# Patient Record
Sex: MALE | Race: WHITE | Employment: OTHER | URBAN - METROPOLITAN AREA
[De-identification: names, ages, dates, MRNs, and addresses within clinical notes are randomized per-mention and may not be internally consistent; named-entity substitution may affect disease eponyms.]

---

## 2019-03-25 ENCOUNTER — APPOINTMENT (OUTPATIENT)
Dept: ULTRASOUND IMAGING | Age: 54
End: 2019-03-25
Payer: MEDICARE

## 2019-03-25 ENCOUNTER — HOSPITAL ENCOUNTER (EMERGENCY)
Age: 54
Discharge: HOME | End: 2019-03-25
Payer: MEDICARE

## 2019-03-25 ENCOUNTER — APPOINTMENT (OUTPATIENT)
Dept: GENERAL RADIOLOGY | Age: 54
End: 2019-03-25
Payer: MEDICARE

## 2019-03-25 VITALS
HEIGHT: 72 IN | TEMPERATURE: 98.1 F | HEART RATE: 86 BPM | DIASTOLIC BLOOD PRESSURE: 94 MMHG | OXYGEN SATURATION: 98 % | BODY MASS INDEX: 33.18 KG/M2 | WEIGHT: 245 LBS | RESPIRATION RATE: 14 BRPM | SYSTOLIC BLOOD PRESSURE: 137 MMHG

## 2019-03-25 DIAGNOSIS — M79.604 BILATERAL LEG PAIN: Primary | ICD-10-CM

## 2019-03-25 DIAGNOSIS — M79.605 BILATERAL LEG PAIN: Primary | ICD-10-CM

## 2019-03-25 DIAGNOSIS — M54.50 ACUTE LOW BACK PAIN WITHOUT SCIATICA, UNSPECIFIED BACK PAIN LATERALITY: ICD-10-CM

## 2019-03-25 LAB
ALBUMIN SERPL-MCNC: 4.2 G/DL (ref 3.5–5.2)
ALP BLD-CCNC: 57 U/L (ref 40–129)
ALT SERPL-CCNC: 15 U/L (ref 0–40)
ANION GAP SERPL CALCULATED.3IONS-SCNC: 10 MMOL/L (ref 7–16)
AST SERPL-CCNC: 13 U/L (ref 0–39)
BILIRUB SERPL-MCNC: 0.3 MG/DL (ref 0–1.2)
BUN BLDV-MCNC: 19 MG/DL (ref 6–20)
CALCIUM SERPL-MCNC: 9.8 MG/DL (ref 8.6–10.2)
CHLORIDE BLD-SCNC: 99 MMOL/L (ref 98–107)
CO2: 29 MMOL/L (ref 22–29)
CREAT SERPL-MCNC: 1.1 MG/DL (ref 0.7–1.2)
GFR AFRICAN AMERICAN: >60
GFR NON-AFRICAN AMERICAN: >60 ML/MIN/1.73
GLUCOSE BLD-MCNC: 105 MG/DL (ref 74–99)
HCT VFR BLD CALC: 43.7 % (ref 37–54)
HEMOGLOBIN: 14.4 G/DL (ref 12.5–16.5)
MCH RBC QN AUTO: 31.6 PG (ref 26–35)
MCHC RBC AUTO-ENTMCNC: 33 % (ref 32–34.5)
MCV RBC AUTO: 96 FL (ref 80–99.9)
PDW BLD-RTO: 12.9 FL (ref 11.5–15)
PLATELET # BLD: 352 E9/L (ref 130–450)
PMV BLD AUTO: 9.7 FL (ref 7–12)
POTASSIUM SERPL-SCNC: 4.9 MMOL/L (ref 3.5–5)
RBC # BLD: 4.55 E12/L (ref 3.8–5.8)
SODIUM BLD-SCNC: 138 MMOL/L (ref 132–146)
TOTAL PROTEIN: 6.9 G/DL (ref 6.4–8.3)
WBC # BLD: 14.3 E9/L (ref 4.5–11.5)

## 2019-03-25 PROCEDURE — 72110 X-RAY EXAM L-2 SPINE 4/>VWS: CPT

## 2019-03-25 PROCEDURE — 99284 EMERGENCY DEPT VISIT MOD MDM: CPT

## 2019-03-25 PROCEDURE — 80053 COMPREHEN METABOLIC PANEL: CPT

## 2019-03-25 PROCEDURE — 36415 COLL VENOUS BLD VENIPUNCTURE: CPT

## 2019-03-25 PROCEDURE — 6370000000 HC RX 637 (ALT 250 FOR IP): Performed by: NURSE PRACTITIONER

## 2019-03-25 PROCEDURE — 85027 COMPLETE CBC AUTOMATED: CPT

## 2019-03-25 PROCEDURE — 73562 X-RAY EXAM OF KNEE 3: CPT

## 2019-03-25 PROCEDURE — 73564 X-RAY EXAM KNEE 4 OR MORE: CPT

## 2019-03-25 PROCEDURE — 93970 EXTREMITY STUDY: CPT

## 2019-03-25 RX ORDER — HYDROCODONE BITARTRATE AND ACETAMINOPHEN 5; 325 MG/1; MG/1
1 TABLET ORAL ONCE
Status: DISCONTINUED | OUTPATIENT
Start: 2019-03-25 | End: 2019-03-25

## 2019-03-25 RX ORDER — NAPROXEN 500 MG/1
500 TABLET ORAL 2 TIMES DAILY PRN
Qty: 28 TABLET | Refills: 0 | Status: SHIPPED | OUTPATIENT
Start: 2019-03-25 | End: 2019-04-16 | Stop reason: ALTCHOICE

## 2019-03-25 RX ORDER — HYDROCODONE BITARTRATE AND ACETAMINOPHEN 5; 325 MG/1; MG/1
1 TABLET ORAL ONCE
Status: COMPLETED | OUTPATIENT
Start: 2019-03-25 | End: 2019-03-25

## 2019-03-25 RX ADMIN — HYDROCODONE BITARTRATE AND ACETAMINOPHEN 1 TABLET: 5; 325 TABLET ORAL at 10:32

## 2019-03-25 ASSESSMENT — PAIN DESCRIPTION - ORIENTATION: ORIENTATION: LEFT;RIGHT

## 2019-03-25 ASSESSMENT — PAIN SCALES - GENERAL
PAINLEVEL_OUTOF10: 10
PAINLEVEL_OUTOF10: 10

## 2019-03-25 ASSESSMENT — PAIN DESCRIPTION - FREQUENCY: FREQUENCY: CONTINUOUS

## 2019-03-25 ASSESSMENT — PAIN DESCRIPTION - PROGRESSION: CLINICAL_PROGRESSION: GRADUALLY WORSENING

## 2019-03-25 ASSESSMENT — PAIN DESCRIPTION - DESCRIPTORS: DESCRIPTORS: ACHING

## 2019-03-25 ASSESSMENT — PAIN DESCRIPTION - PAIN TYPE: TYPE: ACUTE PAIN

## 2019-03-25 ASSESSMENT — PAIN DESCRIPTION - LOCATION: LOCATION: LEG

## 2019-03-28 ENCOUNTER — OFFICE VISIT (OUTPATIENT)
Dept: INTERNAL MEDICINE | Age: 54
End: 2019-03-28
Payer: MEDICARE

## 2019-03-28 ENCOUNTER — TELEPHONE (OUTPATIENT)
Dept: INTERNAL MEDICINE | Age: 54
End: 2019-03-28

## 2019-03-28 VITALS
RESPIRATION RATE: 18 BRPM | WEIGHT: 250 LBS | HEART RATE: 87 BPM | DIASTOLIC BLOOD PRESSURE: 78 MMHG | HEIGHT: 72 IN | BODY MASS INDEX: 33.86 KG/M2 | TEMPERATURE: 97.4 F | SYSTOLIC BLOOD PRESSURE: 130 MMHG

## 2019-03-28 DIAGNOSIS — J42 CHRONIC BRONCHITIS, UNSPECIFIED CHRONIC BRONCHITIS TYPE (HCC): ICD-10-CM

## 2019-03-28 DIAGNOSIS — M25.461 KNEE EFFUSION, RIGHT: Primary | ICD-10-CM

## 2019-03-28 PROCEDURE — G8417 CALC BMI ABV UP PARAM F/U: HCPCS | Performed by: INTERNAL MEDICINE

## 2019-03-28 PROCEDURE — G8427 DOCREV CUR MEDS BY ELIG CLIN: HCPCS | Performed by: INTERNAL MEDICINE

## 2019-03-28 PROCEDURE — 99213 OFFICE O/P EST LOW 20 MIN: CPT | Performed by: INTERNAL MEDICINE

## 2019-03-28 PROCEDURE — 99203 OFFICE O/P NEW LOW 30 MIN: CPT | Performed by: INTERNAL MEDICINE

## 2019-03-28 PROCEDURE — 3017F COLORECTAL CA SCREEN DOC REV: CPT | Performed by: INTERNAL MEDICINE

## 2019-03-28 PROCEDURE — G8484 FLU IMMUNIZE NO ADMIN: HCPCS | Performed by: INTERNAL MEDICINE

## 2019-03-28 PROCEDURE — G8926 SPIRO NO PERF OR DOC: HCPCS | Performed by: INTERNAL MEDICINE

## 2019-03-28 PROCEDURE — 4004F PT TOBACCO SCREEN RCVD TLK: CPT | Performed by: INTERNAL MEDICINE

## 2019-03-28 PROCEDURE — 3023F SPIROM DOC REV: CPT | Performed by: INTERNAL MEDICINE

## 2019-03-28 RX ORDER — COLCHICINE 0.6 MG/1
0.6 TABLET ORAL 2 TIMES DAILY
Qty: 28 TABLET | Refills: 0 | Status: SHIPPED | OUTPATIENT
Start: 2019-03-28 | End: 2019-04-25 | Stop reason: CLARIF

## 2019-03-28 RX ORDER — TRAZODONE HYDROCHLORIDE 100 MG/1
100 TABLET ORAL NIGHTLY
COMMUNITY
End: 2019-08-07 | Stop reason: SDUPTHER

## 2019-03-28 RX ORDER — ALBUTEROL SULFATE 90 UG/1
1-2 AEROSOL, METERED RESPIRATORY (INHALATION) 2 TIMES DAILY
Status: ON HOLD | COMMUNITY
Start: 2014-10-14 | End: 2020-03-11 | Stop reason: SDUPTHER

## 2019-03-28 RX ORDER — DULOXETIN HYDROCHLORIDE 60 MG/1
60 CAPSULE, DELAYED RELEASE ORAL DAILY
COMMUNITY
End: 2019-08-07 | Stop reason: SDUPTHER

## 2019-03-28 RX ORDER — GABAPENTIN 400 MG/1
400 CAPSULE ORAL 3 TIMES DAILY
COMMUNITY
End: 2019-05-13 | Stop reason: DRUGHIGH

## 2019-03-28 ASSESSMENT — PATIENT HEALTH QUESTIONNAIRE - PHQ9
SUM OF ALL RESPONSES TO PHQ9 QUESTIONS 1 & 2: 0
SUM OF ALL RESPONSES TO PHQ QUESTIONS 1-9: 0
2. FEELING DOWN, DEPRESSED OR HOPELESS: 0
SUM OF ALL RESPONSES TO PHQ QUESTIONS 1-9: 0
1. LITTLE INTEREST OR PLEASURE IN DOING THINGS: 0

## 2019-04-02 ENCOUNTER — OFFICE VISIT (OUTPATIENT)
Dept: ORTHOPEDIC SURGERY | Age: 54
End: 2019-04-02
Payer: MEDICARE

## 2019-04-02 VITALS
SYSTOLIC BLOOD PRESSURE: 124 MMHG | HEIGHT: 72 IN | TEMPERATURE: 97.1 F | BODY MASS INDEX: 33.18 KG/M2 | WEIGHT: 245 LBS | HEART RATE: 82 BPM | DIASTOLIC BLOOD PRESSURE: 88 MMHG

## 2019-04-02 DIAGNOSIS — M25.562 CHRONIC PAIN OF BOTH KNEES: Primary | ICD-10-CM

## 2019-04-02 DIAGNOSIS — M25.561 CHRONIC PAIN OF BOTH KNEES: Primary | ICD-10-CM

## 2019-04-02 DIAGNOSIS — M48.062 SPINAL STENOSIS OF LUMBAR REGION WITH NEUROGENIC CLAUDICATION: ICD-10-CM

## 2019-04-02 DIAGNOSIS — G89.29 CHRONIC PAIN OF BOTH KNEES: Primary | ICD-10-CM

## 2019-04-02 PROCEDURE — 4004F PT TOBACCO SCREEN RCVD TLK: CPT | Performed by: ORTHOPAEDIC SURGERY

## 2019-04-02 PROCEDURE — G8427 DOCREV CUR MEDS BY ELIG CLIN: HCPCS | Performed by: ORTHOPAEDIC SURGERY

## 2019-04-02 PROCEDURE — 3017F COLORECTAL CA SCREEN DOC REV: CPT | Performed by: ORTHOPAEDIC SURGERY

## 2019-04-02 PROCEDURE — 99203 OFFICE O/P NEW LOW 30 MIN: CPT | Performed by: ORTHOPAEDIC SURGERY

## 2019-04-02 PROCEDURE — G8417 CALC BMI ABV UP PARAM F/U: HCPCS | Performed by: ORTHOPAEDIC SURGERY

## 2019-04-02 NOTE — PROGRESS NOTES
Chief Complaint:   Chief Complaint   Patient presents with    Knee Pain     Bilateral knee pain, right knee is swollen and painful, pt. states its worse than left knee. Pt. states this pain started 3 wks ago. Xrays 3/25/19 [facility].  Leg Pain     Bilateral lower leg pain, sometimes travels into thighs x 3 wks. No xrays. HPI     Veronica Dai is a 48 y.o. male, who presents with a few week history of nonspecific bilateral knee pain predominantly anterior compartment associated with some crepitus on the right, no history of injury. He also complains of radicular type symptoms from the low back down the lateral and posterior aspect of the upper leg into the posterior aspect of the lower leg. The symptoms are aggravated especially by standing and walking. Progressive knee pain interfering with his abilities especially on stairs and transfers. Patient does have history of some type of chronic back problems with \"a ruptured disc\". He recently moved to this area from [place] he is a former  on custodial disability due to history of kidney and lung cancer, has undergone nephrectomy and partial pneumonectomy for this in the past. He does continue to smoke. Activity levels are sedentary. No fever chills sweats or other constitutional symptoms of weight loss malaise or anorexia. Allergies; medications; past medical, surgical, family, and social history; and problem list have been reviewed today and updated as indicated in this encounter - see below following Ortho specifics. Musculoskeletal: Upper extremities are grossly intact, leg lengths are equal and hip motion is painless bilaterally. Bilateral knees show no deformity, there is anterior compartment synovitis and maybe a trace benign effusion in the right knee with minimal retropatellar crepitus, no mediolateral or AP laxity, negative Rosa Maria's minimal joint line pain or tenderness.  Straight leg raising is grossly positive bilaterally for sciatic type symptoms down the back of both legs into the feet. No objective motor sensory deficits, quadriceps reflexes are brisk bilaterally, Achilles reflexes absent bilaterally. Radiologic Studies: Weightbearing x-rays of the knees were obtained today, joint spaces are preserved there is no deformity sclerosis osteophyte formation, these are interpreted as normal.    ASSESSMENT/PLAN:    Yocasta was seen today for knee pain and leg pain. Diagnoses and all orders for this visit:    Chronic pain of both knees  -     XR Knee Bilateral Standing    Spinal stenosis of lumbar region with neurogenic claudication  -     Ambulatory referral to [address]     Treatment alternatives were reviewed including medical and physical therapies, injections, and surgical options, expected risks benefits and likely outcome of each were discussed in detail. My clinical impression is probably spinal stenosis with sciatic symptoms, I find no structural indication for problems at the knees. I think his knee pain is most likely functional nature related to his deconditioning and probable spinal stenosis. He was referred for evaluation in this regard by physiatry questions were asked and answered he will follow-up here if needed. Return if symptoms worsen or fail to improve. Chris Benitez MD    4/2/2019  4:06 PM      There is no problem list on file for this patient.       Past Medical History:   Diagnosis Date    Difficulty sleeping        Past Surgical History:   Procedure Laterality Date    LUNG SURGERY Right     part of the right     TOTAL NEPHRECTOMY Right        Current Outpatient Medications   Medication Sig Dispense Refill    albuterol sulfate HFA (PROVENTIL HFA) 108 (90 Base) MCG/ACT inhaler Inhale 1-2 puffs into the lungs      fluticasone-salmeterol (ADVAIR DISKUS) 250-50 MCG/DOSE AEPB Inhale into the lungs      gabapentin (NEURONTIN) 400 MG capsule Take 400 mg by mouth 3 times daily.      DULoxetine (CYMBALTA) 60 MG extended release capsule Take 60 mg by mouth daily      traZODone (DESYREL) 100 MG tablet Take 100 mg by mouth nightly      colchicine (COLCRYS) 0.6 MG tablet Take 1 tablet by mouth 2 times daily for 14 days 28 tablet 0    naproxen (NAPROSYN) 500 MG tablet Take 1 tablet by mouth 2 times daily as needed for Pain 28 tablet 0     No current facility-administered medications for this visit. No Known Allergies    Social History     Socioeconomic History    Marital status: Single     Spouse name: None    Number of children: None    Years of education: None    Highest education level: None   Occupational History    None   Social Needs    Financial resource strain: None    Food insecurity:     Worry: None     Inability: None    Transportation needs:     Medical: None     Non-medical: None   Tobacco Use    Smoking status: Current Every Day Smoker     Packs/day: 1.00     Types: Cigarettes    Smokeless tobacco: Never Used   Substance and Sexual Activity    Alcohol use: Not Currently    Drug use: Never    Sexual activity: None   Lifestyle    Physical activity:     Days per week: None     Minutes per session: None    Stress: None   Relationships    Social connections:     Talks on phone: None     Gets together: None     Attends Shinto service: None     Active member of club or organization: None     Attends meetings of clubs or organizations: None     Relationship status: None    Intimate partner violence:     Fear of current or ex partner: None     Emotionally abused: None     Physically abused: None     Forced sexual activity: None   Other Topics Concern    None   Social History Narrative    None       No family history on file. Review of Systems  As follows except as previously noted in HPI:  Constitutional: Negative for chills, diaphoresis, fatigue, fever and unexpected weight change.    Respiratory: Negative for cough, shortness of breath and wheezing. Cardiovascular: Negative for chest pain and palpitations. Neurological: Negative for dizziness, syncope, cephalgia. GI / : negative  Musculoskeletal: see HPI       Objective:   Physical Exam   Constitutional: Oriented to person, place, and time. and appears well-developed and well-nourished. :   Head: Normocephalic and atraumatic. Eyes: EOM are normal.   Neck: Neck supple. Cardiovascular: Normal rate and regular rhythm. Pulmonary/Chest: Effort normal. No stridor. No respiratory distress, no wheezes. Abdominal:  No abnormal distension. Neurological: Alert and oriented to person, place, and time. Skin: Skin is warm and dry. Psychiatric: Normal mood and affect.  Behavior is normal. Thought content normal.

## 2019-04-16 ENCOUNTER — OFFICE VISIT (OUTPATIENT)
Dept: INTERNAL MEDICINE | Age: 54
End: 2019-04-16
Payer: MEDICARE

## 2019-04-16 VITALS
HEIGHT: 72 IN | BODY MASS INDEX: 33.18 KG/M2 | DIASTOLIC BLOOD PRESSURE: 76 MMHG | TEMPERATURE: 98 F | SYSTOLIC BLOOD PRESSURE: 120 MMHG | RESPIRATION RATE: 16 BRPM | HEART RATE: 90 BPM | WEIGHT: 245 LBS

## 2019-04-16 DIAGNOSIS — M25.562 BILATERAL CHRONIC KNEE PAIN: Primary | ICD-10-CM

## 2019-04-16 DIAGNOSIS — M48.062 SPINAL STENOSIS OF LUMBAR REGION WITH NEUROGENIC CLAUDICATION: ICD-10-CM

## 2019-04-16 DIAGNOSIS — G89.29 BILATERAL CHRONIC KNEE PAIN: Primary | ICD-10-CM

## 2019-04-16 DIAGNOSIS — M25.561 BILATERAL CHRONIC KNEE PAIN: Primary | ICD-10-CM

## 2019-04-16 PROCEDURE — 99213 OFFICE O/P EST LOW 20 MIN: CPT | Performed by: INTERNAL MEDICINE

## 2019-04-16 PROCEDURE — G8417 CALC BMI ABV UP PARAM F/U: HCPCS | Performed by: INTERNAL MEDICINE

## 2019-04-16 PROCEDURE — 99212 OFFICE O/P EST SF 10 MIN: CPT | Performed by: INTERNAL MEDICINE

## 2019-04-16 PROCEDURE — G8427 DOCREV CUR MEDS BY ELIG CLIN: HCPCS | Performed by: INTERNAL MEDICINE

## 2019-04-16 PROCEDURE — 3017F COLORECTAL CA SCREEN DOC REV: CPT | Performed by: INTERNAL MEDICINE

## 2019-04-16 PROCEDURE — 4004F PT TOBACCO SCREEN RCVD TLK: CPT | Performed by: INTERNAL MEDICINE

## 2019-04-16 RX ORDER — HYDROCORTISONE 0.5 %
CREAM (GRAM) TOPICAL 3 TIMES DAILY
Qty: 2 BOTTLE | Refills: 2 | Status: SHIPPED | OUTPATIENT
Start: 2019-04-16 | End: 2019-10-16

## 2019-04-16 RX ORDER — IBUPROFEN 600 MG/1
600 TABLET ORAL 4 TIMES DAILY PRN
Qty: 120 TABLET | Refills: 2 | Status: SHIPPED | OUTPATIENT
Start: 2019-04-16 | End: 2019-08-07 | Stop reason: SDUPTHER

## 2019-04-16 RX ORDER — OMEPRAZOLE 40 MG/1
CAPSULE, DELAYED RELEASE ORAL
Refills: 0 | COMMUNITY
Start: 2019-04-01 | End: 2019-08-07 | Stop reason: SDUPTHER

## 2019-04-16 ASSESSMENT — ENCOUNTER SYMPTOMS
WHEEZING: 0
VOMITING: 0
EYE DISCHARGE: 0
STRIDOR: 0
APNEA: 0
ANAL BLEEDING: 0
ABDOMINAL PAIN: 0
EYE ITCHING: 0
CHEST TIGHTNESS: 0
NAUSEA: 0
ABDOMINAL DISTENTION: 0
SINUS PRESSURE: 0
SINUS PAIN: 0
EYE PAIN: 0
RHINORRHEA: 0
BACK PAIN: 1
COUGH: 0
SORE THROAT: 0
CONSTIPATION: 0
SHORTNESS OF BREATH: 0
CHOKING: 0
DIARRHEA: 0
EYE REDNESS: 0

## 2019-04-16 NOTE — PROGRESS NOTES
Medical record release signed for [facility] in [facility] [place] [facility]  Discharge instructions given. Patient verbalizes understanding.

## 2019-04-16 NOTE — PROGRESS NOTES
[facility]  InternalMedicine Residency Program  ACC Note      SUBJECTIVE:  CC: had concerns including Knee Pain (bilateral); Leg Swelling; Difficulty Walking; and Nicotine Dependence. HPI:Yocasta Ardon presented to the [facility] for a 2 week follow up visit. He was here 2 weeks ago as an ER follow up. He was seen in the ER for bilateral leg pain of a few weeks duration. Today, he is still complaining of b/l knee pain, occasional swelling, worse on the R. Pain is 10.5/10; aching, constant. He at times feels the pain originates at the back. Associated difficulty with ambulation, with occasional knee knocking. States Appt with PMR is in July. Naproxen does not help. Per wife, the family did some moving and heavy lifting about 6 weeks ago Prior to pain onset      Bilateral knee pain right >> left  - He had a negative lower extremity US, XR negative for any acute pathology. His LS spine XR reveals age-indeterminate anterior wedging/compression deformities at T11, T12 and L1 with loss of anterior vertebral body height ranging from 5-10%. - Remote history of Gout  - Orthopedic referral -  My clinical impression is probably spinal stenosis with sciatic symptoms, I find no structural indication for problems at the knees. I think his knee pain is most likely functional nature related to his deconditioning and probable spinal stenosis. Return if symptoms worsen or fail to improve. He was referred to PM&R, however, appointment is in July. - Per patient, the pain is unbearable  - patient already on Gabapentin    COPD  - He has a history of COPD- no PFTS on file  - had R partial pneumonectomy for lung cancer about 5 years ago,  -  He is a current smoker.  He has about 80PY smoking history and still smokes a pack per day currently (cut down from 2 packs per day)  - Will obtain medical records  - Referral to pulmonology - appt in August  -On Albuterol and Advair    Renal Cancer  - also possible hx of renal cell cancer with right nephrectomy in 2005. - Will obtain medical records    T11-T12 wedge compression fracture  - Need DEXA scan in future appt   - Claims he had MRI in the past      Review of Systems   Constitutional: Negative for activity change, appetite change, chills, diaphoresis, fatigue and fever. HENT: Negative for congestion, nosebleeds, postnasal drip, rhinorrhea, sinus pressure, sinus pain, sneezing, sore throat and tinnitus. Eyes: Negative for pain, discharge, redness and itching. Respiratory: Negative for apnea, cough, choking, chest tightness, shortness of breath, wheezing and stridor. Cardiovascular: Negative for chest pain, palpitations and leg swelling. Gastrointestinal: Negative for abdominal distention, abdominal pain, anal bleeding, constipation, diarrhea, nausea and vomiting. Endocrine: Negative for cold intolerance and heat intolerance. Genitourinary: Negative for difficulty urinating, dysuria, enuresis, flank pain, frequency and genital sores. Musculoskeletal: Positive for arthralgias, back pain and joint swelling. Negative for gait problem, myalgias and neck pain. Difficulty with ambulation   Skin: Negative for rash. Allergic/Immunologic: Negative for environmental allergies. Neurological: Negative for headaches. Hematological: Does not bruise/bleed easily. Psychiatric/Behavioral: Negative for suicidal ideas.        Outpatient Medications Marked as Taking for the 4/16/19 encounter (Office Visit) with Wally Martinez MD   Medication Sig Dispense Refill    ibuprofen (ADVIL;MOTRIN) 600 MG tablet Take 1 tablet by mouth 4 times daily as needed for Pain Please take with meals 120 tablet 2    methyl salicylate-menthol (RODNEY GARCIA GREASELESS) 10-15 % CREA Apply topically three times daily 2 Bottle 2    albuterol sulfate HFA (PROVENTIL HFA) 108 (90 Base) MCG/ACT inhaler Inhale 1-2 puffs into the lungs      fluticasone-salmeterol (ADVAIR DISKUS) 250-50 affect. His behavior is normal. Judgment and thought content normal.     PLAN:  1. Spinal stenosis of lumbar region with neurogenic claudication  - ibuprofen (ADVIL;MOTRIN) 600 MG tablet; Take 1 tablet by mouth 4 times daily as needed for Pain Please take with meals  Dispense: 120 tablet; Refill: 2  [facility], [address] [place] [address] records - patient may need repeat MRI  - PM&R follow up in July    2. Bilateral chronic knee pain  - ibuprofen (ADVIL;MOTRIN) 600 MG tablet; Take 1 tablet by mouth 4 times daily as needed for Pain Please take with meals  Dispense: 120 tablet; Refill: 2  - Barry Lazo MD, Pain Medicine, [facility] - Physical Therapy, [address]   - methyl salicylate-menthol (RODNEY GARCIA GREASELESS) 10-15 % CREA; Apply topically three times daily  Dispense: 2 Bottle;  Refill: 2  - Switch antinflammatory - Naproxen changed to Ibuprofen  - Stop colchicine    RTC in one month  I have reviewed my findings and recommendations with Louie Talbot and Dr Michael Robertson MD PGY-1   4/16/2019 9:00 AM

## 2019-04-16 NOTE — PATIENT INSTRUCTIONS
Thank you for coming to your follow up appointment   Please take your medications as directed and keep your follow up appointment  Call our office if you have any questions or concerns at [phone]    Referrals have been made to  Physical Therapy and pain medicine. If you do not hear from the office in 1 week, please call the number listed.     Maria Guadalupe Chowdhury MD

## 2019-04-17 ENCOUNTER — TELEPHONE (OUTPATIENT)
Dept: INTERNAL MEDICINE | Age: 54
End: 2019-04-17

## 2019-04-19 ENCOUNTER — HOSPITAL ENCOUNTER (EMERGENCY)
Age: 54
Discharge: HOME | End: 2019-04-19
Payer: MEDICARE

## 2019-04-19 VITALS
BODY MASS INDEX: 33.59 KG/M2 | HEART RATE: 98 BPM | WEIGHT: 248 LBS | DIASTOLIC BLOOD PRESSURE: 84 MMHG | RESPIRATION RATE: 16 BRPM | SYSTOLIC BLOOD PRESSURE: 136 MMHG | HEIGHT: 72 IN | TEMPERATURE: 97.6 F | OXYGEN SATURATION: 100 %

## 2019-04-19 DIAGNOSIS — G89.29 ACUTE EXACERBATION OF CHRONIC LOW BACK PAIN: Primary | ICD-10-CM

## 2019-04-19 DIAGNOSIS — M54.50 ACUTE EXACERBATION OF CHRONIC LOW BACK PAIN: Primary | ICD-10-CM

## 2019-04-19 PROCEDURE — 6370000000 HC RX 637 (ALT 250 FOR IP): Performed by: NURSE PRACTITIONER

## 2019-04-19 PROCEDURE — 96372 THER/PROPH/DIAG INJ SC/IM: CPT

## 2019-04-19 PROCEDURE — 99282 EMERGENCY DEPT VISIT SF MDM: CPT

## 2019-04-19 PROCEDURE — 6360000002 HC RX W HCPCS: Performed by: NURSE PRACTITIONER

## 2019-04-19 RX ORDER — DEXAMETHASONE SODIUM PHOSPHATE 10 MG/ML
8 INJECTION INTRAMUSCULAR; INTRAVENOUS ONCE
Status: COMPLETED | OUTPATIENT
Start: 2019-04-19 | End: 2019-04-19

## 2019-04-19 RX ORDER — KETOROLAC TROMETHAMINE 30 MG/ML
30 INJECTION, SOLUTION INTRAMUSCULAR; INTRAVENOUS ONCE
Status: COMPLETED | OUTPATIENT
Start: 2019-04-19 | End: 2019-04-19

## 2019-04-19 RX ORDER — CYCLOBENZAPRINE HCL 10 MG
10 TABLET ORAL ONCE
Status: COMPLETED | OUTPATIENT
Start: 2019-04-19 | End: 2019-04-19

## 2019-04-19 RX ORDER — HYDROCODONE BITARTRATE AND ACETAMINOPHEN 5; 325 MG/1; MG/1
1 TABLET ORAL EVERY 6 HOURS PRN
Qty: 12 TABLET | Refills: 0 | Status: SHIPPED | OUTPATIENT
Start: 2019-04-19 | End: 2019-04-22

## 2019-04-19 RX ORDER — METHYLPREDNISOLONE 4 MG/1
TABLET ORAL
Qty: 1 KIT | Refills: 0 | Status: SHIPPED | OUTPATIENT
Start: 2019-04-19 | End: 2019-04-25 | Stop reason: CLARIF

## 2019-04-19 RX ORDER — CYCLOBENZAPRINE HCL 5 MG
5 TABLET ORAL 3 TIMES DAILY PRN
Qty: 12 TABLET | Refills: 0 | Status: SHIPPED | OUTPATIENT
Start: 2019-04-19 | End: 2019-04-25 | Stop reason: SDUPTHER

## 2019-04-19 RX ADMIN — CYCLOBENZAPRINE HYDROCHLORIDE 10 MG: 10 TABLET, FILM COATED ORAL at 10:12

## 2019-04-19 RX ADMIN — KETOROLAC TROMETHAMINE 30 MG: 30 INJECTION, SOLUTION INTRAMUSCULAR; INTRAVENOUS at 10:12

## 2019-04-19 RX ADMIN — DEXAMETHASONE SODIUM PHOSPHATE 8 MG: 10 INJECTION INTRAMUSCULAR; INTRAVENOUS at 10:11

## 2019-04-19 ASSESSMENT — PAIN SCALES - GENERAL
PAINLEVEL_OUTOF10: 10
PAINLEVEL_OUTOF10: 10

## 2019-04-19 ASSESSMENT — PAIN DESCRIPTION - PAIN TYPE: TYPE: ACUTE PAIN

## 2019-04-19 ASSESSMENT — PAIN DESCRIPTION - ORIENTATION: ORIENTATION: RIGHT

## 2019-04-19 ASSESSMENT — PAIN DESCRIPTION - LOCATION: LOCATION: LEG;HIP

## 2019-04-19 ASSESSMENT — PAIN DESCRIPTION - FREQUENCY: FREQUENCY: CONTINUOUS

## 2019-04-19 ASSESSMENT — PAIN DESCRIPTION - DESCRIPTORS: DESCRIPTORS: ACHING

## 2019-04-20 NOTE — ED PROVIDER NOTES
[facility]     Department of Emergency Medicine   ED  Provider Note  Admit Date/RoomTime: 4/19/2019  9:31 AM  ED Room: /[facility]-2  Chief Complaint   Leg Swelling (from hips down, reports pain and swelling ongoing for weeks, getting worse, denies injury)    History of Present Illness   Source of history provided by:  patient. History/Exam Limitations: none. Catalino Simms is a 48 y.o. old male with a prior history of recurrent self limited episodes of low back pain in the past, previous osteoarthritis of lumbar spine and previous herniated disc, presents to the emergency department by private vehicle, for acute-on-chronic, aching bilateral lower lumbar spine pain with radiation, to both thighs, for several month(s) prior to arrival.  The original pain was caused by twisting. There has been no history of recent injury. Since onset the symptoms have been intermittent and mild in severity. He denies any of the following: bladder incontinence, bladder urgency, bowel incontinence, bowel urgency or sacral numbness. Associated Signs & Symptoms: none. The pain is aggraveated by movement and relieved by nothing. There has been no abdominal pain, cramping, vomiting, diarrhea, fever, chills, sweats, headache or arthralgias. He is not enrolled in pain management program.since states that he is seeing his primary care physician multiple times for his back pain. He states that he is scheduled for pain management in one week and also to follow-up with physical therapy. Patient states that he would like something for pain while he waits to follow up. He denies any new injury or trauma he denies any chest pain shortness of breath or loss of bowel or bladder function. .  ROS    Pertinent positives and negatives are stated within HPI, all other systems reviewed and are negative. Past Surgical History:  has a past surgical history that includes Lung surgery (Right) and total nephrectomy (Right).   Social History:  reports that he has been smoking cigarettes. He has a 40.00 pack-year smoking history. He has never used smokeless tobacco. He reports that he drank alcohol. He reports that he does not use drugs. Family History: family history is not on file. Allergies: Patient has no known allergies. Physical Exam           ED Triage Vitals   BP Temp Temp Source Pulse Resp SpO2 Height Weight   04/19/19 0927 04/19/19 0920 04/19/19 0920 04/19/19 0920 04/19/19 0927 04/19/19 0920 04/19/19 0927 04/19/19 0927   (!) 125/106 97.6 °F (36.4 °C) Temporal 98 16 100 % 6' (1.829 m) 248 lb (112.5 kg)      Oxygen Saturation Interpretation: Normal.    Constitutional:  Alert, development consistent with age. HEENT:  NC/NT. Airway patent. Neck:  Normal ROM. Supple. Respiratory:  Clear to auscultation and breath sounds equal.  CV:  Regular rate and rhythm, normal heart sounds, without pathological murmurs, ectopy, gallops, or rubs. GI:  Abdomen Soft, nontender, good bowel sounds. No firm or pulsatile mass. Back: lower lumbar spine bilateral.             Tenderness: Mild. Swelling: no.              Range of Motion: full range with pain. CVA Tenderness: No.            Straight leg raising:  Bilateral negative. Skin:  no erythema, rash or swelling noted. Distal Function:              Motor deficit: none. Sensory deficit: none. Pulse deficit: none. Calf Tenderness:  No Bilateral.               Edema:  none Both lower extremity(s). Reflexes: Bilateral knee,ankle,biceps normal.  Gait:  normal.  Integument:  Normal turgor. Warm, dry, without visible rash. Lymphatics: No lymphangitis or adenopathy noted. Neurological:  Oriented. Motor functions intact. Lab / Imaging Results   (All laboratory and radiology results have been personally reviewed by myself)  Labs:  No results found for this visit on 04/19/19. Imaging:   All Radiology results interpreted by Radiologist unless otherwise noted. No orders to display       ED Course / Medical Decision Making     Medications   dexamethasone (DECADRON) injection 8 mg (8 mg Intramuscular Given 4/19/19 1011)   cyclobenzaprine (FLEXERIL) tablet 10 mg (10 mg Oral Given 4/19/19 1012)   ketorolac (TORADOL) injection 30 mg (30 mg Intramuscular Given 4/19/19 1012)            Consult(s):   None    Procedure(s):   none    Medical Decision Making/Counseling:    *At this time the patient is without objective evidence of an acute process requiring inpatient management. The emergency provider has spoken with the patient and discussed todays emergency visit, in addition to providing specific details for the plan of care and counseling regarding the diagnosis and prognosis. He was counseled on the role of the emergency department regarding prescribing medications for chronic conditions including Narcotic and other controlled substances. Based on the presenting complaint and nature of illness, the requested medications will  be provided today in prescription form and he is instructed to contact their provider for supplemental medications as soon as possible. Questions are answered at this time and they are agreeable with the plan. Assessment      1. Acute exacerbation of chronic low back pain      Plan   Discharge to home  Patient condition is good    New Medications     Discharge Medication List as of 4/19/2019 10:35 AM      START taking these medications    Details   cyclobenzaprine (FLEXERIL) 5 MG tablet Take 1 tablet by mouth 3 times daily as needed for Muscle spasms, Disp-12 tablet, R-0Print      methylPREDNISolone (MEDROL, SHANI,) 4 MG tablet Take by mouth., Disp-1 kit, R-0Print      HYDROcodone-acetaminophen (NORCO) 5-325 MG per tablet Take 1 tablet by mouth every 6 hours as needed for Pain for up to 3 days. , Disp-12 tablet, R-0Print           Electronically signed by STEFANIA Zepeda CNP   DD: 4/20/19  **This report was transcribed using voice recognition software. Every effort was made to ensure accuracy; however, inadvertent computerized transcription errors may be present.   END OF ED PROVIDER NOTE      STEFANIA Multani - CNP  04/20/19 1645

## 2019-04-23 ENCOUNTER — TELEPHONE (OUTPATIENT)
Dept: INTERNAL MEDICINE | Age: 54
End: 2019-04-23

## 2019-04-23 NOTE — TELEPHONE ENCOUNTER
Romayne Brazen from [facility] [phone] # [phone] called in at 3:15. She got a referral from us but the patient already goes to PMR. Do you feel it necessary for patient to do both? Romayne Brazen thinks that is repetitive? She can be reached at the above number.

## 2019-04-24 ENCOUNTER — TELEPHONE (OUTPATIENT)
Dept: INTERNAL MEDICINE | Age: 54
End: 2019-04-24

## 2019-04-24 NOTE — TELEPHONE ENCOUNTER
Called carlos at pain management,  last note states patient to be scheduled for PT,Pain Management and that PMR appointment pending. Socorro Plan states will schedule appointment for pain management.

## 2019-04-24 NOTE — TELEPHONE ENCOUNTER
4/17/19 called PMR  ,was able to schedule patient with Dr.Masternick angulo on 4/25/19 at 1pm  Patient notified, office number and address given to patient.

## 2019-04-25 ENCOUNTER — OFFICE VISIT (OUTPATIENT)
Dept: PHYSICAL MEDICINE AND REHAB | Age: 54
End: 2019-04-25
Payer: MEDICARE

## 2019-04-25 VITALS
HEART RATE: 80 BPM | SYSTOLIC BLOOD PRESSURE: 128 MMHG | WEIGHT: 245 LBS | RESPIRATION RATE: 16 BRPM | BODY MASS INDEX: 33.18 KG/M2 | DIASTOLIC BLOOD PRESSURE: 80 MMHG | HEIGHT: 72 IN

## 2019-04-25 DIAGNOSIS — M54.41 ACUTE BILATERAL LOW BACK PAIN WITH BILATERAL SCIATICA: Primary | ICD-10-CM

## 2019-04-25 DIAGNOSIS — M54.42 ACUTE BILATERAL LOW BACK PAIN WITH BILATERAL SCIATICA: Primary | ICD-10-CM

## 2019-04-25 PROCEDURE — 3017F COLORECTAL CA SCREEN DOC REV: CPT | Performed by: PHYSICAL MEDICINE & REHABILITATION

## 2019-04-25 PROCEDURE — 4004F PT TOBACCO SCREEN RCVD TLK: CPT | Performed by: PHYSICAL MEDICINE & REHABILITATION

## 2019-04-25 PROCEDURE — G8427 DOCREV CUR MEDS BY ELIG CLIN: HCPCS | Performed by: PHYSICAL MEDICINE & REHABILITATION

## 2019-04-25 PROCEDURE — G8417 CALC BMI ABV UP PARAM F/U: HCPCS | Performed by: PHYSICAL MEDICINE & REHABILITATION

## 2019-04-25 PROCEDURE — 99204 OFFICE O/P NEW MOD 45 MIN: CPT | Performed by: PHYSICAL MEDICINE & REHABILITATION

## 2019-04-25 RX ORDER — CYCLOBENZAPRINE HCL 5 MG
5-10 TABLET ORAL 3 TIMES DAILY PRN
Qty: 90 TABLET | Refills: 1 | Status: SHIPPED | OUTPATIENT
Start: 2019-04-25 | End: 2019-08-07 | Stop reason: ALTCHOICE

## 2019-04-25 RX ORDER — HYDROCODONE BITARTRATE AND ACETAMINOPHEN 5; 325 MG/1; MG/1
1 TABLET ORAL EVERY 8 HOURS PRN
Qty: 21 TABLET | Refills: 0 | Status: SHIPPED | OUTPATIENT
Start: 2019-04-25 | End: 2019-05-02

## 2019-04-25 NOTE — PROGRESS NOTES
Titifreddysilvio Gooden, [address] [facility] Physical Medicine [facility] Rehabilitation  [address] [facility] [place]  Phone: [phone]  Fax: [phone]    PCP: Yanci Dash MD  Date of visit: 4/25/19    Chief Complaint   Patient presents with    New Patient     Referred by Dr. Howie Eller for spinal stenosis    Back Pain     Started 7 weeks ago with no known etiology. Radiates down both legs and knees swell       Dear Dr. Howie Eller,     Thank you for referring your patient to be seen. As you know,  Charlann Dance is a 48 y.o. male with past medical history as below who presents with low back and leg pain for 7 week(s). There was a sudden onset of pain after no known injury. Now, the pain is constant and occurs daily. The pain is rated Pain Score:  10 - Worst pain ever, is described as aching, and is located in the low back with radiation to the legs and thighs are very achy. The symptoms have been worse since onset. The pain is better with rest.  The pain is worse with activity. There is associated numbness/tingling in legs. There is weakness. There is no bowel/bladder changes. The prior workup has included: Xray    The prior treatment has included:  PT: scheduled to start tomorrow   Chiropractic: none    Modalities: none   OTC Tylenol: yes with relief   NSAIDS: only has one kidney. ibupfrofen 600mg  Medrol dose lawrence with relief   Opioids: norco with relief   Membrane stabilizers: neurontin 400mg TID  cymbalta currently   Muscle relaxers: flexeril with relief   Previous injections: none   Previous surgery at this site: none       No Known Allergies    Current Outpatient Medications   Medication Sig Dispense Refill    cyclobenzaprine (FLEXERIL) 5 MG tablet Take 1-2 tablets by mouth 3 times daily as needed for Muscle spasms 90 tablet 1    HYDROcodone-acetaminophen (NORCO) 5-325 MG per tablet Take 1 tablet by mouth every 8 hours as needed for Pain for up to 7 days.  Intended supply: 7 days. Take lowest dose possible to manage pain 21 tablet 0    omeprazole (PRILOSEC) 40 MG delayed release capsule   0    ibuprofen (ADVIL;MOTRIN) 600 MG tablet Take 1 tablet by mouth 4 times daily as needed for Pain Please take with meals 120 tablet 2    methyl salicylate-menthol (RODNEY GARCIA GREASELESS) 10-15 % CREA Apply topically three times daily 2 Bottle 2    albuterol sulfate HFA (PROVENTIL HFA) 108 (90 Base) MCG/ACT inhaler Inhale 1-2 puffs into the lungs      fluticasone-salmeterol (ADVAIR DISKUS) 250-50 MCG/DOSE AEPB Inhale into the lungs      gabapentin (NEURONTIN) 400 MG capsule Take 400 mg by mouth 3 times daily.  DULoxetine (CYMBALTA) 60 MG extended release capsule Take 60 mg by mouth daily      traZODone (DESYREL) 100 MG tablet Take 100 mg by mouth nightly       No current facility-administered medications for this visit. Past Medical History:   Diagnosis Date    Difficulty sleeping        Past Surgical History:   Procedure Laterality Date    LUNG SURGERY Right     part of the right     TOTAL NEPHRECTOMY Right        No family history on file. Social History     Tobacco Use    Smoking status: Current Every Day Smoker     Packs/day: 1.00     Years: 40.00     Pack years: 40.00     Types: Cigarettes    Smokeless tobacco: Never Used   Substance Use Topics    Alcohol use: Not Currently    Drug use: Never      Functional Status: The patient is able to ambulate and perform activities of daily living without the use of an assistive device. Occupation: The patient is currently disability for cancer. ROS: For more complete ROS answered by the patient, please see .     Constitutional: Denies fevers, chills, night sweats, unintentional weight loss     Skin: Denies rash or skin changes     Eyes: Denies vision changes    Ears/Nose/Throat: Denies nasal congestion or sore throat     Respiratory: Denies SOB or cough     Cardiovascular: Denies CP, palpitations, anterior wedging at T11, T12 and L1 with loss of anterior vertebral   body height ranging from 5-10%. No comparison studies are available. No pars interarticularis fracture defect.           Impression   1. Age-indeterminate anterior wedging/compression deformities at T11,   T12 and L1 with loss of anterior vertebral body height ranging from   5-10%. Clinical correlation is recommended         Impression:   Evans Conway is a 48 y.o. male     1. Acute bilateral low back pain with bilateral sciatica        Plan:   · Will obtain MRI L spine, acute onset pain with radiating pain, age-indeterminate compression deformities on x-ray. · Refilled flexeril 5mg TID PRN   · Cautious with NSAIDs- one kidney   · Provided 7 days of norco 5/325mg. He understands I will not provide any refills. Controlled Substances Monitoring:     RX Monitoring 4/25/2019   Attestation The Prescription Monitoring Report for this patient was reviewed today. Chronic Pain Routine Monitoring No signs of potential drug abuse or diversion identified: otherwise, see note documentation   ·      The patient was educated about the diagnosis, prognosis, indications, risks and benefits of treatment. An opportunity to ask questions was given to the patient and questions were answered. The patient agreed to proceed with the recommended treatment as described above.  Follow up after MRI      Thank you for the consultation and for allowing me to participate in the care of this patient.         Sincerely,     [employer] froylan, [facility]   Board Certified Physical Medicine and Rehabilitation

## 2019-05-03 ENCOUNTER — TELEPHONE (OUTPATIENT)
Dept: PHYSICAL MEDICINE AND REHAB | Age: 54
End: 2019-05-03

## 2019-05-03 NOTE — TELEPHONE ENCOUNTER
I called the patient back and informed him that Dr. González Siddiqui will not provide refills, and to either contact his PCP's or Dr. Vinod Marquis office. He voiced understanding.

## 2019-05-04 ENCOUNTER — HOSPITAL ENCOUNTER (OUTPATIENT)
Dept: MRI IMAGING | Age: 54
Discharge: HOME | End: 2019-05-06
Payer: MEDICARE

## 2019-05-04 DIAGNOSIS — M54.42 ACUTE BILATERAL LOW BACK PAIN WITH BILATERAL SCIATICA: ICD-10-CM

## 2019-05-04 DIAGNOSIS — M54.41 ACUTE BILATERAL LOW BACK PAIN WITH BILATERAL SCIATICA: ICD-10-CM

## 2019-05-04 PROCEDURE — 72148 MRI LUMBAR SPINE W/O DYE: CPT

## 2019-05-07 ENCOUNTER — TELEPHONE (OUTPATIENT)
Dept: PHYSICAL MEDICINE AND REHAB | Age: 54
End: 2019-05-07

## 2019-05-07 NOTE — TELEPHONE ENCOUNTER
Patient called regarding results of MRI that he had on 5-4-19. Would you like to see him or have us give him his results over the phone? Please advise.

## 2019-05-08 ENCOUNTER — OFFICE VISIT (OUTPATIENT)
Dept: INTERNAL MEDICINE | Age: 54
End: 2019-05-08
Payer: MEDICARE

## 2019-05-08 ENCOUNTER — HOSPITAL ENCOUNTER (OUTPATIENT)
Age: 54
Discharge: HOME | End: 2019-05-08
Payer: MEDICARE

## 2019-05-08 VITALS — WEIGHT: 244.3 LBS | HEIGHT: 72 IN | RESPIRATION RATE: 16 BRPM | BODY MASS INDEX: 33.09 KG/M2

## 2019-05-08 DIAGNOSIS — E08.8 DIABETES MELLITUS DUE TO UNDERLYING CONDITION WITH COMPLICATION, WITHOUT LONG-TERM CURRENT USE OF INSULIN (HCC): ICD-10-CM

## 2019-05-08 DIAGNOSIS — N52.9 VASCULOGENIC ERECTILE DYSFUNCTION, UNSPECIFIED VASCULOGENIC ERECTILE DYSFUNCTION TYPE: ICD-10-CM

## 2019-05-08 DIAGNOSIS — Z12.2 ENCOUNTER FOR SCREENING FOR LUNG CANCER: Primary | ICD-10-CM

## 2019-05-08 DIAGNOSIS — Z85.118 H/O: LUNG CANCER: ICD-10-CM

## 2019-05-08 DIAGNOSIS — Z11.4 SCREENING FOR HIV (HUMAN IMMUNODEFICIENCY VIRUS): ICD-10-CM

## 2019-05-08 DIAGNOSIS — Z13.220 SCREENING FOR HYPERLIPIDEMIA: ICD-10-CM

## 2019-05-08 DIAGNOSIS — Z13.1 SCREENING FOR DIABETES MELLITUS: ICD-10-CM

## 2019-05-08 DIAGNOSIS — F41.3 OTHER MIXED ANXIETY DISORDERS: ICD-10-CM

## 2019-05-08 LAB
CHOLESTEROL, TOTAL: 221 MG/DL (ref 0–199)
HBA1C MFR BLD: 5.1 % (ref 4–5.6)
HDLC SERPL-MCNC: 51 MG/DL
LDL CHOLESTEROL CALCULATED: 116 MG/DL (ref 0–99)
TRIGL SERPL-MCNC: 270 MG/DL (ref 0–149)
TSH SERPL DL<=0.05 MIU/L-ACNC: 2.08 UIU/ML (ref 0.27–4.2)
VLDLC SERPL CALC-MCNC: 54 MG/DL

## 2019-05-08 PROCEDURE — 86703 HIV-1/HIV-2 1 RESULT ANTBDY: CPT

## 2019-05-08 PROCEDURE — 80061 LIPID PANEL: CPT

## 2019-05-08 PROCEDURE — 3017F COLORECTAL CA SCREEN DOC REV: CPT | Performed by: INTERNAL MEDICINE

## 2019-05-08 PROCEDURE — 4004F PT TOBACCO SCREEN RCVD TLK: CPT | Performed by: INTERNAL MEDICINE

## 2019-05-08 PROCEDURE — 83036 HEMOGLOBIN GLYCOSYLATED A1C: CPT

## 2019-05-08 PROCEDURE — 99213 OFFICE O/P EST LOW 20 MIN: CPT | Performed by: INTERNAL MEDICINE

## 2019-05-08 PROCEDURE — 36415 COLL VENOUS BLD VENIPUNCTURE: CPT

## 2019-05-08 PROCEDURE — G8427 DOCREV CUR MEDS BY ELIG CLIN: HCPCS | Performed by: INTERNAL MEDICINE

## 2019-05-08 PROCEDURE — 99214 OFFICE O/P EST MOD 30 MIN: CPT | Performed by: INTERNAL MEDICINE

## 2019-05-08 PROCEDURE — 84443 ASSAY THYROID STIM HORMONE: CPT

## 2019-05-08 PROCEDURE — G8417 CALC BMI ABV UP PARAM F/U: HCPCS | Performed by: INTERNAL MEDICINE

## 2019-05-08 RX ORDER — SILDENAFIL CITRATE 20 MG/1
20 TABLET ORAL PRN
Qty: 30 TABLET | Refills: 0 | Status: SHIPPED | OUTPATIENT
Start: 2019-05-08 | End: 2019-07-15 | Stop reason: SDUPTHER

## 2019-05-08 NOTE — PROGRESS NOTES
Dr. Jorge Alvarez reviewed discharge instructions with patient. Pt given AVS and lab scripts per Dr. Jad Lafleur notified we would mail out appt card with the date and time of next appointment.

## 2019-05-08 NOTE — PATIENT INSTRUCTIONS
- return in 3 months  - Cont follow up with PMR, pain clinic, lung doctor   - Lab work today  - Chapito Weiss MD  Internal Medicine

## 2019-05-08 NOTE — PROGRESS NOTES
[facility]  Internal Medicine Clinic    Attending Physician Statement:  Anup Conley M.D., F.A.C.P. I have discussed the case, including pertinent history and exam findings with the resident. I have seen and examined the patient and the key elements of the encounter have been performed by me. I agree with the assessment, plan and orders as documented by the resident. - ED dysfuction-- discussed options at length    Doubt acute compression fx T12- I personally reviewed xray/MRI  PMR fu for MRI results  Mechanical back pain  Patient is seen for fu visit today. Last office notes reviewed, relative labs and imaging. Also lung CA hx  Sp resection- fu CT lung order/pulm    Remainder of medical problems as per resident note.

## 2019-05-09 LAB — HIV-1 AND HIV-2 ANTIBODIES: NORMAL

## 2019-05-10 ENCOUNTER — OFFICE VISIT (OUTPATIENT)
Dept: PAIN MANAGEMENT | Age: 54
End: 2019-05-10
Payer: MEDICARE

## 2019-05-10 ENCOUNTER — TELEPHONE (OUTPATIENT)
Dept: PHYSICAL MEDICINE AND REHAB | Age: 54
End: 2019-05-10

## 2019-05-10 VITALS
DIASTOLIC BLOOD PRESSURE: 72 MMHG | RESPIRATION RATE: 16 BRPM | OXYGEN SATURATION: 97 % | SYSTOLIC BLOOD PRESSURE: 134 MMHG | BODY MASS INDEX: 33.18 KG/M2 | HEART RATE: 102 BPM | WEIGHT: 245 LBS | TEMPERATURE: 98 F | HEIGHT: 72 IN

## 2019-05-10 DIAGNOSIS — M54.41 CHRONIC BILATERAL LOW BACK PAIN WITH BILATERAL SCIATICA: ICD-10-CM

## 2019-05-10 DIAGNOSIS — M48.061 SPINAL STENOSIS OF LUMBAR REGION WITHOUT NEUROGENIC CLAUDICATION: ICD-10-CM

## 2019-05-10 DIAGNOSIS — M54.42 CHRONIC BILATERAL LOW BACK PAIN WITH BILATERAL SCIATICA: ICD-10-CM

## 2019-05-10 DIAGNOSIS — G89.4 CHRONIC PAIN SYNDROME: Primary | ICD-10-CM

## 2019-05-10 DIAGNOSIS — G89.29 CHRONIC BILATERAL LOW BACK PAIN WITH BILATERAL SCIATICA: ICD-10-CM

## 2019-05-10 DIAGNOSIS — M54.16 LUMBAR RADICULOPATHY: ICD-10-CM

## 2019-05-10 PROCEDURE — 3017F COLORECTAL CA SCREEN DOC REV: CPT | Performed by: PAIN MEDICINE

## 2019-05-10 PROCEDURE — G8427 DOCREV CUR MEDS BY ELIG CLIN: HCPCS | Performed by: PAIN MEDICINE

## 2019-05-10 PROCEDURE — 99204 OFFICE O/P NEW MOD 45 MIN: CPT | Performed by: PAIN MEDICINE

## 2019-05-10 PROCEDURE — 4004F PT TOBACCO SCREEN RCVD TLK: CPT | Performed by: PAIN MEDICINE

## 2019-05-10 PROCEDURE — G8417 CALC BMI ABV UP PARAM F/U: HCPCS | Performed by: PAIN MEDICINE

## 2019-05-10 NOTE — PROGRESS NOTES
Veronica Dai presents to the [facility] on 5/10/2019. Yocasta is complaining of pain mid-lower back and bilateral legs . The pain is constant. The pain is described as aching, throbbing and stabbing. Pain is rated on his best day at a 8, on his worst day at a 10, and on average at a 8 on the VAS scale. He took his last dose of ibu. Any procedures since your last visit    Pacemaker or defibrilator: No managed by none    He has been on anticoagulation medications to include NSAIDS and is managed by PCP.       /72   Pulse 102   Temp 98 °F (36.7 °C) (Oral)   Resp 16   Ht 6' (1.829 m)   Wt 245 lb (111.1 kg)   SpO2 97%   BMI 33.23 kg/m²

## 2019-05-10 NOTE — TELEPHONE ENCOUNTER
Patient states that he went to the pain clinic this morning and was told that they could not help him or see him any more because he already goes to you and that you \"basically do the same thing\". He states that he is in severe chronic pain and feels he is getting bounced around with no relief. He would like to know if you have any advise or suggestions. Please advise.

## 2019-05-10 NOTE — PROGRESS NOTES
[facility]        [address], [facility] [place] [facility]  Dept: [phone]          Consult Note      Patient:  FREDI Monae 1965    Date of Service:  05/10/19     Requesting Physician:  Madalyn Byers MD    Reason for Consult:      Patient presents with complaints of bilateral, lower back pain that started 2 months ago    HISTORY OF PRESENT ILLNESS:      Pain is constant and is described as aching, stabbing and throbbing. Pain does radiate to both lower extremities. He  has numbness that is intermittent of the both lower extremities and does not have bladder or bowel dysfunction. Alleviating factors include: \"laying across the bed\". Aggravating factors include:  Stairs, sitting or walking too long, increased activity. He has been on anticoagulation medications to include NSAIDS and has not been on herbal supplements. He is not diabetic. Imaging:   Lumbar MRI 2019 -   FINDINGS:     Vertebral body height and alignment are normal. Bone marrow is   unremarkable. There is mild loss of signal from L2-3 and L4-5 disc   compatible with degenerative change but without disc space narrowing       At L1-2, there is slight anterior posterior bridging hard disc and   spur. There is no significant stenosis. Similar findings are present   at L2-3.       At L3-4 there is mild posterior disc bulge. There is mild narrowing of   the interpedicular distance and mild stenosis.       At L4-5 there is mild central and left-sided disc bulge. There is   facet arthrosis and narrowed interpedicular distance. There is   moderate stenosis.       At L5-S1 there is some facet arthrosis but without stenosis. Xray lumbar 3/2019 -   Findings: Surgical clips scattered in the right abdomen. 5   nonrib-bearing vertebral bodies. . No pars interarticularis fracture   defect. Normal sagittal alignment lumbar spine.  Age-indeterminate   anterior wedging at T11, T12 and L1 with loss of x3  Build:Overweight  Function:Rises from a seated position with difficulty, mild    HEENT:    Head:normocephalic, atraumatic  Pupils:regular, round, equal  Sclera: icterus absent    Lungs:    Breathing:normal breathing pattern    Abdomen:    Shape:non-distended  Tenderness:none  Guarding:none    Lumbar spine:    Spine inspection:normal   CVA tenderness:No   Palpation:tenderness paravertebral muscles, left, right and positive. Range of motion:abnormal mildly in lateral bending, flexion, extension rotation bilateral and is painful. Musculoskeletal:    Trigger points in Paraveteral:absent bilaterally  SI joint tenderness:positive right, positive left              KYLIE test:negative right, negative             left  Piriformis tenderness:positive right, positive left  Trochanteric bursa tenderness:positive right, positive left  SLR:positive right, positive left, sitting     Extremities:    Tremors:None bilaterally upper and lower  Range of motion:pain with internal rotation of hips negative.   Intact:Yes  Varicose veins:absent   Pulses:present Lt radial  Cyanosis:none  Edema:none x all 4 extremities    Neurological:    Sensory:normal to light touch BLE    Motor:       Left Deltoid5/5                  Right Quadriceps5/5          Left Quadriceps5/5           Right Gastrocnemius5/5    Left Gastrocnemius5/5  Right Ant Tibialis5/5  Left Ant Tibialis5/5    Reflexes:      Right Quadriceps reflex2+  Left Quadriceps reflex2+  Right Achilles reflex2+  Left Achilles reflex2+  Gait:normal    Dermatology:    Skin:no rashes or lesions noted    Assessment/Plan:  LBP and BLE pain  Lumbar MRI shows moderate stenosis at L4-5  Hx renal cell carcinoma tx with right nephrectomy in 2005, had right lung surgery in 2013 due to recurrence (no chemo or radiation)    Discussed the overall treatment of his condition with membrane stabilizers (currently prescribed), PT (currently ordered), and injections (likely pending with  Stacie) and if those conservative measures fail he could consider surgery  Pt is in the midst of a work-up and treatment plan with Dr. Servando Lindo that would not differ from my treatment plan therefore I encouraged him to continue to continue to f/u with Dr. Servando Lindo as he is scheduled in 3 days where she will likely be discussing injection therapy with him    Referring document and imaging reviewed  OARRS report reviewed  Pt is not a candidate for NSAIDS due to prior nephrectomy  Continue gabapentin from outside provider (300 mg TID), encouraged him to seek an increase  PT scheduled but pt was a no show as he was \"afraid\" to participate for fear of making it worse, discussed he should move forward with PT  Patient encouraged to stay active and to lose weight  Treatment plan discussed with the patient including medication and procedure side effects     Gailal was seen today for new patient. Diagnoses and all orders for this visit:    Chronic pain syndrome    Lumbar radiculopathy    Spinal stenosis of lumbar region without neurogenic claudication    Chronic bilateral low back pain with bilateral sciatica      CC:  Referring physician    GRANT Mercedes.

## 2019-05-13 ENCOUNTER — OFFICE VISIT (OUTPATIENT)
Dept: PHYSICAL MEDICINE AND REHAB | Age: 54
End: 2019-05-13
Payer: MEDICARE

## 2019-05-13 ENCOUNTER — PREP FOR PROCEDURE (OUTPATIENT)
Dept: PHYSICAL MEDICINE AND REHAB | Age: 54
End: 2019-05-13

## 2019-05-13 VITALS
WEIGHT: 247 LBS | HEART RATE: 92 BPM | DIASTOLIC BLOOD PRESSURE: 84 MMHG | HEIGHT: 72 IN | BODY MASS INDEX: 33.46 KG/M2 | SYSTOLIC BLOOD PRESSURE: 126 MMHG

## 2019-05-13 DIAGNOSIS — M54.17 LUMBOSACRAL RADICULITIS: Primary | ICD-10-CM

## 2019-05-13 DIAGNOSIS — M51.36 DDD (DEGENERATIVE DISC DISEASE), LUMBAR: ICD-10-CM

## 2019-05-13 PROCEDURE — 4004F PT TOBACCO SCREEN RCVD TLK: CPT | Performed by: PHYSICAL MEDICINE & REHABILITATION

## 2019-05-13 PROCEDURE — G8427 DOCREV CUR MEDS BY ELIG CLIN: HCPCS | Performed by: PHYSICAL MEDICINE & REHABILITATION

## 2019-05-13 PROCEDURE — 99214 OFFICE O/P EST MOD 30 MIN: CPT | Performed by: PHYSICAL MEDICINE & REHABILITATION

## 2019-05-13 PROCEDURE — G8417 CALC BMI ABV UP PARAM F/U: HCPCS | Performed by: PHYSICAL MEDICINE & REHABILITATION

## 2019-05-13 PROCEDURE — 3017F COLORECTAL CA SCREEN DOC REV: CPT | Performed by: PHYSICAL MEDICINE & REHABILITATION

## 2019-05-13 RX ORDER — GABAPENTIN 600 MG/1
600 TABLET ORAL 3 TIMES DAILY
Qty: 90 TABLET | Refills: 2 | Status: SHIPPED | OUTPATIENT
Start: 2019-05-13 | End: 2019-08-07 | Stop reason: SDUPTHER

## 2019-05-13 NOTE — H&P
daily 2 Bottle 2    albuterol sulfate HFA (PROVENTIL HFA) 108 (90 Base) MCG/ACT inhaler Inhale 1-2 puffs into the lungs      fluticasone-salmeterol (ADVAIR DISKUS) 250-50 MCG/DOSE AEPB Inhale into the lungs      DULoxetine (CYMBALTA) 60 MG extended release capsule Take 60 mg by mouth daily      traZODone (DESYREL) 100 MG tablet Take 100 mg by mouth nightly       No current facility-administered medications for this visit. Past Medical History:   Diagnosis Date    Difficulty sleeping        Past Surgical History:   Procedure Laterality Date    LUNG SURGERY Right     part of the right     TOTAL NEPHRECTOMY Right        History reviewed. No pertinent family history. Social History     Tobacco Use    Smoking status: Current Every Day Smoker     Packs/day: 1.00     Years: 40.00     Pack years: 40.00     Types: Cigarettes    Smokeless tobacco: Never Used   Substance Use Topics    Alcohol use: Not Currently    Drug use: Never      Functional Status: The patient is able to ambulate and perform activities of daily living without the use of an assistive device. Occupation: The patient is currently disability for cancer. ROS:    Constitutional: Denies fevers, chills, night sweats, unintentional weight loss     Skin: Denies rash or skin changes     Eyes: Denies vision changes    Ears/Nose/Throat: Denies nasal congestion or sore throat     Respiratory: Denies SOB or cough     Cardiovascular: Denies CP, palpitations, edema      Gastrointestinal: Denies abdominal pain,  N/V, constipation, or diarrhea    Genitourinary: Denies urinary symptoms    Neurologic: See HPI.     MSK: See HPI. Psychiatric: Denies sleep disturbance, anxiety, depression    Hematologic/Lymphatic/Immunologic: Denies bruising       Physical Exam:   Blood pressure 126/84, pulse 92, height 6' (1.829 m), weight 247 lb (112 kg). General: well developed and well nourished in no acute distress.  Body habitus is obese  HEENT: No rhinorrhea, sneezing, yawning, or lacrimation. No scleral icterus or conjunctival injection. Resp: symmetrical chest expansion, unlabored breathing, respirations unlabored. CV: Heart rate is regular. Peripheral pulses are palpable  Lymph: No visible regional lymphadenopathy. Skin: No rashes or ecchymosis. Normal turgor. Psych: Mood is calm. Affect is normal.   Ext: No edema noted     MSK:   Back/Hip Exam:   Inspection: Pelvis was symmetric. Lumbar lordotic curvature was decreased. There was no scoliosis. No ecchymoses or erythema. Palpation: Palpatory exam revealed tenderness along lumbosacral paraspinals, ttp midline spine L1-L4, no ttp greater trochanters and TFL on both side. There was paraspinal spasms. There were no trigger points. ROM: ROM decreased  Special/provocative testing:   Supine SLR positive bilaterally   negative FABERS. Significantly tight hamstrings     Neurological Exam:  Strength:   R  L  Hip Flex  5  5  Knee Ext  5  5  Ankle dorsi  5  5  EHL   5 5  Ankle Plantar  5  5    Sensory:  Decreased sensation LT left L3 dermatomes     Reflexes:   R  L  Patellar  (2+) (2+)  Ankle Jerk  (2+) (2+)      Gait is antalgic. Imaging: (personally reviewed by me 05/13/19)  MRI L Spine   FINDINGS:     Vertebral body height and alignment are normal. Bone marrow is   unremarkable. There is mild loss of signal from L2-3 and L4-5 disc   compatible with degenerative change but without disc space narrowing       At L1-2, there is slight anterior posterior bridging hard disc and   spur. There is no significant stenosis. Similar findings are present   at L2-3.       At L3-4 there is mild posterior disc bulge. There is mild narrowing of   the interpedicular distance and mild stenosis.       At L4-5 there is mild central and left-sided disc bulge. There is   facet arthrosis and narrowed interpedicular distance.  There is   moderate stenosis.       At L5-S1 there is some facet arthrosis but without stenosis.         Impression   Multilevel mild posterior bridging hard disc and spur. This is   somewhat greater centrally and on the left at L4-5. In combination   with facet arthrosis and narrowed interpedicular distance, there is   moderate stenosis at this level         Impression:   Yo Villeda is a 48 y.o. male     1. Lumbosacral radiculitis    2. DDD (degenerative disc disease), lumbar        Plan:   · MRI reviewed in detail with the patient. · Schedule PT  · Will increase neurontin to 600mg TID   · Patient would benefit from bilateral L4-5 Transforaminal Epidural Steroid Injection. Procedure risks, benefits and alternatives were discussed. Patient would like to proceed.  The patient was educated about the diagnosis, prognosis, indications, risks and benefits of treatment. An opportunity to ask questions was given to the patient and questions were answered. The patient agreed to proceed with the recommended treatment as described above.       Follow up after injection         Andrea Guerrero DO, [facility]   Board Certified Physical Medicine and Rehabilitation

## 2019-05-13 NOTE — PROGRESS NOTES
Levi Rees, [address] [facility] Physical Medicine and Rehabilitation  [address] [facility] [place]  Phone: [phone]  Fax: [phone]    PCP: Melissa Martínez MD  Date of visit: 5/13/19    Chief Complaint   Patient presents with    Back Pain     Follow up, discuss MRI results     Interval:   Patient presents today for follow up visit and to review MRI. MRI reveals central and NF stenosis at L4-5. Has continued pain. The pain is rated Pain Score:  10 - Worst pain ever, is described as aching, and is located in the low back with radiation to the legs and thighs are very achy. The pain is better with rest.  The pain is worse with activity. There is associated numbness/tingling in legs. There is weakness. There is no bowel/bladder changes. The prior workup has included: Xray, MRI    The prior treatment has included:  PT: none   Chiropractic: none    Modalities: none   OTC Tylenol: yes with relief   NSAIDS: only has one kidney. ibupfrofen 600mg  Medrol dose lawrence with relief   Opioids: norco with relief   Membrane stabilizers: neurontin 400mg TID  cymbalta currently   Muscle relaxers: flexeril with relief   Previous injections: none   Previous surgery at this site: none       No Known Allergies    Current Outpatient Medications   Medication Sig Dispense Refill    gabapentin (NEURONTIN) 600 MG tablet Take 1 tablet by mouth 3 times daily for 30 days.  90 tablet 2    sildenafil (REVATIO) 20 MG tablet Take 1 tablet by mouth as needed (as directed 1-2 tablets) 30 tablet 0    cyclobenzaprine (FLEXERIL) 5 MG tablet Take 1-2 tablets by mouth 3 times daily as needed for Muscle spasms 90 tablet 1    omeprazole (PRILOSEC) 40 MG delayed release capsule   0    ibuprofen (ADVIL;MOTRIN) 600 MG tablet Take 1 tablet by mouth 4 times daily as needed for Pain Please take with meals 120 tablet 2    methyl salicylate-menthol (RODNEY GARCIA GREASELESS) 10-15 % CREA Apply topically three times daily 2 Bottle 2    albuterol sulfate HFA (PROVENTIL HFA) 108 (90 Base) MCG/ACT inhaler Inhale 1-2 puffs into the lungs      fluticasone-salmeterol (ADVAIR DISKUS) 250-50 MCG/DOSE AEPB Inhale into the lungs      DULoxetine (CYMBALTA) 60 MG extended release capsule Take 60 mg by mouth daily      traZODone (DESYREL) 100 MG tablet Take 100 mg by mouth nightly       No current facility-administered medications for this visit. Past Medical History:   Diagnosis Date    Difficulty sleeping        Past Surgical History:   Procedure Laterality Date    LUNG SURGERY Right     part of the right     TOTAL NEPHRECTOMY Right        History reviewed. No pertinent family history. Social History     Tobacco Use    Smoking status: Current Every Day Smoker     Packs/day: 1.00     Years: 40.00     Pack years: 40.00     Types: Cigarettes    Smokeless tobacco: Never Used   Substance Use Topics    Alcohol use: Not Currently    Drug use: Never      Functional Status: The patient is able to ambulate and perform activities of daily living without the use of an assistive device. Occupation: The patient is currently disability for cancer. ROS:    Constitutional: Denies fevers, chills, night sweats, unintentional weight loss     Skin: Denies rash or skin changes     Eyes: Denies vision changes    Ears/Nose/Throat: Denies nasal congestion or sore throat     Respiratory: Denies SOB or cough     Cardiovascular: Denies CP, palpitations, edema      Gastrointestinal: Denies abdominal pain,  N/V, constipation, or diarrhea    Genitourinary: Denies urinary symptoms    Neurologic: See HPI.     MSK: See HPI. Psychiatric: Denies sleep disturbance, anxiety, depression    Hematologic/Lymphatic/Immunologic: Denies bruising       Physical Exam:   Blood pressure 126/84, pulse 92, height 6' (1.829 m), weight 247 lb (112 kg). General: well developed and well nourished in no acute distress.  Body habitus is obese  HEENT: No stenosis.         Impression   Multilevel mild posterior bridging hard disc and spur. This is   somewhat greater centrally and on the left at L4-5. In combination   with facet arthrosis and narrowed interpedicular distance, there is   moderate stenosis at this level         Impression:   Heather Hong is a 48 y.o. male     1. Lumbosacral radiculitis    2. DDD (degenerative disc disease), lumbar        Plan:   · MRI reviewed in detail with the patient. · Schedule PT  · Will increase neurontin to 600mg TID   · Patient would benefit from bilateral L4-5 Transforaminal Epidural Steroid Injection. Procedure risks, benefits and alternatives were discussed. Patient would like to proceed.  The patient was educated about the diagnosis, prognosis, indications, risks and benefits of treatment. An opportunity to ask questions was given to the patient and questions were answered. The patient agreed to proceed with the recommended treatment as described above.       Follow up after injection         Giovanna Peabody, DO, [facility]   Board Certified Physical Medicine and Rehabilitation

## 2019-05-20 ENCOUNTER — HOSPITAL ENCOUNTER (OUTPATIENT)
Dept: CT IMAGING | Age: 54
Discharge: HOME | End: 2019-05-22
Payer: MEDICARE

## 2019-05-20 DIAGNOSIS — Z85.118 H/O: LUNG CANCER: ICD-10-CM

## 2019-05-20 PROCEDURE — 71250 CT THORAX DX C-: CPT

## 2019-05-22 ENCOUNTER — TELEPHONE (OUTPATIENT)
Dept: INTERNAL MEDICINE | Age: 54
End: 2019-05-22

## 2019-05-22 NOTE — TELEPHONE ENCOUNTER
Patient called in at 11:18 lm/am.  He would like results of the CT scan he had done on Sat and labs he had done last week. He can be reached at [phone].

## 2019-06-06 ENCOUNTER — HOSPITAL ENCOUNTER (OUTPATIENT)
Dept: OPERATING ROOM | Age: 54
Discharge: HOME | End: 2019-06-06

## 2019-06-06 DIAGNOSIS — M51.36 LUMBAR DEGENERATIVE DISC DISEASE: ICD-10-CM

## 2019-07-15 ENCOUNTER — TELEPHONE (OUTPATIENT)
Dept: INTERNAL MEDICINE | Age: 54
End: 2019-07-15

## 2019-07-15 RX ORDER — SILDENAFIL CITRATE 20 MG/1
20 TABLET ORAL PRN
Qty: 30 TABLET | Refills: 0 | Status: SHIPPED | OUTPATIENT
Start: 2019-07-15 | End: 2019-08-07 | Stop reason: SDUPTHER

## 2019-08-07 ENCOUNTER — OFFICE VISIT (OUTPATIENT)
Dept: INTERNAL MEDICINE | Age: 54
End: 2019-08-07
Payer: MEDICARE

## 2019-08-07 VITALS
RESPIRATION RATE: 16 BRPM | TEMPERATURE: 97.2 F | OXYGEN SATURATION: 95 % | WEIGHT: 238 LBS | BODY MASS INDEX: 32.23 KG/M2 | DIASTOLIC BLOOD PRESSURE: 89 MMHG | SYSTOLIC BLOOD PRESSURE: 117 MMHG | HEIGHT: 72 IN | HEART RATE: 91 BPM

## 2019-08-07 DIAGNOSIS — N40.1 BENIGN PROSTATIC HYPERPLASIA (BPH) WITH STRAINING ON URINATION: ICD-10-CM

## 2019-08-07 DIAGNOSIS — G89.29 BILATERAL CHRONIC KNEE PAIN: ICD-10-CM

## 2019-08-07 DIAGNOSIS — M25.562 BILATERAL CHRONIC KNEE PAIN: ICD-10-CM

## 2019-08-07 DIAGNOSIS — M48.062 SPINAL STENOSIS OF LUMBAR REGION WITH NEUROGENIC CLAUDICATION: ICD-10-CM

## 2019-08-07 DIAGNOSIS — I20.8 STABLE ANGINA (HCC): Primary | ICD-10-CM

## 2019-08-07 DIAGNOSIS — R39.16 BENIGN PROSTATIC HYPERPLASIA (BPH) WITH STRAINING ON URINATION: ICD-10-CM

## 2019-08-07 DIAGNOSIS — M25.561 BILATERAL CHRONIC KNEE PAIN: ICD-10-CM

## 2019-08-07 PROCEDURE — 93010 ELECTROCARDIOGRAM REPORT: CPT | Performed by: INTERNAL MEDICINE

## 2019-08-07 PROCEDURE — 99214 OFFICE O/P EST MOD 30 MIN: CPT | Performed by: INTERNAL MEDICINE

## 2019-08-07 PROCEDURE — 93005 ELECTROCARDIOGRAM TRACING: CPT | Performed by: INTERNAL MEDICINE

## 2019-08-07 PROCEDURE — 99213 OFFICE O/P EST LOW 20 MIN: CPT | Performed by: INTERNAL MEDICINE

## 2019-08-07 RX ORDER — SILDENAFIL CITRATE 20 MG/1
20 TABLET ORAL PRN
Qty: 30 TABLET | Refills: 2 | Status: SHIPPED
Start: 2019-08-07 | End: 2020-03-18 | Stop reason: SDUPTHER

## 2019-08-07 RX ORDER — GABAPENTIN 600 MG/1
600 TABLET ORAL 3 TIMES DAILY
Qty: 90 TABLET | Refills: 1 | Status: SHIPPED | OUTPATIENT
Start: 2019-08-07 | End: 2019-09-20 | Stop reason: SDUPTHER

## 2019-08-07 RX ORDER — DULOXETIN HYDROCHLORIDE 60 MG/1
60 CAPSULE, DELAYED RELEASE ORAL DAILY
Qty: 30 CAPSULE | Refills: 2 | Status: SHIPPED | OUTPATIENT
Start: 2019-08-07 | End: 2019-09-20 | Stop reason: SDUPTHER

## 2019-08-07 RX ORDER — TAMSULOSIN HCL 0.4 MG
0.4 CAPSULE ORAL DAILY
Qty: 30 CAPSULE | Refills: 3
Start: 2019-08-07 | End: 2019-09-20 | Stop reason: SDUPTHER

## 2019-08-07 RX ORDER — OMEPRAZOLE 40 MG/1
40 CAPSULE, DELAYED RELEASE ORAL DAILY
Qty: 30 CAPSULE | Refills: 2 | Status: SHIPPED | OUTPATIENT
Start: 2019-08-07 | End: 2019-09-20 | Stop reason: SDUPTHER

## 2019-08-07 RX ORDER — ASPIRIN 81 MG/1
81 TABLET ORAL DAILY
Qty: 30 TABLET | Refills: 5 | Status: SHIPPED | OUTPATIENT
Start: 2019-08-07 | End: 2019-09-20 | Stop reason: SDUPTHER

## 2019-08-07 RX ORDER — CYCLOBENZAPRINE HCL 5 MG
5-10 TABLET ORAL 3 TIMES DAILY PRN
Qty: 90 TABLET | Status: CANCELLED | OUTPATIENT
Start: 2019-08-07

## 2019-08-07 RX ORDER — ATORVASTATIN CALCIUM 40 MG/1
40 TABLET, FILM COATED ORAL DAILY
Qty: 30 TABLET | Refills: 3 | Status: SHIPPED | OUTPATIENT
Start: 2019-08-07 | End: 2019-09-20 | Stop reason: SDUPTHER

## 2019-08-07 RX ORDER — IBUPROFEN 600 MG/1
600 TABLET ORAL 4 TIMES DAILY PRN
Qty: 120 TABLET | Refills: 2 | Status: SHIPPED | OUTPATIENT
Start: 2019-08-07 | End: 2019-09-20 | Stop reason: SDUPTHER

## 2019-08-07 RX ORDER — LORATADINE 10 MG/1
10 TABLET ORAL DAILY
Qty: 30 TABLET | Refills: 2 | Status: SHIPPED | OUTPATIENT
Start: 2019-08-07 | End: 2019-09-06

## 2019-08-07 RX ORDER — TRAZODONE HYDROCHLORIDE 100 MG/1
100 TABLET ORAL NIGHTLY
Qty: 30 TABLET | Refills: 2 | Status: SHIPPED | OUTPATIENT
Start: 2019-08-07 | End: 2019-09-20 | Stop reason: SDUPTHER

## 2019-08-07 ASSESSMENT — ENCOUNTER SYMPTOMS
WHEEZING: 0
COUGH: 1
SHORTNESS OF BREATH: 0
BACK PAIN: 1
EYES NEGATIVE: 1
GASTROINTESTINAL NEGATIVE: 1
STRIDOR: 0
APNEA: 0
CHEST TIGHTNESS: 0
CHOKING: 0

## 2019-08-07 NOTE — PROGRESS NOTES
wants to think about vaccinations for now. Agree with residents assessment and plan. Review their note for further details.      Kimberly Michaud DO

## 2019-08-14 ENCOUNTER — TELEPHONE (OUTPATIENT)
Dept: NON INVASIVE DIAGNOSTICS | Age: 54
End: 2019-08-14

## 2019-08-15 ENCOUNTER — HOSPITAL ENCOUNTER (OUTPATIENT)
Dept: NUCLEAR MEDICINE | Age: 54
Discharge: HOME | End: 2019-08-17
Payer: MEDICARE

## 2019-08-15 ENCOUNTER — HOSPITAL ENCOUNTER (OUTPATIENT)
Dept: NON INVASIVE DIAGNOSTICS | Age: 54
Discharge: HOME | End: 2019-08-15
Payer: MEDICARE

## 2019-08-15 DIAGNOSIS — I20.8 STABLE ANGINA (HCC): ICD-10-CM

## 2019-08-15 LAB
LV EF: 65 %
LVEF MODALITY: NORMAL

## 2019-08-15 PROCEDURE — 78452 HT MUSCLE IMAGE SPECT MULT: CPT

## 2019-08-15 PROCEDURE — 93018 CV STRESS TEST I&R ONLY: CPT | Performed by: INTERNAL MEDICINE

## 2019-08-15 PROCEDURE — 6360000002 HC RX W HCPCS: Performed by: INTERNAL MEDICINE

## 2019-08-15 PROCEDURE — 93017 CV STRESS TEST TRACING ONLY: CPT

## 2019-08-15 PROCEDURE — 3430000000 HC RX DIAGNOSTIC RADIOPHARMACEUTICAL: Performed by: RADIOLOGY

## 2019-08-15 PROCEDURE — 93016 CV STRESS TEST SUPVJ ONLY: CPT | Performed by: INTERNAL MEDICINE

## 2019-08-15 PROCEDURE — A9500 TC99M SESTAMIBI: HCPCS | Performed by: RADIOLOGY

## 2019-08-15 RX ADMIN — Medication 35 MILLICURIE: at 10:46

## 2019-08-15 RX ADMIN — REGADENOSON 0.4 MG: 0.08 INJECTION, SOLUTION INTRAVENOUS at 10:02

## 2019-08-15 RX ADMIN — Medication 10 MILLICURIE: at 08:51

## 2019-08-19 ENCOUNTER — TELEPHONE (OUTPATIENT)
Dept: PULMONOLOGY | Age: 54
End: 2019-08-19

## 2019-08-19 ENCOUNTER — OFFICE VISIT (OUTPATIENT)
Dept: PULMONOLOGY | Age: 54
End: 2019-08-19
Payer: MEDICARE

## 2019-08-19 VITALS
WEIGHT: 236 LBS | BODY MASS INDEX: 33.04 KG/M2 | RESPIRATION RATE: 18 BRPM | OXYGEN SATURATION: 93 % | SYSTOLIC BLOOD PRESSURE: 112 MMHG | HEIGHT: 71 IN | HEART RATE: 112 BPM | DIASTOLIC BLOOD PRESSURE: 71 MMHG

## 2019-08-19 DIAGNOSIS — G89.4 CHRONIC PAIN SYNDROME: Primary | ICD-10-CM

## 2019-08-19 DIAGNOSIS — R91.8 LUNG NODULE, MULTIPLE: ICD-10-CM

## 2019-08-19 LAB
EXPIRATORY TIME-POST: NORMAL SEC
EXPIRATORY TIME: NORMAL SEC
FEF 25-75% %CHNG: NORMAL
FEF 25-75% %PRED-POST: NORMAL %
FEF 25-75% %PRED-PRE: NORMAL L/SEC
FEF 25-75% PRED: NORMAL L/SEC
FEF 25-75%-POST: NORMAL L/SEC
FEF 25-75%-PRE: NORMAL L/SEC
FENO: 5 PPB
FEV1 %PRED-POST: 63 %
FEV1 %PRED-PRE: 71 %
FEV1 PRED: 3.92 L
FEV1-POST: 2.47 L
FEV1-PRE: 2.81 L
FEV1/FVC %PRED-POST: 92 %
FEV1/FVC %PRED-PRE: 94 %
FEV1/FVC PRED: 78 %
FEV1/FVC-POST: 72 %
FEV1/FVC-PRE: 74 %
FVC %PRED-POST: 68 L
FVC %PRED-PRE: 75 %
FVC PRED: 5.02 L
FVC-POST: 3.43 L
FVC-PRE: 3.81 L
PEF %PRED-POST: NORMAL %
PEF %PRED-PRE: NORMAL L/SEC
PEF PRED: NORMAL L/SEC
PEF%CHNG: NORMAL
PEF-POST: NORMAL L/SEC
PEF-PRE: NORMAL L/SEC

## 2019-08-19 PROCEDURE — 99203 OFFICE O/P NEW LOW 30 MIN: CPT | Performed by: INTERNAL MEDICINE

## 2019-08-19 PROCEDURE — 95012 NITRIC OXIDE EXP GAS DETER: CPT | Performed by: INTERNAL MEDICINE

## 2019-08-19 PROCEDURE — G8428 CUR MEDS NOT DOCUMENT: HCPCS | Performed by: INTERNAL MEDICINE

## 2019-08-19 PROCEDURE — G8598 ASA/ANTIPLAT THER USED: HCPCS | Performed by: INTERNAL MEDICINE

## 2019-08-19 PROCEDURE — 4004F PT TOBACCO SCREEN RCVD TLK: CPT | Performed by: INTERNAL MEDICINE

## 2019-08-19 PROCEDURE — 94060 EVALUATION OF WHEEZING: CPT | Performed by: INTERNAL MEDICINE

## 2019-08-19 PROCEDURE — 99204 OFFICE O/P NEW MOD 45 MIN: CPT | Performed by: INTERNAL MEDICINE

## 2019-08-19 PROCEDURE — 3017F COLORECTAL CA SCREEN DOC REV: CPT | Performed by: INTERNAL MEDICINE

## 2019-08-19 PROCEDURE — G8417 CALC BMI ABV UP PARAM F/U: HCPCS | Performed by: INTERNAL MEDICINE

## 2019-08-19 RX ORDER — VARENICLINE TARTRATE 25 MG
KIT ORAL
Qty: 1 BOX | Refills: 0 | Status: SHIPPED | OUTPATIENT
Start: 2019-08-19 | End: 2019-10-16

## 2019-08-19 ASSESSMENT — PULMONARY FUNCTION TESTS
FVC_PREDICTED: 5.02
FEV1/FVC_PERCENT_PREDICTED_POST: 92
FEV1/FVC_PERCENT_PREDICTED_PRE: 94
FVC_PRE: 3.81
FEV1_PERCENT_PREDICTED_PRE: 71
FVC_PERCENT_PREDICTED_POST: 68
FVC_PERCENT_PREDICTED_PRE: 75
FEV1_PRE: 2.81
FEV1/FVC_POST: 72
FEV1_PREDICTED: 3.92
FEV1_PERCENT_PREDICTED_POST: 63
FEV1/FVC_PREDICTED: 78
FENO: 5
FEV1/FVC_PRE: 74
FEV1_POST: 2.47
FVC_POST: 3.43

## 2019-08-19 NOTE — TELEPHONE ENCOUNTER
Mailed a letter to patient informing him that his CT Chest is scheduled for 8-27-19 at 11:30 am at the [facility]. He must arrive by 11:00 am. There is no prep for this test

## 2019-08-19 NOTE — PROGRESS NOTES
MG tablet Take 1 tablet by mouth 3 times daily for 86 days. 90 tablet 1    omeprazole (PRILOSEC) 40 MG delayed release capsule Take 1 capsule by mouth daily 30 capsule 2    ibuprofen (ADVIL;MOTRIN) 600 MG tablet Take 1 tablet by mouth 4 times daily as needed for Pain Please take with meals 120 tablet 2    traZODone (DESYREL) 100 MG tablet Take 1 tablet by mouth nightly 30 tablet 2    DULoxetine (CYMBALTA) 60 MG extended release capsule Take 1 capsule by mouth daily 30 capsule 2    aspirin EC 81 MG EC tablet Take 1 tablet by mouth daily 30 tablet 5    atorvastatin (LIPITOR) 40 MG tablet Take 1 tablet by mouth daily 30 tablet 3    FLOMAX 0.4 MG capsule Take 1 capsule by mouth daily 30 capsule 3    loratadine (CLARITIN) 10 MG tablet Take 1 tablet by mouth daily 30 tablet 2    methyl salicylate-menthol (RODNEY GARCIA GREASELESS) 10-15 % CREA Apply topically three times daily (Patient not taking: Reported on 8/7/2019) 2 Bottle 2    albuterol sulfate HFA (PROVENTIL HFA) 108 (90 Base) MCG/ACT inhaler Inhale 1-2 puffs into the lungs      fluticasone-salmeterol (ADVAIR DISKUS) 250-50 MCG/DOSE AEPB Inhale into the lungs       No current facility-administered medications for this visit. 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 DVT or PE NO     REVIEW OF SYSTEMS:  Constitutional: General health is good . There has been no weight changes. No fevers, fatigue or weakness.    Head: Patient denies any history of trauma, convulsive disorder or

## 2019-08-20 ENCOUNTER — TELEPHONE (OUTPATIENT)
Dept: PRIMARY CARE CLINIC | Age: 54
End: 2019-08-20

## 2019-08-27 ENCOUNTER — HOSPITAL ENCOUNTER (OUTPATIENT)
Dept: CT IMAGING | Age: 54
Discharge: HOME | End: 2019-08-29
Payer: MEDICARE

## 2019-08-27 ENCOUNTER — HOSPITAL ENCOUNTER (OUTPATIENT)
Age: 54
Discharge: HOME | End: 2019-08-27
Payer: MEDICARE

## 2019-08-27 DIAGNOSIS — G89.4 CHRONIC PAIN SYNDROME: ICD-10-CM

## 2019-08-27 DIAGNOSIS — R39.16 BENIGN PROSTATIC HYPERPLASIA (BPH) WITH STRAINING ON URINATION: ICD-10-CM

## 2019-08-27 DIAGNOSIS — R91.8 LUNG NODULE, MULTIPLE: ICD-10-CM

## 2019-08-27 DIAGNOSIS — N40.1 BENIGN PROSTATIC HYPERPLASIA (BPH) WITH STRAINING ON URINATION: ICD-10-CM

## 2019-08-27 LAB — PROSTATE SPECIFIC ANTIGEN: 1.66 NG/ML (ref 0–4)

## 2019-08-27 PROCEDURE — 82785 ASSAY OF IGE: CPT

## 2019-08-27 PROCEDURE — 85048 AUTOMATED LEUKOCYTE COUNT: CPT

## 2019-08-27 PROCEDURE — 84153 ASSAY OF PSA TOTAL: CPT

## 2019-08-27 PROCEDURE — 71250 CT THORAX DX C-: CPT

## 2019-08-27 PROCEDURE — 36415 COLL VENOUS BLD VENIPUNCTURE: CPT

## 2019-08-27 PROCEDURE — 86003 ALLG SPEC IGE CRUDE XTRC EA: CPT

## 2019-08-28 LAB — EOSINOPHILS ABSOLUTE COUNT: 320 /UL (ref 50–250)

## 2019-09-02 LAB
Lab: NORMAL
REPORT: NORMAL
THIS TEST SENT TO: NORMAL

## 2019-09-06 ENCOUNTER — TELEPHONE (OUTPATIENT)
Dept: PULMONOLOGY | Age: 54
End: 2019-09-06

## 2019-09-09 ENCOUNTER — TELEPHONE (OUTPATIENT)
Dept: PULMONOLOGY | Age: 54
End: 2019-09-09

## 2019-09-09 NOTE — TELEPHONE ENCOUNTER
Call to pt to update on conversation with Dr. Stephen Nissen. Doctor would like to see pt in office in 4-6 weeks. Office appt made for Oct ,2019 @ 1pm. Pt agreeable. Office appt card to be mailed out.

## 2019-09-20 ENCOUNTER — OFFICE VISIT (OUTPATIENT)
Dept: INTERNAL MEDICINE | Age: 54
End: 2019-09-20
Payer: MEDICARE

## 2019-09-20 VITALS
TEMPERATURE: 98.6 F | SYSTOLIC BLOOD PRESSURE: 131 MMHG | DIASTOLIC BLOOD PRESSURE: 84 MMHG | HEIGHT: 75 IN | RESPIRATION RATE: 16 BRPM | WEIGHT: 245.2 LBS | BODY MASS INDEX: 30.49 KG/M2 | HEART RATE: 83 BPM

## 2019-09-20 DIAGNOSIS — M48.062 SPINAL STENOSIS OF LUMBAR REGION WITH NEUROGENIC CLAUDICATION: ICD-10-CM

## 2019-09-20 DIAGNOSIS — I25.10 CORONARY ARTERY DISEASE INVOLVING NATIVE CORONARY ARTERY OF NATIVE HEART WITHOUT ANGINA PECTORIS: ICD-10-CM

## 2019-09-20 DIAGNOSIS — G89.29 BILATERAL CHRONIC KNEE PAIN: ICD-10-CM

## 2019-09-20 DIAGNOSIS — M25.562 BILATERAL CHRONIC KNEE PAIN: ICD-10-CM

## 2019-09-20 DIAGNOSIS — E66.1 CLASS 1 DRUG-INDUCED OBESITY WITHOUT SERIOUS COMORBIDITY WITH BODY MASS INDEX (BMI) OF 30.0 TO 30.9 IN ADULT: ICD-10-CM

## 2019-09-20 DIAGNOSIS — R25.2 MUSCLE CRAMPS: Primary | ICD-10-CM

## 2019-09-20 DIAGNOSIS — Z12.11 SCREENING FOR COLON CANCER: ICD-10-CM

## 2019-09-20 DIAGNOSIS — M25.561 BILATERAL CHRONIC KNEE PAIN: ICD-10-CM

## 2019-09-20 PROCEDURE — 99212 OFFICE O/P EST SF 10 MIN: CPT | Performed by: INTERNAL MEDICINE

## 2019-09-20 PROCEDURE — 4004F PT TOBACCO SCREEN RCVD TLK: CPT | Performed by: INTERNAL MEDICINE

## 2019-09-20 PROCEDURE — G8417 CALC BMI ABV UP PARAM F/U: HCPCS | Performed by: INTERNAL MEDICINE

## 2019-09-20 PROCEDURE — G8427 DOCREV CUR MEDS BY ELIG CLIN: HCPCS | Performed by: INTERNAL MEDICINE

## 2019-09-20 PROCEDURE — 99214 OFFICE O/P EST MOD 30 MIN: CPT | Performed by: INTERNAL MEDICINE

## 2019-09-20 PROCEDURE — G8598 ASA/ANTIPLAT THER USED: HCPCS | Performed by: INTERNAL MEDICINE

## 2019-09-20 PROCEDURE — 3017F COLORECTAL CA SCREEN DOC REV: CPT | Performed by: INTERNAL MEDICINE

## 2019-09-20 RX ORDER — IBUPROFEN 600 MG/1
600 TABLET ORAL 4 TIMES DAILY PRN
Qty: 120 TABLET | Refills: 2 | Status: SHIPPED
Start: 2019-09-20 | End: 2020-03-12 | Stop reason: SDUPTHER

## 2019-09-20 RX ORDER — ATORVASTATIN CALCIUM 40 MG/1
40 TABLET, FILM COATED ORAL DAILY
Qty: 60 TABLET | Refills: 3 | Status: SHIPPED
Start: 2019-09-20 | End: 2020-03-18 | Stop reason: SDUPTHER

## 2019-09-20 RX ORDER — GABAPENTIN 600 MG/1
600 TABLET ORAL 3 TIMES DAILY
Qty: 90 TABLET | Refills: 5 | Status: SHIPPED
Start: 2019-09-20 | End: 2020-03-12 | Stop reason: SDUPTHER

## 2019-09-20 RX ORDER — TAMSULOSIN HCL 0.4 MG
0.4 CAPSULE ORAL DAILY
Qty: 60 CAPSULE | Refills: 2 | Status: SHIPPED | OUTPATIENT
Start: 2019-09-20 | End: 2019-10-01 | Stop reason: SDUPTHER

## 2019-09-20 RX ORDER — DULOXETIN HYDROCHLORIDE 60 MG/1
60 CAPSULE, DELAYED RELEASE ORAL DAILY
Qty: 60 CAPSULE | Refills: 2 | Status: SHIPPED | OUTPATIENT
Start: 2019-09-20 | End: 2019-12-17 | Stop reason: SDUPTHER

## 2019-09-20 RX ORDER — OMEPRAZOLE 40 MG/1
40 CAPSULE, DELAYED RELEASE ORAL DAILY
Qty: 60 CAPSULE | Refills: 1 | Status: ON HOLD | OUTPATIENT
Start: 2019-09-20 | End: 2019-12-20 | Stop reason: HOSPADM

## 2019-09-20 RX ORDER — TRAZODONE HYDROCHLORIDE 100 MG/1
100 TABLET ORAL NIGHTLY
Qty: 60 TABLET | Refills: 2 | Status: SHIPPED | OUTPATIENT
Start: 2019-09-20 | End: 2019-12-17 | Stop reason: SDUPTHER

## 2019-09-20 RX ORDER — ASPIRIN 81 MG/1
81 TABLET ORAL DAILY
Qty: 60 TABLET | Refills: 3 | Status: SHIPPED
Start: 2019-09-20 | End: 2020-03-12 | Stop reason: SDUPTHER

## 2019-09-20 ASSESSMENT — ENCOUNTER SYMPTOMS
ABDOMINAL PAIN: 1
CHEST TIGHTNESS: 0
BACK PAIN: 1
SHORTNESS OF BREATH: 1
EYES NEGATIVE: 1
COUGH: 1
NAUSEA: 0
DIARRHEA: 0
APNEA: 0
ALLERGIC/IMMUNOLOGIC NEGATIVE: 1
CONSTIPATION: 0
WHEEZING: 0
VOMITING: 0

## 2019-09-20 NOTE — PROGRESS NOTES
[facility]    Attending Physician Statement  I have discussed the case, including pertinent history and exam findings with the resident. I have seen and examined the patient and the key elements of the encounter have been performed by me. I agree with the assessment, plan and orders as documented by the resident. I have reviewed all pertinent PMHx, PSHx, FamHx, SocialHx, medications, and allergies and updated history as appropriate. Patient here for routine follow up of medical problems. 1. Episode of chest pain: no recurrence of symptoms and reviewed stress test normal with patient   -  reviewed records from [place], [place] (cath 2015 non-obstructive CAD)  2. COPD s/p right wedge resection for \" cancerous nodules\" with recent pulm nodules identified in May -- recent CT with resolution of previously seen pulm nodules   - on spiriva / symbicort / LIZZIE  - pulmonary follow up planned --reviewed recent CT with patient, appears stable from previous  3. Hx of RCC s/p right nephrectomy 2005  4. ED: continue in sildenafil  5. Tobacco abuse: He has not started Chantix as prescribed per pulmonology  6. Nonobstructive coronary artery disease: Continue aspirin 81 mg daily  7. HLD: Continue on statin  8. BPH: continue on flomax    Screening: Referral for screening colonoscopy / PSA 1.66    Remainder of medical problems as per resident note.   Ollie Mann D.O.  9/20/2019 3:53 PM
Neurological: He is alert and oriented to person, place, and time. No sensory deficit. Has difficulty in flexion and extension, pain worse with extension of back    Skin: Skin is warm and dry. Psychiatric: He has a normal mood and affect. His behavior is normal. Judgment and thought content normal.       ASSESSMENT/PLAN:    I have reviewed all pertient PMHx, PSHx, FamHx, Social Hx, medications, and allergies andupdated history as appropriate. Susan Vail was seen today for check-up and results. Diagnoses and all orders for this visit:    Spinal stenosis of lumbar region with neurogenic claudication  -Seen by PMR(Dr Quinones)   -steroid injection in the past; no relief of symptoms       Muscle cramp:   Vit D level, future     Possible COPD:  -continue breathing treatment   -Follow up with Dr Jonathan Sawant coming up on oct 1    Chest pain  Patient do no have current chest pain  Reviewed recent cardiac stress test  Recent stress test results:   1. No reversible perfusion defect  2. Ejection fraction is 65 %.   3. No significant wall motion abnormality     Health maintenance   Colonoscopy ordered   Denied pneumococcal vaccine     RTC:     I have reviewed my findings andrecommendations with Estiven Martinez and Dr Haresh Barroso MD PGY-1  9/20/2019 4:20 PM

## 2019-09-23 ENCOUNTER — TELEPHONE (OUTPATIENT)
Dept: SURGERY | Age: 54
End: 2019-09-23

## 2019-09-23 PROBLEM — E66.1 CLASS 1 DRUG-INDUCED OBESITY IN ADULT: Status: ACTIVE | Noted: 2019-09-23

## 2019-09-23 PROBLEM — I25.10 CORONARY ARTERY DISEASE INVOLVING NATIVE CORONARY ARTERY OF NATIVE HEART WITHOUT ANGINA PECTORIS: Status: ACTIVE | Noted: 2019-09-23

## 2019-09-23 PROBLEM — E66.811 CLASS 1 DRUG-INDUCED OBESITY IN ADULT: Status: ACTIVE | Noted: 2019-09-23

## 2019-10-01 ENCOUNTER — TELEPHONE (OUTPATIENT)
Dept: INTERNAL MEDICINE | Age: 54
End: 2019-10-01

## 2019-10-01 DIAGNOSIS — N40.1 BENIGN PROSTATIC HYPERPLASIA (BPH) WITH STRAINING ON URINATION: Primary | ICD-10-CM

## 2019-10-01 DIAGNOSIS — R39.16 BENIGN PROSTATIC HYPERPLASIA (BPH) WITH STRAINING ON URINATION: Primary | ICD-10-CM

## 2019-10-01 RX ORDER — TAMSULOSIN HYDROCHLORIDE 0.4 MG/1
0.4 CAPSULE ORAL DAILY
Qty: 60 CAPSULE | Refills: 2 | Status: SHIPPED
Start: 2019-10-01 | End: 2020-03-18 | Stop reason: SDUPTHER

## 2019-10-16 ENCOUNTER — OFFICE VISIT (OUTPATIENT)
Dept: SURGERY | Age: 54
End: 2019-10-16
Payer: MEDICARE

## 2019-10-16 VITALS
WEIGHT: 244 LBS | RESPIRATION RATE: 16 BRPM | HEIGHT: 72 IN | SYSTOLIC BLOOD PRESSURE: 139 MMHG | OXYGEN SATURATION: 96 % | HEART RATE: 106 BPM | TEMPERATURE: 98.6 F | DIASTOLIC BLOOD PRESSURE: 78 MMHG | BODY MASS INDEX: 33.05 KG/M2

## 2019-10-16 DIAGNOSIS — R55 SYNCOPE, UNSPECIFIED SYNCOPE TYPE: Primary | ICD-10-CM

## 2019-10-16 DIAGNOSIS — K21.9 GASTROESOPHAGEAL REFLUX DISEASE, ESOPHAGITIS PRESENCE NOT SPECIFIED: ICD-10-CM

## 2019-10-16 DIAGNOSIS — R10.84 GENERALIZED ABDOMINAL PAIN: ICD-10-CM

## 2019-10-16 PROCEDURE — G8427 DOCREV CUR MEDS BY ELIG CLIN: HCPCS | Performed by: SURGERY

## 2019-10-16 PROCEDURE — G8484 FLU IMMUNIZE NO ADMIN: HCPCS | Performed by: SURGERY

## 2019-10-16 PROCEDURE — 99202 OFFICE O/P NEW SF 15 MIN: CPT | Performed by: SURGERY

## 2019-10-16 PROCEDURE — 99204 OFFICE O/P NEW MOD 45 MIN: CPT | Performed by: SURGERY

## 2019-10-16 PROCEDURE — G8417 CALC BMI ABV UP PARAM F/U: HCPCS | Performed by: SURGERY

## 2019-10-16 RX ORDER — POLYETHYLENE GLYCOL 3350, SODIUM CHLORIDE, POTASSIUM CHLORIDE, SODIUM BICARBONATE, AND SODIUM SULFATE 240; 5.84; 2.98; 6.72; 22.72 G/4L; G/4L; G/4L; G/4L; G/4L
4000 POWDER, FOR SOLUTION ORAL ONCE
Qty: 1 BOTTLE | Refills: 0 | Status: SHIPPED | OUTPATIENT
Start: 2019-10-16 | End: 2019-10-16

## 2019-10-16 ASSESSMENT — ENCOUNTER SYMPTOMS
CONSTIPATION: 0
EYES NEGATIVE: 1
BLOOD IN STOOL: 0
NAUSEA: 1
VOMITING: 0
ABDOMINAL DISTENTION: 0
DIARRHEA: 1
ABDOMINAL PAIN: 1
ANAL BLEEDING: 0

## 2019-10-17 ENCOUNTER — TELEPHONE (OUTPATIENT)
Dept: ADMINISTRATIVE | Age: 54
End: 2019-10-17

## 2019-10-17 DIAGNOSIS — R55 SYNCOPE, UNSPECIFIED SYNCOPE TYPE: Primary | ICD-10-CM

## 2019-11-20 ENCOUNTER — OFFICE VISIT (OUTPATIENT)
Dept: CARDIOLOGY CLINIC | Age: 54
End: 2019-11-20
Payer: MEDICARE

## 2019-11-20 VITALS
DIASTOLIC BLOOD PRESSURE: 78 MMHG | HEIGHT: 72 IN | BODY MASS INDEX: 32.53 KG/M2 | RESPIRATION RATE: 16 BRPM | OXYGEN SATURATION: 97 % | WEIGHT: 240.2 LBS | HEART RATE: 118 BPM | SYSTOLIC BLOOD PRESSURE: 136 MMHG

## 2019-11-20 DIAGNOSIS — I25.10 MILD CORONARY ARTERY DISEASE: ICD-10-CM

## 2019-11-20 DIAGNOSIS — R55 SYNCOPE, UNSPECIFIED SYNCOPE TYPE: Primary | ICD-10-CM

## 2019-11-20 DIAGNOSIS — R10.84 GENERALIZED ABDOMINAL PAIN: ICD-10-CM

## 2019-11-20 DIAGNOSIS — R06.02 SOBOE (SHORTNESS OF BREATH ON EXERTION): ICD-10-CM

## 2019-11-20 DIAGNOSIS — J41.0 SIMPLE CHRONIC BRONCHITIS (HCC): ICD-10-CM

## 2019-11-20 DIAGNOSIS — Z72.0 TOBACCO USE: ICD-10-CM

## 2019-11-20 DIAGNOSIS — Z01.810 PREOPERATIVE CARDIOVASCULAR EXAMINATION: ICD-10-CM

## 2019-11-20 DIAGNOSIS — E78.5 DYSLIPIDEMIA: ICD-10-CM

## 2019-11-20 DIAGNOSIS — R00.0 SINUS TACHYCARDIA: ICD-10-CM

## 2019-11-20 DIAGNOSIS — Z90.5 S/P NEPHRECTOMY: ICD-10-CM

## 2019-11-20 DIAGNOSIS — I10 ESSENTIAL HYPERTENSION: ICD-10-CM

## 2019-11-20 DIAGNOSIS — K27.9 PUD (PEPTIC ULCER DISEASE): ICD-10-CM

## 2019-11-20 DIAGNOSIS — E66.9 NON MORBID OBESITY: ICD-10-CM

## 2019-11-20 DIAGNOSIS — Z85.118 HISTORY OF LUNG CANCER: ICD-10-CM

## 2019-11-20 PROCEDURE — 4004F PT TOBACCO SCREEN RCVD TLK: CPT | Performed by: INTERNAL MEDICINE

## 2019-11-20 PROCEDURE — G8417 CALC BMI ABV UP PARAM F/U: HCPCS | Performed by: INTERNAL MEDICINE

## 2019-11-20 PROCEDURE — G8598 ASA/ANTIPLAT THER USED: HCPCS | Performed by: INTERNAL MEDICINE

## 2019-11-20 PROCEDURE — 99204 OFFICE O/P NEW MOD 45 MIN: CPT | Performed by: INTERNAL MEDICINE

## 2019-11-20 PROCEDURE — G8427 DOCREV CUR MEDS BY ELIG CLIN: HCPCS | Performed by: INTERNAL MEDICINE

## 2019-11-20 PROCEDURE — 93000 ELECTROCARDIOGRAM COMPLETE: CPT | Performed by: INTERNAL MEDICINE

## 2019-11-20 PROCEDURE — G8926 SPIRO NO PERF OR DOC: HCPCS | Performed by: INTERNAL MEDICINE

## 2019-11-20 PROCEDURE — G8484 FLU IMMUNIZE NO ADMIN: HCPCS | Performed by: INTERNAL MEDICINE

## 2019-11-20 PROCEDURE — 3023F SPIROM DOC REV: CPT | Performed by: INTERNAL MEDICINE

## 2019-11-20 PROCEDURE — 3017F COLORECTAL CA SCREEN DOC REV: CPT | Performed by: INTERNAL MEDICINE

## 2019-11-20 RX ORDER — METOPROLOL SUCCINATE 25 MG/1
25 TABLET, EXTENDED RELEASE ORAL DAILY
Qty: 90 TABLET | Refills: 1 | Status: SHIPPED
Start: 2019-11-20 | End: 2020-03-18 | Stop reason: SDUPTHER

## 2019-11-20 SDOH — HEALTH STABILITY: MENTAL HEALTH: HOW MANY STANDARD DRINKS CONTAINING ALCOHOL DO YOU HAVE ON A TYPICAL DAY?: 1 OR 2

## 2019-11-20 SDOH — HEALTH STABILITY: MENTAL HEALTH: HOW OFTEN DO YOU HAVE A DRINK CONTAINING ALCOHOL?: MONTHLY OR LESS

## 2019-11-22 ENCOUNTER — OFFICE VISIT (OUTPATIENT)
Dept: INTERNAL MEDICINE | Age: 54
End: 2019-11-22
Payer: MEDICARE

## 2019-11-22 VITALS
DIASTOLIC BLOOD PRESSURE: 85 MMHG | BODY MASS INDEX: 32.78 KG/M2 | HEIGHT: 72 IN | SYSTOLIC BLOOD PRESSURE: 149 MMHG | WEIGHT: 242 LBS | TEMPERATURE: 98.6 F | HEART RATE: 87 BPM | RESPIRATION RATE: 16 BRPM

## 2019-11-22 DIAGNOSIS — R13.10 DYSPHAGIA, UNSPECIFIED TYPE: ICD-10-CM

## 2019-11-22 DIAGNOSIS — R55 SYNCOPE, UNSPECIFIED SYNCOPE TYPE: Primary | ICD-10-CM

## 2019-11-22 PROCEDURE — 99212 OFFICE O/P EST SF 10 MIN: CPT | Performed by: INTERNAL MEDICINE

## 2019-11-22 PROCEDURE — G8427 DOCREV CUR MEDS BY ELIG CLIN: HCPCS | Performed by: INTERNAL MEDICINE

## 2019-11-22 PROCEDURE — G8484 FLU IMMUNIZE NO ADMIN: HCPCS | Performed by: INTERNAL MEDICINE

## 2019-11-22 PROCEDURE — G8417 CALC BMI ABV UP PARAM F/U: HCPCS | Performed by: INTERNAL MEDICINE

## 2019-11-22 PROCEDURE — 3017F COLORECTAL CA SCREEN DOC REV: CPT | Performed by: INTERNAL MEDICINE

## 2019-11-22 PROCEDURE — 4004F PT TOBACCO SCREEN RCVD TLK: CPT | Performed by: INTERNAL MEDICINE

## 2019-11-22 PROCEDURE — G8598 ASA/ANTIPLAT THER USED: HCPCS | Performed by: INTERNAL MEDICINE

## 2019-11-22 PROCEDURE — 99213 OFFICE O/P EST LOW 20 MIN: CPT | Performed by: INTERNAL MEDICINE

## 2019-11-22 ASSESSMENT — ENCOUNTER SYMPTOMS
SHORTNESS OF BREATH: 1
GASTROINTESTINAL NEGATIVE: 1
COUGH: 1
WHEEZING: 0
TROUBLE SWALLOWING: 1

## 2019-11-27 ENCOUNTER — TELEPHONE (OUTPATIENT)
Dept: SURGERY | Age: 54
End: 2019-11-27

## 2019-12-17 ENCOUNTER — TELEPHONE (OUTPATIENT)
Dept: INTERNAL MEDICINE | Age: 54
End: 2019-12-17

## 2019-12-17 RX ORDER — DULOXETIN HYDROCHLORIDE 60 MG/1
60 CAPSULE, DELAYED RELEASE ORAL DAILY
Qty: 60 CAPSULE | Refills: 3 | Status: SHIPPED
Start: 2019-12-17 | End: 2020-03-18 | Stop reason: SDUPTHER

## 2019-12-17 RX ORDER — TRAZODONE HYDROCHLORIDE 100 MG/1
100 TABLET ORAL NIGHTLY
Qty: 60 TABLET | Refills: 3 | Status: SHIPPED
Start: 2019-12-17 | End: 2020-03-18 | Stop reason: SDUPTHER

## 2019-12-17 RX ORDER — POLYETHYLENE GLYCOL-3350 AND ELECTROLYTES WITH FLAVOR PACK 240; 5.84; 2.98; 6.72; 22.72 G/278.26G; G/278.26G; G/278.26G; G/278.26G; G/278.26G
POWDER, FOR SOLUTION ORAL
Refills: 0 | Status: ON HOLD | COMMUNITY
Start: 2019-11-27 | End: 2020-03-07

## 2019-12-19 ENCOUNTER — HOSPITAL ENCOUNTER (OUTPATIENT)
Dept: CARDIOLOGY | Age: 54
Discharge: HOME | End: 2019-12-19
Payer: MEDICARE

## 2019-12-19 ENCOUNTER — ANESTHESIA EVENT (OUTPATIENT)
Dept: ENDOSCOPY | Age: 54
End: 2019-12-19
Payer: MEDICARE

## 2019-12-19 DIAGNOSIS — I10 ESSENTIAL HYPERTENSION: ICD-10-CM

## 2019-12-19 DIAGNOSIS — R06.02 SOBOE (SHORTNESS OF BREATH ON EXERTION): ICD-10-CM

## 2019-12-19 DIAGNOSIS — Z85.118 HISTORY OF LUNG CANCER: ICD-10-CM

## 2019-12-19 LAB
LV EF: 60 %
LVEF MODALITY: NORMAL

## 2019-12-19 PROCEDURE — 93306 TTE W/DOPPLER COMPLETE: CPT

## 2019-12-20 ENCOUNTER — ANESTHESIA (OUTPATIENT)
Dept: ENDOSCOPY | Age: 54
End: 2019-12-20
Payer: MEDICARE

## 2019-12-20 ENCOUNTER — HOSPITAL ENCOUNTER (OUTPATIENT)
Age: 54
Setting detail: OUTPATIENT SURGERY
Discharge: HOME | End: 2019-12-20
Attending: SURGERY | Admitting: SURGERY
Payer: MEDICARE

## 2019-12-20 VITALS
SYSTOLIC BLOOD PRESSURE: 122 MMHG | TEMPERATURE: 97.8 F | HEIGHT: 72 IN | WEIGHT: 240 LBS | OXYGEN SATURATION: 98 % | HEART RATE: 68 BPM | DIASTOLIC BLOOD PRESSURE: 68 MMHG | BODY MASS INDEX: 32.51 KG/M2 | RESPIRATION RATE: 18 BRPM

## 2019-12-20 VITALS
SYSTOLIC BLOOD PRESSURE: 116 MMHG | OXYGEN SATURATION: 100 % | DIASTOLIC BLOOD PRESSURE: 69 MMHG | RESPIRATION RATE: 12 BRPM

## 2019-12-20 PROCEDURE — 3700000001 HC ADD 15 MINUTES (ANESTHESIA): Performed by: SURGERY

## 2019-12-20 PROCEDURE — 45385 COLONOSCOPY W/LESION REMOVAL: CPT | Performed by: SURGERY

## 2019-12-20 PROCEDURE — 6360000002 HC RX W HCPCS: Performed by: NURSE ANESTHETIST, CERTIFIED REGISTERED

## 2019-12-20 PROCEDURE — 7100000010 HC PHASE II RECOVERY - FIRST 15 MIN: Performed by: SURGERY

## 2019-12-20 PROCEDURE — 2580000003 HC RX 258: Performed by: NURSE ANESTHETIST, CERTIFIED REGISTERED

## 2019-12-20 PROCEDURE — 3609012400 HC EGD TRANSORAL BIOPSY SINGLE/MULTIPLE: Performed by: SURGERY

## 2019-12-20 PROCEDURE — 88305 TISSUE EXAM BY PATHOLOGIST: CPT

## 2019-12-20 PROCEDURE — 45384 COLONOSCOPY W/LESION REMOVAL: CPT | Performed by: SURGERY

## 2019-12-20 PROCEDURE — 2500000003 HC RX 250 WO HCPCS: Performed by: NURSE ANESTHETIST, CERTIFIED REGISTERED

## 2019-12-20 PROCEDURE — 88342 IMHCHEM/IMCYTCHM 1ST ANTB: CPT

## 2019-12-20 PROCEDURE — 45381 COLONOSCOPY SUBMUCOUS NJX: CPT | Performed by: SURGERY

## 2019-12-20 PROCEDURE — 3609010600 HC COLONOSCOPY POLYPECTOMY SNARE/COLD BIOPSY: Performed by: SURGERY

## 2019-12-20 PROCEDURE — 3700000000 HC ANESTHESIA ATTENDED CARE: Performed by: SURGERY

## 2019-12-20 PROCEDURE — 3609019800 HC COLONOSCOPY WITH SUBMUCOSAL INJECTION: Performed by: SURGERY

## 2019-12-20 PROCEDURE — 7100000011 HC PHASE II RECOVERY - ADDTL 15 MIN: Performed by: SURGERY

## 2019-12-20 PROCEDURE — 43239 EGD BIOPSY SINGLE/MULTIPLE: CPT | Performed by: SURGERY

## 2019-12-20 PROCEDURE — 2709999900 HC NON-CHARGEABLE SUPPLY: Performed by: SURGERY

## 2019-12-20 RX ORDER — PROPOFOL 10 MG/ML
INJECTION, EMULSION INTRAVENOUS PRN
Status: DISCONTINUED | OUTPATIENT
Start: 2019-12-20 | End: 2019-12-20 | Stop reason: SDUPTHER

## 2019-12-20 RX ORDER — SODIUM CHLORIDE 9 MG/ML
INJECTION, SOLUTION INTRAVENOUS CONTINUOUS
Status: DISCONTINUED | OUTPATIENT
Start: 2019-12-20 | End: 2019-12-20 | Stop reason: HOSPADM

## 2019-12-20 RX ORDER — SODIUM CHLORIDE 0.9 % (FLUSH) 0.9 %
10 SYRINGE (ML) INJECTION PRN
Status: DISCONTINUED | OUTPATIENT
Start: 2019-12-20 | End: 2019-12-20 | Stop reason: HOSPADM

## 2019-12-20 RX ORDER — SODIUM CHLORIDE 0.9 % (FLUSH) 0.9 %
10 SYRINGE (ML) INJECTION EVERY 12 HOURS SCHEDULED
Status: DISCONTINUED | OUTPATIENT
Start: 2019-12-20 | End: 2019-12-20 | Stop reason: HOSPADM

## 2019-12-20 RX ORDER — GLYCOPYRROLATE 1 MG/5 ML
SYRINGE (ML) INTRAVENOUS PRN
Status: DISCONTINUED | OUTPATIENT
Start: 2019-12-20 | End: 2019-12-20 | Stop reason: SDUPTHER

## 2019-12-20 RX ORDER — PANTOPRAZOLE SODIUM 40 MG/1
40 TABLET, DELAYED RELEASE ORAL
Qty: 180 TABLET | Refills: 3 | Status: SHIPPED
Start: 2019-12-20 | End: 2020-03-18 | Stop reason: SDUPTHER

## 2019-12-20 RX ORDER — SODIUM CHLORIDE 9 MG/ML
INJECTION, SOLUTION INTRAVENOUS CONTINUOUS PRN
Status: DISCONTINUED | OUTPATIENT
Start: 2019-12-20 | End: 2019-12-20 | Stop reason: SDUPTHER

## 2019-12-20 RX ADMIN — SODIUM CHLORIDE: 9 INJECTION, SOLUTION INTRAVENOUS at 09:23

## 2019-12-20 RX ADMIN — PROPOFOL 1150 MG: 10 INJECTION, EMULSION INTRAVENOUS at 10:48

## 2019-12-20 RX ADMIN — Medication 0.2 MG: at 09:24

## 2019-12-20 ASSESSMENT — PAIN - FUNCTIONAL ASSESSMENT
PAIN_FUNCTIONAL_ASSESSMENT: PREVENTS OR INTERFERES SOME ACTIVE ACTIVITIES AND ADLS
PAIN_FUNCTIONAL_ASSESSMENT: 0-10

## 2019-12-20 ASSESSMENT — PAIN DESCRIPTION - DESCRIPTORS: DESCRIPTORS: SHARP;STABBING

## 2020-01-08 ENCOUNTER — TELEPHONE (OUTPATIENT)
Dept: SURGERY | Age: 55
End: 2020-01-08

## 2020-01-08 NOTE — TELEPHONE ENCOUNTER
----- Message from Monik Hopkins MD sent at 1/8/2020 11:06 AM EST -----  Regarding: Answer  Please let the patient know it is <1% chance there would be any Renal side effects. He can start with 40mg Protonix daily to see if that helps with his reflux over the course of the 2 weeks and if he is still having severe reflux increase it to the 40mg Twice daily. At any sign of swelling, blood in his urine or decreased urination he should call in. The risk of this is very rare. If he does not want to take the protonix  I could write for pepcid ( considering this is cleared through the kidneys we would need to  decrease it to daily if he has any signs of renal dysfunction in the future - Most recent kidney function was normal last year). If he does not want to take either and his reflux is still severe I can send him to a Surgeon that specializes in reflux procedures.        Monik Hopkins MD

## 2020-01-08 NOTE — TELEPHONE ENCOUNTER
----- Message from Lilliam Laird MD sent at 1/8/2020 10:17 AM EST -----  Regarding: pathology  Please let the patient know that the polyps came back as multiple Tubular Adenomas, this is currently a benign finding but will require a repeat colonoscopy in 3 years. He also had gastritis and duodenitis on his EGD but no pathological changes on biopsy. Please ask him how his reflux has been on the Protonix.       Lilliam Laird MD    ----- Message -----  From: Estrella Barrera Incoming Results From [facility]  Sent: 12/24/2019  12:08 PM EST  To: Lilliam Laird MD

## 2020-01-31 ENCOUNTER — TELEPHONE (OUTPATIENT)
Dept: CARDIOLOGY CLINIC | Age: 55
End: 2020-01-31

## 2020-01-31 NOTE — TELEPHONE ENCOUNTER
Patient underwent 2D Echo:     12/19/2019   Echo:    Summary   Technically sub-optimal images. Normal left ventricular chamber size. Normal left ventricular systolic function, LVEF is 60 %. Mild left ventricular concentric hypertrophy noted. Stage I diastolic dysfunction. Normal left atrial pressure. Left atrium is of normal size. Interatrial septum not well visualized but appears intact. Normal right ventricle structure and function. Normal mitral valve structure and function. No mitral valve prolapse. Normal aortic valve structure and function. There is trace tricuspid regurgitation, RVSP 25mmHg. Normal aortic root and ascending aorta. No evidence of pericardial effusion. Pericardium appears normal.   No intra cardiac mass or thrombus. No comparison study available. Called patient to discuss test: results are ok.   He verbalized understanding

## 2020-03-07 ENCOUNTER — APPOINTMENT (OUTPATIENT)
Dept: GENERAL RADIOLOGY | Age: 55
DRG: 190 | End: 2020-03-07
Payer: MEDICARE

## 2020-03-07 ENCOUNTER — HOSPITAL ENCOUNTER (INPATIENT)
Age: 55
LOS: 3 days | Discharge: HOME | DRG: 190 | End: 2020-03-11
Attending: EMERGENCY MEDICINE | Admitting: INTERNAL MEDICINE
Payer: MEDICARE

## 2020-03-07 PROBLEM — J10.1 INFLUENZA B: Status: ACTIVE | Noted: 2020-03-07

## 2020-03-07 LAB
ALBUMIN SERPL-MCNC: 4 G/DL (ref 3.5–5.2)
ALP BLD-CCNC: 60 U/L (ref 40–129)
ALT SERPL-CCNC: 42 U/L (ref 0–40)
ANION GAP SERPL CALCULATED.3IONS-SCNC: 14 MMOL/L (ref 7–16)
AST SERPL-CCNC: 59 U/L (ref 0–39)
BACTERIA: ABNORMAL /HPF
BASOPHILS ABSOLUTE: 0.03 E9/L (ref 0–0.2)
BASOPHILS RELATIVE PERCENT: 0.3 % (ref 0–2)
BILIRUB SERPL-MCNC: 0.2 MG/DL (ref 0–1.2)
BILIRUBIN URINE: NEGATIVE
BLOOD, URINE: NEGATIVE
BUN BLDV-MCNC: 16 MG/DL (ref 6–20)
CALCIUM SERPL-MCNC: 9.2 MG/DL (ref 8.6–10.2)
CHLORIDE BLD-SCNC: 103 MMOL/L (ref 98–107)
CLARITY: CLEAR
CO2: 22 MMOL/L (ref 22–29)
COLOR: YELLOW
CREAT SERPL-MCNC: 1.2 MG/DL (ref 0.7–1.2)
EOSINOPHILS ABSOLUTE: 0.04 E9/L (ref 0.05–0.5)
EOSINOPHILS RELATIVE PERCENT: 0.3 % (ref 0–6)
GFR AFRICAN AMERICAN: >60
GFR NON-AFRICAN AMERICAN: >60 ML/MIN/1.73
GLUCOSE BLD-MCNC: 103 MG/DL (ref 74–99)
GLUCOSE URINE: NEGATIVE MG/DL
HCT VFR BLD CALC: 39.8 % (ref 37–54)
HEMOGLOBIN: 12.6 G/DL (ref 12.5–16.5)
IMMATURE GRANULOCYTES #: 0.09 E9/L
IMMATURE GRANULOCYTES %: 0.8 % (ref 0–5)
INFLUENZA A BY PCR: NOT DETECTED
INFLUENZA B BY PCR: DETECTED
KETONES, URINE: NEGATIVE MG/DL
LACTIC ACID: 1.5 MMOL/L (ref 0.5–2.2)
LEUKOCYTE ESTERASE, URINE: NEGATIVE
LYMPHOCYTES ABSOLUTE: 1.84 E9/L (ref 1.5–4)
LYMPHOCYTES RELATIVE PERCENT: 15.7 % (ref 20–42)
MCH RBC QN AUTO: 31.8 PG (ref 26–35)
MCHC RBC AUTO-ENTMCNC: 31.7 % (ref 32–34.5)
MCV RBC AUTO: 100.5 FL (ref 80–99.9)
MONOCYTES ABSOLUTE: 0.96 E9/L (ref 0.1–0.95)
MONOCYTES RELATIVE PERCENT: 8.2 % (ref 2–12)
NEUTROPHILS ABSOLUTE: 8.79 E9/L (ref 1.8–7.3)
NEUTROPHILS RELATIVE PERCENT: 74.7 % (ref 43–80)
NITRITE, URINE: NEGATIVE
PDW BLD-RTO: 12.6 FL (ref 11.5–15)
PH UA: 5.5 (ref 5–9)
PLATELET # BLD: 194 E9/L (ref 130–450)
PMV BLD AUTO: 10.6 FL (ref 7–12)
POTASSIUM REFLEX MAGNESIUM: 4.2 MMOL/L (ref 3.5–5)
PRO-BNP: 57 PG/ML (ref 0–125)
PROTEIN UA: 30 MG/DL
RBC # BLD: 3.96 E12/L (ref 3.8–5.8)
RBC UA: ABNORMAL /HPF (ref 0–2)
SODIUM BLD-SCNC: 139 MMOL/L (ref 132–146)
SPECIFIC GRAVITY UA: >=1.03 (ref 1–1.03)
TOTAL PROTEIN: 6.9 G/DL (ref 6.4–8.3)
TROPONIN: <0.01 NG/ML (ref 0–0.03)
UROBILINOGEN, URINE: 0.2 E.U./DL
WBC # BLD: 11.8 E9/L (ref 4.5–11.5)
WBC UA: ABNORMAL /HPF (ref 0–5)

## 2020-03-07 PROCEDURE — 94640 AIRWAY INHALATION TREATMENT: CPT

## 2020-03-07 PROCEDURE — 93005 ELECTROCARDIOGRAM TRACING: CPT | Performed by: PHYSICIAN ASSISTANT

## 2020-03-07 PROCEDURE — 6370000000 HC RX 637 (ALT 250 FOR IP): Performed by: PHYSICIAN ASSISTANT

## 2020-03-07 PROCEDURE — 6360000002 HC RX W HCPCS: Performed by: INTERNAL MEDICINE

## 2020-03-07 PROCEDURE — 96375 TX/PRO/DX INJ NEW DRUG ADDON: CPT

## 2020-03-07 PROCEDURE — 81001 URINALYSIS AUTO W/SCOPE: CPT

## 2020-03-07 PROCEDURE — 83880 ASSAY OF NATRIURETIC PEPTIDE: CPT

## 2020-03-07 PROCEDURE — 6370000000 HC RX 637 (ALT 250 FOR IP): Performed by: INTERNAL MEDICINE

## 2020-03-07 PROCEDURE — 87040 BLOOD CULTURE FOR BACTERIA: CPT

## 2020-03-07 PROCEDURE — 84484 ASSAY OF TROPONIN QUANT: CPT

## 2020-03-07 PROCEDURE — 0100U HC RESPIRPTHGN MULT REV TRANS & AMP PRB TECH 21 TRGT: CPT

## 2020-03-07 PROCEDURE — 87502 INFLUENZA DNA AMP PROBE: CPT

## 2020-03-07 PROCEDURE — 94664 DEMO&/EVAL PT USE INHALER: CPT

## 2020-03-07 PROCEDURE — 2580000003 HC RX 258: Performed by: INTERNAL MEDICINE

## 2020-03-07 PROCEDURE — 2580000003 HC RX 258: Performed by: PHYSICIAN ASSISTANT

## 2020-03-07 PROCEDURE — 2700000000 HC OXYGEN THERAPY PER DAY

## 2020-03-07 PROCEDURE — 80053 COMPREHEN METABOLIC PANEL: CPT

## 2020-03-07 PROCEDURE — 87081 CULTURE SCREEN ONLY: CPT

## 2020-03-07 PROCEDURE — G0378 HOSPITAL OBSERVATION PER HR: HCPCS

## 2020-03-07 PROCEDURE — 99285 EMERGENCY DEPT VISIT HI MDM: CPT

## 2020-03-07 PROCEDURE — 83605 ASSAY OF LACTIC ACID: CPT

## 2020-03-07 PROCEDURE — 96372 THER/PROPH/DIAG INJ SC/IM: CPT

## 2020-03-07 PROCEDURE — 71045 X-RAY EXAM CHEST 1 VIEW: CPT

## 2020-03-07 PROCEDURE — 85025 COMPLETE CBC W/AUTO DIFF WBC: CPT

## 2020-03-07 PROCEDURE — 96365 THER/PROPH/DIAG IV INF INIT: CPT

## 2020-03-07 PROCEDURE — 96367 TX/PROPH/DG ADDL SEQ IV INF: CPT

## 2020-03-07 RX ORDER — 0.9 % SODIUM CHLORIDE 0.9 %
1000 INTRAVENOUS SOLUTION INTRAVENOUS ONCE
Status: COMPLETED | OUTPATIENT
Start: 2020-03-07 | End: 2020-03-07

## 2020-03-07 RX ORDER — GABAPENTIN 600 MG/1
600 TABLET ORAL 3 TIMES DAILY
COMMUNITY
End: 2020-05-27

## 2020-03-07 RX ORDER — ACETAMINOPHEN 500 MG
1000 TABLET ORAL ONCE
Status: COMPLETED | OUTPATIENT
Start: 2020-03-07 | End: 2020-03-07

## 2020-03-07 RX ORDER — ARFORMOTEROL TARTRATE 15 UG/2ML
15 SOLUTION RESPIRATORY (INHALATION) 2 TIMES DAILY
Status: DISCONTINUED | OUTPATIENT
Start: 2020-03-07 | End: 2020-03-11 | Stop reason: HOSPADM

## 2020-03-07 RX ORDER — POLYETHYLENE GLYCOL 3350 17 G/17G
17 POWDER, FOR SOLUTION ORAL DAILY PRN
Status: DISCONTINUED | OUTPATIENT
Start: 2020-03-07 | End: 2020-03-11 | Stop reason: HOSPADM

## 2020-03-07 RX ORDER — PROMETHAZINE HYDROCHLORIDE 25 MG/1
12.5 TABLET ORAL EVERY 6 HOURS PRN
Status: DISCONTINUED | OUTPATIENT
Start: 2020-03-07 | End: 2020-03-11 | Stop reason: HOSPADM

## 2020-03-07 RX ORDER — METOPROLOL SUCCINATE 25 MG/1
25 TABLET, EXTENDED RELEASE ORAL DAILY
Status: DISCONTINUED | OUTPATIENT
Start: 2020-03-08 | End: 2020-03-11 | Stop reason: HOSPADM

## 2020-03-07 RX ORDER — PANTOPRAZOLE SODIUM 40 MG/1
40 TABLET, DELAYED RELEASE ORAL
Status: DISCONTINUED | OUTPATIENT
Start: 2020-03-08 | End: 2020-03-11 | Stop reason: HOSPADM

## 2020-03-07 RX ORDER — ATORVASTATIN CALCIUM 40 MG/1
40 TABLET, FILM COATED ORAL DAILY
Status: DISCONTINUED | OUTPATIENT
Start: 2020-03-08 | End: 2020-03-11 | Stop reason: HOSPADM

## 2020-03-07 RX ORDER — IPRATROPIUM BROMIDE AND ALBUTEROL SULFATE 2.5; .5 MG/3ML; MG/3ML
1 SOLUTION RESPIRATORY (INHALATION)
Status: DISCONTINUED | OUTPATIENT
Start: 2020-03-08 | End: 2020-03-10

## 2020-03-07 RX ORDER — DULOXETIN HYDROCHLORIDE 60 MG/1
60 CAPSULE, DELAYED RELEASE ORAL DAILY
Status: DISCONTINUED | OUTPATIENT
Start: 2020-03-08 | End: 2020-03-11 | Stop reason: HOSPADM

## 2020-03-07 RX ORDER — ACETAMINOPHEN 650 MG/1
650 SUPPOSITORY RECTAL EVERY 6 HOURS PRN
Status: DISCONTINUED | OUTPATIENT
Start: 2020-03-07 | End: 2020-03-11 | Stop reason: HOSPADM

## 2020-03-07 RX ORDER — TAMSULOSIN HYDROCHLORIDE 0.4 MG/1
0.4 CAPSULE ORAL DAILY
Status: DISCONTINUED | OUTPATIENT
Start: 2020-03-07 | End: 2020-03-11 | Stop reason: HOSPADM

## 2020-03-07 RX ORDER — ACETAMINOPHEN 325 MG/1
650 TABLET ORAL EVERY 6 HOURS PRN
Status: DISCONTINUED | OUTPATIENT
Start: 2020-03-07 | End: 2020-03-11 | Stop reason: HOSPADM

## 2020-03-07 RX ORDER — IPRATROPIUM BROMIDE AND ALBUTEROL SULFATE 2.5; .5 MG/3ML; MG/3ML
1 SOLUTION RESPIRATORY (INHALATION)
Status: COMPLETED | OUTPATIENT
Start: 2020-03-07 | End: 2020-03-07

## 2020-03-07 RX ORDER — GABAPENTIN 300 MG/1
600 CAPSULE ORAL 3 TIMES DAILY
Status: DISCONTINUED | OUTPATIENT
Start: 2020-03-07 | End: 2020-03-11 | Stop reason: HOSPADM

## 2020-03-07 RX ORDER — ASPIRIN 81 MG/1
81 TABLET ORAL DAILY
Status: DISCONTINUED | OUTPATIENT
Start: 2020-03-08 | End: 2020-03-11 | Stop reason: HOSPADM

## 2020-03-07 RX ORDER — OSELTAMIVIR PHOSPHATE 75 MG/1
75 CAPSULE ORAL ONCE
Status: COMPLETED | OUTPATIENT
Start: 2020-03-07 | End: 2020-03-07

## 2020-03-07 RX ORDER — TRAZODONE HYDROCHLORIDE 50 MG/1
100 TABLET ORAL NIGHTLY
Status: DISCONTINUED | OUTPATIENT
Start: 2020-03-07 | End: 2020-03-11 | Stop reason: HOSPADM

## 2020-03-07 RX ORDER — ONDANSETRON 2 MG/ML
4 INJECTION INTRAMUSCULAR; INTRAVENOUS EVERY 6 HOURS PRN
Status: DISCONTINUED | OUTPATIENT
Start: 2020-03-07 | End: 2020-03-11 | Stop reason: HOSPADM

## 2020-03-07 RX ORDER — GABAPENTIN 600 MG/1
600 TABLET ORAL 3 TIMES DAILY
Status: DISCONTINUED | OUTPATIENT
Start: 2020-03-07 | End: 2020-03-07

## 2020-03-07 RX ORDER — SODIUM CHLORIDE 0.9 % (FLUSH) 0.9 %
10 SYRINGE (ML) INJECTION EVERY 12 HOURS SCHEDULED
Status: DISCONTINUED | OUTPATIENT
Start: 2020-03-07 | End: 2020-03-11 | Stop reason: HOSPADM

## 2020-03-07 RX ORDER — SODIUM CHLORIDE 0.9 % (FLUSH) 0.9 %
10 SYRINGE (ML) INJECTION PRN
Status: DISCONTINUED | OUTPATIENT
Start: 2020-03-07 | End: 2020-03-11 | Stop reason: HOSPADM

## 2020-03-07 RX ORDER — BUDESONIDE 0.25 MG/2ML
250 INHALANT ORAL 2 TIMES DAILY
Status: DISCONTINUED | OUTPATIENT
Start: 2020-03-07 | End: 2020-03-10

## 2020-03-07 RX ORDER — OSELTAMIVIR PHOSPHATE 75 MG/1
75 CAPSULE ORAL 2 TIMES DAILY
Status: DISCONTINUED | OUTPATIENT
Start: 2020-03-08 | End: 2020-03-11 | Stop reason: HOSPADM

## 2020-03-07 RX ADMIN — BUDESONIDE 250 MCG: 0.25 SUSPENSION RESPIRATORY (INHALATION) at 21:19

## 2020-03-07 RX ADMIN — ACETAMINOPHEN 1000 MG: 500 TABLET ORAL at 17:32

## 2020-03-07 RX ADMIN — ARFORMOTEROL TARTRATE 15 MCG: 15 SOLUTION RESPIRATORY (INHALATION) at 21:19

## 2020-03-07 RX ADMIN — VANCOMYCIN HYDROCHLORIDE 2250 MG: 1 INJECTION, POWDER, LYOPHILIZED, FOR SOLUTION INTRAVENOUS at 23:07

## 2020-03-07 RX ADMIN — TRAZODONE HYDROCHLORIDE 100 MG: 50 TABLET ORAL at 22:13

## 2020-03-07 RX ADMIN — ONDANSETRON 4 MG: 2 INJECTION INTRAMUSCULAR; INTRAVENOUS at 22:15

## 2020-03-07 RX ADMIN — SODIUM CHLORIDE, PRESERVATIVE FREE 10 ML: 5 INJECTION INTRAVENOUS at 22:15

## 2020-03-07 RX ADMIN — CEFEPIME HYDROCHLORIDE 2 G: 2 INJECTION, POWDER, FOR SOLUTION INTRAVENOUS at 22:20

## 2020-03-07 RX ADMIN — IPRATROPIUM BROMIDE AND ALBUTEROL SULFATE 1 AMPULE: 2.5; .5 SOLUTION RESPIRATORY (INHALATION) at 17:07

## 2020-03-07 RX ADMIN — ACETAMINOPHEN 650 MG: 325 TABLET ORAL at 22:13

## 2020-03-07 RX ADMIN — SODIUM CHLORIDE 1000 ML: 9 INJECTION, SOLUTION INTRAVENOUS at 17:33

## 2020-03-07 RX ADMIN — IPRATROPIUM BROMIDE AND ALBUTEROL SULFATE 1 AMPULE: 2.5; .5 SOLUTION RESPIRATORY (INHALATION) at 17:00

## 2020-03-07 RX ADMIN — OSELTAMIVIR PHOSPHATE 75 MG: 75 CAPSULE ORAL at 18:35

## 2020-03-07 RX ADMIN — ENOXAPARIN SODIUM 40 MG: 40 INJECTION SUBCUTANEOUS at 22:18

## 2020-03-07 RX ADMIN — GABAPENTIN 600 MG: 300 CAPSULE ORAL at 22:14

## 2020-03-07 RX ADMIN — IPRATROPIUM BROMIDE AND ALBUTEROL SULFATE 1 AMPULE: 2.5; .5 SOLUTION RESPIRATORY (INHALATION) at 16:47

## 2020-03-07 ASSESSMENT — PAIN DESCRIPTION - DESCRIPTORS: DESCRIPTORS: SHARP;STABBING

## 2020-03-07 ASSESSMENT — PAIN DESCRIPTION - LOCATION: LOCATION: CHEST;BACK

## 2020-03-07 ASSESSMENT — PAIN DESCRIPTION - PAIN TYPE: TYPE: ACUTE PAIN

## 2020-03-07 ASSESSMENT — PAIN SCALES - GENERAL
PAINLEVEL_OUTOF10: 4
PAINLEVEL_OUTOF10: 9
PAINLEVEL_OUTOF10: 3

## 2020-03-07 ASSESSMENT — PAIN DESCRIPTION - FREQUENCY: FREQUENCY: CONTINUOUS

## 2020-03-07 ASSESSMENT — PAIN DESCRIPTION - ONSET: ONSET: ON-GOING

## 2020-03-07 NOTE — ED PROVIDER NOTES
Motor functions intact.     Lab / Imaging Results   (All laboratory and radiology results have been personally reviewed by myself)  Labs:  Results for orders placed or performed during the hospital encounter of 03/07/20   Rapid influenza A/B antigens   Result Value Ref Range    Influenza A by PCR Not Detected Not Detected    Influenza B by PCR DETECTED (A) Not Detected   CBC Auto Differential   Result Value Ref Range    WBC 11.8 (H) 4.5 - 11.5 E9/L    RBC 3.96 3.80 - 5.80 E12/L    Hemoglobin 12.6 12.5 - 16.5 g/dL    Hematocrit 39.8 37.0 - 54.0 %    .5 (H) 80.0 - 99.9 fL    MCH 31.8 26.0 - 35.0 pg    MCHC 31.7 (L) 32.0 - 34.5 %    RDW 12.6 11.5 - 15.0 fL    Platelets 419 862 - 488 E9/L    MPV 10.6 7.0 - 12.0 fL    Neutrophils % 74.7 43.0 - 80.0 %    Immature Granulocytes % 0.8 0.0 - 5.0 %    Lymphocytes % 15.7 (L) 20.0 - 42.0 %    Monocytes % 8.2 2.0 - 12.0 %    Eosinophils % 0.3 0.0 - 6.0 %    Basophils % 0.3 0.0 - 2.0 %    Neutrophils Absolute 8.79 (H) 1.80 - 7.30 E9/L    Immature Granulocytes # 0.09 E9/L    Lymphocytes Absolute 1.84 1.50 - 4.00 E9/L    Monocytes Absolute 0.96 (H) 0.10 - 0.95 E9/L    Eosinophils Absolute 0.04 (L) 0.05 - 0.50 E9/L    Basophils Absolute 0.03 0.00 - 0.20 E9/L   Troponin   Result Value Ref Range    Troponin <0.01 0.00 - 0.03 ng/mL   Comprehensive Metabolic Panel w/ Reflex to MG   Result Value Ref Range    Sodium 139 132 - 146 mmol/L    Potassium reflex Magnesium 4.2 3.5 - 5.0 mmol/L    Chloride 103 98 - 107 mmol/L    CO2 22 22 - 29 mmol/L    Anion Gap 14 7 - 16 mmol/L    Glucose 103 (H) 74 - 99 mg/dL    BUN 16 6 - 20 mg/dL    CREATININE 1.2 0.7 - 1.2 mg/dL    GFR Non-African American >60 >=60 mL/min/1.73    GFR African American >60     Calcium 9.2 8.6 - 10.2 mg/dL    Total Protein 6.9 6.4 - 8.3 g/dL    Alb 4.0 3.5 - 5.2 g/dL    Total Bilirubin 0.2 0.0 - 1.2 mg/dL    Alkaline Phosphatase 60 40 - 129 U/L    ALT 42 (H) 0 - 40 U/L    AST 59 (H) 0 - 39 U/L   Lactic Acid, Plasma and agreed    Counseling:   I have spoken with the patient and discussed todays results, in addition to providing specific details for the plan of care and counseling regarding the diagnosis and prognosis and are agreeable with the plan. Assessment      1. Influenza B    2. Dyspnea and respiratory abnormalities    3. COPD exacerbation ([facility] [address]      This patient's ED course included: a personal history and physicial examination, re-evaluation prior to disposition, multiple bedside re-evaluations, IV medications, cardiac monitoring, continuous pulse oximetry and complex medical decision making and emergency management  This patient has remained hemodynamically stable during their ED course. Plan   Admit to telemetry. Patient condition is stable. New Medications     New Prescriptions    No medications on file     Electronically signed by Fatmata Shaffer PA-C   DD: 3/7/20  **This report was transcribed using voice recognition software. Every effort was made to ensure accuracy; however, inadvertent computerized transcription errors may be present.   END OF PROVIDER NOTE      Fatmata Shaffer PA-C  03/07/20 0309

## 2020-03-07 NOTE — LETTER
Whats most important for you to know is that your Medicare rights and benefits wont change because your health care provider is participating in [address] [place]. Medicare will keep covering all of your medically necessary services. Even though Medicare will pay your doctor in a different way under [employer], how much you have to pay wont change. Health care providers and suppliers who are enrolled in Medicare will submit their Medicare claims like they always have. Youll have all the same Medicare rights and protections, including the right to choose which hospital, doctor, or other health care provider you see. If you dont want to get care from a health care provider whos participating in [address] [place], then youll have to choose a different health care provider whos not participating in the Model. How can I give feedback about my health care? Medicare might ask you to take a voluntary survey about the services and care you received from [address] [place] [address] during your hospital stay or outpatient procedure and for a specific period of time afterwards. You can decide whether you want to take the voluntary survey, but if you do, itll help Medicare make [employer] and the care of other Medicare patients better. If you have concerns or complaints about your care, you can:   · Talk to your doctor or health care provider. · Contact your Beneficiary and Family Centered Care Quality Improvement   Organization [facility]). You can get your [employer]-QIOs phone number  at  Medicare.gov/contacts or by calling 1-800-MEDICARE. TTY users can call  8-110.308.3411. Where can I learn more about [employer]? Learn more about [employer] at https://innovation.cms.gov/initiatives/bpci-advanced/:  · A list of all the hospitals and physician group practices in the country participating in [address] [place]. · All of the inpatient and outpatient Clinical Episodes that are currently included under [employer]. A Clinical Episode is a grouping of medical conditions or diagnoses that are included in the [address].

## 2020-03-08 ENCOUNTER — APPOINTMENT (OUTPATIENT)
Dept: GENERAL RADIOLOGY | Age: 55
DRG: 190 | End: 2020-03-08
Payer: MEDICARE

## 2020-03-08 ENCOUNTER — APPOINTMENT (OUTPATIENT)
Dept: CT IMAGING | Age: 55
DRG: 190 | End: 2020-03-08
Payer: MEDICARE

## 2020-03-08 LAB
ADENOVIRUS BY PCR: NOT DETECTED
ANION GAP SERPL CALCULATED.3IONS-SCNC: 11 MMOL/L (ref 7–16)
BASOPHILS ABSOLUTE: 0.02 E9/L (ref 0–0.2)
BASOPHILS RELATIVE PERCENT: 0.2 % (ref 0–2)
BORDETELLA PARAPERTUSSIS BY PCR: NOT DETECTED
BORDETELLA PERTUSSIS BY PCR: NOT DETECTED
BUN BLDV-MCNC: 14 MG/DL (ref 6–20)
CALCIUM SERPL-MCNC: 8.8 MG/DL (ref 8.6–10.2)
CHLAMYDOPHILIA PNEUMONIAE BY PCR: NOT DETECTED
CHLORIDE BLD-SCNC: 101 MMOL/L (ref 98–107)
CO2: 23 MMOL/L (ref 22–29)
CORONAVIRUS 229E BY PCR: NOT DETECTED
CORONAVIRUS HKU1 BY PCR: NOT DETECTED
CORONAVIRUS NL63 BY PCR: NOT DETECTED
CORONAVIRUS OC43 BY PCR: NOT DETECTED
CREAT SERPL-MCNC: 1.1 MG/DL (ref 0.7–1.2)
EKG ATRIAL RATE: 95 BPM
EKG P AXIS: 61 DEGREES
EKG P-R INTERVAL: 142 MS
EKG Q-T INTERVAL: 336 MS
EKG QRS DURATION: 76 MS
EKG QTC CALCULATION (BAZETT): 422 MS
EKG R AXIS: 3 DEGREES
EKG T AXIS: 35 DEGREES
EKG VENTRICULAR RATE: 95 BPM
EOSINOPHILS ABSOLUTE: 0.03 E9/L (ref 0.05–0.5)
EOSINOPHILS RELATIVE PERCENT: 0.3 % (ref 0–6)
FOLATE: >20 NG/ML (ref 4.8–24.2)
GFR AFRICAN AMERICAN: >60
GFR NON-AFRICAN AMERICAN: >60 ML/MIN/1.73
GLUCOSE BLD-MCNC: 103 MG/DL (ref 74–99)
HCT VFR BLD CALC: 36.5 % (ref 37–54)
HEMOGLOBIN: 11.5 G/DL (ref 12.5–16.5)
HUMAN METAPNEUMOVIRUS BY PCR: NOT DETECTED
HUMAN RHINOVIRUS/ENTEROVIRUS BY PCR: NOT DETECTED
IMMATURE GRANULOCYTES #: 0.05 E9/L
IMMATURE GRANULOCYTES %: 0.6 % (ref 0–5)
INFLUENZA A BY PCR: NOT DETECTED
INFLUENZA B BY PCR: DETECTED
L. PNEUMOPHILA SEROGP 1 UR AG: NORMAL
LYMPHOCYTES ABSOLUTE: 2.29 E9/L (ref 1.5–4)
LYMPHOCYTES RELATIVE PERCENT: 26 % (ref 20–42)
MAGNESIUM: 2 MG/DL (ref 1.6–2.6)
MCH RBC QN AUTO: 31.7 PG (ref 26–35)
MCHC RBC AUTO-ENTMCNC: 31.5 % (ref 32–34.5)
MCV RBC AUTO: 100.6 FL (ref 80–99.9)
MONOCYTES ABSOLUTE: 0.67 E9/L (ref 0.1–0.95)
MONOCYTES RELATIVE PERCENT: 7.6 % (ref 2–12)
MYCOPLASMA PNEUMONIAE BY PCR: NOT DETECTED
NEUTROPHILS ABSOLUTE: 5.74 E9/L (ref 1.8–7.3)
NEUTROPHILS RELATIVE PERCENT: 65.3 % (ref 43–80)
PARAINFLUENZA VIRUS 1 BY PCR: NOT DETECTED
PARAINFLUENZA VIRUS 2 BY PCR: NOT DETECTED
PARAINFLUENZA VIRUS 3 BY PCR: NOT DETECTED
PARAINFLUENZA VIRUS 4 BY PCR: NOT DETECTED
PDW BLD-RTO: 12.7 FL (ref 11.5–15)
PHOSPHORUS: 3.6 MG/DL (ref 2.5–4.5)
PLATELET # BLD: 174 E9/L (ref 130–450)
PMV BLD AUTO: 10.4 FL (ref 7–12)
POTASSIUM SERPL-SCNC: 3.9 MMOL/L (ref 3.5–5)
PROCALCITONIN: 0.11 NG/ML (ref 0–0.08)
RBC # BLD: 3.63 E12/L (ref 3.8–5.8)
RESPIRATORY SYNCYTIAL VIRUS BY PCR: NOT DETECTED
SODIUM BLD-SCNC: 135 MMOL/L (ref 132–146)
STREP PNEUMONIAE ANTIGEN, URINE: NORMAL
VITAMIN B-12: 423 PG/ML (ref 211–946)
WBC # BLD: 8.8 E9/L (ref 4.5–11.5)

## 2020-03-08 PROCEDURE — 97161 PT EVAL LOW COMPLEX 20 MIN: CPT

## 2020-03-08 PROCEDURE — 2140000000 HC CCU INTERMEDIATE R&B

## 2020-03-08 PROCEDURE — 87088 URINE BACTERIA CULTURE: CPT

## 2020-03-08 PROCEDURE — 84145 PROCALCITONIN (PCT): CPT

## 2020-03-08 PROCEDURE — 87450 HC DIRECT STREP B ANTIGEN: CPT

## 2020-03-08 PROCEDURE — 96366 THER/PROPH/DIAG IV INF ADDON: CPT

## 2020-03-08 PROCEDURE — 36415 COLL VENOUS BLD VENIPUNCTURE: CPT

## 2020-03-08 PROCEDURE — 2700000000 HC OXYGEN THERAPY PER DAY

## 2020-03-08 PROCEDURE — 6360000002 HC RX W HCPCS: Performed by: INTERNAL MEDICINE

## 2020-03-08 PROCEDURE — 6370000000 HC RX 637 (ALT 250 FOR IP): Performed by: INTERNAL MEDICINE

## 2020-03-08 PROCEDURE — 82746 ASSAY OF FOLIC ACID SERUM: CPT

## 2020-03-08 PROCEDURE — 85025 COMPLETE CBC W/AUTO DIFF WBC: CPT

## 2020-03-08 PROCEDURE — 99223 1ST HOSP IP/OBS HIGH 75: CPT | Performed by: INTERNAL MEDICINE

## 2020-03-08 PROCEDURE — 2500000003 HC RX 250 WO HCPCS: Performed by: INTERNAL MEDICINE

## 2020-03-08 PROCEDURE — 2580000003 HC RX 258: Performed by: INTERNAL MEDICINE

## 2020-03-08 PROCEDURE — 83735 ASSAY OF MAGNESIUM: CPT

## 2020-03-08 PROCEDURE — 82607 VITAMIN B-12: CPT

## 2020-03-08 PROCEDURE — 80048 BASIC METABOLIC PNL TOTAL CA: CPT

## 2020-03-08 PROCEDURE — 99222 1ST HOSP IP/OBS MODERATE 55: CPT | Performed by: INTERNAL MEDICINE

## 2020-03-08 PROCEDURE — 71045 X-RAY EXAM CHEST 1 VIEW: CPT

## 2020-03-08 PROCEDURE — 71250 CT THORAX DX C-: CPT

## 2020-03-08 PROCEDURE — 94640 AIRWAY INHALATION TREATMENT: CPT

## 2020-03-08 PROCEDURE — 96375 TX/PRO/DX INJ NEW DRUG ADDON: CPT

## 2020-03-08 PROCEDURE — 93010 ELECTROCARDIOGRAM REPORT: CPT | Performed by: INTERNAL MEDICINE

## 2020-03-08 PROCEDURE — 84100 ASSAY OF PHOSPHORUS: CPT

## 2020-03-08 RX ORDER — METHYLPREDNISOLONE SODIUM SUCCINATE 40 MG/ML
40 INJECTION, POWDER, LYOPHILIZED, FOR SOLUTION INTRAMUSCULAR; INTRAVENOUS EVERY 8 HOURS
Status: DISCONTINUED | OUTPATIENT
Start: 2020-03-08 | End: 2020-03-09

## 2020-03-08 RX ORDER — DOXYCYCLINE HYCLATE 100 MG/1
100 CAPSULE ORAL EVERY 12 HOURS SCHEDULED
Status: CANCELLED | OUTPATIENT
Start: 2020-03-08

## 2020-03-08 RX ORDER — BENZONATATE 100 MG/1
100 CAPSULE ORAL 3 TIMES DAILY PRN
Status: DISCONTINUED | OUTPATIENT
Start: 2020-03-08 | End: 2020-03-11 | Stop reason: HOSPADM

## 2020-03-08 RX ADMIN — BENZONATATE 100 MG: 100 CAPSULE ORAL at 23:42

## 2020-03-08 RX ADMIN — BUDESONIDE 250 MCG: 0.25 SUSPENSION RESPIRATORY (INHALATION) at 10:42

## 2020-03-08 RX ADMIN — DOXYCYCLINE 100 MG: 100 INJECTION, POWDER, LYOPHILIZED, FOR SOLUTION INTRAVENOUS at 22:29

## 2020-03-08 RX ADMIN — ENOXAPARIN SODIUM 40 MG: 40 INJECTION SUBCUTANEOUS at 22:07

## 2020-03-08 RX ADMIN — TAMSULOSIN HYDROCHLORIDE 0.4 MG: 0.4 CAPSULE ORAL at 09:19

## 2020-03-08 RX ADMIN — METHYLPREDNISOLONE SODIUM SUCCINATE 40 MG: 40 INJECTION, POWDER, FOR SOLUTION INTRAMUSCULAR; INTRAVENOUS at 09:18

## 2020-03-08 RX ADMIN — METOPROLOL SUCCINATE 25 MG: 25 TABLET, FILM COATED, EXTENDED RELEASE ORAL at 09:18

## 2020-03-08 RX ADMIN — DOXYCYCLINE 100 MG: 100 INJECTION, POWDER, LYOPHILIZED, FOR SOLUTION INTRAVENOUS at 13:00

## 2020-03-08 RX ADMIN — SODIUM CHLORIDE, PRESERVATIVE FREE 10 ML: 5 INJECTION INTRAVENOUS at 22:11

## 2020-03-08 RX ADMIN — PANTOPRAZOLE SODIUM 40 MG: 40 TABLET, DELAYED RELEASE ORAL at 17:43

## 2020-03-08 RX ADMIN — IPRATROPIUM BROMIDE AND ALBUTEROL SULFATE 1 AMPULE: 2.5; .5 SOLUTION RESPIRATORY (INHALATION) at 10:42

## 2020-03-08 RX ADMIN — METHYLPREDNISOLONE SODIUM SUCCINATE 40 MG: 40 INJECTION, POWDER, FOR SOLUTION INTRAMUSCULAR; INTRAVENOUS at 23:36

## 2020-03-08 RX ADMIN — ARFORMOTEROL TARTRATE 15 MCG: 15 SOLUTION RESPIRATORY (INHALATION) at 10:42

## 2020-03-08 RX ADMIN — GABAPENTIN 600 MG: 300 CAPSULE ORAL at 09:18

## 2020-03-08 RX ADMIN — BUDESONIDE 250 MCG: 0.25 SUSPENSION RESPIRATORY (INHALATION) at 20:17

## 2020-03-08 RX ADMIN — METHYLPREDNISOLONE SODIUM SUCCINATE 40 MG: 40 INJECTION, POWDER, FOR SOLUTION INTRAMUSCULAR; INTRAVENOUS at 17:43

## 2020-03-08 RX ADMIN — DULOXETINE HYDROCHLORIDE 60 MG: 60 CAPSULE, DELAYED RELEASE ORAL at 09:18

## 2020-03-08 RX ADMIN — PANTOPRAZOLE SODIUM 40 MG: 40 TABLET, DELAYED RELEASE ORAL at 06:20

## 2020-03-08 RX ADMIN — ARFORMOTEROL TARTRATE 15 MCG: 15 SOLUTION RESPIRATORY (INHALATION) at 20:16

## 2020-03-08 RX ADMIN — CEFTRIAXONE SODIUM 1 G: 1 INJECTION, POWDER, FOR SOLUTION INTRAMUSCULAR; INTRAVENOUS at 15:30

## 2020-03-08 RX ADMIN — TRAZODONE HYDROCHLORIDE 100 MG: 50 TABLET ORAL at 22:07

## 2020-03-08 RX ADMIN — SODIUM CHLORIDE, PRESERVATIVE FREE 10 ML: 5 INJECTION INTRAVENOUS at 09:18

## 2020-03-08 RX ADMIN — CEFEPIME HYDROCHLORIDE 2 G: 2 INJECTION, POWDER, FOR SOLUTION INTRAVENOUS at 05:10

## 2020-03-08 RX ADMIN — GABAPENTIN 600 MG: 300 CAPSULE ORAL at 22:07

## 2020-03-08 RX ADMIN — BENZONATATE 100 MG: 100 CAPSULE ORAL at 15:31

## 2020-03-08 RX ADMIN — IPRATROPIUM BROMIDE AND ALBUTEROL SULFATE 1 AMPULE: 2.5; .5 SOLUTION RESPIRATORY (INHALATION) at 20:15

## 2020-03-08 RX ADMIN — GABAPENTIN 600 MG: 300 CAPSULE ORAL at 15:33

## 2020-03-08 RX ADMIN — ATORVASTATIN CALCIUM 40 MG: 40 TABLET, FILM COATED ORAL at 09:18

## 2020-03-08 RX ADMIN — OSELTAMIVIR PHOSPHATE 75 MG: 75 CAPSULE ORAL at 06:20

## 2020-03-08 RX ADMIN — OSELTAMIVIR PHOSPHATE 75 MG: 75 CAPSULE ORAL at 22:08

## 2020-03-08 RX ADMIN — IPRATROPIUM BROMIDE AND ALBUTEROL SULFATE 1 AMPULE: 2.5; .5 SOLUTION RESPIRATORY (INHALATION) at 16:24

## 2020-03-08 ASSESSMENT — PAIN DESCRIPTION - ONSET: ONSET: ON-GOING

## 2020-03-08 ASSESSMENT — PAIN SCALES - GENERAL
PAINLEVEL_OUTOF10: 3
PAINLEVEL_OUTOF10: 10

## 2020-03-08 ASSESSMENT — PAIN DESCRIPTION - DESCRIPTORS
DESCRIPTORS: DISCOMFORT;ACHING
DESCRIPTORS: SHARP;STABBING

## 2020-03-08 ASSESSMENT — PAIN DESCRIPTION - FREQUENCY: FREQUENCY: CONTINUOUS

## 2020-03-08 ASSESSMENT — PAIN DESCRIPTION - ORIENTATION: ORIENTATION: ANTERIOR;MID

## 2020-03-08 ASSESSMENT — PAIN DESCRIPTION - LOCATION
LOCATION: BACK;CHEST
LOCATION: CHEST;ABDOMEN

## 2020-03-08 ASSESSMENT — PAIN DESCRIPTION - PAIN TYPE: TYPE: ACUTE PAIN

## 2020-03-08 ASSESSMENT — PAIN DESCRIPTION - PROGRESSION: CLINICAL_PROGRESSION: NOT CHANGED

## 2020-03-08 NOTE — PROGRESS NOTES
[facility]  Internal Medicine Residency Program  Progress Note - House Team 2    Patient:  Jeanna Xiong 47 y.o. male MRN: [id number]     Date of Service: 3/8/2020     CC: cough with syncope  Overnight events: none     Subjective     Pt alert, awake, complains of dry cough, otherwise denies any complaints    Objective     Physical Exam:  · Vitals: BP 98/60   Pulse 87   Temp 98.2 °F (36.8 °C) (Temporal)   Resp 16   Ht 6' (1.829 m)   Wt 247 lb 12.8 oz (112.4 kg)   SpO2 93%   BMI 33.61 kg/m²     · I & O - 24hr: No intake/output data recorded. · General Appearance: alert, appears stated age and cooperative  · HEENT:  Head: Normal, normocephalic, atraumatic. · Neck: supple, symmetrical, trachea midline  · Lung: wheezes bilaterally  · Heart: regular rate and rhythm, S1, S2 normal, no murmur, click, rub or gallop  · Abdomen: soft, non-tender; bowel sounds normal; no masses,  no organomegaly  · Extremities:  extremities normal, atraumatic, no cyanosis or edema  · Musculokeletal: No joint swelling, no muscle tenderness. ROM normal in all joints of extremities.    · Neurologic: Mental status: Alert, oriented, thought content appropriate  Subject  Pertinent Labs & Imaging Studies   tim  CBC with Differential:    Lab Results   Component Value Date    WBC 8.8 03/08/2020    RBC 3.63 03/08/2020    HGB 11.5 03/08/2020    HCT 36.5 03/08/2020     03/08/2020    .6 03/08/2020    MCH 31.7 03/08/2020    MCHC 31.5 03/08/2020    RDW 12.7 03/08/2020    LYMPHOPCT 26.0 03/08/2020    MONOPCT 7.6 03/08/2020    BASOPCT 0.2 03/08/2020    MONOSABS 0.67 03/08/2020    LYMPHSABS 2.29 03/08/2020    EOSABS 0.03 03/08/2020    BASOSABS 0.02 03/08/2020     BMP:    Lab Results   Component Value Date     03/08/2020    K 3.9 03/08/2020    K 4.2 03/07/2020     03/08/2020    CO2 23 03/08/2020    BUN 14 03/08/2020    LABALBU 4.0 03/07/2020    CREATININE 1.1 03/08/2020    CALCIUM 8.8 03/08/2020 GFRAA >60 03/08/2020    LABGLOM >60 03/08/2020    GLUCOSE 103 03/08/2020     Magnesium:    Lab Results   Component Value Date    MG 2.0 03/08/2020     Phosphorus:    Lab Results   Component Value Date    PHOS 3.6 03/08/2020       Resident's Assessment and Plan     Yocasta Jaquez  is a 47 y.o. male with a PMH of COPD, RCC s/p total nephrectomy (2005), Lung cancer s/p right middle lobectomy (2014), new right upper lobe nodule? presented with the complaints of productive cough and dyspnea. He reports generalized myalgia, fatigue, fever with chills. Denies any nausea, vomiting, headache, dizziness, loss of consciousness, palpitations.      In the ED, he was found to have fever, tachycardia, FLU B positive.  Leukocytosis noted, CXR revealing ground glass opacities R> L.     COPD exacerbation 2/2 FLU B, smoking  Presented with dyspnea, fever, tachycardia  Supposed to be on home oxygen but does not use it  FLU B positive  Leukocytosis noted  CXR revealing ground glass opacities R> L  neg LA and  procalcitonin  respiratory panel + flu B  penidng blood culture, respiratory culture, and neg urine antigens   on Cefepime and vancomycin by level  Change abx to ceftriaxone and doxy  Continue breathing treatment  Trend WBC  Start on solumedrol IV 40 Q 8     History of lung cancer s/p right middle lobectomy (2014)  CT chest revealing new 3-7 mm nodules in RUL in 5/2019 but did not show any new nodules in 8/2019  Pulmonology consulted     Sinus tachycardia  Tachycardic  HR ranges from /min  Continue Metoprolol with holding parameters     Hypertension  Continue Metoprolol with holding parameters  Monitor BP     Syncope, cause not clear  Vasovagal?  Telemetry monitoring  Stress test negative (2019)     CAD  Continue aspirin, atorvastatin, BB     Hyperlipidemia  Continue Atorvastatin     PUD   Reports upper abdominal pain  Was not taking pantoprazole at home and used to take Ibuprofen for back pain every day in empty

## 2020-03-08 NOTE — H&P
Right     UPPER GASTROINTESTINAL ENDOSCOPY N/A 12/20/2019    EGD BIOPSY performed by Nishant Price MD at [facility] ENDOSCOPY       Medications Prior to Admission:    Prior to Admission medications    Medication Sig Start Date End Date Taking? Authorizing Provider   pantoprazole (PROTONIX) 40 MG tablet Take 1 tablet by mouth 2 times daily (before meals) 12/20/19   Nishant Price MD   DULoxetine (CYMBALTA) 60 MG extended release capsule Take 1 capsule by mouth daily 12/17/19   Rox Manning MD   traZODone (DESYREL) 100 MG tablet Take 1 tablet by mouth nightly 12/17/19   Rox Manning MD   GAVILYTE-C 240 g solution TAKE AS DIRECTED AS A ONE TIME DOSE 11/27/19   Historical Provider, MD   metoprolol succinate (TOPROL XL) 25 MG extended release tablet Take 1 tablet by mouth daily 11/20/19   Yasmin Mcclure MD   tamsulosin (FLOMAX) 0.4 MG capsule Take 1 capsule by mouth daily 10/1/19   Rochelle Juarez DO   gabapentin (NEURONTIN) 600 MG tablet Take 1 tablet by mouth 3 times daily for 86 days. 9/20/19 12/20/19  Faith Patricio MD   ibuprofen (ADVIL;MOTRIN) 600 MG tablet Take 1 tablet by mouth 4 times daily as needed for Pain Please take with meals 9/20/19   Faith Patricio MD   aspirin EC 81 MG EC tablet Take 1 tablet by mouth daily 9/20/19   Faith Patricio MD   atorvastatin (LIPITOR) 40 MG tablet Take 1 tablet by mouth daily 9/20/19   Faith Patricio MD   sildenafil (REVATIO) 20 MG tablet Take 1 tablet by mouth as needed (as directed 1-2 tablets) 8/7/19   Ruddy Schwartz MD   albuterol sulfate HFA (PROVENTIL HFA) 108 (90 Base) MCG/ACT inhaler Inhale 1-2 puffs into the lungs 2 times daily  10/14/14   Historical Provider, MD   fluticasone-salmeterol (ADVAIR DISKUS) 250-50 MCG/DOSE AEPB Inhale 1 puff into the lungs 2 times daily  10/21/14   Historical Provider, MD       Allergies:  Latex    Social History:   TOBACCO:   reports that he has been smoking cigarettes. He has a 40.00 pack-year smoking history.  He

## 2020-03-08 NOTE — PROGRESS NOTES
Physical Therapy  Physical Therapy Initial Assessment     Name: Parul Degroot  : 1965  MRN: [id number]    Referring Provider: Alejandro Smith MD    Date of Service: 3/8/2020    Evaluating PT: Kateenoc De Paz, PT, DPT, [id number]    Room #: 2477/2100-O  Diagnosis: Influenza B  PMHx/PSHx: COPD, CAD, Lung and kidney CA  Precautions: Fall risk, droplet isolation, O2    SUBJECTIVE:    Pt lives with fiance in a single story house with 0 stair(s) and 0 rail(s) to enter. Bed is on first floor and bath is on first floor. Pt ambulated without AD Independent prior to admission. OBJECTIVE:   Initial Evaluation  Date: 3/8/20 Treatment Date: Short Term/ Long Term   Goals   AM-PAC 6 Clicks 15/78     Was pt agreeable to Eval/treatment? Yes     Does pt have pain? 10/10 R sided chest pain due to excessive coughing; RN aware     Bed Mobility  Rolling: NT  Supine to sit: SBA  Sit to supine: SBA  Scooting: SBA toward EOB  Rolling: Independent   Supine to sit: Independent   Sit to supine: Independent   Scooting: Independent    Transfers Sit to stand: SBA  Stand to sit: SBA  Stand pivot: SBA without AD  Sit to stand: Independent   Stand to sit: Independent   Stand pivot: Independent    Ambulation   30 feet without AD with SBA  200 feet Independent    Stair negotiation: ascended and descended NT  NA   ROM BUE: WFL  BLE: WFL     Strength BUE: WFL  BLE: WFL     Balance Sitting EOB: SBA  Dynamic Standing: SBA without AD  Sitting EOB: Independent   Dynamic Standing: Independent      Pt is A & O x: 4 to person, place, month/year, and situation. Sensation: Pt denies numbness and tingling of extremities. Edema: Unremarkable. Patient education  Pt educated on pursed lip breathing technique. Patient response to education:   Pt verbalized understanding Pt demonstrated skill Pt requires further education in this area   Yes Yes No     ASSESSMENT:    Comments:   Pt was supine in bed upon room entry, agreeable to PT evaluation.  O2 was removed and pt reported he felt mildly SOB. O2 sat ranged from 90-92% on RA while supine. Pt ambulated around room with slow gait speed and mild unsteadiness. Pt reports he feels weak and fatigued and requested to return to bed following short distance ambulation within room. O2 sat was 90% on RA following activity but pt reported he felt uncomfortable without O2; 3 L O2/min was reapplied and symptoms improved. Pt was left supine in bed with all needs met at conclusion of session. Pt's/family goals:   1. To return home. Patient and or family understand(s) diagnosis, prognosis, and plan of care. Yes. PLAN:    PT care will be provided in accordance with the objectives noted above. Exercises and functional mobility practice will be used as well as appropriate assistive devices or modalities to obtain goals. Patient and family education will also be administered as needed. Frequency of treatments: 2-5x/week x 2-3 days. Time in: 0930  Time out: 0945    Total Treatment Time 0 minutes     Evaluation Time includes thorough review of current medical information, gathering information on past medical history/social history and prior level of function, completion of standardized testing/informal observation of tasks, assessment of data and education on plan of care and goals.     CPT codes:  [x] Low Complexity PT evaluation 28417  [] Moderate Complexity PT evaluation 68085  [] High Complexity PT evaluation 29567  [] PT Re-evaluation 64594  [] Gait training 93172 0 minutes  [] Manual therapy 78060 0 minutes  [] Therapeutic activities 36339 0 minutes  [] Therapeutic exercises 29037 0 minutes  [] Neuromuscular reeducation 65830 0 minutes     Humble Herrmann, PT, DPT  [id number]

## 2020-03-08 NOTE — PLAN OF CARE
Problem: Falls - Risk of:  Goal: Will remain free from falls  Description: Will remain free from falls  Outcome: Met This Shift     Problem: Falls - Risk of:  Goal: Absence of physical injury  Description: Absence of physical injury  Outcome: Met This Shift     Problem: Breathing Pattern - Ineffective:  Goal: Ability to achieve and maintain a regular respiratory rate will improve  Description: Ability to achieve and maintain a regular respiratory rate will improve  Outcome: Met This Shift     Problem: Pain:  Goal: Control of acute pain  Description: Control of acute pain  Outcome: Ongoing

## 2020-03-09 LAB
ANION GAP SERPL CALCULATED.3IONS-SCNC: 10 MMOL/L (ref 7–16)
BASOPHILS ABSOLUTE: 0.02 E9/L (ref 0–0.2)
BASOPHILS RELATIVE PERCENT: 0.2 % (ref 0–2)
BUN BLDV-MCNC: 16 MG/DL (ref 6–20)
CALCIUM SERPL-MCNC: 9.4 MG/DL (ref 8.6–10.2)
CHLORIDE BLD-SCNC: 106 MMOL/L (ref 98–107)
CO2: 23 MMOL/L (ref 22–29)
CREAT SERPL-MCNC: 0.8 MG/DL (ref 0.7–1.2)
EOSINOPHILS ABSOLUTE: 0 E9/L (ref 0.05–0.5)
EOSINOPHILS RELATIVE PERCENT: 0 % (ref 0–6)
GFR AFRICAN AMERICAN: >60
GFR NON-AFRICAN AMERICAN: >60 ML/MIN/1.73
GLUCOSE BLD-MCNC: 202 MG/DL (ref 74–99)
HCT VFR BLD CALC: 37.7 % (ref 37–54)
HEMOGLOBIN: 12 G/DL (ref 12.5–16.5)
IMMATURE GRANULOCYTES #: 0.08 E9/L
IMMATURE GRANULOCYTES %: 0.7 % (ref 0–5)
LYMPHOCYTES ABSOLUTE: 1.23 E9/L (ref 1.5–4)
LYMPHOCYTES RELATIVE PERCENT: 10.6 % (ref 20–42)
MAGNESIUM: 2 MG/DL (ref 1.6–2.6)
MCH RBC QN AUTO: 31.5 PG (ref 26–35)
MCHC RBC AUTO-ENTMCNC: 31.8 % (ref 32–34.5)
MCV RBC AUTO: 99 FL (ref 80–99.9)
MONOCYTES ABSOLUTE: 0.46 E9/L (ref 0.1–0.95)
MONOCYTES RELATIVE PERCENT: 4 % (ref 2–12)
MRSA CULTURE ONLY: NORMAL
NEUTROPHILS ABSOLUTE: 9.8 E9/L (ref 1.8–7.3)
NEUTROPHILS RELATIVE PERCENT: 84.5 % (ref 43–80)
PDW BLD-RTO: 12.5 FL (ref 11.5–15)
PLATELET # BLD: 190 E9/L (ref 130–450)
PMV BLD AUTO: 10.5 FL (ref 7–12)
POTASSIUM SERPL-SCNC: 4.5 MMOL/L (ref 3.5–5)
RBC # BLD: 3.81 E12/L (ref 3.8–5.8)
SODIUM BLD-SCNC: 139 MMOL/L (ref 132–146)
VANCOMYCIN TROUGH: <4 MCG/ML (ref 5–16)
WBC # BLD: 11.6 E9/L (ref 4.5–11.5)

## 2020-03-09 PROCEDURE — 99232 SBSQ HOSP IP/OBS MODERATE 35: CPT | Performed by: INTERNAL MEDICINE

## 2020-03-09 PROCEDURE — 2500000003 HC RX 250 WO HCPCS: Performed by: INTERNAL MEDICINE

## 2020-03-09 PROCEDURE — 6370000000 HC RX 637 (ALT 250 FOR IP): Performed by: INTERNAL MEDICINE

## 2020-03-09 PROCEDURE — 94640 AIRWAY INHALATION TREATMENT: CPT

## 2020-03-09 PROCEDURE — 6360000002 HC RX W HCPCS: Performed by: INTERNAL MEDICINE

## 2020-03-09 PROCEDURE — 94667 MNPJ CHEST WALL 1ST: CPT

## 2020-03-09 PROCEDURE — 36415 COLL VENOUS BLD VENIPUNCTURE: CPT

## 2020-03-09 PROCEDURE — 85025 COMPLETE CBC W/AUTO DIFF WBC: CPT

## 2020-03-09 PROCEDURE — 83735 ASSAY OF MAGNESIUM: CPT

## 2020-03-09 PROCEDURE — 80202 ASSAY OF VANCOMYCIN: CPT

## 2020-03-09 PROCEDURE — 94760 N-INVAS EAR/PLS OXIMETRY 1: CPT

## 2020-03-09 PROCEDURE — 2700000000 HC OXYGEN THERAPY PER DAY

## 2020-03-09 PROCEDURE — 80048 BASIC METABOLIC PNL TOTAL CA: CPT

## 2020-03-09 PROCEDURE — 2580000003 HC RX 258: Performed by: INTERNAL MEDICINE

## 2020-03-09 PROCEDURE — 2140000000 HC CCU INTERMEDIATE R&B

## 2020-03-09 RX ORDER — METHYLPREDNISOLONE SODIUM SUCCINATE 40 MG/ML
40 INJECTION, POWDER, LYOPHILIZED, FOR SOLUTION INTRAMUSCULAR; INTRAVENOUS EVERY 12 HOURS
Status: DISCONTINUED | OUTPATIENT
Start: 2020-03-09 | End: 2020-03-11 | Stop reason: HOSPADM

## 2020-03-09 RX ADMIN — BENZONATATE 100 MG: 100 CAPSULE ORAL at 16:16

## 2020-03-09 RX ADMIN — CEFTRIAXONE SODIUM 1 G: 1 INJECTION, POWDER, FOR SOLUTION INTRAMUSCULAR; INTRAVENOUS at 09:19

## 2020-03-09 RX ADMIN — ATORVASTATIN CALCIUM 40 MG: 40 TABLET, FILM COATED ORAL at 09:20

## 2020-03-09 RX ADMIN — DULOXETINE HYDROCHLORIDE 60 MG: 60 CAPSULE, DELAYED RELEASE ORAL at 09:20

## 2020-03-09 RX ADMIN — PROMETHAZINE HYDROCHLORIDE 12.5 MG: 25 TABLET ORAL at 16:16

## 2020-03-09 RX ADMIN — METOPROLOL SUCCINATE 25 MG: 25 TABLET, FILM COATED, EXTENDED RELEASE ORAL at 09:21

## 2020-03-09 RX ADMIN — BUDESONIDE 250 MCG: 0.25 SUSPENSION RESPIRATORY (INHALATION) at 07:37

## 2020-03-09 RX ADMIN — GABAPENTIN 600 MG: 300 CAPSULE ORAL at 09:20

## 2020-03-09 RX ADMIN — IPRATROPIUM BROMIDE AND ALBUTEROL SULFATE 1 AMPULE: 2.5; .5 SOLUTION RESPIRATORY (INHALATION) at 07:37

## 2020-03-09 RX ADMIN — IPRATROPIUM BROMIDE AND ALBUTEROL SULFATE 1 AMPULE: 2.5; .5 SOLUTION RESPIRATORY (INHALATION) at 11:25

## 2020-03-09 RX ADMIN — GABAPENTIN 600 MG: 300 CAPSULE ORAL at 15:52

## 2020-03-09 RX ADMIN — SODIUM CHLORIDE, PRESERVATIVE FREE 10 ML: 5 INJECTION INTRAVENOUS at 20:59

## 2020-03-09 RX ADMIN — DOXYCYCLINE 100 MG: 100 INJECTION, POWDER, LYOPHILIZED, FOR SOLUTION INTRAVENOUS at 09:19

## 2020-03-09 RX ADMIN — SODIUM CHLORIDE, PRESERVATIVE FREE 10 ML: 5 INJECTION INTRAVENOUS at 09:24

## 2020-03-09 RX ADMIN — OSELTAMIVIR PHOSPHATE 75 MG: 75 CAPSULE ORAL at 20:59

## 2020-03-09 RX ADMIN — IPRATROPIUM BROMIDE AND ALBUTEROL SULFATE 1 AMPULE: 2.5; .5 SOLUTION RESPIRATORY (INHALATION) at 20:52

## 2020-03-09 RX ADMIN — BUDESONIDE 250 MCG: 0.25 SUSPENSION RESPIRATORY (INHALATION) at 20:51

## 2020-03-09 RX ADMIN — IPRATROPIUM BROMIDE AND ALBUTEROL SULFATE 1 AMPULE: 2.5; .5 SOLUTION RESPIRATORY (INHALATION) at 16:11

## 2020-03-09 RX ADMIN — TRAZODONE HYDROCHLORIDE 100 MG: 50 TABLET ORAL at 20:59

## 2020-03-09 RX ADMIN — METHYLPREDNISOLONE SODIUM SUCCINATE 40 MG: 40 INJECTION, POWDER, FOR SOLUTION INTRAMUSCULAR; INTRAVENOUS at 09:20

## 2020-03-09 RX ADMIN — ARFORMOTEROL TARTRATE 15 MCG: 15 SOLUTION RESPIRATORY (INHALATION) at 20:51

## 2020-03-09 RX ADMIN — BENZONATATE 100 MG: 100 CAPSULE ORAL at 23:25

## 2020-03-09 RX ADMIN — PANTOPRAZOLE SODIUM 40 MG: 40 TABLET, DELAYED RELEASE ORAL at 05:28

## 2020-03-09 RX ADMIN — PANTOPRAZOLE SODIUM 40 MG: 40 TABLET, DELAYED RELEASE ORAL at 16:16

## 2020-03-09 RX ADMIN — GABAPENTIN 600 MG: 300 CAPSULE ORAL at 20:59

## 2020-03-09 RX ADMIN — ENOXAPARIN SODIUM 40 MG: 40 INJECTION SUBCUTANEOUS at 20:59

## 2020-03-09 RX ADMIN — OSELTAMIVIR PHOSPHATE 75 MG: 75 CAPSULE ORAL at 09:20

## 2020-03-09 RX ADMIN — BENZONATATE 100 MG: 100 CAPSULE ORAL at 09:21

## 2020-03-09 RX ADMIN — ARFORMOTEROL TARTRATE 15 MCG: 15 SOLUTION RESPIRATORY (INHALATION) at 07:37

## 2020-03-09 RX ADMIN — METHYLPREDNISOLONE SODIUM SUCCINATE 40 MG: 40 INJECTION, POWDER, FOR SOLUTION INTRAMUSCULAR; INTRAVENOUS at 21:00

## 2020-03-09 RX ADMIN — ASPIRIN 81 MG: 81 TABLET, COATED ORAL at 09:20

## 2020-03-09 RX ADMIN — TAMSULOSIN HYDROCHLORIDE 0.4 MG: 0.4 CAPSULE ORAL at 09:20

## 2020-03-09 RX ADMIN — DOXYCYCLINE 100 MG: 100 INJECTION, POWDER, LYOPHILIZED, FOR SOLUTION INTRAVENOUS at 23:25

## 2020-03-09 ASSESSMENT — PAIN DESCRIPTION - ORIENTATION: ORIENTATION: ANTERIOR

## 2020-03-09 ASSESSMENT — PAIN DESCRIPTION - ONSET: ONSET: ON-GOING

## 2020-03-09 ASSESSMENT — PAIN SCALES - GENERAL: PAINLEVEL_OUTOF10: 5

## 2020-03-09 ASSESSMENT — PAIN DESCRIPTION - LOCATION: LOCATION: CHEST;ABDOMEN

## 2020-03-09 ASSESSMENT — PAIN DESCRIPTION - PAIN TYPE: TYPE: ACUTE PAIN

## 2020-03-09 ASSESSMENT — PAIN DESCRIPTION - DESCRIPTORS: DESCRIPTORS: CRAMPING;SHARP

## 2020-03-09 ASSESSMENT — PAIN DESCRIPTION - FREQUENCY: FREQUENCY: INTERMITTENT

## 2020-03-09 NOTE — CARE COORDINATION
Transition of care at discharge: Patient is alert and oriented. He is currently in isolation for influenza. He is independent at home. He does not use assistive devices. His pcp is Dr Adilene Steven and her pharmacy is [address] [place] [address] on [facility]. His plan is to return home at discharge. Will follow. ..

## 2020-03-09 NOTE — PROGRESS NOTES
03/09/2020    EOSABS 0.00 03/09/2020    BASOSABS 0.02 03/09/2020     BMP:    Lab Results   Component Value Date     03/09/2020    K 4.5 03/09/2020    K 4.2 03/07/2020     03/09/2020    CO2 23 03/09/2020    BUN 16 03/09/2020    LABALBU 4.0 03/07/2020    CREATININE 0.8 03/09/2020    CALCIUM 9.4 03/09/2020    GFRAA >60 03/09/2020    LABGLOM >60 03/09/2020    GLUCOSE 202 03/09/2020       Resident's Assessment and Plan     COPD exacerbation  - 2/2 influenza B vs CAP  - continue aerosols (DuoNeb, Pulmicort, Performist)  - continue Solumedrol 40 q8h, will wean tomorrow    Influenza B Infection  - symptomatic management    CAP  - continue ceftriaxone and doxycycline  - procalcitonin 0.11     H/o Lung CA s/p RML lobectomy  - RUL nodules stable compared to CT in Aug 2019  - Pulmonology on board  - OP CT chest in 3-6 months     Hypertension  - Continue Metoprolol with holding parameters  - Monitor BP     CAD  - Continue aspirin, atorvastatin, BB     Hyperlipidemia  - Continue Atorvastatin     PUD   - S/p EGD revealing duodenitis, PUD in 12/2019  - S/p Colonoscopy revealing tubular adenoma, with removal of 5 polyps (12/2019)   - Continue Protonix BID       PT/OT evaluation: ordered  DVT prophylaxis/ GI prophylaxis: Lovenox/Protonix  Bulmaro MD Akua, PGY-1  Attending physician: Dr. Graham Sarmiento

## 2020-03-09 NOTE — PROGRESS NOTES
Attending Physician Statement    Patient was seen today for follow up of COPD exacerbation and influenza B infection. I have discussed the case, including pertinent history and exam findings with the medical resident and the team. I reviewed patient's overnight and today's issues, patient's medications, vital signs, pertinent lab results and imaging studies. I have seen and examined the patient and the key elements of the encounter have been performed by me. I agree with the assessment, plan and orders as documented by the medical resident. Vital signs have been reviewed. Patient's blood pressure has been stable, he has been afebrile for last at least 24 hours. Patient states that he is feeling about 50% better as compared to the day of his admission, but is still feeling tired and gets short of breath on mild exertion. He denies headaches, nausea vomiting. He denies lightheadedness or dizziness. He denies chest pain or palpitations. He denies abdominal pain, diarrhea or constipation. He denies fever chills. The patient denies abdominal pain, diarrhea or constipation. On examination, the patient is alert, awake and oriented x3, he seems to be slightly dyspneic during conversation. Oral cavity mucosa is moist.  Pupils are equally round and reactive to light. Lung examination reveals bilateral end expiratory wheezing, few crackles in the right upper and mid zone. Cardiac exam reveals regular S1-S2, no murmur. Abdomen soft, nontender, nondistended, bowel sounds are positive in all quadrants. No lower extremity edema on either side. No gross motor or sensory deficit appreciated. Skin is warm and dry. Pertinent lab results and imaging studies have been reviewed. Assessment/Plan:    1.   Acute exacerbation of COPD: The patient is currently on IV Solu-Medrol 40 mg every 8 hours, this will be decreased to 40 mg every 12 hours that can likely be changed tomorrow to p.o. prednisone 40 mg daily with tapering over the next few days. Budesonide nebulization twice daily, arformoterol tartrate nebulization twice daily and DuoNeb nebulization every 4 hours will be continued for now. Patient is not on supplemental O2 at home. 2.  Influenza B infection: The patient will be continued on Tamiflu 75 mg twice daily for total of 10 doses. 3.  Community-acquired pneumonia: We will continue the patient on IV Rocephin and IV doxycycline. At the time of discharge, the patient will be prescribed either Augmentin or levofloxacin. 4.  Leukocytosis: This is likely due to IV Solu-Medrol. 5.  Continued tobacco use: Patient has been counseled about tobacco cessation. 6.  History of renal cell carcinoma, status post right nephrectomy. Patient to follow-up with Dr. Yoshi Palma as an outpatient. Remainder of medical problems, physical exam, assessment and plan as per medical resident's note.      Meseret Mcfarlane MD  Internal Medicine Residency Faculty  3/9/2020

## 2020-03-09 NOTE — CONSULTS
Department of Internal Medicine  Division of Pulmonary, Critical Care & Sleep Medicine  Pulmonary [facility]                                             Pulmonary  Consult     I had the pleasure of seeing  Tonya Frey in the [facility] regarding their lUng nodule RUL ,other nodules     SOB   HISTORY OF PRESENT ILLNESS:    Tonya Frey is a 47y.o. year old  Who start smoking at age14  And increase 1 pack daily and he still smoke and he tried one time to quit     The Patient comes in with SOB that has been going on for the last few days  Associated with cough and wheezing nd some with some sputum that is dark ,    He is chronic  smoker with h/o RCC in 2005 s/p nephrectomy ,he was admitted in 9/2014 for back pain found incidentally to have a RUL nodule. At [facility] ,Outpatient evaluation revealed PET avid disease with adenopathy positive in the low right paratracheal position and in the azygous recess. EBUS completed 8/2014 was nondiagnostic demonstrating caseating and noncaseating granulomas and some reactive follicular centers.  He was refer to CTS for wedge biopsy and he was told that it was lung cancer right side but no documents yet       He is on Symbicort and Spiriva and albuterol ,he use albuterol 3-4 times daily     He was tested for Flu B        ALLERGIES:    Allergies   Allergen Reactions    Latex Rash     Skin turns red       PAST MEDICAL HISTORY:       Diagnosis Date    CAD (coronary artery disease)     Cancer of kidney ([address]     COPD (chronic obstructive pulmonary disease) ([address]     Difficulty sleeping     ED (erectile dysfunction)     History of BPH     Hyperlipidemia     [facility]     Tobacco abuse        MEDICATIONS:   Current Facility-Administered Medications   Medication Dose Route Frequency Provider Last Rate Last Dose    methylPREDNISolone sodium (SOLU-MEDROL) injection 40 mg  40 mg Intravenous Q8H Patricia MORE Ally Mariee MD   40 mg at 03/08/20 2336    cefTRIAXone (ROCEPHIN) 1 g in sterile water 10 mL IV syringe  1 g Intravenous Q24H Nino Miller MD   1 g at 03/08/20 1530    benzonatate (TESSALON) capsule 100 mg  100 mg Oral TID PRN Nino Miller MD   100 mg at 03/08/20 2342    doxycycline (VIBRAMYCIN) 100 mg in dextrose 5 % 100 mL IVPB  100 mg Intravenous Q12H Nino Miller MD   Stopped at 03/08/20 2336    aspirin EC tablet 81 mg  81 mg Oral Daily Hermelinda Wu MD        atorvastatin (LIPITOR) tablet 40 mg  40 mg Oral Daily Hermelinda Wu MD   40 mg at 03/08/20 0918    DULoxetine (CYMBALTA) extended release capsule 60 mg  60 mg Oral Daily Hermelinda Wu MD   60 mg at 03/08/20 3114    metoprolol succinate (TOPROL XL) extended release tablet 25 mg  25 mg Oral Daily Hermelinda Wu MD   25 mg at 03/08/20 0918    pantoprazole (PROTONIX) tablet 40 mg  40 mg Oral BID AC Hermelinda Wu MD   40 mg at 03/08/20 1743    tamsulosin (FLOMAX) capsule 0.4 mg  0.4 mg Oral Daily Hermelinda Wu MD   0.4 mg at 03/08/20 0919    traZODone (DESYREL) tablet 100 mg  100 mg Oral Nightly Hermelinda Wu MD   100 mg at 03/08/20 2207    sodium chloride flush 0.9 % injection 10 mL  10 mL Intravenous 2 times per day Hermelinda Wu MD   10 mL at 03/08/20 2211    sodium chloride flush 0.9 % injection 10 mL  10 mL Intravenous PRN Hermelinda Wu MD        acetaminophen (TYLENOL) tablet 650 mg  650 mg Oral Q6H PRN Hermelinda Wu MD   650 mg at 03/07/20 2213    Or    acetaminophen (TYLENOL) suppository 650 mg  650 mg Rectal Q6H PRN Hermelinda Wu MD        polyethylene glycol (GLYCOLAX) packet 17 g  17 g Oral Daily PRN Hermelinda Wu MD        promethazine (PHENERGAN) tablet 12.5 mg  12.5 mg Oral Q6H PRN Hermelinda Wu MD        Or    ondansetron [facility]) injection 4 mg  4 mg Intravenous Q6H PRN Hermelinda Wu MD   4 mg at 03/07/20 1165    enoxaparin (LOVENOX) injection 40 mg  40 mg Subcutaneous Daily Marigene Banegas MD Ramon   40 mg at 03/08/20 2207    ipratropium-albuterol (DUONEB) nebulizer solution 1 ampule  1 ampule Inhalation Q4H WA Dami Wilkerson MD   1 ampule at 03/08/20 2015    Arformoterol Tartrate (BROVANA) nebulizer solution 15 mcg  15 mcg Nebulization BID Dami Wilkerson MD   15 mcg at 03/08/20 2016    budesonide (PULMICORT) nebulizer suspension 250 mcg  250 mcg Nebulization BID Dami Wilkerson MD   250 mcg at 03/08/20 2017    oseltamivir (TAMIFLU) capsule 75 mg  75 mg Oral BID Dami Wilkerson MD   75 mg at 03/08/20 2208    gabapentin (NEURONTIN) capsule 600 mg  600 mg Oral TID Dami Wilkerson MD   600 mg at 03/08/20 2207    0.9 % sodium chloride infusion admixture   Intravenous Q8H Dami Wilkerson MD           SOCIAL AND OCCUPATIONAL HEALTH:  Social History     Tobacco Use   Smoking Status Current Every Day Smoker    Packs/day: 1.00    Years: 40.00    Pack years: 40.00    Types: Cigarettes   Smokeless Tobacco Never Used     TB :no   Pets :Dogs   Industrial exposure no exposure ,  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 Kathleen Trinh N/A 12/20/2019    COLONOSCOPY POLYPECTOMY SNARE/COLD BIOPSY performed by Rachel Kenny MD at [place] [address]  12/20/2019    COLONOSCOPY SUBMUCOSAL/BOTOX INJECTION performed by Rachel Kenny MD at [address] Right     part of the right     TOTAL NEPHRECTOMY Right     UPPER GASTROINTESTINAL ENDOSCOPY N/A 12/20/2019    EGD BIOPSY performed by Rachel Kenny MD at [address]:   Lung cancer:yes ? DVT or PE NO     REVIEW OF SYSTEMS:  Constitutional: General health is good . There has been no weight changes. No fevers, fatigue or weakness.    Head: Patient denies any history of trauma, convulsive disorder or syncope. Skin:  Patient denies history of changes in pigmentation, eruptions or pruritus. No easy bruising or bleeding. EENT: no nasal congestion   Cardiovascular ,No chest pain ,No edema ,  Respiration:SOB:++  ,CORTEZ :++  Gastrointestinal:No GI bleed ,no melena  ,no hematemesis    Musculoskeletal: no joint pain ,no swelling  Neurological:no , syncope. Denies twitching, convulsions, loss of consciousness or memory. Endocrine:  . No history of goiter, exophthalmos or dryness of skin. The patient has no history of diabetes. Hematopoietic:  No history of bleeding disorders or easy bruising. Rheumatic:  No connective tissue disease history or polyarthritis/inflammatory joint disease. PHYSICAL EXAMINATION:  Vitals:    03/08/20 2312   BP: (!) 114/59   Pulse: 81   Resp: 16   Temp: 96.8 °F (36 °C)   SpO2: 96%     Constitutional: This is a well developed, well nourished 47y.o. year old male who is alert, oriented, cooperative and in no apparent distress. Head was normocephalic and atraumatic. EENT: Mallampati class :  Extraocular muscles intact. External canals are patent and no discharge was appreciated. Septum was midline,   mucosa was without erythema, exudates or cobblestoning. No thrush was noted. Neck: Supple without thyromegaly. No elevated JVP. Trachea was midline. No carotid bruits were auscultated. Respiratory: rhonchi     Cardiovascular: Regular without murmur, clicks, gallops or rubs. There is no left or right ventricular heave. Pulses:  Carotid, radial and femoral pulses were equally bilaterally. Abdomen: Slightly rounded and soft without organomegaly. No rebound, rigidity or guarding was appreciated. Lymphatic: No lymphadenopathy. Musculoskeletal: no joint deformity   Extremities: no edema   Skin:  Warm and dry. Good color, turgor and pigmentation. No lesions or scars.   Neurological/Psychiatric: The patient's general behavior, level of consciousness, thought content and emotional status is normal.  Cranial nerves II-XII are intact. DATA:     FVC : 3.81  75   FEV1 2.81  71%  FEv1/FVC 74               IMPRESSION:    1-Acute COPD exacerbation  2- Flu B  3- Sarcoid ? 2-RCC with possible lung cancer waiting records(I will check with office ,he did not follow up  3-RUL lung nodule stable compare with old CT as above   4-Possible DEJON        5- multiple ground glass/nodules /pneumonia in new CT       PLAN:     I need to review his records ,but the Lung nodules RUL stable when compare with CT August 2019 ,ie over 8 months     Poly Flu  Agree with abx   Need follow up CT make sure resolution in 3-6 months  Continue home inhalers    -on Spirivia  -on Symbicort   -on albuterol  Change solumedrol q12 and then PO 40 before discharge and then wean over 7 days   BD  ICS  Agree with abx   Check procal    Sputum culture if possible    Thank you for allowing me to participate in [address] [place] [address]. I will keep following with you ,should you have any concerns ,please contact me at [phone]     Sincerely,        Lilliam Brunner MD  Pulmonary & Critical Care Medicine     NOTE: This report was transcribed using voice recognition software. Every effort was made to ensure accuracy; however, inadvertent computerized transcription errors may be present.

## 2020-03-10 ENCOUNTER — APPOINTMENT (OUTPATIENT)
Dept: GENERAL RADIOLOGY | Age: 55
DRG: 190 | End: 2020-03-10
Payer: MEDICARE

## 2020-03-10 LAB
ANION GAP SERPL CALCULATED.3IONS-SCNC: 12 MMOL/L (ref 7–16)
BASOPHILS ABSOLUTE: 0.01 E9/L (ref 0–0.2)
BASOPHILS RELATIVE PERCENT: 0.1 % (ref 0–2)
BUN BLDV-MCNC: 20 MG/DL (ref 6–20)
CALCIUM SERPL-MCNC: 9.2 MG/DL (ref 8.6–10.2)
CHLORIDE BLD-SCNC: 109 MMOL/L (ref 98–107)
CO2: 22 MMOL/L (ref 22–29)
CREAT SERPL-MCNC: 0.8 MG/DL (ref 0.7–1.2)
EOSINOPHILS ABSOLUTE: 0 E9/L (ref 0.05–0.5)
EOSINOPHILS RELATIVE PERCENT: 0 % (ref 0–6)
GFR AFRICAN AMERICAN: >60
GFR NON-AFRICAN AMERICAN: >60 ML/MIN/1.73
GLUCOSE BLD-MCNC: 219 MG/DL (ref 74–99)
HBA1C MFR BLD: 5.5 % (ref 4–5.6)
HCT VFR BLD CALC: 36.4 % (ref 37–54)
HEMOGLOBIN: 11.8 G/DL (ref 12.5–16.5)
IMMATURE GRANULOCYTES #: 0.14 E9/L
IMMATURE GRANULOCYTES %: 0.8 % (ref 0–5)
LYMPHOCYTES ABSOLUTE: 1.5 E9/L (ref 1.5–4)
LYMPHOCYTES RELATIVE PERCENT: 8.8 % (ref 20–42)
MAGNESIUM: 2.1 MG/DL (ref 1.6–2.6)
MCH RBC QN AUTO: 32.4 PG (ref 26–35)
MCHC RBC AUTO-ENTMCNC: 32.4 % (ref 32–34.5)
MCV RBC AUTO: 100 FL (ref 80–99.9)
MONOCYTES ABSOLUTE: 0.65 E9/L (ref 0.1–0.95)
MONOCYTES RELATIVE PERCENT: 3.8 % (ref 2–12)
NEUTROPHILS ABSOLUTE: 14.66 E9/L (ref 1.8–7.3)
NEUTROPHILS RELATIVE PERCENT: 86.5 % (ref 43–80)
PDW BLD-RTO: 12.7 FL (ref 11.5–15)
PLATELET # BLD: 230 E9/L (ref 130–450)
PMV BLD AUTO: 10.8 FL (ref 7–12)
POTASSIUM SERPL-SCNC: 4.6 MMOL/L (ref 3.5–5)
PRO-BNP: 262 PG/ML (ref 0–125)
RBC # BLD: 3.64 E12/L (ref 3.8–5.8)
SODIUM BLD-SCNC: 143 MMOL/L (ref 132–146)
URINE CULTURE, ROUTINE: NORMAL
WBC # BLD: 17 E9/L (ref 4.5–11.5)

## 2020-03-10 PROCEDURE — 94668 MNPJ CHEST WALL SBSQ: CPT

## 2020-03-10 PROCEDURE — 6370000000 HC RX 637 (ALT 250 FOR IP): Performed by: RADIOLOGY

## 2020-03-10 PROCEDURE — 80048 BASIC METABOLIC PNL TOTAL CA: CPT

## 2020-03-10 PROCEDURE — 83735 ASSAY OF MAGNESIUM: CPT

## 2020-03-10 PROCEDURE — 83880 ASSAY OF NATRIURETIC PEPTIDE: CPT

## 2020-03-10 PROCEDURE — 71045 X-RAY EXAM CHEST 1 VIEW: CPT

## 2020-03-10 PROCEDURE — 6360000002 HC RX W HCPCS: Performed by: INTERNAL MEDICINE

## 2020-03-10 PROCEDURE — 99232 SBSQ HOSP IP/OBS MODERATE 35: CPT | Performed by: INTERNAL MEDICINE

## 2020-03-10 PROCEDURE — 83036 HEMOGLOBIN GLYCOSYLATED A1C: CPT

## 2020-03-10 PROCEDURE — 2700000000 HC OXYGEN THERAPY PER DAY

## 2020-03-10 PROCEDURE — 2580000003 HC RX 258: Performed by: INTERNAL MEDICINE

## 2020-03-10 PROCEDURE — 2500000003 HC RX 250 WO HCPCS: Performed by: INTERNAL MEDICINE

## 2020-03-10 PROCEDURE — 85025 COMPLETE CBC W/AUTO DIFF WBC: CPT

## 2020-03-10 PROCEDURE — 99233 SBSQ HOSP IP/OBS HIGH 50: CPT | Performed by: INTERNAL MEDICINE

## 2020-03-10 PROCEDURE — 94760 N-INVAS EAR/PLS OXIMETRY 1: CPT

## 2020-03-10 PROCEDURE — 6370000000 HC RX 637 (ALT 250 FOR IP): Performed by: INTERNAL MEDICINE

## 2020-03-10 PROCEDURE — 2140000000 HC CCU INTERMEDIATE R&B

## 2020-03-10 PROCEDURE — 36415 COLL VENOUS BLD VENIPUNCTURE: CPT

## 2020-03-10 PROCEDURE — 94640 AIRWAY INHALATION TREATMENT: CPT

## 2020-03-10 PROCEDURE — 94761 N-INVAS EAR/PLS OXIMETRY MLT: CPT

## 2020-03-10 RX ORDER — KETOROLAC TROMETHAMINE 30 MG/ML
15 INJECTION, SOLUTION INTRAMUSCULAR; INTRAVENOUS ONCE
Status: COMPLETED | OUTPATIENT
Start: 2020-03-10 | End: 2020-03-10

## 2020-03-10 RX ORDER — TRAMADOL HYDROCHLORIDE 50 MG/1
50 TABLET ORAL ONCE
Status: DISCONTINUED | OUTPATIENT
Start: 2020-03-10 | End: 2020-03-10

## 2020-03-10 RX ORDER — BUDESONIDE 0.5 MG/2ML
1 INHALANT ORAL 2 TIMES DAILY
Status: DISCONTINUED | OUTPATIENT
Start: 2020-03-11 | End: 2020-03-11 | Stop reason: HOSPADM

## 2020-03-10 RX ORDER — IPRATROPIUM BROMIDE AND ALBUTEROL SULFATE 2.5; .5 MG/3ML; MG/3ML
1 SOLUTION RESPIRATORY (INHALATION) EVERY 4 HOURS
Status: DISCONTINUED | OUTPATIENT
Start: 2020-03-10 | End: 2020-03-11 | Stop reason: HOSPADM

## 2020-03-10 RX ORDER — TRAMADOL HYDROCHLORIDE 50 MG/1
50 TABLET ORAL EVERY 12 HOURS PRN
Status: DISCONTINUED | OUTPATIENT
Start: 2020-03-10 | End: 2020-03-11 | Stop reason: HOSPADM

## 2020-03-10 RX ADMIN — DULOXETINE HYDROCHLORIDE 60 MG: 60 CAPSULE, DELAYED RELEASE ORAL at 10:00

## 2020-03-10 RX ADMIN — ARFORMOTEROL TARTRATE 15 MCG: 15 SOLUTION RESPIRATORY (INHALATION) at 08:31

## 2020-03-10 RX ADMIN — PANTOPRAZOLE SODIUM 40 MG: 40 TABLET, DELAYED RELEASE ORAL at 05:17

## 2020-03-10 RX ADMIN — DOXYCYCLINE 100 MG: 100 INJECTION, POWDER, LYOPHILIZED, FOR SOLUTION INTRAVENOUS at 09:59

## 2020-03-10 RX ADMIN — DOXYCYCLINE 100 MG: 100 INJECTION, POWDER, LYOPHILIZED, FOR SOLUTION INTRAVENOUS at 22:23

## 2020-03-10 RX ADMIN — GABAPENTIN 600 MG: 300 CAPSULE ORAL at 14:48

## 2020-03-10 RX ADMIN — SODIUM CHLORIDE, PRESERVATIVE FREE 10 ML: 5 INJECTION INTRAVENOUS at 22:22

## 2020-03-10 RX ADMIN — METHYLPREDNISOLONE SODIUM SUCCINATE 40 MG: 40 INJECTION, POWDER, FOR SOLUTION INTRAMUSCULAR; INTRAVENOUS at 10:01

## 2020-03-10 RX ADMIN — BENZONATATE 100 MG: 100 CAPSULE ORAL at 22:22

## 2020-03-10 RX ADMIN — TRAZODONE HYDROCHLORIDE 100 MG: 50 TABLET ORAL at 22:22

## 2020-03-10 RX ADMIN — CEFTRIAXONE SODIUM 1 G: 1 INJECTION, POWDER, FOR SOLUTION INTRAMUSCULAR; INTRAVENOUS at 10:01

## 2020-03-10 RX ADMIN — IPRATROPIUM BROMIDE AND ALBUTEROL SULFATE 1 AMPULE: .5; 3 SOLUTION RESPIRATORY (INHALATION) at 13:46

## 2020-03-10 RX ADMIN — IPRATROPIUM BROMIDE AND ALBUTEROL SULFATE 1 AMPULE: .5; 3 SOLUTION RESPIRATORY (INHALATION) at 16:03

## 2020-03-10 RX ADMIN — OSELTAMIVIR PHOSPHATE 75 MG: 75 CAPSULE ORAL at 09:59

## 2020-03-10 RX ADMIN — OSELTAMIVIR PHOSPHATE 75 MG: 75 CAPSULE ORAL at 22:22

## 2020-03-10 RX ADMIN — PANTOPRAZOLE SODIUM 40 MG: 40 TABLET, DELAYED RELEASE ORAL at 18:28

## 2020-03-10 RX ADMIN — ASPIRIN 81 MG: 81 TABLET, COATED ORAL at 10:01

## 2020-03-10 RX ADMIN — ARFORMOTEROL TARTRATE 15 MCG: 15 SOLUTION RESPIRATORY (INHALATION) at 20:09

## 2020-03-10 RX ADMIN — IPRATROPIUM BROMIDE AND ALBUTEROL SULFATE 1 AMPULE: .5; 3 SOLUTION RESPIRATORY (INHALATION) at 20:09

## 2020-03-10 RX ADMIN — BUDESONIDE 250 MCG: 0.25 SUSPENSION RESPIRATORY (INHALATION) at 20:09

## 2020-03-10 RX ADMIN — IPRATROPIUM BROMIDE AND ALBUTEROL SULFATE 1 AMPULE: 2.5; .5 SOLUTION RESPIRATORY (INHALATION) at 08:30

## 2020-03-10 RX ADMIN — BUDESONIDE 250 MCG: 0.25 SUSPENSION RESPIRATORY (INHALATION) at 08:32

## 2020-03-10 RX ADMIN — SODIUM CHLORIDE, PRESERVATIVE FREE 10 ML: 5 INJECTION INTRAVENOUS at 10:02

## 2020-03-10 RX ADMIN — TAMSULOSIN HYDROCHLORIDE 0.4 MG: 0.4 CAPSULE ORAL at 10:00

## 2020-03-10 RX ADMIN — TRAMADOL HYDROCHLORIDE 50 MG: 50 TABLET, FILM COATED ORAL at 18:28

## 2020-03-10 RX ADMIN — KETOROLAC TROMETHAMINE 15 MG: 30 INJECTION, SOLUTION INTRAMUSCULAR; INTRAVENOUS at 12:24

## 2020-03-10 RX ADMIN — METHYLPREDNISOLONE SODIUM SUCCINATE 40 MG: 40 INJECTION, POWDER, FOR SOLUTION INTRAMUSCULAR; INTRAVENOUS at 22:23

## 2020-03-10 RX ADMIN — GABAPENTIN 600 MG: 300 CAPSULE ORAL at 22:22

## 2020-03-10 RX ADMIN — METOPROLOL SUCCINATE 25 MG: 25 TABLET, FILM COATED, EXTENDED RELEASE ORAL at 10:00

## 2020-03-10 RX ADMIN — ATORVASTATIN CALCIUM 40 MG: 40 TABLET, FILM COATED ORAL at 10:00

## 2020-03-10 RX ADMIN — ENOXAPARIN SODIUM 40 MG: 40 INJECTION SUBCUTANEOUS at 22:23

## 2020-03-10 RX ADMIN — GABAPENTIN 600 MG: 300 CAPSULE ORAL at 10:00

## 2020-03-10 ASSESSMENT — PAIN SCALES - GENERAL
PAINLEVEL_OUTOF10: 7
PAINLEVEL_OUTOF10: 7
PAINLEVEL_OUTOF10: 10

## 2020-03-10 ASSESSMENT — PAIN DESCRIPTION - FREQUENCY: FREQUENCY: INTERMITTENT

## 2020-03-10 ASSESSMENT — PAIN DESCRIPTION - LOCATION: LOCATION: CHEST

## 2020-03-10 ASSESSMENT — PAIN DESCRIPTION - PAIN TYPE: TYPE: ACUTE PAIN

## 2020-03-10 ASSESSMENT — PAIN DESCRIPTION - PROGRESSION: CLINICAL_PROGRESSION: NOT CHANGED

## 2020-03-10 ASSESSMENT — PAIN DESCRIPTION - ORIENTATION: ORIENTATION: MID

## 2020-03-10 ASSESSMENT — PAIN DESCRIPTION - ONSET: ONSET: GRADUAL

## 2020-03-10 ASSESSMENT — PAIN DESCRIPTION - DESCRIPTORS: DESCRIPTORS: ACHING

## 2020-03-10 NOTE — PROGRESS NOTES
[facility]  Internal Medicine Residency Program  Progress Note - House Team 1    Patient:  Reshma Tierney 47 y.o. male MRN: [id number]     Date of Service: 3/10/2020     CC: SOB  Overnight events: None     Subjective     Patient was seen and examined this morning at bedside in no acute distress. Patient feels about the same as yesterday, on 2 L NC which is down from the 6 L he was on. Still very wheezy bilaterally. Will switch to PO steroids in anticipation of discharge. If discharged, will need abx changed to PO. Objective     Physical Exam:  · Vitals: BP (!) 153/59   Pulse 77   Temp 97.2 °F (36.2 °C) (Temporal)   Resp 20   Ht 6' (1.829 m)   Wt 247 lb 12.8 oz (112.4 kg)   SpO2 95%   BMI 33.61 kg/m²     I & O - 24hr: I/O this shift:  In: 180 [P.O.:180]  · Out: -    · General Appearance: alert, appears stated age and cooperative  · HEENT:  Head: Normocephalic, no lesions, without obvious abnormality. · Neck: no adenopathy, no carotid bruit, no JVD, supple, symmetrical, trachea midline and thyroid not enlarged, symmetric, no tenderness/mass/nodules  · Lung: wheezes bilaterally  · Heart: regular rate and rhythm, S1, S2 normal, no murmur, click, rub or gallop  · Abdomen: soft, non-tender; bowel sounds normal; no masses,  no organomegaly  · Extremities:  extremities normal, atraumatic, no cyanosis or edema  · Musculokeletal: No joint swelling, no muscle tenderness. ROM normal in all joints of extremities.    · Neurologic: Mental status: Alert, oriented, thought content appropriate  Subject  Pertinent Labs & Imaging Studies   tim  CBC with Differential:    Lab Results   Component Value Date    WBC 17.0 03/10/2020    RBC 3.64 03/10/2020    HGB 11.8 03/10/2020    HCT 36.4 03/10/2020     03/10/2020    .0 03/10/2020    MCH 32.4 03/10/2020    MCHC 32.4 03/10/2020    RDW 12.7 03/10/2020    LYMPHOPCT 8.8 03/10/2020    MONOPCT 3.8 03/10/2020    BASOPCT 0.1 03/10/2020 MONOSABS 0.65 03/10/2020    LYMPHSABS 1.50 03/10/2020    EOSABS 0.00 03/10/2020    BASOSABS 0.01 03/10/2020     BMP:    Lab Results   Component Value Date     03/10/2020    K 4.6 03/10/2020    K 4.2 03/07/2020     03/10/2020    CO2 22 03/10/2020    BUN 20 03/10/2020    LABALBU 4.0 03/07/2020    CREATININE 0.8 03/10/2020    CALCIUM 9.2 03/10/2020    GFRAA >60 03/10/2020    LABGLOM >60 03/10/2020    GLUCOSE 219 03/10/2020       Resident's Assessment and Plan     COPD exacerbation  - 2/2 influenza B vs CAP  - continue aerosols (DuoNeb, Pulmicort, Performist)  - will change to 60 mg prednisone     Influenza B Infection  - symptomatic management    CAP  - continue ceftriaxone and doxycycline  - may change if patient is discharged either tonight or tomorrow   - procalcitonin 0.11     H/o Lung CA s/p RML lobectomy  - RUL nodules stable compared to CT in Aug 2019  - Pulmonology on board  - OP CT chest in 3-6 months     Hypertension  - Continue Metoprolol with holding parameters  - Monitor BP     CAD  - Continue aspirin, atorvastatin, BB     Hyperlipidemia  - Continue Atorvastatin     PUD   - S/p EGD revealing duodenitis, PUD in 12/2019  - S/p Colonoscopy revealing tubular adenoma, with removal of 5 polyps (12/2019)   - Continue Protonix BID       PT/OT evaluation: ordered  DVT prophylaxis/ GI prophylaxis: Lovenox/Protonix  [address], DO, PGY-1  Attending physician: Dr. Cristóbal Steel

## 2020-03-11 VITALS
RESPIRATION RATE: 18 BRPM | TEMPERATURE: 98.2 F | BODY MASS INDEX: 33.56 KG/M2 | HEIGHT: 72 IN | DIASTOLIC BLOOD PRESSURE: 54 MMHG | HEART RATE: 65 BPM | WEIGHT: 247.8 LBS | OXYGEN SATURATION: 98 % | SYSTOLIC BLOOD PRESSURE: 97 MMHG

## 2020-03-11 LAB
ANION GAP SERPL CALCULATED.3IONS-SCNC: 11 MMOL/L (ref 7–16)
BASOPHILS ABSOLUTE: 0.02 E9/L (ref 0–0.2)
BASOPHILS RELATIVE PERCENT: 0.1 % (ref 0–2)
BUN BLDV-MCNC: 21 MG/DL (ref 6–20)
CALCIUM SERPL-MCNC: 9 MG/DL (ref 8.6–10.2)
CHLORIDE BLD-SCNC: 104 MMOL/L (ref 98–107)
CO2: 24 MMOL/L (ref 22–29)
CREAT SERPL-MCNC: 0.9 MG/DL (ref 0.7–1.2)
EOSINOPHILS ABSOLUTE: 0 E9/L (ref 0.05–0.5)
EOSINOPHILS RELATIVE PERCENT: 0 % (ref 0–6)
GFR AFRICAN AMERICAN: >60
GFR NON-AFRICAN AMERICAN: >60 ML/MIN/1.73
GLUCOSE BLD-MCNC: 176 MG/DL (ref 74–99)
HCT VFR BLD CALC: 36.1 % (ref 37–54)
HEMOGLOBIN: 11.5 G/DL (ref 12.5–16.5)
IMMATURE GRANULOCYTES #: 0.32 E9/L
IMMATURE GRANULOCYTES %: 2 % (ref 0–5)
LYMPHOCYTES ABSOLUTE: 1.63 E9/L (ref 1.5–4)
LYMPHOCYTES RELATIVE PERCENT: 10.2 % (ref 20–42)
MAGNESIUM: 2.1 MG/DL (ref 1.6–2.6)
MCH RBC QN AUTO: 32.4 PG (ref 26–35)
MCHC RBC AUTO-ENTMCNC: 31.9 % (ref 32–34.5)
MCV RBC AUTO: 101.7 FL (ref 80–99.9)
MONOCYTES ABSOLUTE: 0.69 E9/L (ref 0.1–0.95)
MONOCYTES RELATIVE PERCENT: 4.3 % (ref 2–12)
NEUTROPHILS ABSOLUTE: 13.26 E9/L (ref 1.8–7.3)
NEUTROPHILS RELATIVE PERCENT: 83.4 % (ref 43–80)
PDW BLD-RTO: 12.7 FL (ref 11.5–15)
PLATELET # BLD: 244 E9/L (ref 130–450)
PMV BLD AUTO: 11.2 FL (ref 7–12)
POTASSIUM SERPL-SCNC: 4.7 MMOL/L (ref 3.5–5)
RBC # BLD: 3.55 E12/L (ref 3.8–5.8)
SODIUM BLD-SCNC: 139 MMOL/L (ref 132–146)
WBC # BLD: 15.9 E9/L (ref 4.5–11.5)

## 2020-03-11 PROCEDURE — 2580000003 HC RX 258: Performed by: INTERNAL MEDICINE

## 2020-03-11 PROCEDURE — 2700000000 HC OXYGEN THERAPY PER DAY

## 2020-03-11 PROCEDURE — 6360000002 HC RX W HCPCS: Performed by: INTERNAL MEDICINE

## 2020-03-11 PROCEDURE — 6370000000 HC RX 637 (ALT 250 FOR IP): Performed by: INTERNAL MEDICINE

## 2020-03-11 PROCEDURE — 83735 ASSAY OF MAGNESIUM: CPT

## 2020-03-11 PROCEDURE — 85025 COMPLETE CBC W/AUTO DIFF WBC: CPT

## 2020-03-11 PROCEDURE — 36415 COLL VENOUS BLD VENIPUNCTURE: CPT

## 2020-03-11 PROCEDURE — 2500000003 HC RX 250 WO HCPCS: Performed by: INTERNAL MEDICINE

## 2020-03-11 PROCEDURE — 6370000000 HC RX 637 (ALT 250 FOR IP): Performed by: RADIOLOGY

## 2020-03-11 PROCEDURE — 94640 AIRWAY INHALATION TREATMENT: CPT

## 2020-03-11 PROCEDURE — 80048 BASIC METABOLIC PNL TOTAL CA: CPT

## 2020-03-11 PROCEDURE — 99238 HOSP IP/OBS DSCHRG MGMT 30/<: CPT | Performed by: INTERNAL MEDICINE

## 2020-03-11 RX ORDER — BENZONATATE 100 MG/1
100 CAPSULE ORAL 3 TIMES DAILY PRN
Qty: 21 CAPSULE | Refills: 0 | Status: SHIPPED | OUTPATIENT
Start: 2020-03-11 | End: 2020-03-11

## 2020-03-11 RX ORDER — TIOTROPIUM BROMIDE 18 UG/1
18 CAPSULE ORAL; RESPIRATORY (INHALATION) DAILY
Qty: 30 CAPSULE | Refills: 0 | Status: SHIPPED | OUTPATIENT
Start: 2020-03-11 | End: 2020-09-04 | Stop reason: SDUPTHER

## 2020-03-11 RX ORDER — AMOXICILLIN AND CLAVULANATE POTASSIUM 875; 125 MG/1; MG/1
1 TABLET, FILM COATED ORAL 2 TIMES DAILY
Qty: 8 TABLET | Refills: 0 | Status: SHIPPED | OUTPATIENT
Start: 2020-03-11 | End: 2020-03-11 | Stop reason: SDUPTHER

## 2020-03-11 RX ORDER — ALBUTEROL SULFATE 90 UG/1
1-2 AEROSOL, METERED RESPIRATORY (INHALATION) EVERY 4 HOURS PRN
Qty: 1 INHALER | Refills: 2 | Status: SHIPPED
Start: 2020-03-11 | End: 2020-03-18 | Stop reason: SDUPTHER

## 2020-03-11 RX ORDER — TIOTROPIUM BROMIDE 18 UG/1
18 CAPSULE ORAL; RESPIRATORY (INHALATION) DAILY
Qty: 30 CAPSULE | Refills: 0 | Status: SHIPPED | OUTPATIENT
Start: 2020-03-11 | End: 2020-03-11 | Stop reason: SDUPTHER

## 2020-03-11 RX ORDER — PREDNISONE 10 MG/1
TABLET ORAL
Qty: 20 TABLET | Refills: 0 | Status: SHIPPED | OUTPATIENT
Start: 2020-03-11 | End: 2020-03-11 | Stop reason: SDUPTHER

## 2020-03-11 RX ORDER — PREDNISONE 10 MG/1
TABLET ORAL
Qty: 20 TABLET | Refills: 0 | Status: SHIPPED | OUTPATIENT
Start: 2020-03-11 | End: 2020-09-04 | Stop reason: ALTCHOICE

## 2020-03-11 RX ORDER — OSELTAMIVIR PHOSPHATE 75 MG/1
75 CAPSULE ORAL 2 TIMES DAILY
Qty: 3 CAPSULE | Refills: 0 | Status: SHIPPED | OUTPATIENT
Start: 2020-03-11 | End: 2020-03-13

## 2020-03-11 RX ORDER — ALBUTEROL SULFATE 90 UG/1
1-2 AEROSOL, METERED RESPIRATORY (INHALATION) EVERY 4 HOURS PRN
Qty: 1 INHALER | Refills: 2 | Status: SHIPPED | OUTPATIENT
Start: 2020-03-11 | End: 2020-03-11 | Stop reason: SDUPTHER

## 2020-03-11 RX ORDER — AMOXICILLIN AND CLAVULANATE POTASSIUM 875; 125 MG/1; MG/1
1 TABLET, FILM COATED ORAL 2 TIMES DAILY
Qty: 8 TABLET | Refills: 0 | Status: SHIPPED | OUTPATIENT
Start: 2020-03-11 | End: 2020-03-15

## 2020-03-11 RX ORDER — BENZONATATE 100 MG/1
100 CAPSULE ORAL 3 TIMES DAILY PRN
Qty: 21 CAPSULE | Refills: 0 | Status: SHIPPED
Start: 2020-03-11 | End: 2020-03-18 | Stop reason: SDUPTHER

## 2020-03-11 RX ADMIN — DULOXETINE HYDROCHLORIDE 60 MG: 60 CAPSULE, DELAYED RELEASE ORAL at 08:46

## 2020-03-11 RX ADMIN — ARFORMOTEROL TARTRATE 15 MCG: 15 SOLUTION RESPIRATORY (INHALATION) at 08:28

## 2020-03-11 RX ADMIN — DOXYCYCLINE 100 MG: 100 INJECTION, POWDER, LYOPHILIZED, FOR SOLUTION INTRAVENOUS at 09:38

## 2020-03-11 RX ADMIN — TRAMADOL HYDROCHLORIDE 50 MG: 50 TABLET, FILM COATED ORAL at 06:28

## 2020-03-11 RX ADMIN — ASPIRIN 81 MG: 81 TABLET, COATED ORAL at 08:46

## 2020-03-11 RX ADMIN — TAMSULOSIN HYDROCHLORIDE 0.4 MG: 0.4 CAPSULE ORAL at 08:46

## 2020-03-11 RX ADMIN — BUDESONIDE 1000 MCG: 0.5 SUSPENSION RESPIRATORY (INHALATION) at 08:29

## 2020-03-11 RX ADMIN — IPRATROPIUM BROMIDE AND ALBUTEROL SULFATE 1 AMPULE: .5; 3 SOLUTION RESPIRATORY (INHALATION) at 08:27

## 2020-03-11 RX ADMIN — CEFTRIAXONE SODIUM 1 G: 1 INJECTION, POWDER, FOR SOLUTION INTRAMUSCULAR; INTRAVENOUS at 08:46

## 2020-03-11 RX ADMIN — ATORVASTATIN CALCIUM 40 MG: 40 TABLET, FILM COATED ORAL at 08:46

## 2020-03-11 RX ADMIN — Medication 10 ML: at 09:39

## 2020-03-11 RX ADMIN — METHYLPREDNISOLONE SODIUM SUCCINATE 40 MG: 40 INJECTION, POWDER, FOR SOLUTION INTRAMUSCULAR; INTRAVENOUS at 08:46

## 2020-03-11 RX ADMIN — BENZONATATE 100 MG: 100 CAPSULE ORAL at 06:28

## 2020-03-11 RX ADMIN — SODIUM CHLORIDE, PRESERVATIVE FREE 10 ML: 5 INJECTION INTRAVENOUS at 08:46

## 2020-03-11 RX ADMIN — GABAPENTIN 600 MG: 300 CAPSULE ORAL at 08:46

## 2020-03-11 RX ADMIN — OSELTAMIVIR PHOSPHATE 75 MG: 75 CAPSULE ORAL at 08:46

## 2020-03-11 RX ADMIN — METOPROLOL SUCCINATE 25 MG: 25 TABLET, FILM COATED, EXTENDED RELEASE ORAL at 08:46

## 2020-03-11 RX ADMIN — PANTOPRAZOLE SODIUM 40 MG: 40 TABLET, DELAYED RELEASE ORAL at 06:28

## 2020-03-11 ASSESSMENT — PAIN SCALES - GENERAL
PAINLEVEL_OUTOF10: 9
PAINLEVEL_OUTOF10: 0

## 2020-03-11 NOTE — DISCHARGE SUMMARY
[address]  Discharge Summary    PCP: Marlene Dos Santos MD    Admit Date:3/7/2020  Discharge Date: 3/11/2020    Admission Diagnosis:   1. Acute Exacerbation of COPD  2. Influenza B Infection  3. H/o Lung CA s/p R middle lobectomy  4. H/o Renal Cell Carcinoma s/p nephrectomy  5. HTN  6. Sinus Tachycardia  7. Vasovagal Syncope  8. CAD  9. HLD  10. PUD    Discharge Diagnosis:  11. Acute Exacerbation of COPD  12. Influenza B Infection  13. CAP  14. H/o Lung CA s/p R middle lobectomy  15. DEJON  16. H/o Renal Cell Carcinoma s/p nephrectomy  17. HTN  18. CAD  23. HLD  20. PUD    Hospital Course: The patient is a 47 y.o. male with a PMH of COPD, RCC s/p total nephrectomy (2005), Lung cancer s/p right middle lobectomy (2014), new right upper lobe nodule, growing in size presented with the complaints of productive cough and dyspnea. He reports generalized myalgia, fatigue, fever with chills. Per wife, he lost consciousness for brief period while coughing and regained consciousness shortly. Denies any nausea, vomiting, headache, dizziness, loss of consciousness, palpitations, dysuria. Reports back pain for about one year. X-ray lumbar spine and MRI lumbar spine revealing multilevel mild posterior bridging hard disc and spur, facet arthrosis, stenosis.      In the ED, he was found to have fever, tachycardia, FLU B positive. Leukocytosis noted, CXR revealing ground glass opacities R> L. Patient was admitted to the floor, pulmonology consulted. He was started on Tamiflu and CAP coverage with ceftriaxone and doxycycline. Aerosols were started and patient was given IV Solumedrol for significant wheezing bilaterally. Patient was hypoxic and required supplemental O2 via NC during admission. Pulmonology reviewed CT chest and recommended repeat CT chest in 3-6 months as OP. IV solumedrol was tapered down to 40mg BID.  At night, patient placed on CPAP while sleeping. 3/11, patient reported symptomatic improvement, not requiring NC at this time. Ambulatory pulse oximetry today showed SpO2 95% on room air while ambulating. He is requesting to go home today. No other acute complaints. Patient will need OP sleep study with PAP titration, ordered on discharge. Significant findings (history and exam, laboratory, radiological, pathology, other tests):   · General Appearance: alert, appears stated age and cooperative  · HEENT:  Head: Normocephalic, no lesions, without obvious abnormality. · Neck: no adenopathy, no carotid bruit, no JVD, supple, symmetrical, trachea midline and thyroid not enlarged, symmetric, no tenderness/mass/nodules  · Lung: wheezes bilaterally  · Heart: regular rate and rhythm, S1, S2 normal, no murmur, click, rub or gallop  · Abdomen: soft, non-tender; bowel sounds normal; no masses,  no organomegaly  · Extremities:  extremities normal, atraumatic, no cyanosis or edema  · Musculokeletal: No joint swelling, no muscle tenderness. ROM normal in all joints of extremities. · Neurologic: Mental status: Alert, oriented, thought content appropriate    Pending test results: None    Consults:  1. Pulmonology    Procedures: None    Condition at discharge: Stable    Disposition: home    Discharge Medications:  Current Discharge Medication List      START taking these medications    Details   benzonatate (TESSALON) 100 MG capsule Take 1 capsule by mouth 3 times daily as needed for Cough  Qty: 21 capsule, Refills: 0      amoxicillin-clavulanate (AUGMENTIN) 875-125 MG per tablet Take 1 tablet by mouth 2 times daily for 4 days  Qty: 8 tablet, Refills: 0      predniSONE (DELTASONE) 10 MG tablet Take 4 tabs x 2 days, 3 tabs x 2 days, 2 tabs x 2 days, 1 tab x 2 days, stop.   Qty: 20 tablet, Refills: 0      tiotropium (SPIRIVA HANDIHALER) 18 MCG inhalation capsule Inhale 1 capsule into the lungs daily  Qty: 30 capsule, Refills: 0         CONTINUE these medications which have CHANGED    Details albuterol sulfate HFA (PROVENTIL HFA) 108 (90 Base) MCG/ACT inhaler Inhale 1-2 puffs into the lungs every 4 hours as needed for Wheezing or Shortness of Breath  Qty: 1 Inhaler, Refills: 2      fluticasone-salmeterol (ADVAIR DISKUS) 250-50 MCG/DOSE AEPB Inhale 1 puff into the lungs 2 times daily  Qty: 60 each, Refills: 1         CONTINUE these medications which have NOT CHANGED    Details   gabapentin (NEURONTIN) 600 MG tablet Take 600 mg by mouth 3 times daily.       pantoprazole (PROTONIX) 40 MG tablet Take 1 tablet by mouth 2 times daily (before meals)  Qty: 180 tablet, Refills: 3      DULoxetine (CYMBALTA) 60 MG extended release capsule Take 1 capsule by mouth daily  Qty: 60 capsule, Refills: 3      traZODone (DESYREL) 100 MG tablet Take 1 tablet by mouth nightly  Qty: 60 tablet, Refills: 3      metoprolol succinate (TOPROL XL) 25 MG extended release tablet Take 1 tablet by mouth daily  Qty: 90 tablet, Refills: 1      tamsulosin (FLOMAX) 0.4 MG capsule Take 1 capsule by mouth daily  Qty: 60 capsule, Refills: 2    Associated Diagnoses: Benign prostatic hyperplasia (BPH) with straining on urination      aspirin EC 81 MG EC tablet Take 1 tablet by mouth daily  Qty: 60 tablet, Refills: 3      atorvastatin (LIPITOR) 40 MG tablet Take 1 tablet by mouth daily  Qty: 60 tablet, Refills: 3      ibuprofen (ADVIL;MOTRIN) 600 MG tablet Take 1 tablet by mouth 4 times daily as needed for Pain Please take with meals  Qty: 120 tablet, Refills: 2    Associated Diagnoses: Spinal stenosis of lumbar region with neurogenic claudication; Bilateral chronic knee pain      sildenafil (REVATIO) 20 MG tablet Take 1 tablet by mouth as needed (as directed 1-2 tablets)  Qty: 30 tablet, Refills: 2         STOP taking these medications       GAVILYTE-C 240 g solution Comments:   Reason for Stopping:               [facility] Internal Medicine Resident Service     Activity as tolerated  Diet: common adult  Be compliant with your medications and take

## 2020-03-11 NOTE — DISCHARGE INSTR - PHARMACY
from a hot shower or from a sink filled with hot water to help clear a stuffy nose. · Before you use cough and cold medicines, check the label. These medicines may not be safe for young children or for people with certain health problems. · If the skin around your nose and lips becomes sore, put some petroleum jelly on the area. · To ease coughing:  ? Drink fluids to soothe a scratchy throat. ? Suck on cough drops or plain hard candy. ? Take an over-the-counter cough medicine that contains dextromethorphan to help you get some sleep. Read and follow all instructions on the label. ? Raise your head at night with an extra pillow. This may help you rest if coughing keeps you awake. · Take any prescribed medicine exactly as directed. Call your doctor if you think you are having a problem with your medicine. To avoid spreading the flu  · Wash your hands regularly, and keep your hands away from your face. · Stay home from school, work, and other public places until you are feeling better and your fever has been gone for at least 24 hours. The fever needs to have gone away on its own without the help of medicine. · Ask people living with you to talk to their doctors about preventing the flu. They may get antiviral medicine to keep from getting the flu from you. · To prevent the flu in the future, get a flu vaccine every fall. Encourage people living with you to get the vaccine. · Cover your mouth when you cough or sneeze. When should you call for help? Call 911 anytime you think you may need emergency care.  For example, call if:    · You have severe trouble breathing.    Call your doctor now or seek immediate medical care if:    · You have new or worse trouble breathing.     · You seem to be getting much sicker.     · You feel very sleepy or confused.     · You have a new or higher fever.     · You get a new rash.    Watch closely for changes in your health, and be sure to contact your doctor if:    · You

## 2020-03-12 ENCOUNTER — TELEPHONE (OUTPATIENT)
Dept: INTERNAL MEDICINE | Age: 55
End: 2020-03-12

## 2020-03-12 ENCOUNTER — CARE COORDINATION (OUTPATIENT)
Dept: CASE MANAGEMENT | Age: 55
End: 2020-03-12

## 2020-03-12 LAB
BLOOD CULTURE, ROUTINE: NORMAL
CULTURE, BLOOD 2: NORMAL

## 2020-03-12 RX ORDER — GABAPENTIN 600 MG/1
600 TABLET ORAL 3 TIMES DAILY
Qty: 90 TABLET | Refills: 0 | Status: SHIPPED
Start: 2020-03-12 | End: 2020-03-18 | Stop reason: SDUPTHER

## 2020-03-12 RX ORDER — IBUPROFEN 600 MG/1
600 TABLET ORAL 4 TIMES DAILY PRN
Qty: 120 TABLET | Refills: 0 | Status: SHIPPED
Start: 2020-03-12 | End: 2020-07-20 | Stop reason: SDUPTHER

## 2020-03-12 RX ORDER — ASPIRIN 81 MG/1
81 TABLET ORAL DAILY
Qty: 60 TABLET | Refills: 0 | Status: SHIPPED
Start: 2020-03-12 | End: 2020-05-26 | Stop reason: SDUPTHER

## 2020-03-12 NOTE — TELEPHONE ENCOUNTER
Last Appointment:  11/22/2019  Future Appointments   Date Time Provider Fatou Guzman   3/18/2020  3:00 PM URGENT CARE 1 [facility]

## 2020-03-12 NOTE — CARE COORDINATION
D[facility] Transitions Initial Follow Up Call    Call within 2 business days of discharge: Yes    Patient: Kay Nunez Patient : 1965   MRN: [id number]  Reason for Admission: influenza B  Discharge Date: 3/11/20 RARS: Readmission Risk Score: 13      Last Discharge [facility]       Complaint Diagnosis Description Type Department Provider    3/7/20 Shortness of Breath Influenza B . .. ED to Hosp-Admission (Discharged) (ADMITTED) [facility] 8S CDU Sherri Matthews MD; Turner Roberts. Spoke with: Kay Nunez, patient    Facility: [facility]    Non-face-to-face services provided:  Scheduled appointment with PCP-confirmed apppointment for 3/18/20  Obtained and reviewed discharge summary and/or continuity of care documents    Care Transitions 24 Hour Call    Do you have a copy of your discharge instructions?:  Yes  Do you have all of your prescriptions and are they filled?:  Yes  Have you been contacted by a [facility] Pharmacist?:  No  Have you scheduled your follow up appointment?:  Yes  How are you going to get to your appointment?:  Car - drive self  Were you discharged with any Home Care or Post Acute Services:  No  Do you feel like you have everything you need to keep you well at home?:  Yes  Care Transitions Interventions  No Identified Needs       -Spoke with patient Yocasta for initial BPCI care transition call post hospital discharge. Explained the role of Care Transition Nurse and the BPCI-A program, patient is agreeable to follow up post discharge from the hospital.   -Patient admitted to [facility] ([facility] on 3/7/20 for influenza B with symptoms of productive cough, dyspnea, generalized myalgia, fatigue, and fever/chills.  -Patient states he is weak/fatigued, continues with cough, denies fever/chills presently. CTN encouraged patient to maintain hydration.   -Patient declined full med review, 1111F not entered.   CTN able to confirm new medications of Proventil HFA, Augmentin, Tessalon, Advair Diskus, Prednisone, and Spiriva. CTN stressed importance of completing full course of antibiotic and steroid therapy. CTN also recommended patient to use rescue inhaler as needed and to take the Tessalon for cough;also to use maintenance inhalers as prescribed. Patient verbalized understanding. -CTN explained that a member of the Care Transition Central Team will be contacting him for further follow up calls, patient  is in agreement and denies any other needs or concerns at this time. -CTN will hand off to the Care Transition Central team to follow.            Follow Up  Future Appointments   Date Time Provider Fatou Guzman   3/18/2020  3:00 PM URGENT CARE 1 [facility]       Amy Erwin RN

## 2020-03-18 ENCOUNTER — OFFICE VISIT (OUTPATIENT)
Dept: INTERNAL MEDICINE | Age: 55
End: 2020-03-18
Payer: MEDICARE

## 2020-03-18 VITALS
BODY MASS INDEX: 33.46 KG/M2 | RESPIRATION RATE: 16 BRPM | TEMPERATURE: 97.4 F | HEIGHT: 72 IN | SYSTOLIC BLOOD PRESSURE: 103 MMHG | WEIGHT: 247 LBS | DIASTOLIC BLOOD PRESSURE: 77 MMHG | HEART RATE: 103 BPM

## 2020-03-18 PROCEDURE — 99212 OFFICE O/P EST SF 10 MIN: CPT | Performed by: RADIOLOGY

## 2020-03-18 PROCEDURE — 99214 OFFICE O/P EST MOD 30 MIN: CPT | Performed by: RADIOLOGY

## 2020-03-18 RX ORDER — SILDENAFIL CITRATE 20 MG/1
20 TABLET ORAL PRN
Qty: 30 TABLET | Refills: 0 | Status: SHIPPED | OUTPATIENT
Start: 2020-03-18 | End: 2020-09-04 | Stop reason: SDUPTHER

## 2020-03-18 RX ORDER — GABAPENTIN 600 MG/1
600 TABLET ORAL 3 TIMES DAILY
Qty: 90 TABLET | Refills: 0 | Status: SHIPPED
Start: 2020-03-18 | End: 2020-04-27 | Stop reason: SDUPTHER

## 2020-03-18 RX ORDER — DULOXETIN HYDROCHLORIDE 60 MG/1
60 CAPSULE, DELAYED RELEASE ORAL DAILY
Qty: 30 CAPSULE | Refills: 2 | Status: SHIPPED | OUTPATIENT
Start: 2020-03-18 | End: 2020-09-04 | Stop reason: SDUPTHER

## 2020-03-18 RX ORDER — PANTOPRAZOLE SODIUM 40 MG/1
40 TABLET, DELAYED RELEASE ORAL
Qty: 30 TABLET | Refills: 2 | Status: SHIPPED
Start: 2020-03-18 | End: 2020-07-20 | Stop reason: SDUPTHER

## 2020-03-18 RX ORDER — TIOTROPIUM BROMIDE 18 UG/1
18 CAPSULE ORAL; RESPIRATORY (INHALATION) DAILY
Qty: 90 CAPSULE | Status: CANCELLED | OUTPATIENT
Start: 2020-03-18

## 2020-03-18 RX ORDER — ATORVASTATIN CALCIUM 40 MG/1
40 TABLET, FILM COATED ORAL DAILY
Qty: 30 TABLET | Refills: 2 | Status: SHIPPED | OUTPATIENT
Start: 2020-03-18 | End: 2020-09-04 | Stop reason: SDUPTHER

## 2020-03-18 RX ORDER — TRAZODONE HYDROCHLORIDE 100 MG/1
100 TABLET ORAL NIGHTLY
Qty: 30 TABLET | Refills: 2 | Status: SHIPPED | OUTPATIENT
Start: 2020-03-18 | End: 2020-09-04 | Stop reason: SDUPTHER

## 2020-03-18 RX ORDER — METOPROLOL SUCCINATE 25 MG/1
25 TABLET, EXTENDED RELEASE ORAL DAILY
Qty: 30 TABLET | Refills: 2 | Status: SHIPPED
Start: 2020-03-18 | End: 2020-08-24

## 2020-03-18 RX ORDER — BENZONATATE 100 MG/1
100 CAPSULE ORAL 3 TIMES DAILY PRN
Qty: 30 CAPSULE | Refills: 0 | Status: SHIPPED | OUTPATIENT
Start: 2020-03-18 | End: 2020-03-28

## 2020-03-18 RX ORDER — ALBUTEROL SULFATE 90 UG/1
1-2 AEROSOL, METERED RESPIRATORY (INHALATION) EVERY 4 HOURS PRN
Qty: 3 INHALER | Refills: 2 | Status: SHIPPED | OUTPATIENT
Start: 2020-03-18 | End: 2020-09-04 | Stop reason: SDUPTHER

## 2020-03-18 RX ORDER — PREDNISONE 10 MG/1
20 TABLET ORAL DAILY
Qty: 14 TABLET | Refills: 0 | Status: SHIPPED | OUTPATIENT
Start: 2020-03-18 | End: 2020-03-25

## 2020-03-18 RX ORDER — TAMSULOSIN HYDROCHLORIDE 0.4 MG/1
0.4 CAPSULE ORAL DAILY
Qty: 30 CAPSULE | Refills: 2 | Status: SHIPPED | OUTPATIENT
Start: 2020-03-18 | End: 2020-09-04 | Stop reason: SDUPTHER

## 2020-03-18 ASSESSMENT — PATIENT HEALTH QUESTIONNAIRE - PHQ9
SUM OF ALL RESPONSES TO PHQ QUESTIONS 1-9: 0
SUM OF ALL RESPONSES TO PHQ9 QUESTIONS 1 & 2: 0
2. FEELING DOWN, DEPRESSED OR HOPELESS: 0
SUM OF ALL RESPONSES TO PHQ QUESTIONS 1-9: 0
1. LITTLE INTEREST OR PLEASURE IN DOING THINGS: 0

## 2020-03-18 ASSESSMENT — ENCOUNTER SYMPTOMS
COUGH: 1
SHORTNESS OF BREATH: 1
WHEEZING: 1

## 2020-03-18 NOTE — PROGRESS NOTES
productive cough and dyspnea. He reports generalized myalgia, fatigue, fever with chills. Per wife, he lost consciousness for brief period while coughing and regained consciousness shortly. Denies any nausea, vomiting, headache, dizziness, loss of consciousness, palpitations, dysuria. Reports back pain for about one year. X-ray lumbar spine and MRI lumbar spine revealing multilevel mild posterior bridging hard disc and spur, facet arthrosis, stenosis.   In the ED, he was found to have fever, tachycardia, FLU B positive. Leukocytosis noted, CXR revealing ground glass opacities R> L. Patient was admitted to the floor, pulmonology consulted. He was started on Tamiflu and CAP coverage with ceftriaxone and doxycycline. Aerosols were started and patient was given IV Solumedrol for significant wheezing bilaterally. Patient was hypoxic and required supplemental O2 via NC during admission. Pulmonology reviewed CT chest and recommended repeat CT chest in 3-6 months as OP. IV solumedrol was tapered down to 40mg BID. At night, patient placed on CPAP while sleeping. 3/11, patient reported symptomatic improvement, not requiring NC at this time. Ambulatory pulse oximetry today showed SpO2 95% on room air while ambulating. He is requesting to go home today. No other acute complaints. Patient will need OP sleep study with PAP titration, ordered on discharge.     Will have to defer sleep test thought, bc \"elective\" and they wont' schedule at this time (corona season)    Mechanical back pain- daljit/nsaids etc. continue  Remainder of medical problems as per resident note.

## 2020-03-18 NOTE — PROGRESS NOTES
extended release tablet Take 1 tablet by mouth daily 30 tablet 2    tamsulosin (FLOMAX) 0.4 MG capsule Take 1 capsule by mouth daily 30 capsule 2    atorvastatin (LIPITOR) 40 MG tablet Take 1 tablet by mouth daily 30 tablet 2    sildenafil (REVATIO) 20 MG tablet Take 1 tablet by mouth as needed (as directed 1-2 tablets) 30 tablet 0    benzonatate (TESSALON) 100 MG capsule Take 1 capsule by mouth 3 times daily as needed for Cough 30 capsule 0    gabapentin (NEURONTIN) 600 MG tablet Take 1 tablet by mouth 3 times daily for 86 days. 90 tablet 0    predniSONE (DELTASONE) 10 MG tablet Take 2 tablets by mouth daily for 7 days 14 tablet 0    aspirin EC 81 MG EC tablet Take 1 tablet by mouth daily 60 tablet 0    ibuprofen (ADVIL;MOTRIN) 600 MG tablet Take 1 tablet by mouth 4 times daily as needed for Pain Please take with meals 120 tablet 0    tiotropium (SPIRIVA HANDIHALER) 18 MCG inhalation capsule Inhale 1 capsule into the lungs daily 30 capsule 0    fluticasone-salmeterol (ADVAIR DISKUS) 250-50 MCG/DOSE AEPB Inhale 1 puff into the lungs 2 times daily 60 each 1    gabapentin (NEURONTIN) 600 MG tablet Take 600 mg by mouth 3 times daily. Medications patient taking as of now reconciled against medications ordered at time of hospital discharge: YES     Chief Complaint   Patient presents with    Follow-Up from Hospital     pneumonia     46 yo old male with past medical hx of COPD, RCC s/p total nephrectomy 2005, lung cancer s/p right middle lobectomy ( 2014) new right upper lobe nodule. He presented on 3/7 with shortness of breath and cough. He was found to be hypoxic and  flu b +. While in the hospital he was started on tamiflu and CAP coverage. CT chest revealed new pulmonary nodules on the RUL. He was discharged with prednisone for 1 week and amoxicillin. Today patient has finished the amoxicillin and prednisone. He will wax and wane is his shortness of breath.  He gets short of breath with exertion. He is still having a cough and producing a yellow/greeen sputum. He ocationally has fevers but none recorded. Inpatient course: Discharge summary reviewed- see chart. Interval history/Current status: stable    Vitals:    03/18/20 1517   BP: 103/77   Site: Right Upper Arm   Position: Sitting   Cuff Size: Medium Adult   Pulse: 103   Resp: 16   Temp: 97.4 °F (36.3 °C)   TempSrc: Oral   Weight: 247 lb (112 kg)   Height: 6' (1.829 m)     Body mass index is 33.5 kg/m². Wt Readings from Last 3 Encounters:   03/18/20 247 lb (112 kg)   03/07/20 247 lb 12.8 oz (112.4 kg)   12/20/19 240 lb (108.9 kg)     BP Readings from Last 3 Encounters:   03/18/20 103/77   03/11/20 (!) 97/54   12/20/19 116/69       Review of Systems   Constitutional: Positive for chills and fatigue. Negative for appetite change. HENT: Positive for congestion. Respiratory: Positive for cough, shortness of breath and wheezing. Cardiovascular: Negative for leg swelling. Physical Exam  Constitutional:       Appearance: Normal appearance. HENT:      Head: Normocephalic. Nose: Nose normal.      Mouth/Throat:      Mouth: Mucous membranes are moist.   Eyes:      Extraocular Movements: Extraocular movements intact. Pupils: Pupils are equal, round, and reactive to light. Neck:      Musculoskeletal: Normal range of motion. Cardiovascular:      Rate and Rhythm: Normal rate and regular rhythm. Pulmonary:      Effort: Pulmonary effort is normal.      Breath sounds: Wheezing present. Skin:     General: Skin is warm. Neurological:      Mental Status: He is alert. Assessment/Plan:    1. COPD exacerbation   - reordered albuterol inhaler  - patient needs to make f/u appt with Dr Delia mckeon   - continue with Spiriva, Advair and albuterol  - finnnished amoxicillin and prednisione taper  - ordered 1 week 20mg daily prednisione  - benzonatate (TESSALON) 100 MG capsule;  Take 1 capsule by mouth 3 times daily   - ordered chest xray   -f/u in 1 week    2. Pulmonary nodule in RUL  - patient will need a repeat CT chest in 3 to 6 months    3. Influenza B-RESOLVED  - patient finnished tamiflu  - will f/u in 1 week    4. Benign prostatic hyperplasia (BPH) with straining on urination  - tamsulosin (FLOMAX) 0.4 MG capsule; Take 1 capsule by mouth daily  Dispense: 30 capsule; Refill: 2    5. Lipid disorder  - atorvastatin (LIPITOR) 40 MG tablet; Take 1 tablet by mouth daily  Dispense: 30 tablet; Refill: 2    6. Hypertension, unspecified type  - metoprolol succinate (TOPROL XL) 25 MG extended release tablet; Take 1 tablet by mouth daily  Dispense: 30 tablet; Refill: 2  - BP today was 103/77  - may need to reconsider new medication if shortness of breath gets worse    7. Depression, unspecified depression type    - DULoxetine (CYMBALTA) 60 MG extended release capsule; Take 1 capsule by mouth daily  Dispense: 30 capsule; Refill: 2    8. Sleep disorder  - traZODone (DESYREL) 100 MG tablet; Take 1 tablet by mouth nightly  Dispense: 30 tablet; Refill: 2  - consider sleep study as out patient    9. Gastroesophageal reflux disease, esophagitis presence not specified    - pantoprazole (PROTONIX) 40 MG tablet; Take 1 tablet by mouth 2 times daily (before meals)  Dispense: 30 tablet; Refill: 2    10. Cough  - benzonatate (TESSALON) 100 MG capsule; Take 1 capsule by mouth 3 times daily as needed for Cough  Dispense: 30 capsule; Refill: 0    11. Chronic pain syndrome  - gabapentin (NEURONTIN) 600 MG tablet; Take 1 tablet by mouth 3 times daily for 86 days. Dispense: 90 tablet;  Refill: 0        Medical Decision Making: moderate complexity

## 2020-03-19 ENCOUNTER — CARE COORDINATION (OUTPATIENT)
Dept: CASE MANAGEMENT | Age: 55
End: 2020-03-19

## 2020-03-19 RX ORDER — GUAIFENESIN/DEXTROMETHORPHAN 100-10MG/5
5 SYRUP ORAL 3 TIMES DAILY PRN
Qty: 1 BOTTLE | Refills: 0 | Status: SHIPPED | OUTPATIENT
Start: 2020-03-19 | End: 2020-03-29

## 2020-03-19 NOTE — CARE COORDINATION
[facility] Transitions Follow Up Call    3/19/2020    Patient: Keiry Memory  Patient : 1965   MRN: <[id number]>  Reason for Admission: Influenza B  Discharge Date: 3/11/20 RARS: Readmission Risk Score: 15         Spoke with: Yocasta    Care Transitions Subsequent and Final Call    Subsequent and Final Calls  Do you have any ongoing symptoms?:  Yes  Onset of Patient-reported symptoms: In the past 7 days  Patient-reported symptoms:  Cough, Congestion, Weakness  Interventions for patient-reported symptoms:  Notified PCP/Physician  Have your medications changed?:  Yes  Patient Reports:  prednisone 20 MG bid x 7 days  Do you have any questions related to your medications?:  No  Do you currently have any active services?:  No  Do you have any needs or concerns that I can assist you with?:  Yes  Patient-reported Needs or Concerns:  Tessalon not covered by insurance  Identified Barriers:  None  Care Transitions Interventions  Other Interventions:          CTN spoke to Dante Faye who stated he continues to have productive cough, congestion and occ SOB, mostly with exertion. Stated appetite remains decreased. Advised to eat small bland meals and stay well hydrated. Pt went to PCP yesterday and is on extended dose of prednisone 20 MG bid x 7 days. Denies adverse side effects on Prednisone. Pt completed atb, did not get Tessalon as was not covered by insurance. Pt is using inhalers as prescribed. CTN call ed PCP office and spoe to Talisha Gomez who will collaborate with PCP and call another cough medication on to pharmacy. CTN called pt back with updates.     Daina Malik RN BSN   Care Transitions Nurse  [phone]     Follow Up  Future Appointments   Date Time Provider Fatou Guzman   3/25/2020  3:00 PM Ender Yanez MD [facility] EDWAR Terry RN

## 2020-03-20 ENCOUNTER — CARE COORDINATION (OUTPATIENT)
Dept: CASE MANAGEMENT | Age: 55
End: 2020-03-20

## 2020-03-23 ENCOUNTER — CARE COORDINATION (OUTPATIENT)
Dept: CASE MANAGEMENT | Age: 55
End: 2020-03-23

## 2020-03-25 ENCOUNTER — CARE COORDINATION (OUTPATIENT)
Dept: CASE MANAGEMENT | Age: 55
End: 2020-03-25

## 2020-03-25 NOTE — CARE COORDINATION
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 VM box not set up, unable to leave message. Care Transitions Subsequent and Final Call    Subsequent and Final Calls  Care Transitions Interventions  Other Interventions: Follow Up  No future appointments.     Zack Canela RN

## 2020-04-01 ENCOUNTER — CARE COORDINATION (OUTPATIENT)
Dept: CASE MANAGEMENT | Age: 55
End: 2020-04-01

## 2020-04-01 NOTE — CARE COORDINATION
[facility] Transitions Follow Up Call    2020    Patient: Jia Esparza  Patient : 1965   MRN: [id number]  Reason for Admission:  Discharge Date: 3/11/20 RARS: Readmission Risk Score: 15         Spoke with: tania Rust    Care Transitions Subsequent and Final Call    Subsequent and Final Calls  Do you have any ongoing symptoms?:  Yes  Onset of Patient-reported symptoms: In the past 7 days  Patient-reported symptoms:  Cough, Congestion, Fever, Shortness of Breath  Interventions for patient-reported symptoms:  Notified PCP/Physician  Have your medications changed?:  No  Do you have any questions related to your medications?:  No  Do you currently have any active services?:  No  Do you have any needs or concerns that I can assist you with?:  No  Identified Barriers:  None  Care Transitions Interventions  Other Interventions: Follow Up:  Contacted patient for BPCI-A follow up. Yocasta reports he continues to have a productive cough with green/brown sputum \"all the time\". Becomes short of breath at rest and with minimal exertion. He denies being in respiratory distress. Reports that he has had a low grade temperature off and on. He may have some chest pressure when he is coughing and short of breath. Appetite has been poor since he got sick. He is trying to drink fluids to stay hydrated. He has completed his antibiotic treatment. CTN will contact PCP office. Verified PCP. Contacted PCP office. Spoke with Papi Moore. Informed her of patient's symptoms. She states they are not seeing patients in the office. He will need to contact their flu clinic triage at [phone]. She states they will be able to direct him on where to go. She recommended that he call himself instead of going through CTN. No other questions or concerns at this time. Called patient back. Informed him that PCP office is not seeing anyone with symptoms.   He will need to contact the flu clinic triage at [phone]. He verbalized understanding. Explained to patient that they will be able to direct him on where to go. He does not have any other questions or concerns at this time. Will follow up with patient on Friday.       Mackenzie Beaulieu RN

## 2020-04-03 ENCOUNTER — CARE COORDINATION (OUTPATIENT)
Dept: CASE MANAGEMENT | Age: 55
End: 2020-04-03

## 2020-04-03 NOTE — CARE COORDINATION
[facility] Transitions Follow Up Call    4/3/2020    Patient: Norton Blizzard  Patient : 1965   MRN: [id number]  Reason for Admission:   Discharge Date: 3/11/20 RARS: Readmission Risk Score: 13      Spoke with:  tania Rust    Follow Up:  Received return phone call from patient. He stated he feels about the same as he did when speaking on Wednesday. He stated he called the number that was given to him but said he refuses to go to the flu clinic. Stated it was too risky for him to go to the clinic because of his health. He continues to have a productive cough with greenish/brown sputum. Continues to become short of breath with minimal exertion. Denied being in respiratory distress. Denies having a fever or chills. Encouraged to go to the clinic. Patient refused. Informed him that CTN will contact PCP again. Contacted PCP office. Recording stated call will be routed to on call center because office is closed. Recording repeated multiple times and call was not re-directed to anyone. [facility] phone number. Spoke with Marcel Charles who answered the phone. Informed her of reason for call. She gave CTN triage phone number [phone] if wanting to speak with triage nurse. Called patient back. Informed him that PCP office closed. CTN will route message to PCP. He is aware that he may not get a response until next week. Advised if signs/symptoms worsen to go to the flu clinic and explained they can prescribe him medication if needed. CTN also advised that if he is in any distress to call 911 or report to the ER. He verbalized understanding. He does not have any other questions or concerns at this time. Will follow up next week. No future appointments.     Mackenzie Beaulieu RN

## 2020-04-06 ENCOUNTER — CARE COORDINATION (OUTPATIENT)
Dept: CASE MANAGEMENT | Age: 55
End: 2020-04-06

## 2020-04-06 ENCOUNTER — TELEPHONE (OUTPATIENT)
Dept: INTERNAL MEDICINE | Age: 55
End: 2020-04-06

## 2020-04-06 NOTE — TELEPHONE ENCOUNTER
[facility] nurse said the patient was directed to go to the Flu Clinic last week but refused. She would like to speak with someone about his symptoms. Phone: [phone].

## 2020-04-06 NOTE — CARE COORDINATION
Received return phone call from Clarence at Dr. Po Malone office. She stated she spoke with patient this morning. She stated he was advised to go to the flu clinic because he is having increased shortness of breath, productive cough with green/brown sputum and fever. She stated he refused to go to the flu clinic. She will relay message to one of the providers to let them know. She stated someone will call CTN back if they have any more questions, if not they will contact patient directly. No other questions or concerns at this time.     Sandra Brown, RN  Care Transition Nurse

## 2020-04-07 NOTE — TELEPHONE ENCOUNTER
Called patient and unable to leave message as voicemail not set up. I did discuss with his care coordinator, Mike Watkins, and the patient has been appropriately directed to Flu clinic for evaluation/treatment.     Electronically signed by Hossein Eastman DO on 4/7/2020 at 2:58 PM

## 2020-04-10 ENCOUNTER — CARE COORDINATION (OUTPATIENT)
Dept: CASE MANAGEMENT | Age: 55
End: 2020-04-10

## 2020-04-13 ENCOUNTER — CARE COORDINATION (OUTPATIENT)
Dept: CASE MANAGEMENT | Age: 55
End: 2020-04-13

## 2020-04-17 ENCOUNTER — CARE COORDINATION (OUTPATIENT)
Dept: CASE MANAGEMENT | Age: 55
End: 2020-04-17

## 2020-04-17 NOTE — CARE COORDINATION
[facility] Transitions Follow Up Call    2020    Patient: Abdulkaidr   Patient : 1965   MRN: <[id number]>  Reason for Admission:   Discharge Date: 3/11/20 RARS: Readmission Risk Score: 13         Attempted to contact patient for follow up BPCI-A transition call. Unable to leave a voicemail message to return call. Will continue to follow. Care Transitions Subsequent and Final Call    Subsequent and Final Calls  Care Transitions Interventions  Other Interventions: Follow Up  No future appointments.     Reginaldo Jarrett LPN

## 2020-04-22 ENCOUNTER — CARE COORDINATION (OUTPATIENT)
Dept: CASE MANAGEMENT | Age: 55
End: 2020-04-22

## 2020-04-27 RX ORDER — GABAPENTIN 600 MG/1
600 TABLET ORAL 3 TIMES DAILY
Qty: 90 TABLET | Refills: 0 | Status: SHIPPED
Start: 2020-04-27 | End: 2020-05-26 | Stop reason: SDUPTHER

## 2020-05-05 ENCOUNTER — CARE COORDINATION (OUTPATIENT)
Dept: CASE MANAGEMENT | Age: 55
End: 2020-05-05

## 2020-05-19 ENCOUNTER — CARE COORDINATION (OUTPATIENT)
Dept: CASE MANAGEMENT | Age: 55
End: 2020-05-19

## 2020-05-19 NOTE — CARE COORDINATION
[address] [facility] ([facility]) Follow Up Call  Qualifying Diagnosis of Pneumonia. 5/19/2020  Patient Name:  Abdulkadir Mary   YOB: 1965  Discharge Date:  3/11/20  RARS:  Readmission Risk Score: 13    PCP:  Kenan Marrero MD    Assessment:      Short of Breath: occasional   Cough Frequency, Productive/Color: occasional, clear-tan sputum   Appetite: good   Sleep pattern: with medication use   Thinking clearly:yes   Tired/weakness: getting stronger   Fever, Chills Sweating: denies   Headaches: occasionally  [address],  Active: denies need   Medication Needs/Questions: denies   Transportation Need:  denies   Live Alone: denies   Ability to [employer], [place]: friend cooks for him   Do you feel like you have everything you need to stay well at home? yes   Follow Up Appointment: completed   Teaching: Denies need for further COVID 19 education  Care Transitions will continue to follow per BPCI Program.  Cecy Lowe RN, CTN  Follow Up Concerns:    No future appointments.

## 2020-05-26 ENCOUNTER — TELEPHONE (OUTPATIENT)
Dept: INTERNAL MEDICINE | Age: 55
End: 2020-05-26

## 2020-05-27 RX ORDER — ASPIRIN 81 MG/1
81 TABLET ORAL DAILY
Qty: 30 TABLET | Refills: 1 | Status: SHIPPED
Start: 2020-05-27 | End: 2020-07-20 | Stop reason: SDUPTHER

## 2020-05-27 RX ORDER — GABAPENTIN 600 MG/1
600 TABLET ORAL 3 TIMES DAILY
Qty: 90 TABLET | Refills: 1 | Status: SHIPPED
Start: 2020-05-27 | End: 2020-07-20 | Stop reason: SDUPTHER

## 2020-06-09 ENCOUNTER — CARE COORDINATION (OUTPATIENT)
Dept: CASE MANAGEMENT | Age: 55
End: 2020-06-09

## 2020-07-20 NOTE — TELEPHONE ENCOUNTER
Patient asking for refills on gabapentin, ASA, Omeprazole, and Ibuprofen. Last seen 3/18/20. No future appointment.

## 2020-07-21 RX ORDER — IBUPROFEN 600 MG/1
600 TABLET ORAL 4 TIMES DAILY PRN
Qty: 120 TABLET | Refills: 0 | Status: SHIPPED | OUTPATIENT
Start: 2020-07-21 | End: 2020-09-04 | Stop reason: SDUPTHER

## 2020-07-21 RX ORDER — GABAPENTIN 600 MG/1
600 TABLET ORAL 3 TIMES DAILY
Qty: 90 TABLET | Refills: 1 | Status: SHIPPED | OUTPATIENT
Start: 2020-07-21 | End: 2020-09-04 | Stop reason: SDUPTHER

## 2020-07-21 RX ORDER — ASPIRIN 81 MG/1
81 TABLET ORAL DAILY
Qty: 30 TABLET | Refills: 0 | Status: SHIPPED | OUTPATIENT
Start: 2020-07-21 | End: 2020-09-04 | Stop reason: SDUPTHER

## 2020-07-21 RX ORDER — PANTOPRAZOLE SODIUM 40 MG/1
40 TABLET, DELAYED RELEASE ORAL
Qty: 60 TABLET | Refills: 0 | Status: SHIPPED
Start: 2020-07-21 | End: 2020-09-05

## 2020-09-01 NOTE — PROGRESS NOTES
[id number] [facility]    Pre-visit planning call to discuss patient care during COVID-19 pandemic. A. Discussed with the patient  about virtual visits through [facility] or [facility] link to facilitate patient care continuity. Last office visit date: 03/18/2020  Outstanding labs/imaging: CXR pending  Medication refill needs: as per chart    B. Result of call: Face to Face appointment maintained and patient screened negative for viral symptoms (fever, chills, body aches, soar throat, abdominal pain or diarrhea) OR exposure to a sick contact. Patient instructed to call office if viral symptoms develop prior to Face to Face appointment. C. COVID-19 screening: Patient denies viral symptoms. Advised patient if coming for face to face office visit, to drive or be dropped off at the Internal Medicine clinic on [facility]; please wear a mask and expect to have temperature taken. Wheelchair and escort to the office will be provided if needed.     Bryce Ceja MD  9/1/20

## 2020-09-03 RX ORDER — PANTOPRAZOLE SODIUM 40 MG/1
40 TABLET, DELAYED RELEASE ORAL
Qty: 60 TABLET | Refills: 0 | Status: CANCELLED | OUTPATIENT
Start: 2020-09-03

## 2020-09-04 ENCOUNTER — OFFICE VISIT (OUTPATIENT)
Dept: INTERNAL MEDICINE | Age: 55
End: 2020-09-04
Payer: MEDICARE

## 2020-09-04 ENCOUNTER — HOSPITAL ENCOUNTER (OUTPATIENT)
Age: 55
Discharge: HOME | End: 2020-09-06
Payer: MEDICARE

## 2020-09-04 VITALS
DIASTOLIC BLOOD PRESSURE: 86 MMHG | RESPIRATION RATE: 12 BRPM | BODY MASS INDEX: 35.98 KG/M2 | WEIGHT: 257 LBS | SYSTOLIC BLOOD PRESSURE: 123 MMHG | TEMPERATURE: 98.5 F | HEIGHT: 71 IN | HEART RATE: 93 BPM

## 2020-09-04 PROBLEM — D53.9 MACROCYTIC ANEMIA: Status: ACTIVE | Noted: 2020-09-04

## 2020-09-04 LAB
BILIRUBIN URINE: NEGATIVE
BLOOD, URINE: NEGATIVE
CLARITY: CLEAR
COLOR: YELLOW
GLUCOSE URINE: NEGATIVE MG/DL
KETONES, URINE: NEGATIVE MG/DL
LEUKOCYTE ESTERASE, URINE: NEGATIVE
NITRITE, URINE: NEGATIVE
PH UA: 5.5 (ref 5–9)
PROTEIN UA: NEGATIVE MG/DL
REASON FOR REJECTION: NORMAL
REJECTED TEST: NORMAL
SPECIFIC GRAVITY UA: 1.02 (ref 1–1.03)
UROBILINOGEN, URINE: 0.2 E.U./DL

## 2020-09-04 PROCEDURE — G8427 DOCREV CUR MEDS BY ELIG CLIN: HCPCS | Performed by: INTERNAL MEDICINE

## 2020-09-04 PROCEDURE — 3023F SPIROM DOC REV: CPT | Performed by: INTERNAL MEDICINE

## 2020-09-04 PROCEDURE — 81003 URINALYSIS AUTO W/O SCOPE: CPT

## 2020-09-04 PROCEDURE — 3017F COLORECTAL CA SCREEN DOC REV: CPT | Performed by: INTERNAL MEDICINE

## 2020-09-04 PROCEDURE — 99212 OFFICE O/P EST SF 10 MIN: CPT | Performed by: INTERNAL MEDICINE

## 2020-09-04 PROCEDURE — G8417 CALC BMI ABV UP PARAM F/U: HCPCS | Performed by: INTERNAL MEDICINE

## 2020-09-04 PROCEDURE — G8926 SPIRO NO PERF OR DOC: HCPCS | Performed by: INTERNAL MEDICINE

## 2020-09-04 PROCEDURE — 4004F PT TOBACCO SCREEN RCVD TLK: CPT | Performed by: INTERNAL MEDICINE

## 2020-09-04 PROCEDURE — 99214 OFFICE O/P EST MOD 30 MIN: CPT | Performed by: INTERNAL MEDICINE

## 2020-09-04 RX ORDER — ATORVASTATIN CALCIUM 40 MG/1
40 TABLET, FILM COATED ORAL DAILY
Qty: 30 TABLET | Refills: 2 | Status: SHIPPED
Start: 2020-09-04 | End: 2020-11-08 | Stop reason: SDUPTHER

## 2020-09-04 RX ORDER — TIOTROPIUM BROMIDE 18 UG/1
18 CAPSULE ORAL; RESPIRATORY (INHALATION) DAILY
Qty: 30 CAPSULE | Refills: 2 | Status: SHIPPED
Start: 2020-09-04 | End: 2021-02-22 | Stop reason: SDUPTHER

## 2020-09-04 RX ORDER — ASPIRIN 81 MG/1
81 TABLET ORAL DAILY
Qty: 30 TABLET | Refills: 2 | Status: SHIPPED
Start: 2020-09-04 | End: 2020-11-08 | Stop reason: SDUPTHER

## 2020-09-04 RX ORDER — TAMSULOSIN HYDROCHLORIDE 0.4 MG/1
0.4 CAPSULE ORAL DAILY
Qty: 30 CAPSULE | Refills: 2 | Status: SHIPPED
Start: 2020-09-04 | End: 2020-12-21 | Stop reason: SDUPTHER

## 2020-09-04 RX ORDER — OMEPRAZOLE 40 MG/1
40 CAPSULE, DELAYED RELEASE ORAL
Qty: 30 CAPSULE | Refills: 5 | Status: SHIPPED
Start: 2020-09-04 | End: 2020-11-08 | Stop reason: SDUPTHER

## 2020-09-04 RX ORDER — GABAPENTIN 600 MG/1
600 TABLET ORAL 3 TIMES DAILY
Qty: 90 TABLET | Refills: 1 | Status: SHIPPED
Start: 2020-09-04 | End: 2020-10-30 | Stop reason: SDUPTHER

## 2020-09-04 RX ORDER — METOPROLOL SUCCINATE 25 MG/1
TABLET, EXTENDED RELEASE ORAL
Qty: 30 TABLET | Refills: 2 | Status: SHIPPED
Start: 2020-09-04 | End: 2020-11-08 | Stop reason: SDUPTHER

## 2020-09-04 RX ORDER — DULOXETIN HYDROCHLORIDE 60 MG/1
60 CAPSULE, DELAYED RELEASE ORAL DAILY
Qty: 30 CAPSULE | Refills: 2 | Status: SHIPPED
Start: 2020-09-04 | End: 2020-11-09

## 2020-09-04 RX ORDER — TRAZODONE HYDROCHLORIDE 100 MG/1
100 TABLET ORAL NIGHTLY
Qty: 30 TABLET | Refills: 2 | Status: SHIPPED
Start: 2020-09-04 | End: 2020-11-08 | Stop reason: SDUPTHER

## 2020-09-04 RX ORDER — ALBUTEROL SULFATE 90 UG/1
1-2 AEROSOL, METERED RESPIRATORY (INHALATION) EVERY 4 HOURS PRN
Qty: 3 INHALER | Refills: 2 | Status: SHIPPED
Start: 2020-09-04 | End: 2021-02-22 | Stop reason: SDUPTHER

## 2020-09-04 RX ORDER — SILDENAFIL CITRATE 20 MG/1
20 TABLET ORAL PRN
Qty: 30 TABLET | Refills: 1 | Status: SHIPPED
Start: 2020-09-04 | End: 2020-11-08 | Stop reason: SDUPTHER

## 2020-09-04 RX ORDER — IBUPROFEN 600 MG/1
600 TABLET ORAL 4 TIMES DAILY PRN
Qty: 120 TABLET | Refills: 0 | Status: SHIPPED
Start: 2020-09-04 | End: 2020-09-25 | Stop reason: SDUPTHER

## 2020-09-04 NOTE — PATIENT INSTRUCTIONS
Patient Education        Preventing Falls: Care Instructions  Your Care Instructions     Getting around your home safely can be a challenge if you have injuries or health problems that make it easy for you to fall. Loose rugs and furniture in walkways are among the dangers for many older people who have problems walking or who have poor eyesight. People who have conditions such as arthritis, osteoporosis, or dementia also have to be careful not to fall. You can make your home safer with a few simple measures. Follow-up care is a key part of your treatment and safety. Be sure to make and go to all appointments, and call your doctor if you are having problems. It's also a good idea to know your test results and keep a list of the medicines you take. How can you care for yourself at home? Taking care of yourself  · You may get dizzy if you do not drink enough water. To prevent dehydration, drink plenty of fluids, enough so that your urine is light yellow or clear like water. Choose water and other caffeine-free clear liquids. If you have kidney, heart, or liver disease and have to limit fluids, talk with your doctor before you increase the amount of fluids you drink. · Exercise regularly to improve your strength, muscle tone, and balance. Walk if you can. Swimming may be a good choice if you cannot walk easily. · Have your vision and hearing checked each year or any time you notice a change. If you have trouble seeing and hearing, you might not be able to avoid objects and could lose your balance. · Know the side effects of the medicines you take. Ask your doctor or pharmacist whether the medicines you take can affect your balance. Sleeping pills or sedatives can affect your balance. · Limit the amount of alcohol you drink. Alcohol can impair your balance and other senses. · Ask your doctor whether calluses or corns on your feet need to be removed.  If you wear loose-fitting shoes because of calluses or corns, you can lose your balance and fall. · Talk to your doctor if you have numbness in your feet. Preventing falls at home  · Remove raised doorway thresholds, throw rugs, and clutter. Repair loose carpet or raised areas in the floor. · Move furniture and electrical cords to keep them out of walking paths. · Use nonskid floor wax, and wipe up spills right away, especially on ceramic tile floors. · If you use a walker or cane, put rubber tips on it. If you use crutches, clean the bottoms of them regularly with an abrasive pad, such as steel wool. · Keep your house well lit, especially [place], and outside walkways. Use night-lights in areas such as hallways and bathrooms. Add extra light switches or use remote switches (such as switches that go on or off when you clap your hands) to make it easier to turn lights on if you have to get up during the night. · Install sturdy handrails on stairways. · Move items in your cabinets so that the things you use a lot are on the lower shelves (about waist level). · Keep a cordless phone and a flashlight with new batteries by your bed. If possible, put a phone in each of the main rooms of your house, or carry a cell phone in case you fall and cannot reach a phone. Or, you can wear a device around your neck or wrist. You push a button that sends a signal for help. · Wear low-heeled shoes that fit well and give your feet good support. Use footwear with nonskid soles. Check the heels and soles of your shoes for wear. Repair or replace worn heels or soles. · Do not wear socks without shoes on wood floors. · Walk on the grass when the sidewalks are slippery. If you live in an area that gets snow and ice in the winter, sprinkle salt on slippery steps and sidewalks. Preventing falls in the bath  · Install grab bars and nonskid mats inside and outside your shower or tub and near the toilet and sinks. · Use shower chairs and bath benches.   · Use a hand-held shower head

## 2020-09-04 NOTE — PROGRESS NOTES
Attending Physician Statement  I have discussed the case, including pertinent history and exam findings with the resident. I have seen and examined the patient and the key elements of the encounter have been performed by me. I reviewed medical, surgical, social histories, meds and any pertinent labs. I agree with the assessment, plan and orders as documented by the resident. C/o right sided upper abdominal pain for about a month and left-sided flank pain for the past few weeks. He has chronic diarrhea, with \"good days and bad days\" for the past several years. No weight loss or blood in stool, no fever, chills. He has hx remote renal cell carcinoma with right sided nephrectomy. Drinks ETOH occasionally. Had colonoscopy 12/19 noted to have diverticula, 2 polyps (tubular adenoma) and a hiatal hernia. Had leukocytosis and mild macrocytic anemia in March, no follow up noted. Has some left sided flank pain with percussion and some RUQ pain with deep palpation. Will obtain CT abdomen and pelvis along with labs, CMP, CBC, UA. Patient also requesting lipase.

## 2020-09-04 NOTE — PROGRESS NOTES
[facility]  [facility]  ACC Note      SUBJECTIVE:  CC: had concerns including Medication Refill; Abdominal Pain (right); Flank Pain (left); and Medication Problem (reaction to protonix request omeprazole). HPI:Yocasta Chavez presented to the [facility] for a routine visit. He has PMH of COPD, right sided pulmonary nodule, s/p right middle lobectomy 2014, renal cell carcinoma s/p nephrectomy in 2005, CAD, HTN, HLD, BPH, GERD, long-standing back pain, sleep disorder. Patient complains right upper quadrant abdominal pain for 1-2 month. It occurs every day, dull/sharp?, 10/10 in severity, last for a few minutes, radiating to the back, not related to food intake, nothing makes it worse, gabapentin improves the pain. Denies nausea, vomiting, constipation, or sick contact. He had cholecystectomy in 2000. Reports drinking alcohol occasionally. Patient has chronic watery diarrhea for couple years, 4-5 times/day. Denies change in appetite. Gain 17 lbs since March 2020. Patient reports left flank pain for couple weeks, throbbing pain, 7-8/10 in severity, non-radiating. Patient had a brief episode of left flank pain couple years ago. Denies history of nephrolithiasis. After patient was discharged in March, he continued to have intermittent fever and cough. He states that his girlfriend took his temperature and he does not know the readings. He also had multiple syncopal episode after coughing spells. Review of Systems   Constitutional: Positive for fever and unexpected weight change. Negative for chills. HENT: Negative for sore throat. Respiratory: Positive for cough and shortness of breath. Cardiovascular: Negative for chest pain, palpitations and leg swelling. Gastrointestinal: Positive for abdominal pain and diarrhea. Negative for abdominal distention, blood in stool, constipation, nausea and vomiting.    Genitourinary: Positive for flank pain. Negative for difficulty urinating, dysuria and hematuria. Skin: Negative for color change, rash and wound. Neurological: Negative for dizziness, light-headedness, numbness and headaches. Hematological: Negative for adenopathy. Outpatient Medications Marked as Taking for the 9/4/20 encounter (Office Visit) with Willian Prakash MD   Medication Sig Dispense Refill    gabapentin (NEURONTIN) 600 MG tablet Take 1 tablet by mouth 3 times daily for 60 days. 90 tablet 1    ibuprofen (ADVIL;MOTRIN) 600 MG tablet Take 1 tablet by mouth 4 times daily as needed for Pain Please take with meals 120 tablet 0    aspirin EC 81 MG EC tablet Take 1 tablet by mouth daily 30 tablet 2    albuterol sulfate HFA (PROVENTIL HFA) 108 (90 Base) MCG/ACT inhaler Inhale 1-2 puffs into the lungs every 4 hours as needed for Wheezing or Shortness of Breath 3 Inhaler 2    DULoxetine (CYMBALTA) 60 MG extended release capsule Take 1 capsule by mouth daily 30 capsule 2    traZODone (DESYREL) 100 MG tablet Take 1 tablet by mouth nightly 30 tablet 2    tamsulosin (FLOMAX) 0.4 MG capsule Take 1 capsule by mouth daily 30 capsule 2    atorvastatin (LIPITOR) 40 MG tablet Take 1 tablet by mouth daily 30 tablet 2    sildenafil (REVATIO) 20 MG tablet Take 1 tablet by mouth as needed (as directed 1-2 tablets) 30 tablet 1    tiotropium (SPIRIVA HANDIHALER) 18 MCG inhalation capsule Inhale 1 capsule into the lungs daily 30 capsule 2    fluticasone-salmeterol (ADVAIR DISKUS) 250-50 MCG/DOSE AEPB Inhale 1 puff into the lungs 2 times daily 60 each 2    metoprolol succinate (TOPROL XL) 25 MG extended release tablet Take 1 tablet by mouth once daily 30 tablet 2    omeprazole (PRILOSEC) 40 MG delayed release capsule Take 1 capsule by mouth every morning (before breakfast) 30 capsule 5       I have reviewed all pertinent PMHx, PSHx, FamHx, SocialHx, medications, and allergiesand updated history as appropriate.     OBJECTIVE:    VS: /86 Pulse 93   Temp 98.5 °F (36.9 °C) (Oral)   Resp 12   Ht 5' 11\" (1.803 m)   Wt 257 lb (116.6 kg)   BMI 35.84 kg/m²   Physical Exam  Constitutional:       General: He is not in acute distress. Appearance: Normal appearance. He is obese. HENT:      Head: Normocephalic and atraumatic. Mouth/Throat:      Pharynx: No oropharyngeal exudate or posterior oropharyngeal erythema. Eyes:      Extraocular Movements: Extraocular movements intact. Pupils: Pupils are equal, round, and reactive to light. Neck:      Musculoskeletal: Neck supple. No neck rigidity or muscular tenderness. Cardiovascular:      Rate and Rhythm: Normal rate and regular rhythm. Pulses: Normal pulses. Heart sounds: Normal heart sounds. Pulmonary:      Effort: Pulmonary effort is normal. No respiratory distress. Breath sounds: Normal breath sounds. No wheezing. Abdominal:      General: Bowel sounds are normal.      Palpations: Abdomen is soft. Tenderness: There is abdominal tenderness in the right upper quadrant. There is left CVA tenderness. There is no guarding or rebound. Musculoskeletal:         General: No tenderness or deformity. Right lower leg: No edema. Left lower leg: No edema. Skin:     General: Skin is warm and dry. Neurological:      Mental Status: He is alert and oriented to person, place, and time. Motor: No weakness. PLAN:    1. Right upper quadrant abdominal pain  - RUQ pain and tenderness with deep palpation. No weight loss. - history of right renal cell carcinoma s/p nephrectomy  - drink alcohol occasionally  - colonoscopy shows diverticula, tubular adenoma and hiatal hernia (12/2019)  - ALT 42, AST 59 on 03/07/2020  - obtain CT ABDOMEN PELVIS W WO CONTRAST   - f/u in 6 weeks  - obtain amylase and lipase  - obtain CMP and CMP    2.  Left flank pain  - left sided CVA tenderness  - History of right renal cell carcinoma s/p nephrectomy  - denies history of nephrolithiasis  - obtain urinalysis and CT ABDOMEN PELVIS     3. Lipid disorder  - continue atorvastatin (LIPITOR) 40 MG tablet; Take 1 tablet by mouth daily     4. Hypertension, unspecified type  - continue metoprolol succinate (TOPROL XL) 25 MG extended release tablet; Take 1 tablet by mouth once daily   - BP today 123/86 in office  - may need to reconsider medication if shortness of breath gets worse    5. Coronary artery disease   - continue aspirin EC 81 MG EC tablet; Take 1 tablet by mouth daily    - continue atorvastatin (LIPITOR) 40 MG tablet; Take 1 tablet by mouth daily   - continue metoprolol succinate (TOPROL XL) 25 MG extended release tablet; Take 1 tablet by mouth once daily     6. COPD   - continue tiotropium (SPIRIVA HANDIHALER) 18 MCG inhalation capsule; Inhale 1 capsule into the lungs daily    - continue fluticasone-salmeterol (ADVAIR DISKUS) 250-50 MCG/DOSE AEPB; Inhale 1 puff into the lungs 2 times daily    - continue albuterol sulfate HFA (Proventil HFA) 108 (90 base) MCG/ACT inhaler, 1-2 puff Q4H  - follow up with Dr Bubba Corbett    7. Pulmonary nodule in RUL (2014)  - repeat CT chest in 3-6 month (last 03/08/2020)    8. Erectile dysfunction, unspecified erectile dysfunction type  - continue sildenafil (REVATIO) 20 MG tablet; Take 1 tablet by mouth as needed (as directed 1-2 tablets)    9. Sleep disorder  - continue traZODone (DESYREL) 100 MG tablet; Take 1 tablet by mouth nightly    - consider sleep study as outpatient in the future    10. Chronic pain syndrome  - continue gabapentin (NEURONTIN) 600 MG tablet; Take 1 tablet by mouth 3 times daily for 60 days. 11. Spinal stenosis of lumbar region with neurogenic claudication  - continue ibuprofen (ADVIL;MOTRIN) 600 MG tablet; Take 1 tablet by mouth 4 times daily as needed, take with meals      12. Bilateral chronic knee pain  - continue ibuprofen (ADVIL;MOTRIN) 600 MG tablet;  Take 1 tablet by mouth 4 times daily as needed, take with meals 13. Gastroesophageal reflux disease, esophagitis presence not specified  - Patient does not tolerate pantoprazole. Change to omeprazole 40 mg, daily before breakfast    14. Depression, unspecified depression type  - continue DULoxetine (CYMBALTA) 60 MG extended release capsule; Take 1 capsule by mouth daily      15. Benign prostatic hyperplasia (BPH) with straining on urination  - continue tamsulosin (FLOMAX) 0.4 MG capsule; Take 1 capsule by mouth daily      RTC: in 6 weeks    I have reviewed my findings and recommendations with Dayton Walker and Dr Reji Flood.     Maco Sharp MD PGY-1   9/5/2020 12:21 AM

## 2020-09-04 NOTE — PROGRESS NOTES
Discharge instructions reviewed with patient. Patient verbalizes understanding. AVS given.   Labwork sent to lab

## 2020-09-05 ASSESSMENT — ENCOUNTER SYMPTOMS
CONSTIPATION: 0
ABDOMINAL PAIN: 1
COLOR CHANGE: 0
SORE THROAT: 0
COUGH: 1
SHORTNESS OF BREATH: 1
BLOOD IN STOOL: 0
NAUSEA: 0
ABDOMINAL DISTENTION: 0
DIARRHEA: 1
VOMITING: 0

## 2020-09-09 ENCOUNTER — TELEPHONE (OUTPATIENT)
Dept: INTERNAL MEDICINE | Age: 55
End: 2020-09-09

## 2020-09-16 ENCOUNTER — HOSPITAL ENCOUNTER (OUTPATIENT)
Dept: CT IMAGING | Age: 55
Discharge: HOME | End: 2020-09-18
Payer: MEDICARE

## 2020-09-16 ENCOUNTER — HOSPITAL ENCOUNTER (OUTPATIENT)
Age: 55
Discharge: HOME | End: 2020-09-16
Payer: MEDICARE

## 2020-09-16 LAB
ALBUMIN SERPL-MCNC: 4.2 G/DL (ref 3.5–5.2)
ALP BLD-CCNC: 65 U/L (ref 40–129)
ALT SERPL-CCNC: 24 U/L (ref 0–40)
AMYLASE: 52 U/L (ref 20–100)
ANION GAP SERPL CALCULATED.3IONS-SCNC: 13 MMOL/L (ref 7–16)
AST SERPL-CCNC: 18 U/L (ref 0–39)
BASOPHILS ABSOLUTE: 0.09 E9/L (ref 0–0.2)
BASOPHILS RELATIVE PERCENT: 0.6 % (ref 0–2)
BILIRUB SERPL-MCNC: 0.4 MG/DL (ref 0–1.2)
BUN BLDV-MCNC: 16 MG/DL (ref 6–20)
CALCIUM SERPL-MCNC: 9.8 MG/DL (ref 8.6–10.2)
CHLORIDE BLD-SCNC: 101 MMOL/L (ref 98–107)
CO2: 24 MMOL/L (ref 22–29)
CREAT SERPL-MCNC: 1.1 MG/DL (ref 0.7–1.2)
EOSINOPHILS ABSOLUTE: 0.26 E9/L (ref 0.05–0.5)
EOSINOPHILS RELATIVE PERCENT: 1.8 % (ref 0–6)
GFR AFRICAN AMERICAN: >60
GFR NON-AFRICAN AMERICAN: >60 ML/MIN/1.73
GLUCOSE BLD-MCNC: 150 MG/DL (ref 74–99)
HCT VFR BLD CALC: 52.6 % (ref 37–54)
HEMOGLOBIN: 17.7 G/DL (ref 12.5–16.5)
IMMATURE GRANULOCYTES #: 0.14 E9/L
IMMATURE GRANULOCYTES %: 1 % (ref 0–5)
LIPASE: 35 U/L (ref 13–60)
LYMPHOCYTES ABSOLUTE: 2.82 E9/L (ref 1.5–4)
LYMPHOCYTES RELATIVE PERCENT: 20 % (ref 20–42)
MCH RBC QN AUTO: 32.4 PG (ref 26–35)
MCHC RBC AUTO-ENTMCNC: 33.7 % (ref 32–34.5)
MCV RBC AUTO: 96.3 FL (ref 80–99.9)
MONOCYTES ABSOLUTE: 0.9 E9/L (ref 0.1–0.95)
MONOCYTES RELATIVE PERCENT: 6.4 % (ref 2–12)
NEUTROPHILS ABSOLUTE: 9.87 E9/L (ref 1.8–7.3)
NEUTROPHILS RELATIVE PERCENT: 70.2 % (ref 43–80)
PDW BLD-RTO: 12.9 FL (ref 11.5–15)
PLATELET # BLD: 295 E9/L (ref 130–450)
PMV BLD AUTO: 10.2 FL (ref 7–12)
POTASSIUM SERPL-SCNC: 4.1 MMOL/L (ref 3.5–5)
RBC # BLD: 5.46 E12/L (ref 3.8–5.8)
SODIUM BLD-SCNC: 138 MMOL/L (ref 132–146)
TOTAL PROTEIN: 6.7 G/DL (ref 6.4–8.3)
WBC # BLD: 14.1 E9/L (ref 4.5–11.5)

## 2020-09-16 PROCEDURE — 2580000003 HC RX 258: Performed by: RADIOLOGY

## 2020-09-16 PROCEDURE — 80053 COMPREHEN METABOLIC PANEL: CPT

## 2020-09-16 PROCEDURE — 74178 CT ABD&PLV WO CNTR FLWD CNTR: CPT

## 2020-09-16 PROCEDURE — 6360000004 HC RX CONTRAST MEDICATION: Performed by: RADIOLOGY

## 2020-09-16 PROCEDURE — 83690 ASSAY OF LIPASE: CPT

## 2020-09-16 PROCEDURE — 82150 ASSAY OF AMYLASE: CPT

## 2020-09-16 PROCEDURE — 85025 COMPLETE CBC W/AUTO DIFF WBC: CPT

## 2020-09-16 PROCEDURE — 36415 COLL VENOUS BLD VENIPUNCTURE: CPT

## 2020-09-16 RX ORDER — SODIUM CHLORIDE 0.9 % (FLUSH) 0.9 %
10 SYRINGE (ML) INJECTION PRN
Status: DISCONTINUED | OUTPATIENT
Start: 2020-09-16 | End: 2020-09-19 | Stop reason: HOSPADM

## 2020-09-16 RX ADMIN — Medication 10 ML: at 14:27

## 2020-09-16 RX ADMIN — IOPAMIDOL 110 ML: 755 INJECTION, SOLUTION INTRAVENOUS at 14:27

## 2020-09-17 ENCOUNTER — TELEPHONE (OUTPATIENT)
Dept: INTERNAL MEDICINE | Age: 55
End: 2020-09-17

## 2020-09-17 NOTE — TELEPHONE ENCOUNTER
----- Message from Della Robison DO sent at 9/16/2020  3:40 PM EDT -----  Please schedule follow up visit to review and discuss results of CT scan. Thank you.

## 2020-09-21 NOTE — TELEPHONE ENCOUNTER
2nd attempt to reach patient. No answer noted. Unable to leave voicemail message as mailbox has not been set up.

## 2020-09-24 ENCOUNTER — TELEPHONE (OUTPATIENT)
Dept: INTERNAL MEDICINE | Age: 55
End: 2020-09-24

## 2020-09-25 ENCOUNTER — VIRTUAL VISIT (OUTPATIENT)
Dept: INTERNAL MEDICINE | Age: 55
End: 2020-09-25
Payer: MEDICARE

## 2020-09-25 PROCEDURE — 99442 PR PHYS/QHP TELEPHONE EVALUATION 11-20 MIN: CPT | Performed by: INTERNAL MEDICINE

## 2020-09-25 RX ORDER — IBUPROFEN 600 MG/1
600 TABLET ORAL 4 TIMES DAILY PRN
Qty: 120 TABLET | Refills: 0 | Status: SHIPPED
Start: 2020-09-25 | End: 2020-09-25 | Stop reason: SINTOL

## 2020-09-25 NOTE — PROGRESS NOTES
Pankaj Lucio is a 54 y.o. male evaluated via telephone on 9/25/2020. Consent:  He and/or health care decision maker is aware that that he may receive a bill for this telephone service, depending on his insurance coverage, and has provided verbal consent to proceed: Yes      Documentation:  I communicated with the patient and/or health care decision maker about patients complaint of abdominal pain and chronic diarrhea. Lab results were discussed . Details of this discussion including any medical advice provided:     54year old with PMH significant for chronic diarrhea, renal cell carcinoma s/p nephrectomy in 2005, CAD, pulmonary nodule s/p right middle lobe resection (2015), COPD was called today to discuss lab and imaging results. Patient had questions regarding the comments on CT which were explained to him. Patient also stated that he continues to have lower abdominal pain and some right upper quad pain. Lower abdominal pain was unrelated to frequency of stool,  Intermittent, unrelated to food, fever, worsening diarrhea. Patient stated that he is color blind and cannot tell if his stool has blood. Hb on CBC was noted to be 17.7. Patient stated that his stools are sometimes water and sometimes may have a 'mushy consistency'. collapsed colon with wall thickening and subserosal fatty infiltration likely due to chronic inflammation. 2.1 cm fatty mass is identified in the ileocecal valve. He denies weight loss, at previous visit he was noted to have gained weight (17 lbs). His diet consists of high fat and protein meals including beef, pork chops, chicken. He has history of cholecystectomy. RUQ pain appears to sharp in nature, pt does admit to some heart burn, history significant for GERD. 1. Lower Abdominal pain   - Chronic diarrhea, minimum 6 BMs a day, water or loose. Unsure about blood in stool, no accompanying N/V, fever. Unrelated to bowel movement or food.  Hx of Abx use in the last 6 months. R/o infections ,especially C diff. Stool studies including fecal leukocytes, fecal fat. Follow Vitamin D levels. - Post cholecystectomy diarrhea vs consider UC vs microscopic colitis (2/2 NSAID use?)  -Dietary changes were advised     2. RUQ  -Likely 2/2 gastritis, advised to take omeprazole daily in AM, 1-2 hrs before meal. Dietary changes recommended, advised to stop ibuprofen use. I affirm this is a Patient Initiated Episode with a Patient who has not had a related appointment within my department in the past 7 days or scheduled within the next 24 hours.     Patient identification was verified at the start of the visit: Yes    Total Time: minutes: 11-20 minutes    Note: not billable if this call serves to triage the patient into an appointment for the relevant concern    Cyril Thomson

## 2020-09-25 NOTE — PROGRESS NOTES
[facility]     Attending Physician Statement  I have discussed the case, including pertinent history and exam findings with the resident. I have seen and examined the patient and the key elements of the encounter have been performed by me. I agree with the assessment, plan and orders as documented by the resident. I have reviewed all pertinent PMHx, PSHx, FamHx, SocialHx, medications, and allergies and updated history as appropriate. Patient here for lab results. CT abdomen and pelvis ordered because of abdominal pain and chronic diarrhea. Lipase, amylase normal. Hx of cholecystectomy in the past, GB absent in the scan. Liver appears normal, there is no new mass. Lung findings notes on ct chest are similar to changes seen in ct chest 5/2019. The chest wall defect with lung herniation was present in previous imaging, unsure if this the cause of pain now. He has no respiratory symptoms. He needs to follow up with Dr Wilner Jon (last seen 3/2020), we have instructed patient to make the appointment. He has chronic diarrhea. Hx of abx use in the last 6 months. Colonoscopy last year with polypectomy but no random biopsies. Will work up for malabsorption, C.diff. Also on PPI since 2019. If normal work up, trial off PPI, may need colonoscopy with random biopsies. Remainder of medical problems per resident's note. Yulissa Harrell MD  9/25/2020 3:54 PM

## 2020-09-25 NOTE — PATIENT INSTRUCTIONS
Please have stool collection as instructed   specimen container and collection container at main lab while getting blood work completed  Return stool specimen to main lab once collected  Continue all medications as instructed  Follow up as scheduled with   Please call if any questions or concerns

## 2020-09-25 NOTE — PROGRESS NOTES
Prescriptions for labwork faxed to main lab per patient request  Patient instructed on stool collection, patient will  hat for stool collection and specimen container at main lab while getting blood work done, collect stool and return container back to lab  avs mailed  Discharge instructions given per Brittny Chance

## 2020-10-30 RX ORDER — GABAPENTIN 600 MG/1
600 TABLET ORAL 3 TIMES DAILY
Qty: 90 TABLET | Refills: 0 | Status: SHIPPED
Start: 2020-10-30 | End: 2020-11-08 | Stop reason: SDUPTHER

## 2020-10-30 NOTE — TELEPHONE ENCOUNTER
Last Appointment:  9/25/20  Future Appointments   Date Time Provider Fatou Megan   11/9/2020  8:30 AM [facility]hoff[facility]      Pt picked up last refill on 10-7-20.

## 2020-11-05 NOTE — PROGRESS NOTES
[facility]  InternalMedicine Residency Program  ACC Note      SUBJECTIVE:  CC: had no chief complaint listed for this encounter. HPI:Yocasta Beth presented to the [facility] for a routine visit. The patient is a 77-year-old male with PMH of COPD, right-sided pulmonary nodule s/p lobectomy in 2014, renal cell carcinoma s/p nephrectomy in 2005, CAD, HTN, HLD, BPH, GERD, longstanding, and sleep disorder, who presented to the clinic for routine visit. Patient complains of increasing anxiety which was previously controlled with Cymbalta 60 mg daily. He reports that he feels tenderness, easily to be irritated and poor sleep. Wean down Cymbalta and a trial of bupropion. Patient endorses moderate to severe right upper quadrant sharp pain for several months, intermittent, not related to food intake or activity, makes it better, associated with nausea, vomiting, green non-bloody. He also have chronic diarrhea, watery, 10-12/day, may occur at night. RUQ pain may improve with defecation. He reports poor appetite, ~20 pounds weight gain since 3/2020. Denies fever, chills, sick contact. Lab tests on 9/16 showing amylase, lipase, CMP unremarkable except blood glucose 150. WBC 14.1, hemoglobin 7.7. CT abdomen pelvis on 9/16 showed normal liver and pancreas, gallbladder and right kidney absent. However it shows patchy infiltrates and nodular and masslike infiltrate in the right lung base and a chest wall defect/hernia in the lateral right chest wall. Review of Systems   Constitutional: Positive for unexpected weight change. Negative for appetite change, chills and fever. HENT: Positive for congestion and sinus pain. Negative for facial swelling. Eyes: Positive for visual disturbance. Respiratory: Positive for cough and shortness of breath. Cardiovascular: Negative for chest pain, palpitations and leg swelling. Gastrointestinal: Positive for abdominal pain, diarrhea, nausea and vomiting. Negative for blood in stool and constipation. Endocrine: Negative for polyuria. Genitourinary: Negative for difficulty urinating. Neurological: Positive for headaches. Negative for dizziness, light-headedness and numbness. Outpatient Medications Marked as Taking for the 11/9/20 encounter (Office Visit) with Nyasia Howell MD   Medication Sig Dispense Refill    gabapentin (NEURONTIN) 600 MG tablet Take 1 tablet by mouth 3 times daily for 60 days. 90 tablet 0    aspirin EC 81 MG EC tablet Take 1 tablet by mouth daily 30 tablet 3    traZODone (DESYREL) 100 MG tablet Take 1 tablet by mouth nightly 30 tablet 3    atorvastatin (LIPITOR) 40 MG tablet Take 1 tablet by mouth daily 30 tablet 3    sildenafil (REVATIO) 20 MG tablet Take 1 tablet by mouth as needed (as directed 1-2 tablets) 30 tablet 1    metoprolol succinate (TOPROL XL) 25 MG extended release tablet Take 1 tablet by mouth once daily 30 tablet 3    omeprazole (PRILOSEC) 40 MG delayed release capsule Take 1 capsule by mouth every morning (before breakfast) 30 capsule 3    DULoxetine (CYMBALTA) 30 MG extended release capsule Take 1 capsule by mouth daily Stop taking 60 mg capsule. Take 1 capsule of 30 mg daily for 7 days followed by 1 capsule of 20 mg for 14 days 7 capsule 0    DULoxetine (CYMBALTA) 20 MG extended release capsule Take 1 capsule by mouth daily Stop taking 60 mg capsule.  Take 1 capsule of 30 mg daily for 7 days followed by 1 capsule of 20 mg for 14 days 14 capsule 0    buPROPion (WELLBUTRIN SR) 150 MG extended release tablet Take 1 tablet by mouth 2 times daily 60 tablet 3    albuterol sulfate HFA (PROVENTIL HFA) 108 (90 Base) MCG/ACT inhaler Inhale 1-2 puffs into the lungs every 4 hours as needed for Wheezing or Shortness of Breath 3 Inhaler 2    tamsulosin (FLOMAX) 0.4 MG capsule Take 1 capsule by mouth daily 30 capsule 2  tiotropium (SPIRIVA HANDIHALER) 18 MCG inhalation capsule Inhale 1 capsule into the lungs daily 30 capsule 2    fluticasone-salmeterol (ADVAIR DISKUS) 250-50 MCG/DOSE AEPB Inhale 1 puff into the lungs 2 times daily 60 each 2       I have reviewed all pertinent PMHx, PSHx, FamHx, SocialHx, medications, and allergiesand updated history as appropriate. OBJECTIVE:    VS: /73 (Site: Right Upper Arm, Position: Sitting, Cuff Size: Large Adult)   Pulse 90   Temp 98.4 °F (36.9 °C) (Oral)   Resp 16   Ht 5' 11\" (1.803 m)   Wt 259 lb 6.4 oz (117.7 kg)   BMI 36.18 kg/m²   Physical Exam  Constitutional:       General: He is not in acute distress. Appearance: He is obese. HENT:      Head: Normocephalic and atraumatic. Mouth/Throat:      Mouth: Mucous membranes are moist.   Eyes:      Conjunctiva/sclera: Conjunctivae normal.      Pupils: Pupils are equal, round, and reactive to light. Neck:      Musculoskeletal: Neck supple. No muscular tenderness. Cardiovascular:      Rate and Rhythm: Normal rate and regular rhythm. Pulses: Normal pulses. Heart sounds: Normal heart sounds. No murmur. No gallop. Pulmonary:      Effort: Pulmonary effort is normal.      Breath sounds: Decreased breath sounds present. Abdominal:      General: Bowel sounds are normal. There is no distension. Palpations: Abdomen is soft. Tenderness: There is abdominal tenderness in the right upper quadrant. There is no guarding or rebound. Musculoskeletal:         General: No swelling or deformity. Skin:     General: Skin is warm and dry. Neurological:      General: No focal deficit present. Mental Status: He is oriented to person, place, and time. PLAN:  1. Chronic pain syndrome  - gabapentin (NEURONTIN) 600 MG tablet; Take 1 tablet by mouth 3 times daily for 60 days. Dispense: 90 tablet;  Refill: 0 2. Coronary artery disease involving native heart with angina pectoris, unspecified vessel or lesion type ([facility]  - continue aspirin EC 81 MG daily, toprol 25 mg daily, and lipitor 40 mg daily    3. Shortness of breath  - CT CHEST W WO CONTRAST; Future    4. Sleep disorder  - traZODone (DESYREL) 100 MG tablet; Take 1 tablet by mouth nightly  Dispense: 30 tablet; Refill: 3    5. Benign prostatic hyperplasia (BPH) with straining on urination  -Continue flomax    7. Lipid disorder  - atorvastatin (LIPITOR) 40 MG tablet; Take 1 tablet by mouth daily  Dispense: 30 tablet; Refill: 3    8. Erectile dysfunction, unspecified erectile dysfunction type  - sildenafil (REVATIO) 20 MG tablet; Take 1 tablet by mouth as needed (as directed 1-2 tablets)  Dispense: 30 tablet; Refill: 1    9. COPD exacerbation (HCC)  -Continue albuterol inhaler and spiriva and advair     10. Hypertension, unspecified type  - metoprolol succinate (TOPROL XL) 25 MG extended release tablet; Take 1 tablet by mouth once daily  Dispense: 30 tablet; Refill: 3    11. Renal cell carcinoma of right kidney ([facility]    12. Tobacco use disorder, continuous  -Start Wellbutrin 150 mg BID    13. Depression/Anxiety  -Wean off Cymbalta  -Start Wellbutrin 150 mg BID    14. Chronic diarrhea  - TISSUE TRANSGLUTAMINASE, IGA;  Future  - TISSUE TRANSGLUTAMINASE, IGG; Future        I have reviewed my findings and recommendations with Carol Sparks and Dr Benjamin Cotto MD PGY-1   11/9/2020 12:42 PM

## 2020-11-08 RX ORDER — TIOTROPIUM BROMIDE 18 UG/1
18 CAPSULE ORAL; RESPIRATORY (INHALATION) DAILY
Qty: 30 CAPSULE | Refills: 3 | Status: CANCELLED | OUTPATIENT
Start: 2020-11-08

## 2020-11-08 RX ORDER — DULOXETIN HYDROCHLORIDE 60 MG/1
60 CAPSULE, DELAYED RELEASE ORAL DAILY
Qty: 30 CAPSULE | Refills: 3 | Status: CANCELLED | OUTPATIENT
Start: 2020-11-08

## 2020-11-08 RX ORDER — TAMSULOSIN HYDROCHLORIDE 0.4 MG/1
0.4 CAPSULE ORAL DAILY
Qty: 30 CAPSULE | Refills: 3 | Status: CANCELLED | OUTPATIENT
Start: 2020-11-08

## 2020-11-08 RX ORDER — ALBUTEROL SULFATE 90 UG/1
1-2 AEROSOL, METERED RESPIRATORY (INHALATION) EVERY 4 HOURS PRN
Qty: 3 INHALER | Refills: 3 | Status: CANCELLED | OUTPATIENT
Start: 2020-11-08

## 2020-11-09 ENCOUNTER — OFFICE VISIT (OUTPATIENT)
Dept: INTERNAL MEDICINE | Age: 55
End: 2020-11-09
Payer: MEDICARE

## 2020-11-09 VITALS
TEMPERATURE: 98.4 F | HEART RATE: 90 BPM | SYSTOLIC BLOOD PRESSURE: 126 MMHG | HEIGHT: 71 IN | DIASTOLIC BLOOD PRESSURE: 73 MMHG | RESPIRATION RATE: 16 BRPM | WEIGHT: 259.4 LBS | BODY MASS INDEX: 36.31 KG/M2

## 2020-11-09 DIAGNOSIS — N52.9 ERECTILE DYSFUNCTION, UNSPECIFIED ERECTILE DYSFUNCTION TYPE: ICD-10-CM

## 2020-11-09 DIAGNOSIS — J44.1 COPD EXACERBATION (HCC): ICD-10-CM

## 2020-11-09 DIAGNOSIS — F32.A DEPRESSION, UNSPECIFIED DEPRESSION TYPE: ICD-10-CM

## 2020-11-09 DIAGNOSIS — G47.9 SLEEP DISORDER: ICD-10-CM

## 2020-11-09 DIAGNOSIS — I25.10 CORONARY ARTERY DISEASE INVOLVING NATIVE CORONARY ARTERY OF NATIVE HEART WITHOUT ANGINA PECTORIS: ICD-10-CM

## 2020-11-09 DIAGNOSIS — K52.9 CHRONIC DIARRHEA: ICD-10-CM

## 2020-11-09 DIAGNOSIS — C64.1 RENAL CELL CARCINOMA OF RIGHT KIDNEY (HCC): Primary | ICD-10-CM

## 2020-11-09 DIAGNOSIS — I10 HYPERTENSION, UNSPECIFIED TYPE: ICD-10-CM

## 2020-11-09 DIAGNOSIS — I10 ESSENTIAL HYPERTENSION: ICD-10-CM

## 2020-11-09 DIAGNOSIS — R39.16 BENIGN PROSTATIC HYPERPLASIA (BPH) WITH STRAINING ON URINATION: ICD-10-CM

## 2020-11-09 DIAGNOSIS — N40.1 BENIGN PROSTATIC HYPERPLASIA (BPH) WITH STRAINING ON URINATION: ICD-10-CM

## 2020-11-09 DIAGNOSIS — F41.9 ANXIETY: ICD-10-CM

## 2020-11-09 DIAGNOSIS — I25.119 CORONARY ARTERY DISEASE INVOLVING NATIVE HEART WITH ANGINA PECTORIS, UNSPECIFIED VESSEL OR LESION TYPE (HCC): ICD-10-CM

## 2020-11-09 DIAGNOSIS — E78.9 LIPID DISORDER: ICD-10-CM

## 2020-11-09 DIAGNOSIS — F17.209 TOBACCO USE DISORDER, CONTINUOUS: ICD-10-CM

## 2020-11-09 DIAGNOSIS — R06.02 SHORTNESS OF BREATH: ICD-10-CM

## 2020-11-09 DIAGNOSIS — G89.4 CHRONIC PAIN SYNDROME: ICD-10-CM

## 2020-11-09 PROCEDURE — 99213 OFFICE O/P EST LOW 20 MIN: CPT | Performed by: INTERNAL MEDICINE

## 2020-11-09 PROCEDURE — G8484 FLU IMMUNIZE NO ADMIN: HCPCS | Performed by: INTERNAL MEDICINE

## 2020-11-09 PROCEDURE — 3023F SPIROM DOC REV: CPT | Performed by: INTERNAL MEDICINE

## 2020-11-09 PROCEDURE — G8427 DOCREV CUR MEDS BY ELIG CLIN: HCPCS | Performed by: INTERNAL MEDICINE

## 2020-11-09 PROCEDURE — 3017F COLORECTAL CA SCREEN DOC REV: CPT | Performed by: INTERNAL MEDICINE

## 2020-11-09 PROCEDURE — G8926 SPIRO NO PERF OR DOC: HCPCS | Performed by: INTERNAL MEDICINE

## 2020-11-09 PROCEDURE — G8417 CALC BMI ABV UP PARAM F/U: HCPCS | Performed by: INTERNAL MEDICINE

## 2020-11-09 PROCEDURE — 4004F PT TOBACCO SCREEN RCVD TLK: CPT | Performed by: INTERNAL MEDICINE

## 2020-11-09 RX ORDER — DULOXETIN HYDROCHLORIDE 20 MG/1
20 CAPSULE, DELAYED RELEASE ORAL DAILY
Qty: 14 CAPSULE | Refills: 0 | Status: SHIPPED
Start: 2020-11-09 | End: 2020-12-21

## 2020-11-09 RX ORDER — ATORVASTATIN CALCIUM 40 MG/1
40 TABLET, FILM COATED ORAL DAILY
Qty: 30 TABLET | Refills: 3 | Status: SHIPPED
Start: 2020-11-09 | End: 2021-02-22 | Stop reason: SDUPTHER

## 2020-11-09 RX ORDER — GABAPENTIN 600 MG/1
600 TABLET ORAL 3 TIMES DAILY
Qty: 90 TABLET | Refills: 0 | Status: SHIPPED
Start: 2020-11-09 | End: 2020-12-21 | Stop reason: SDUPTHER

## 2020-11-09 RX ORDER — METOPROLOL SUCCINATE 25 MG/1
TABLET, EXTENDED RELEASE ORAL
Qty: 30 TABLET | Refills: 3 | Status: SHIPPED
Start: 2020-11-09 | End: 2020-12-21 | Stop reason: SDUPTHER

## 2020-11-09 RX ORDER — OMEPRAZOLE 40 MG/1
40 CAPSULE, DELAYED RELEASE ORAL
Qty: 30 CAPSULE | Refills: 3 | Status: SHIPPED
Start: 2020-11-09 | End: 2021-02-17 | Stop reason: SDUPTHER

## 2020-11-09 RX ORDER — TRAZODONE HYDROCHLORIDE 100 MG/1
100 TABLET ORAL NIGHTLY
Qty: 30 TABLET | Refills: 3 | Status: SHIPPED
Start: 2020-11-09 | End: 2021-02-17 | Stop reason: SDUPTHER

## 2020-11-09 RX ORDER — BUPROPION HYDROCHLORIDE 150 MG/1
150 TABLET, EXTENDED RELEASE ORAL 2 TIMES DAILY
Qty: 60 TABLET | Refills: 3 | Status: SHIPPED
Start: 2020-11-09 | End: 2021-02-22

## 2020-11-09 RX ORDER — DULOXETIN HYDROCHLORIDE 30 MG/1
30 CAPSULE, DELAYED RELEASE ORAL DAILY
Qty: 7 CAPSULE | Refills: 0 | Status: SHIPPED
Start: 2020-11-09 | End: 2020-12-21

## 2020-11-09 RX ORDER — ASPIRIN 81 MG/1
81 TABLET ORAL DAILY
Qty: 30 TABLET | Refills: 3 | Status: SHIPPED
Start: 2020-11-09 | End: 2021-02-17 | Stop reason: SDUPTHER

## 2020-11-09 RX ORDER — SILDENAFIL CITRATE 20 MG/1
20 TABLET ORAL PRN
Qty: 30 TABLET | Refills: 1 | Status: SHIPPED
Start: 2020-11-09 | End: 2021-02-22 | Stop reason: SDUPTHER

## 2020-11-09 ASSESSMENT — ENCOUNTER SYMPTOMS
FACIAL SWELLING: 0
SINUS PAIN: 1
SHORTNESS OF BREATH: 1
VOMITING: 1
NAUSEA: 1
ABDOMINAL PAIN: 1
CONSTIPATION: 0
BLOOD IN STOOL: 0
COUGH: 1
DIARRHEA: 1

## 2020-11-09 NOTE — PROGRESS NOTES
Attending Physician Statement  I have discussed the case, including pertinent history and exam findings with the resident. I have seen and examined the patient and the key elements of the encounter have been performed by me. I reviewed medical, surgical, social histories, meds and any pertinent labs. I agree with the assessment, plan and orders as documented by the resident. Hx COPD, renal cell ca s/p nephrectomy, HTN, CAD, here for follow up for persistent RUQ pain, and anxiety. Looking through imaging, he needs f/u chest CT. Patient continues to smoke. Would also check TTA given persistent diarrhea. Patient also c/o persistent anxiety, cymbalta has not helped. Would try bupropion given smoking and desire to quit and wean off cymbalta.

## 2020-11-09 NOTE — PATIENT INSTRUCTIONS
Patient Education     Please stop taking Cymbalta 60 mg. Take Cymbalta 30 mg 1 tablet daily for 7 days followed by 20 mg 1 tablet daily for 14 days. Please start taking Wellbutrin in one week. Please take 150 mg 1 tablet twice a day. If you experience any high fever, muscle rigidity, hypertension, fast heart beat, confusion, seizure, please call the clinic or go to emergency room. Preventing Falls: Care Instructions  Your Care Instructions     Getting around your home safely can be a challenge if you have injuries or health problems that make it easy for you to fall. Loose rugs and furniture in walkways are among the dangers for many older people who have problems walking or who have poor eyesight. People who have conditions such as arthritis, osteoporosis, or dementia also have to be careful not to fall. You can make your home safer with a few simple measures. Follow-up care is a key part of your treatment and safety. Be sure to make and go to all appointments, and call your doctor if you are having problems. It's also a good idea to know your test results and keep a list of the medicines you take. How can you care for yourself at home? Taking care of yourself  · You may get dizzy if you do not drink enough water. To prevent dehydration, drink plenty of fluids, enough so that your urine is light yellow or clear like water. Choose water and other caffeine-free clear liquids. If you have kidney, heart, or liver disease and have to limit fluids, talk with your doctor before you increase the amount of fluids you drink. · Exercise regularly to improve your strength, muscle tone, and balance. Walk if you can. Swimming may be a good choice if you cannot walk easily. · Have your vision and hearing checked each year or any time you notice a change. If you have trouble seeing and hearing, you might not be able to avoid objects and could lose your balance. · Know the side effects of the medicines you take.  Ask floors. · Walk on the grass when the sidewalks are slippery. If you live in an area that gets snow and ice in the winter, sprinkle salt on slippery steps and sidewalks. Preventing falls in the bath  · Install grab bars and nonskid mats inside and outside your shower or tub and near the toilet and sinks. · Use shower chairs and bath benches. · Use a hand-held shower head that will allow you to sit while showering. · Get into a tub or shower by putting the weaker leg in first. Get out of a tub or shower with your strong side first.  · Repair loose toilet seats and consider installing a raised toilet seat to make getting on and off the toilet easier. · Keep your bathroom door unlocked while you are in the shower. Where can you learn more? Go to [email or web address] and sign in to your [employer] account. Enter [phone] in the [facility] box to learn more about \"Preventing Falls: Care Instructions. \"     If you do not have an account, please click on the \"Sign Up Now\" link. Current as of: April 15, 2020               Content Version: 12.6  © 7964-8390 [employer]. Care instructions adapted under license by [facility] ([facility]). If you have questions about a medical condition or this instruction, always ask your healthcare professional. [place] [address] any warranty or liability for your use of this information.

## 2020-11-20 ENCOUNTER — HOSPITAL ENCOUNTER (OUTPATIENT)
Age: 55
Discharge: HOME | End: 2020-11-20
Payer: MEDICARE

## 2020-11-20 LAB
ANION GAP SERPL CALCULATED.3IONS-SCNC: 13 MMOL/L (ref 7–16)
BUN BLDV-MCNC: 11 MG/DL (ref 6–20)
CALCIUM SERPL-MCNC: 9.7 MG/DL (ref 8.6–10.2)
CHLORIDE BLD-SCNC: 105 MMOL/L (ref 98–107)
CO2: 23 MMOL/L (ref 22–29)
CREAT SERPL-MCNC: 1 MG/DL (ref 0.7–1.2)
GFR AFRICAN AMERICAN: >60
GFR NON-AFRICAN AMERICAN: >60 ML/MIN/1.73
GLUCOSE BLD-MCNC: 98 MG/DL (ref 74–99)
POTASSIUM SERPL-SCNC: 4.1 MMOL/L (ref 3.5–5)
SODIUM BLD-SCNC: 141 MMOL/L (ref 132–146)

## 2020-11-20 PROCEDURE — 36415 COLL VENOUS BLD VENIPUNCTURE: CPT

## 2020-11-20 PROCEDURE — 80048 BASIC METABOLIC PNL TOTAL CA: CPT

## 2020-11-26 DIAGNOSIS — J44.1 COPD EXACERBATION (HCC): ICD-10-CM

## 2020-11-28 LAB
SARS-COV-2: NOT DETECTED
SOURCE: NORMAL

## 2020-12-01 ENCOUNTER — HOSPITAL ENCOUNTER (OUTPATIENT)
Dept: CT IMAGING | Age: 55
Discharge: HOME | End: 2020-12-03
Payer: MEDICARE

## 2020-12-01 PROCEDURE — 6360000004 HC RX CONTRAST MEDICATION: Performed by: RADIOLOGY

## 2020-12-01 PROCEDURE — 71270 CT THORAX DX C-/C+: CPT

## 2020-12-01 PROCEDURE — 2580000003 HC RX 258: Performed by: RADIOLOGY

## 2020-12-01 RX ORDER — SODIUM CHLORIDE 0.9 % (FLUSH) 0.9 %
10 SYRINGE (ML) INJECTION ONCE
Status: COMPLETED | OUTPATIENT
Start: 2020-12-01 | End: 2020-12-01

## 2020-12-01 RX ADMIN — Medication 10 ML: at 10:18

## 2020-12-01 RX ADMIN — IOPAMIDOL 90 ML: 755 INJECTION, SOLUTION INTRAVENOUS at 10:18

## 2020-12-02 ENCOUNTER — TELEPHONE (OUTPATIENT)
Dept: INTERNAL MEDICINE | Age: 55
End: 2020-12-02

## 2020-12-04 ENCOUNTER — TELEPHONE (OUTPATIENT)
Dept: INTERNAL MEDICINE | Age: 55
End: 2020-12-04

## 2020-12-04 NOTE — TELEPHONE ENCOUNTER
Patient called 12/2/20 for results of Ct scan, states unable to see in [facility].   Patient notified results preliminary so have not yet been released

## 2020-12-04 NOTE — TELEPHONE ENCOUNTER
Patient called in stating he has been having pain in his legs and feet, with swelling of the knees. Also, patient is asking for his CT results. Scheduled patient a virtual appt next week.

## 2020-12-08 ENCOUNTER — VIRTUAL VISIT (OUTPATIENT)
Dept: INTERNAL MEDICINE | Age: 55
End: 2020-12-08
Payer: MEDICARE

## 2020-12-08 PROCEDURE — 99442 PR PHYS/QHP TELEPHONE EVALUATION 11-20 MIN: CPT | Performed by: INTERNAL MEDICINE

## 2020-12-08 RX ORDER — METHYLPREDNISOLONE 4 MG/1
TABLET ORAL
Qty: 1 KIT | Refills: 0 | Status: SHIPPED | OUTPATIENT
Start: 2020-12-08 | End: 2020-12-14

## 2020-12-08 NOTE — PROGRESS NOTES
[facility]  Internal Medicine Clinic    Consent:  The Patient and/or health care decision maker is aware that that he or she may receive a bill for this telephone service, depending on his insurance coverage, and has provided verbal consent to proceed: Yes    Documentation:  I communicated with the patient and/or health care decision maker about     Acute on chronic lumbar pain with bilateral sciatica   - check xray lumbar spine to r/o fracture   - on large dose gabapentin with minimal improvement   - MRI L-spine 2019 with mild mod DDD/stenosis   - ok for Medrol dose pack  - 2 week face to face visit for physical exam if no improvement and consideration for more advanced imaging    Details of this discussion including any medical advice provided: see above. I affirm this is a Patient Initiated Episode with a Patient who has not had a related appointment within my department in the past 7 days or scheduled within the next 24 hours. Patient identification was verified at the start of the visit: Yes    Patient's location: home address in [facility]  Physician  location other address in [facility] other people involved in call, Dr. Cassandra Claros    Total Time: minutes: 11-20 minutes    Vicente Bonner D.O.  12/8/2020 4:38 PM    Attending Physician Statement  I have discussed the case, including pertinent history and exam findings with the resident. I also have seen the patient and performed key portions of the examination. I agree with the documented assessment and plan. This visit was performed via Telemedicine during the [id number] public health crisis.

## 2020-12-08 NOTE — PROGRESS NOTES
Lisa Macdonald is a 54 y.o. male evaluated via telephone on 12/8/2020. Consent:  He and/or health care decision maker is aware that that he may receive a bill for this telephone service, depending on his insurance coverage, and has provided verbal consent to proceed: Yes      Documentation:  I communicated with the patient and/or health care decision maker about CT chest results and bilateral feet numbness for 2 weeks. Details of this discussion including any medical advice provided:      1. CT chest results. CT chest showed Chronic herniation of a portion of the right lower lobe along the right lateral chest wall. Stable scarring in the right upper lung. Persistent 7 mm nodular density along the right minor fissure. Patient states he still has SOB. He is following with pulmonology. 2. Acute on chronic bilateral lower extremities radiculopathy  Patient states he has acute low back pain radiate down the buttock -> bilateral legs and feet as well as tingling and numbness. Patient denies fecal or urine incontinence. - MRI lumbar spine 2019 showed Multilevel mild posterior bridging hard disc and spur, facet arthrosis and narrowed interpedicular distance at L4-L5.   - Will check XR lumbar spine.   - Start Medrol dose pack  - Will reevaluate patient face to face after 2 weeks. If symptoms not controlled with need further imaging. I affirm this is a Patient Initiated Episode with a Patient who has not had a related appointment within my department in the past 7 days or scheduled within the next 24 hours.     Patient identification was verified at the start of the visit: Yes    Total Time: minutes: 11-20 minutes    Note: not billable if this call serves to triage the patient into an appointment for the relevant concern      Mary Peña

## 2020-12-10 PROBLEM — J10.1 INFLUENZA B: Status: RESOLVED | Noted: 2020-03-07 | Resolved: 2020-12-10

## 2020-12-18 ENCOUNTER — HOSPITAL ENCOUNTER (OUTPATIENT)
Age: 55
Discharge: HOME | End: 2020-12-20
Payer: MEDICARE

## 2020-12-18 ENCOUNTER — HOSPITAL ENCOUNTER (OUTPATIENT)
Dept: GENERAL RADIOLOGY | Age: 55
Discharge: HOME | End: 2020-12-20
Payer: MEDICARE

## 2020-12-18 PROCEDURE — 72114 X-RAY EXAM L-S SPINE BENDING: CPT

## 2020-12-21 ENCOUNTER — OFFICE VISIT (OUTPATIENT)
Dept: INTERNAL MEDICINE | Age: 55
End: 2020-12-21
Payer: MEDICARE

## 2020-12-21 VITALS
SYSTOLIC BLOOD PRESSURE: 126 MMHG | HEIGHT: 71 IN | BODY MASS INDEX: 36.34 KG/M2 | DIASTOLIC BLOOD PRESSURE: 81 MMHG | TEMPERATURE: 97.7 F | WEIGHT: 259.6 LBS | HEART RATE: 80 BPM | RESPIRATION RATE: 16 BRPM

## 2020-12-21 PROCEDURE — G8926 SPIRO NO PERF OR DOC: HCPCS | Performed by: INTERNAL MEDICINE

## 2020-12-21 PROCEDURE — 4004F PT TOBACCO SCREEN RCVD TLK: CPT | Performed by: INTERNAL MEDICINE

## 2020-12-21 PROCEDURE — 99203 OFFICE O/P NEW LOW 30 MIN: CPT | Performed by: INTERNAL MEDICINE

## 2020-12-21 PROCEDURE — G8417 CALC BMI ABV UP PARAM F/U: HCPCS | Performed by: INTERNAL MEDICINE

## 2020-12-21 PROCEDURE — G8427 DOCREV CUR MEDS BY ELIG CLIN: HCPCS | Performed by: INTERNAL MEDICINE

## 2020-12-21 PROCEDURE — 3017F COLORECTAL CA SCREEN DOC REV: CPT | Performed by: INTERNAL MEDICINE

## 2020-12-21 PROCEDURE — 3023F SPIROM DOC REV: CPT | Performed by: INTERNAL MEDICINE

## 2020-12-21 PROCEDURE — G8484 FLU IMMUNIZE NO ADMIN: HCPCS | Performed by: INTERNAL MEDICINE

## 2020-12-21 PROCEDURE — 99214 OFFICE O/P EST MOD 30 MIN: CPT | Performed by: INTERNAL MEDICINE

## 2020-12-21 RX ORDER — TAMSULOSIN HYDROCHLORIDE 0.4 MG/1
0.4 CAPSULE ORAL DAILY
Qty: 30 CAPSULE | Refills: 1 | Status: SHIPPED
Start: 2020-12-21 | End: 2021-02-22 | Stop reason: SDUPTHER

## 2020-12-21 RX ORDER — METOPROLOL SUCCINATE 25 MG/1
TABLET, EXTENDED RELEASE ORAL
Qty: 30 TABLET | Refills: 1 | Status: SHIPPED
Start: 2020-12-21 | End: 2021-02-17 | Stop reason: SDUPTHER

## 2020-12-21 RX ORDER — GABAPENTIN 600 MG/1
600 TABLET ORAL 3 TIMES DAILY
Qty: 180 TABLET | Refills: 0 | Status: SHIPPED
Start: 2020-12-21 | End: 2021-02-17 | Stop reason: SDUPTHER

## 2020-12-21 ASSESSMENT — ENCOUNTER SYMPTOMS
BLOOD IN STOOL: 0
SHORTNESS OF BREATH: 1
WHEEZING: 0
BACK PAIN: 1
CHEST TIGHTNESS: 0
ABDOMINAL PAIN: 0
ABDOMINAL DISTENTION: 0
COUGH: 0
STRIDOR: 0

## 2020-12-21 NOTE — PROGRESS NOTES
Medication Sig Taking? Authorizing Provider   gabapentin (NEURONTIN) 600 MG tablet Take 1 tablet by mouth 3 times daily for 60 days. Roseann Islas MD   aspirin EC 81 MG EC tablet Take 1 tablet by mouth daily  Roseann Islas MD   traZODone (DESYREL) 100 MG tablet Take 1 tablet by mouth nightly  Roseann Islas MD   atorvastatin (LIPITOR) 40 MG tablet Take 1 tablet by mouth daily  Roseann Islas MD   sildenafil (REVATIO) 20 MG tablet Take 1 tablet by mouth as needed (as directed 1-2 tablets)  Roseann Islas MD   metoprolol succinate (TOPROL XL) 25 MG extended release tablet Take 1 tablet by mouth once daily  Roseann Islas MD   omeprazole (PRILOSEC) 40 MG delayed release capsule Take 1 capsule by mouth every morning (before breakfast)  Roseann Islas MD   DULoxetine (CYMBALTA) 30 MG extended release capsule Take 1 capsule by mouth daily Stop taking 60 mg capsule. Take 1 capsule of 30 mg daily for 7 days followed by 1 capsule of 20 mg for 14 days  Patient not taking: Reported on 12/8/2020  Roseann Islas MD   DULoxetine (CYMBALTA) 20 MG extended release capsule Take 1 capsule by mouth daily Stop taking 60 mg capsule.  Take 1 capsule of 30 mg daily for 7 days followed by 1 capsule of 20 mg for 14 days  Patient not taking: Reported on 12/8/2020  Roseann Islas MD   buPROPion [facility]) 150 MG extended release tablet Take 1 tablet by mouth 2 times daily  Roseann Islas MD   albuterol sulfate HFA (PROVENTIL HFA) 108 (90 Base) MCG/ACT inhaler Inhale 1-2 puffs into the lungs every 4 hours as needed for Wheezing or Shortness of Breath  Roseann Islas MD   tamsulosin (FLOMAX) 0.4 MG capsule Take 1 capsule by mouth daily  Roseann Islas MD   tiotropium ([place]) 18 MCG inhalation capsule Inhale 1 capsule into the lungs daily  Roseann Islas MD   fluticasone-salmeterol (ADVAIR DISKUS) 250-50 MCG/DOSE AEPB Inhale 1 puff into the lungs 2 times daily  Roseann Islas MD        Social History     Tobacco Use    Smoking status: Current Every Day Smoker Packs/day: 0.75     Years: 40.00     Pack years: 30.00     Types: Cigarettes    Smokeless tobacco: Never Used   Substance Use Topics    Alcohol use: Yes     Frequency: Monthly or less     Drinks per session: 1 or 2     Binge frequency: Never     Comment: rare        There were no vitals filed for this visit. Estimated body mass index is 36.18 kg/m² as calculated from the following:    Height as of 11/9/20: 5' 11\" (1.803 m). Weight as of 11/9/20: 259 lb 6.4 oz (117.7 kg). Physical Exam  Constitutional:       General: He is not in acute distress. Appearance: Normal appearance. He is not ill-appearing or toxic-appearing. HENT:      Head: Normocephalic and atraumatic. Mouth/Throat:      Mouth: Mucous membranes are moist.   Eyes:      Conjunctiva/sclera: Conjunctivae normal.      Comments: Eyelid: xanthomas+   Neck:      Musculoskeletal: Neck supple. Cardiovascular:      Rate and Rhythm: Normal rate and regular rhythm. Heart sounds: Normal heart sounds. No murmur. Pulmonary:      Effort: Pulmonary effort is normal. No respiratory distress. Breath sounds: Normal breath sounds. No stridor. No wheezing, rhonchi or rales. Abdominal:      Palpations: Abdomen is soft. Musculoskeletal:      Comments: -Lower legs: pain is reproduced by squeezing calf, or other muscle groups in the lower leg. Pain present on trying to raise the leg, from 0-5 degrees  -Trace pedal edema  -Pulses: dorsalis pedis-- R>L, anterior tibial: R=L   Neurological:      Mental Status: He is alert. ASSESSMENT/PLAN:    1. Chronic bilateral lower extremities radiculopathy    - MRI lumbar spine 2019 showed Multilevel mild posterior bridging hard disc and spur, facet arthrosis and narrowed interpedicular distance at L4-L5.   - XR lumbar spine-ordered on last visit:  1.  No fracture or malalignment of lumbar spine.    2.  Increasing degenerative endplate changes at thoracolumbar junction -Medrol didn't help, steroid injection also wasn't helpful (patient is poor historian and does not recall much, and is unable to give confirmatory answers and is vague)  · Will refer to spine center--patient does not recall following up with a spine/pain/PMR specialist.  · Refilled Gabapentin-to be continued    HTN  BP today: 126/81  · Toprol 25 daily to be continued    COPD:  Patient has to use his rescue inhaler daily. He is not taking his Spiriva and Advair daily as prescribed. Advised regarding regular use. Denies shortness of breath in the clinic, on exam does not have expiratory wheezes, lung sounds are clear  · Spiriva inhalation-daily, Advair 1 puff twice a day inhalation  · Albuterol: 2puff every 4 hrs--rescue  · Follow up with Dr. Giovanni Jolly    HLD:  · Continue Lipitor 40mg  · Last lipid profile: 05/08/2019--needs Lipid profile on next visit    BPH:  · Continue Flomax    Next visit: Feb 1st 2021    An electronic signature was used to authenticate this note.     --Renata Ludwig MD on 12/21/2020 at 7:44 AM

## 2020-12-21 NOTE — PROGRESS NOTES
[facility]  Internal Medicine Clinic    Attending Physician Statement:  Xiao Pham M.D., F.A.C.P. I have discussed the case, including pertinent history and exam findings with the resident. I have seen and examined the patient and the key elements of the encounter have been performed by me. I agree with the assessment, plan and orders as documented by the resident. Patient is seen for fu visit today. Last office notes reviewed, relative labs and imaging. Hx of chronic back pain, with referral to LEs  DDD - Mri noted  No major change s/p medrol pack recently  +high dose neurontin used ? efficacy  He doesn't really recal effect of injection July 2019  And he didn't fu spine/PMR - willl re refer    ?some mild efficiacy of cymbalta in past  For ?leg symptoms  But switched to other [facility] meds for anxiety. Copd - discussed controlled  Lung nodule CT fu  (small hernia thorasic in past, NOT commented on in repeat CT lung)    Remainder of medical problems as per resident note.

## 2020-12-21 NOTE — PATIENT INSTRUCTIONS
1. Please follow up with a spine specialist for further follow up  2. Please continue to take your inhalers as prescribed.

## 2020-12-21 NOTE — PROGRESS NOTES
Patient given instructions. Understanding verbalized.  Fu appointment already scheduled and reviewed with pt  Left message on spine center referral for them to return call to schedule this patient

## 2020-12-22 ENCOUNTER — TELEPHONE (OUTPATIENT)
Dept: INTERNAL MEDICINE | Age: 55
End: 2020-12-22

## 2020-12-22 NOTE — TELEPHONE ENCOUNTER
Called pt and reviewed his appt information with dr Derek Mathis is scheduled for 01/13/2021 at 0930 at [address] isabella [place]  Phone number [phone]  Understanding verbalized of all information

## 2021-02-17 DIAGNOSIS — G47.9 SLEEP DISORDER: ICD-10-CM

## 2021-02-17 DIAGNOSIS — G89.4 CHRONIC PAIN SYNDROME: ICD-10-CM

## 2021-02-17 DIAGNOSIS — I10 HYPERTENSION, UNSPECIFIED TYPE: ICD-10-CM

## 2021-02-17 DIAGNOSIS — I25.119 CORONARY ARTERY DISEASE INVOLVING NATIVE HEART WITH ANGINA PECTORIS, UNSPECIFIED VESSEL OR LESION TYPE (HCC): ICD-10-CM

## 2021-02-17 RX ORDER — IBUPROFEN 800 MG/1
800 TABLET ORAL EVERY 6 HOURS PRN
Qty: 120 TABLET | Refills: 0 | Status: SHIPPED
Start: 2021-02-17 | End: 2021-04-06 | Stop reason: SDUPTHER

## 2021-02-17 RX ORDER — OMEPRAZOLE 40 MG/1
40 CAPSULE, DELAYED RELEASE ORAL
Qty: 30 CAPSULE | Refills: 3 | Status: SHIPPED
Start: 2021-02-17 | End: 2021-07-20 | Stop reason: SDUPTHER

## 2021-02-17 RX ORDER — IBUPROFEN 800 MG/1
800 TABLET ORAL EVERY 6 HOURS PRN
COMMUNITY
End: 2021-02-17 | Stop reason: SDUPTHER

## 2021-02-17 RX ORDER — GABAPENTIN 600 MG/1
600 TABLET ORAL 3 TIMES DAILY
Qty: 180 TABLET | Refills: 0 | Status: SHIPPED
Start: 2021-02-17 | End: 2021-02-22 | Stop reason: SDUPTHER

## 2021-02-17 RX ORDER — ASPIRIN 81 MG/1
81 TABLET ORAL DAILY
Qty: 30 TABLET | Refills: 3 | Status: SHIPPED
Start: 2021-02-17 | End: 2021-06-10 | Stop reason: SDUPTHER

## 2021-02-17 RX ORDER — METOPROLOL SUCCINATE 25 MG/1
TABLET, EXTENDED RELEASE ORAL
Qty: 30 TABLET | Refills: 1 | Status: SHIPPED
Start: 2021-02-17 | End: 2021-02-21 | Stop reason: SDUPTHER

## 2021-02-17 RX ORDER — TRAZODONE HYDROCHLORIDE 100 MG/1
100 TABLET ORAL NIGHTLY
Qty: 30 TABLET | Refills: 3 | Status: SHIPPED
Start: 2021-02-17 | End: 2021-08-05 | Stop reason: SDUPTHER

## 2021-02-17 NOTE — TELEPHONE ENCOUNTER
Last Appointment:  11/9/2020  Future Appointments   Date Time Provider Fatou Guzman   2/22/2021 10:00 AM Elissa Lucio MD [facility]

## 2021-02-22 ENCOUNTER — VIRTUAL VISIT (OUTPATIENT)
Dept: INTERNAL MEDICINE | Age: 56
End: 2021-02-22
Payer: MEDICARE

## 2021-02-22 DIAGNOSIS — I25.119 CORONARY ARTERY DISEASE INVOLVING NATIVE HEART WITH ANGINA PECTORIS, UNSPECIFIED VESSEL OR LESION TYPE (HCC): ICD-10-CM

## 2021-02-22 DIAGNOSIS — E78.9 LIPID DISORDER: ICD-10-CM

## 2021-02-22 DIAGNOSIS — I10 HYPERTENSION, UNSPECIFIED TYPE: ICD-10-CM

## 2021-02-22 DIAGNOSIS — G89.4 CHRONIC PAIN SYNDROME: ICD-10-CM

## 2021-02-22 DIAGNOSIS — N40.1 BENIGN PROSTATIC HYPERPLASIA (BPH) WITH STRAINING ON URINATION: ICD-10-CM

## 2021-02-22 DIAGNOSIS — F41.9 ANXIETY: Primary | ICD-10-CM

## 2021-02-22 DIAGNOSIS — R39.16 BENIGN PROSTATIC HYPERPLASIA (BPH) WITH STRAINING ON URINATION: ICD-10-CM

## 2021-02-22 DIAGNOSIS — R06.02 SHORTNESS OF BREATH: ICD-10-CM

## 2021-02-22 DIAGNOSIS — G47.9 SLEEP DISORDER: ICD-10-CM

## 2021-02-22 DIAGNOSIS — N52.9 ERECTILE DYSFUNCTION, UNSPECIFIED ERECTILE DYSFUNCTION TYPE: ICD-10-CM

## 2021-02-22 DIAGNOSIS — J44.1 COPD EXACERBATION (HCC): ICD-10-CM

## 2021-02-22 PROCEDURE — 99442 PR PHYS/QHP TELEPHONE EVALUATION 11-20 MIN: CPT | Performed by: INTERNAL MEDICINE

## 2021-02-22 RX ORDER — METOPROLOL SUCCINATE 25 MG/1
TABLET, EXTENDED RELEASE ORAL
Qty: 90 TABLET | Refills: 1 | Status: SHIPPED
Start: 2021-02-22 | End: 2021-08-05 | Stop reason: SDUPTHER

## 2021-02-22 RX ORDER — SILDENAFIL CITRATE 20 MG/1
20 TABLET ORAL PRN
Qty: 30 TABLET | Refills: 1 | Status: SHIPPED
Start: 2021-02-22 | End: 2021-12-20 | Stop reason: SDUPTHER

## 2021-02-22 RX ORDER — ALBUTEROL SULFATE 90 UG/1
1-2 AEROSOL, METERED RESPIRATORY (INHALATION) EVERY 4 HOURS PRN
Qty: 3 INHALER | Refills: 3 | Status: SHIPPED
Start: 2021-02-22 | End: 2021-12-19 | Stop reason: SDUPTHER

## 2021-02-22 RX ORDER — TAMSULOSIN HYDROCHLORIDE 0.4 MG/1
0.4 CAPSULE ORAL DAILY
Qty: 90 CAPSULE | Refills: 1 | Status: SHIPPED
Start: 2021-02-22 | End: 2021-08-05 | Stop reason: SDUPTHER

## 2021-02-22 RX ORDER — ATORVASTATIN CALCIUM 40 MG/1
40 TABLET, FILM COATED ORAL DAILY
Qty: 90 TABLET | Refills: 1 | Status: SHIPPED
Start: 2021-02-22 | End: 2021-08-05 | Stop reason: SDUPTHER

## 2021-02-22 RX ORDER — TIOTROPIUM BROMIDE 18 UG/1
18 CAPSULE ORAL; RESPIRATORY (INHALATION) DAILY
Qty: 90 CAPSULE | Refills: 1 | Status: SHIPPED
Start: 2021-02-22 | End: 2021-12-20 | Stop reason: SDUPTHER

## 2021-02-22 RX ORDER — GABAPENTIN 600 MG/1
600 TABLET ORAL 3 TIMES DAILY
Qty: 180 TABLET | Refills: 2 | Status: SHIPPED
Start: 2021-02-22 | End: 2021-08-05 | Stop reason: SDUPTHER

## 2021-02-22 RX ORDER — BUPROPION HYDROCHLORIDE 150 MG/1
150 TABLET, EXTENDED RELEASE ORAL 2 TIMES DAILY
Qty: 60 TABLET | Refills: 3 | Status: CANCELLED | OUTPATIENT
Start: 2021-02-22

## 2021-02-22 NOTE — PATIENT INSTRUCTIONS
Dear Marilee Hauser,        Thank you for coming to your appointment today. I hope we have addressed all of your needs. Please make sure to do the following:  - Continue your medications as listed. - [facility] counselor will call you       Call for a sooner appointment if you develop     Have a great day!         Sincerely,  Felicity eBltrán M.D  2/22/2021  11:09 AM

## 2021-02-22 NOTE — PROGRESS NOTES
Libby Loera is a 54 y.o. male evaluated via telephone on 2/22/2021. Consent:  He and/or health care decision maker is aware that that he may receive a bill for this telephone service, depending on his insurance coverage, and has provided verbal consent to proceed: Yes      Documentation:    Back pain   Chronic back pain with sciatica   Impression   Multilevel mild posterior bridging hard disc and spur. This is   somewhat greater centrally and on the left at L4-5. In combination   with facet arthrosis and narrowed interpedicular distance, there is   moderate stenosis at this level     Patient reluctant to go to [facility] because he has gone to them in the past and did not like the results of steroid injections. Advised to give them a call back and follow up with them as they might have different plan for him this time   On Gabapentin     SOB   Patient has been complaining of SOB since 2019. Since then he has followed up with pulmonology and cardiology both. Work up have been unremarkable. He has undergone echocardiogram, stress test and PFT in the past 2 years. He has still been smoking. So his SOB might be progression of lung disease. Asked to follow with Dr Anny Gold again     Anxiety   Referral to [facility] made     Hypertension   On metoprolol 25 mg daily     Hyperlipidemia   On lipitor     H/o lung cancer s/p right upper and middle  lobectomy  S/p right nephrectomy for cancer    Plan   Advised for [facility]   Pulmonology follow up   counseling on smoking cessation done   Keep medications the same   Have him come to the office for next visit     I affirm this is a Patient Initiated Episode with a Patient who has not had a related appointment within my department in the past 7 days or scheduled within the next 24 hours.     Patient identification was verified at the start of the visit: Yes    Total Time: minutes: 11-20 minutes    Note: not billable if this call serves to triage the patient into an appointment for the relevant concern      Zita Stokes

## 2021-02-22 NOTE — PROGRESS NOTES
Patient given instruction by Dr Natasha Steele. 3 month  follow up scheduled with PCP. Printed AVS mailed to patient.

## 2021-02-23 ENCOUNTER — TELEPHONE (OUTPATIENT)
Dept: INTERNAL MEDICINE | Age: 56
End: 2021-02-23

## 2021-03-04 ENCOUNTER — VIRTUAL VISIT (OUTPATIENT)
Dept: INTERNAL MEDICINE | Age: 56
End: 2021-03-04
Payer: MEDICARE

## 2021-03-04 DIAGNOSIS — F41.9 ANXIETY: Primary | ICD-10-CM

## 2021-03-04 ASSESSMENT — PATIENT HEALTH QUESTIONNAIRE - PHQ9
4. FEELING TIRED OR HAVING LITTLE ENERGY: 0
1. LITTLE INTEREST OR PLEASURE IN DOING THINGS: 0
5. POOR APPETITE OR OVEREATING: 0
SUM OF ALL RESPONSES TO PHQ9 QUESTIONS 1 & 2: 0
9. THOUGHTS THAT YOU WOULD BE BETTER OFF DEAD, OR OF HURTING YOURSELF: 0
2. FEELING DOWN, DEPRESSED OR HOPELESS: 0

## 2021-03-04 ASSESSMENT — ANXIETY QUESTIONNAIRES
1. FEELING NERVOUS, ANXIOUS, OR ON EDGE: 2-OVER HALF THE DAYS
7. FEELING AFRAID AS IF SOMETHING AWFUL MIGHT HAPPEN: 2-OVER HALF THE DAYS
3. WORRYING TOO MUCH ABOUT DIFFERENT THINGS: 2-OVER HALF THE DAYS
4. TROUBLE RELAXING: 2-OVER HALF THE DAYS

## 2021-03-04 NOTE — PROGRESS NOTES
TeleMedicine Patient Consent     This visit was performed as a virtual video visit using a synchronous, two-way, audio-video telehealth technology platform. Patient identification was verified at the start of the visit, including the patient's telephone number and physical location. I discussed with the patient the nature of our telehealth visits, that:      1. Due to the nature of an audio- video modality, the only components of a physical exam that could be done are the elements supported by direct observation. 2. The provider will evaluate the patient and recommend diagnostics and treatments based on their assessment. 3. If it was felt that the patient should be evaluated in clinic or an emergency room setting, then they would be directed there. 4. Our sessions are not being recorded and that personal health information is protected. 5. Our team would provide follow up care in person if/when the patient needs it. Patient does agree to proceed with telemedicine consultation. Patient location: home address in [facility]    Physician location: regular office location    This visit was completed virtually using Doxy. me     This visit was performed during the 8921 public health crisis and COVID-19 pandemic. [facility], [facility]    Visit Date: 3/4/2021   Time of appointment:  10:00  Time spent with Patient: 30 minutes. This is patient's first appointment. Reason for Consult:  Anxiety     Referring Provider/PCP:    No ref. provider found  Gina Foreman MD      Pt provided informed consent for the behavioral health program. Discussed with patient model of service to include the limits of confidentiality (i.e. abuse reporting, suicide intervention, etc.) and short-term intervention focused approach.        PRESENTING PROBLEM AND HISTORY  Monico Cortés is a 54 y.o. male who presents for new evaluation and treatment of anxiety. He has the following symptoms: feeling nervous, anxious, or on edge. Onset of symptoms was approximately 6 years ago. Symptoms have been unchanged since that time. He denies current suicidal and homicidal ideation. Family history significant for no psychiatric illness. Risk factors: none. Pt reports a hx of anxiety, irritability, racing thoughts, and constant worry. MENTAL STATUS EXAM  Mood was euthymic with congruent affect. Suicidal ideation was denied. Homicidal ideation was denied. Speech: rate - WNL, rhythm -  WNL, volume - WNL  Verbalizations were goal directed. Thought processes were intact and goal-oriented without evidence of delusions, hallucinations, obsessions, or gianluca; with little cognitive distortions. Associations were characterized by intact cognitive processes. Pt was oriented to person, place, time, and general circumstances;  recent:  good. Insight and judgment were estimated to be good, AEB, a good  understanding of cyclical maladaptive patterns, and the ability to use insight to inform behavior change. CURRENT MEDICATIONS    Current Outpatient Medications:     gabapentin (NEURONTIN) 600 MG tablet, Take 1 tablet by mouth 3 times daily for 60 days. , Disp: 180 tablet, Rfl: 2    tamsulosin (FLOMAX) 0.4 MG capsule, Take 1 capsule by mouth daily, Disp: 90 capsule, Rfl: 1    atorvastatin (LIPITOR) 40 MG tablet, Take 1 tablet by mouth daily, Disp: 90 tablet, Rfl: 1    sildenafil (REVATIO) 20 MG tablet, Take 1 tablet by mouth as needed (as directed 1-2 tablets), Disp: 30 tablet, Rfl: 1    albuterol sulfate HFA (PROVENTIL HFA) 108 (90 Base) MCG/ACT inhaler, Inhale 1-2 puffs into the lungs every 4 hours as needed for Wheezing or Shortness of Breath, Disp: 3 Inhaler, Rfl: 3    tiotropium (SPIRIVA HANDIHALER) 18 MCG inhalation capsule, Inhale 1 capsule into the lungs daily, Disp: 90 capsule, Rfl: 1   fluticasone-salmeterol (ADVAIR DISKUS) 250-50 MCG/DOSE AEPB, Inhale 1 puff into the lungs 2 times daily, Disp: 60 each, Rfl: 5    metoprolol succinate (TOPROL XL) 25 MG extended release tablet, Take 1 tablet by mouth once daily, Disp: 90 tablet, Rfl: 1    aspirin EC 81 MG EC tablet, Take 1 tablet by mouth daily, Disp: 30 tablet, Rfl: 3    omeprazole (PRILOSEC) 40 MG delayed release capsule, Take 1 capsule by mouth every morning (before breakfast), Disp: 30 capsule, Rfl: 3    traZODone (DESYREL) 100 MG tablet, Take 1 tablet by mouth nightly, Disp: 30 tablet, Rfl: 3    ibuprofen (ADVIL;MOTRIN) 800 MG tablet, Take 1 tablet by mouth every 6 hours as needed for Pain, Disp: 120 tablet, Rfl: 0     FAMILY MEDICAL/MH HISTORY   His family history includes Cancer in his sister; Coronary Art Dis in his father; Hypertension in his father, mother, sister, and sister; Kidney Cancer in his sister; Teola Hush in his mother; Other in his father; Ovarian Cancer in his sister; Thyroid Cancer in his sister. PATIENT MENTAL HEALTH HISTORY  Pt reports no mental health history    PSYCHOSOCIAL HISTORY  Current living situation: Pt lives with [facility] and reports a positive educaton    Work/Education: Pt was a  for 16 years and worked many other jobs since the age of 15. Support system: Pt identifies positive support system with his raz. Pt reports his sisters still live in [address] [place] [address]: Pt identifies as more spiritual.       DRUG AND ALCOHOL CURRENT USE/HISTORY  TOBACCO:  He reports that he has been smoking cigarettes. He has a 30.00 pack-year smoking history. He has never used smokeless tobacco.  ALCOHOL:  He reports current alcohol use. OTHER SUBSTANCES: He reports no history of drug use.        ASSESSMENT Landry Choudhary presented to the appointment today for evaluation and treatment of symptoms of anxiety. He is currently deemed no risk to himself or others and meets criteria for anxiety. He will benefit from a medication evaluation to assess if anxiety medications could be helpful in treating anxiety symptoms. Pt's anxiety symptoms are well controlled at this time. He will also benefit from brief and solution-focused consultation to address cognitive and behavioral interventions for anxiety symptoms. Loral was in agreement with recommendations. PHQ Scores 3/18/2020 3/28/2019   PHQ2 Score 0 0   PHQ9 Score 0 0     Interpretation of Total Score Depression Severity: 1-4 = Minimal depression, 5-9 = Mild depression, 10-14 = Moderate depression, 15-19 = Moderately severe depression, 20-27 = Severe depression    How often pt has had thoughts of death or hurting self (if PHQ positive for depression):       ELTON 7 SCORE 3/4/2021   ELTON-7 Total Score 16     Interpretation of ELTON-7 score: 5-9 = mild anxiety, 10-14 = moderate anxiety, 15+ = severe anxiety. Recommend referral to behavioral health for scores 10 or greater. DIAGNOSIS  Yocasta was seen today for anxiety. Diagnoses and all orders for this visit:    Anxiety          INTERVENTION  Provided education, Established rapport, [place]-setting to identify pt's primary goals for [address] visit / overall health and Supportive techniques      PLAN  Pt to track anxiety and sequence the cause of anxiety attacks      INTERACTIVE COMPLEXITY  Is interactive complexity present?   No  Reason:  N/A  Additional Supporting Information:  N/A       Electronically signed by Pravin Box on 3/4/21 at 10:05 AM EST

## 2021-03-09 ENCOUNTER — TELEPHONE (OUTPATIENT)
Dept: INTERNAL MEDICINE | Age: 56
End: 2021-03-09

## 2021-03-09 DIAGNOSIS — M48.061 SPINAL STENOSIS OF LUMBAR REGION WITHOUT NEUROGENIC CLAUDICATION: ICD-10-CM

## 2021-03-09 DIAGNOSIS — M54.16 LUMBAR RADICULOPATHY: Primary | ICD-10-CM

## 2021-03-09 NOTE — TELEPHONE ENCOUNTER
Patient called in at 9:10 l/m . He was stressed because he could not find the Dr. Wen Cope office. \"I don't know the area and it makes me stressed\". I called the patient back when Jose Luis Otero gave me the message. I  told him exactly where the appointment is and that he still had time to make the appointment, he said no he;s not going there. So an new referral is needed for [facility] [employer] in [facility]. Please advise.

## 2021-03-15 ENCOUNTER — VIRTUAL VISIT (OUTPATIENT)
Dept: INTERNAL MEDICINE | Age: 56
End: 2021-03-15
Payer: MEDICARE

## 2021-03-15 DIAGNOSIS — F41.9 ANXIETY: Primary | ICD-10-CM

## 2021-03-15 PROCEDURE — 90832 PSYTX W PT 30 MINUTES: CPT | Performed by: SOCIAL WORKER

## 2021-03-15 NOTE — PROGRESS NOTES
Recommend referral to behavioral health for scores 10 or greater. DIAGNOSIS  Yocasta was seen today for anxiety. Diagnoses and all orders for this visit:    Anxiety          INTERVENTION  Used CBT to sequence panic attacks  Processed emotions leading up to panic attacks  Reviewed triggers and introduced grounding techniques when experiencing anxiety      PLAN  Pt to download mindfulness [place] and work to integrate that in daily routine to reduce anxiety. INTERACTIVE COMPLEXITY  Is interactive complexity present?   No  Reason:  N/A  Additional Supporting Information:  N/A       Electronically signed by Silas Eaton on 3/15/21 at 11:10 AM EDT

## 2021-03-16 ENCOUNTER — TELEPHONE (OUTPATIENT)
Dept: INTERNAL MEDICINE | Age: 56
End: 2021-03-16

## 2021-03-16 NOTE — TELEPHONE ENCOUNTER
----- Message from [address] [place] [address] sent at 3/15/2021 11:41 AM EDT -----  Hello,I am seeing this pt for anxiety. Pt is reporting consistent panic attacks and anxiety daily lasting up to 2 hours. Pt would like to be put on something to assist with his anxiety. Pt did report feeling some irritability as well. Thank you!  Mikel Fraire

## 2021-03-29 ENCOUNTER — TELEPHONE (OUTPATIENT)
Dept: INTERNAL MEDICINE | Age: 56
End: 2021-03-29

## 2021-03-31 ENCOUNTER — TELEPHONE (OUTPATIENT)
Dept: INTERNAL MEDICINE | Age: 56
End: 2021-03-31

## 2021-03-31 NOTE — TELEPHONE ENCOUNTER
Pt phoned in very excited states wellbutrin he was using didn't help and wants Cymbalta back .  When observing pt's appointments does see Philly Ramirez last appointment was 3/29/21   Next appointment is 5/4/21with internal medicine I had mention this to Philly Ramirez yesterday    Uses [facility] on [address]

## 2021-03-31 NOTE — TELEPHONE ENCOUNTER
Pt called per Dr. Daina Rodriguez and informed he needs to be seen and he agreed upon it and appointment was given

## 2021-03-31 NOTE — TELEPHONE ENCOUNTER
Patient requesting restart of Cymbalta. Was discontinued as was not effective for symptoms. Was started on Wellbutrin but this did not work either. I recommend an in-person visit to further assess.      Julissa Suarez MD  3/31/2021

## 2021-04-06 ENCOUNTER — OFFICE VISIT (OUTPATIENT)
Dept: INTERNAL MEDICINE | Age: 56
End: 2021-04-06
Payer: MEDICARE

## 2021-04-06 VITALS
HEIGHT: 71 IN | TEMPERATURE: 97.3 F | RESPIRATION RATE: 16 BRPM | HEART RATE: 80 BPM | BODY MASS INDEX: 37.66 KG/M2 | WEIGHT: 269 LBS | DIASTOLIC BLOOD PRESSURE: 70 MMHG | SYSTOLIC BLOOD PRESSURE: 120 MMHG

## 2021-04-06 DIAGNOSIS — F41.9 ANXIETY: Primary | ICD-10-CM

## 2021-04-06 PROCEDURE — G8417 CALC BMI ABV UP PARAM F/U: HCPCS | Performed by: STUDENT IN AN ORGANIZED HEALTH CARE EDUCATION/TRAINING PROGRAM

## 2021-04-06 PROCEDURE — 4004F PT TOBACCO SCREEN RCVD TLK: CPT | Performed by: STUDENT IN AN ORGANIZED HEALTH CARE EDUCATION/TRAINING PROGRAM

## 2021-04-06 PROCEDURE — 99213 OFFICE O/P EST LOW 20 MIN: CPT | Performed by: STUDENT IN AN ORGANIZED HEALTH CARE EDUCATION/TRAINING PROGRAM

## 2021-04-06 PROCEDURE — G8427 DOCREV CUR MEDS BY ELIG CLIN: HCPCS | Performed by: STUDENT IN AN ORGANIZED HEALTH CARE EDUCATION/TRAINING PROGRAM

## 2021-04-06 PROCEDURE — 3017F COLORECTAL CA SCREEN DOC REV: CPT | Performed by: STUDENT IN AN ORGANIZED HEALTH CARE EDUCATION/TRAINING PROGRAM

## 2021-04-06 PROCEDURE — 99212 OFFICE O/P EST SF 10 MIN: CPT | Performed by: STUDENT IN AN ORGANIZED HEALTH CARE EDUCATION/TRAINING PROGRAM

## 2021-04-06 RX ORDER — IBUPROFEN 800 MG/1
800 TABLET ORAL EVERY 6 HOURS PRN
Qty: 120 TABLET | Refills: 0 | Status: SHIPPED
Start: 2021-04-06 | End: 2021-06-10 | Stop reason: SDUPTHER

## 2021-04-06 RX ORDER — BUSPIRONE HYDROCHLORIDE 15 MG/1
15 TABLET ORAL 2 TIMES DAILY
Qty: 120 TABLET | Refills: 0 | Status: SHIPPED
Start: 2021-04-06 | End: 2021-06-11 | Stop reason: SDUPTHER

## 2021-04-06 ASSESSMENT — ENCOUNTER SYMPTOMS
VOMITING: 0
TROUBLE SWALLOWING: 0
WHEEZING: 0
VOICE CHANGE: 0
COUGH: 0
SHORTNESS OF BREATH: 0
ABDOMINAL PAIN: 0
NAUSEA: 0

## 2021-04-06 ASSESSMENT — PATIENT HEALTH QUESTIONNAIRE - PHQ9
SUM OF ALL RESPONSES TO PHQ QUESTIONS 1-9: 0
1. LITTLE INTEREST OR PLEASURE IN DOING THINGS: 0
2. FEELING DOWN, DEPRESSED OR HOPELESS: 0
SUM OF ALL RESPONSES TO PHQ QUESTIONS 1-9: 0

## 2021-04-06 NOTE — PROGRESS NOTES
[facility]  [facility]    Attending Physician Statement  I have discussed the case, including pertinent history and exam findings with the resident physician. I agree with the assessment, plan and orders as documented by the resident. I have reviewed all pertinent PMHx, PSHx, FamHx, SocialHx, medications, and allergies and updated history as appropriate. Patient presents for routine follow up of medical problems. Pt presented for the follow up of anxiety  Has been on welbutrin and it was reported causing more irritability. Pt has been off medication for few months, and pt appears to have prominent anxiety disorder. Trial of Buspar small dose BID and follow up 4 weeks for dose adjustment. Need counseling service along with medication. Total time spent 30 minutes    Remainder of medical problems as per resident note.     Darinel Hernandez MD  4/6/2021 9:16 AM

## 2021-04-06 NOTE — PATIENT INSTRUCTIONS
and other senses. · Ask your doctor whether calluses or corns on your feet need to be removed. If you wear loose-fitting shoes because of calluses or corns, you can lose your balance and fall. · Talk to your doctor if you have numbness in your feet. Preventing falls at home  · Remove raised doorway thresholds, throw rugs, and clutter. Repair loose carpet or raised areas in the floor. · Move furniture and electrical cords to keep them out of walking paths. · Use nonskid floor wax, and wipe up spills right away, especially on ceramic tile floors. · If you use a walker or cane, put rubber tips on it. If you use crutches, clean the bottoms of them regularly with an abrasive pad, such as steel wool. · Keep your house well lit, especially Jaunita Ill, and outside walkways. Use night-lights in areas such as hallways and bathrooms. Add extra light switches or use remote switches (such as switches that go on or off when you clap your hands) to make it easier to turn lights on if you have to get up during the night. · Install sturdy handrails on stairways. · Move items in your cabinets so that the things you use a lot are on the lower shelves (about waist level). · Keep a cordless phone and a flashlight with new batteries by your bed. If possible, put a phone in each of the main rooms of your house, or carry a cell phone in case you fall and cannot reach a phone. Or, you can wear a device around your neck or wrist. You push a button that sends a signal for help. · Wear low-heeled shoes that fit well and give your feet good support. Use footwear with nonskid soles. Check the heels and soles of your shoes for wear. Repair or replace worn heels or soles. · Do not wear socks without shoes on wood floors. · Walk on the grass when the sidewalks are slippery. If you live in an area that gets snow and ice in the winter, sprinkle salt on slippery steps and sidewalks.   Preventing falls in the bath  · Install grab bars and nonskid mats inside and outside your shower or tub and near the toilet and sinks. · Use shower chairs and bath benches. · Use a hand-held shower head that will allow you to sit while showering. · Get into a tub or shower by putting the weaker leg in first. Get out of a tub or shower with your strong side first.  · Repair loose toilet seats and consider installing a raised toilet seat to make getting on and off the toilet easier. · Keep your bathroom door unlocked while you are in the shower. Where can you learn more? Go to https://[employer]. org and sign in to your [employer] account. Enter [phone] in the [employer] box to learn more about \"Preventing Falls: Care Instructions. \"     If you do not have an account, please click on the \"Sign Up Now\" link. Current as of: December 7, 2020               Content Version: 12.8  © 2006-2021 Healthwise, [facility]. Care instructions adapted under license by [facility] ([facility]). If you have questions about a medical condition or this instruction, always ask your healthcare professional. [address] any warranty or liability for your use of this information.

## 2021-04-06 NOTE — PROGRESS NOTES
[facility]  InternalMedicine Residency Program  ACC Note      SUBJECTIVE:  CC: had concerns including Anxiety and Discuss Medications. HPI:Yocasta Henry presented to the [facility] for a routine visit. PMHx of   Anxiety, COPD, BPH, Chronic pain, CAD    Patient presents to the clinic today because of symptoms of anxiety. He reports he was previously on Cymbalta because of this. Cymbalta alleviated his symptoms and reports a positive response to it. However after some time he reports he wasn't as responsive on it; breakthrough anxiety symptoms to the point he was ultimately switched from Cymbalta to Wellbutrin. This did not help. He reports while being on Wellbutrin, his symptoms became worse. Reports mainly anxiety symptoms, easy to anger, easy to stress, feeling tense. He does follow with social work because of his anxiety with regular social work visits. When he was taking Wellbutrin he reports taking it about a month before stopping. Enough time to become therapeutic. He has now been of anti anxiety medications for a few months now. Review of Systems   Constitutional: Negative for chills, diaphoresis, fatigue, fever and unexpected weight change. HENT: Negative for trouble swallowing and voice change. Eyes: Negative for visual disturbance. Respiratory: Negative for cough, shortness of breath and wheezing. Cardiovascular: Negative for chest pain. Gastrointestinal: Negative for abdominal pain, nausea and vomiting. Genitourinary: Negative for difficulty urinating, dysuria and hematuria. Musculoskeletal: Negative for myalgias, neck pain and neck stiffness. Neurological: Negative for tremors and syncope. Psychiatric/Behavioral: Negative for hallucinations, self-injury and suicidal ideas. The patient is nervous/anxious. All other systems reviewed and are negative.       Current Outpatient Medications on File Prior to Visit   Medication Sig Dispense Refill    gabapentin (NEURONTIN) 600 MG tablet Take 1 tablet by mouth 3 times daily for 60 days. 180 tablet 2    tamsulosin (FLOMAX) 0.4 MG capsule Take 1 capsule by mouth daily 90 capsule 1    atorvastatin (LIPITOR) 40 MG tablet Take 1 tablet by mouth daily 90 tablet 1    sildenafil (REVATIO) 20 MG tablet Take 1 tablet by mouth as needed (as directed 1-2 tablets) 30 tablet 1    albuterol sulfate HFA (PROVENTIL HFA) 108 (90 Base) MCG/ACT inhaler Inhale 1-2 puffs into the lungs every 4 hours as needed for Wheezing or Shortness of Breath 3 Inhaler 3    tiotropium (SPIRIVA HANDIHALER) 18 MCG inhalation capsule Inhale 1 capsule into the lungs daily 90 capsule 1    fluticasone-salmeterol (ADVAIR DISKUS) 250-50 MCG/DOSE AEPB Inhale 1 puff into the lungs 2 times daily 60 each 5    metoprolol succinate (TOPROL XL) 25 MG extended release tablet Take 1 tablet by mouth once daily 90 tablet 1    aspirin EC 81 MG EC tablet Take 1 tablet by mouth daily 30 tablet 3    omeprazole (PRILOSEC) 40 MG delayed release capsule Take 1 capsule by mouth every morning (before breakfast) 30 capsule 3    traZODone (DESYREL) 100 MG tablet Take 1 tablet by mouth nightly 30 tablet 3     No current facility-administered medications on file prior to visit. OBJECTIVE:    VS: /70 (Site: Left Upper Arm, Position: Sitting, Cuff Size: Large Adult)   Pulse 80   Temp 97.3 °F (36.3 °C) (Oral)   Resp 16   Ht 5' 11\" (1.803 m)   Wt 269 lb (122 kg)   BMI 37.52 kg/m²   Physical Exam  Vitals signs reviewed. Constitutional:       Appearance: Normal appearance. HENT:      Head: Normocephalic and atraumatic. Right Ear: External ear normal.      Left Ear: External ear normal.      Nose: Nose normal.   Eyes:      Extraocular Movements: Extraocular movements intact. Neck:      Musculoskeletal: Neck supple. Cardiovascular:      Rate and Rhythm: Normal rate and regular rhythm. Pulses: Normal pulses.       Heart sounds: Normal heart sounds. No murmur. No gallop. Pulmonary:      Effort: Pulmonary effort is normal.      Breath sounds: Normal breath sounds. Abdominal:      General: Abdomen is flat. Palpations: Abdomen is soft. Musculoskeletal:      Right lower leg: No edema. Left lower leg: No edema. Skin:     General: Skin is warm. Capillary Refill: Capillary refill takes less than 2 seconds. Neurological:      General: No focal deficit present. Mental Status: He is alert. Psychiatric:         Mood and Affect: Mood normal.         Behavior: Behavior normal.         Thought Content: Thought content normal.         Judgment: Judgment normal.         ASSESSMENT/PLAN:    Patient seen and examined for management of anxiety like symptoms. Off antidepressants/anti-anxiety medication for few months now. Previously responded to Cymbalta, but then not much response. Wellbutrin failed. Will now trial patient on Buspar 15 mg BID with follow up in one month to monitor response. Informed patient timeframe of symptom response; it will take about 3-4 weeks to notice symptomatic relief. Patient understood. Encouraged patient to follow with  for further management of anxiety symptoms. Patient understood plan and is agreeable    I have reviewed all pertient PMHx, PSHx, FamHx, Social Hx, medications, and allergies andupdated history as appropriate. Yocasta was seen today for anxiety and discuss medications. Diagnoses and all orders for this visit:    Anxiety    Other orders  -     ibuprofen (ADVIL;MOTRIN) 800 MG tablet; Take 1 tablet by mouth every 6 hours as needed for Pain  -     busPIRone (BUSPAR) 15 MG tablet;  Take 15 mg by mouth 2 times daily          RTC:   In one month for management of chronic symptoms and monitor response to Buspar    I have reviewed my findings andrecommendations with Casa Mcfarlane and Dr Katharina Jon MD PGY-1  4/6/2021 9:49 AM

## 2021-04-16 ENCOUNTER — VIRTUAL VISIT (OUTPATIENT)
Dept: INTERNAL MEDICINE | Age: 56
End: 2021-04-16
Payer: MEDICARE

## 2021-04-16 DIAGNOSIS — F41.9 ANXIETY: Primary | ICD-10-CM

## 2021-04-16 PROBLEM — E08.8 DIABETES MELLITUS DUE TO UNDERLYING CONDITION WITH COMPLICATION, WITHOUT LONG-TERM CURRENT USE OF INSULIN (HCC): Status: ACTIVE | Noted: 2021-04-16

## 2021-04-16 PROCEDURE — 90832 PSYTX W PT 30 MINUTES: CPT | Performed by: SOCIAL WORKER

## 2021-04-16 NOTE — PROGRESS NOTES
TeleMedicine Patient Consent     This visit was performed as a virtual video visit using a synchronous, two-way, audio-video telehealth technology platform. Patient identification was verified at the start of the visit, including the patient's telephone number and physical location. I discussed with the patient the nature of our telehealth visits, that:      1. Due to the nature of an audio- video modality, the only components of a physical exam that could be done are the elements supported by direct observation. 2. The provider will evaluate the patient and recommend diagnostics and treatments based on their assessment. 3. If it was felt that the patient should be evaluated in clinic or an emergency room setting, then they would be directed there. 4. Our sessions are not being recorded and that personal health information is protected. 5. Our team would provide follow up care in person if/when the patient needs it. Patient does agree to proceed with telemedicine consultation. Patient location: home address in [facility]    Physician location: regular office location    This visit was completed virtually using Doxy. me     This visit was performed during the 9914 public health crisis and COVID-19 pandemic. ADULT BEHAVIORAL HEALTH FOLLOW UP  Adrienne DANIELLE Topete,AISHA      Visit Date: 4/16/2021   Time of appointment:  9:00   Time spent with Patient: 20 minutes. This is patient's third appointment. Reason for Consult:  Anxiety     Referring Provider/PCP:    No ref. provider found  Lindsey Marin MD      Pt provided informed consent for the behavioral health program. Discussed with patient model of service to include the limits of confidentiality (i.e. abuse reporting, suicide intervention, etc.) and short-term intervention focused approach. Pt indicated understanding. Tomas Peralta is a 54 y.o. male who presents for follow up of anxiety.  Pt reports anxiety is still present at times but has improved since the medication change a few weeks ago in the clinic. MENTAL STATUS EXAM  Mood was euthymic with congruent affect. Suicidal ideation was denied. Homicidal ideation was denied. Speech: rate - WNL, rhythm - WNL, volume - WNL. Verbalizations were goal directed. Thought processes were intact and goal-oriented without evidence of delusions, hallucinations, obsessions, or gianluca; with little cognitive distortions. Associations were characterized by intact cognitive processes. Pt was oriented to person, place, time, and general circumstances;  recent:  good. Insight and judgment were estimated to be good, AEB, a good  understanding of cyclical maladaptive patterns, and the ability to use insight to inform behavior change. ASSESSMENT  Shima Rosas presented to the appointment today for evaluation and treatment of symptoms of anxiety. He is currently deemed no risk to himself or others and meets criteria for anxiety. Pt was in agreement with recommendations. Pt reports progress with anxiety related to medication adjustment. Pt reports he has been noticing it working better and experiencing less anxiety symptoms. PHQ Scores 4/6/2021 3/4/2021 3/18/2020 3/28/2019   PHQ2 Score 0 0 0 0   PHQ9 Score 0 1 0 0     Interpretation of Total Score Depression Severity: 1-4 = Minimal depression, 5-9 = Mild depression, 10-14 = Moderate depression, 15-19 = Moderately severe depression, 20-27 = Severe depression    How often pt has had thoughts of death or hurting self (if PHQ positive for depression):       ELTON 7 SCORE 3/4/2021   ELTON-7 Total Score 16     Interpretation of ELTON-7 score: 5-9 = mild anxiety, 10-14 = moderate anxiety, 15+ = severe anxiety. Recommend referral to behavioral health for scores 10 or greater. DIAGNOSIS  Yocasta was seen today for anxiety.     Diagnoses and all orders for this visit:    Anxiety          INTERVENTION  Reviewed symptoms of anxiety  Identified triggers using CBT Discussed continued ways to improve anxiety    PLAN  Pt to be seen in one month  If pt continues to report progress pt to be discharged at that time      INTERACTIVE COMPLEXITY  Is interactive complexity present?   No  Reason:  N/A  Additional Supporting Information:  N/A       Electronically signed by Moses Vickers on 4/16/21 at 10:28 AM EDT

## 2021-04-26 ENCOUNTER — TELEPHONE (OUTPATIENT)
Dept: ADMINISTRATIVE | Age: 56
End: 2021-04-26

## 2021-05-10 ENCOUNTER — OFFICE VISIT (OUTPATIENT)
Dept: PHYSICAL MEDICINE AND REHAB | Age: 56
End: 2021-05-10
Payer: MEDICARE

## 2021-05-10 VITALS
DIASTOLIC BLOOD PRESSURE: 75 MMHG | HEIGHT: 71 IN | SYSTOLIC BLOOD PRESSURE: 117 MMHG | WEIGHT: 264 LBS | BODY MASS INDEX: 36.96 KG/M2 | HEART RATE: 89 BPM

## 2021-05-10 DIAGNOSIS — R20.2 NUMBNESS AND TINGLING: ICD-10-CM

## 2021-05-10 DIAGNOSIS — M79.672 BILATERAL FOOT PAIN: Primary | ICD-10-CM

## 2021-05-10 DIAGNOSIS — R20.0 NUMBNESS AND TINGLING: ICD-10-CM

## 2021-05-10 DIAGNOSIS — M79.671 BILATERAL FOOT PAIN: Primary | ICD-10-CM

## 2021-05-10 PROCEDURE — G8417 CALC BMI ABV UP PARAM F/U: HCPCS | Performed by: PHYSICAL MEDICINE & REHABILITATION

## 2021-05-10 PROCEDURE — G8427 DOCREV CUR MEDS BY ELIG CLIN: HCPCS | Performed by: PHYSICAL MEDICINE & REHABILITATION

## 2021-05-10 PROCEDURE — 99214 OFFICE O/P EST MOD 30 MIN: CPT | Performed by: PHYSICAL MEDICINE & REHABILITATION

## 2021-05-10 PROCEDURE — 4004F PT TOBACCO SCREEN RCVD TLK: CPT | Performed by: PHYSICAL MEDICINE & REHABILITATION

## 2021-05-10 PROCEDURE — 3017F COLORECTAL CA SCREEN DOC REV: CPT | Performed by: PHYSICAL MEDICINE & REHABILITATION

## 2021-05-10 NOTE — PROGRESS NOTES
Soledad Chavez, [address] [facility] Physical Medicine and Rehabilitation  [address]  Phone: [phone]  Fax: [phone]    PCP: Mayra Newton MD  Date of visit: 5/10/21    Chief Complaint   Patient presents with    Lower Back Pain    Leg Pain        Yocasta Lloyd is a 54 y.o. male with past medical history as below who presents with bilateral foot pain for many months. He does report low back pain but doesn't associate the two together. Now, the pain is constant but varies and occurs daily. The pain is rated Pain Score:   7, is described as burning, and is located in both feet- toes and soles. The symptoms have been worse since onset. The pain is better with sitting. The pain is worse with walking. There is associated numbness/tingling. There is weakness. There is no bowel/bladder changes.      The prior workup has included: X-ray L spine, MRI L spine 2019    The prior treatment has included:  PT: none    Chiropractic: none    Modalities: none   OTC Tylenol: none   NSAIDS: contraindicated   Opioids: norco   Membrane stabilizers: neurontin  Muscle relaxers: none   Previous injections: bilateral L4-5 TFESI, 2019  Previous surgery at this site: none    Allergies   Allergen Reactions    Latex Rash     Skin turns red       Current Outpatient Medications   Medication Sig Dispense Refill    ibuprofen (ADVIL;MOTRIN) 800 MG tablet Take 1 tablet by mouth every 6 hours as needed for Pain 120 tablet 0    busPIRone (BUSPAR) 15 MG tablet Take 15 mg by mouth 2 times daily 120 tablet 0    tamsulosin (FLOMAX) 0.4 MG capsule Take 1 capsule by mouth daily 90 capsule 1    atorvastatin (LIPITOR) 40 MG tablet Take 1 tablet by mouth daily 90 tablet 1    sildenafil (REVATIO) 20 MG tablet Take 1 tablet by mouth as needed (as directed 1-2 tablets) 30 tablet 1    albuterol sulfate HFA (PROVENTIL HFA) 108 (90 Base) MCG/ACT inhaler Inhale 1-2 puffs into the lungs every 4 hours Hypertension Father     Coronary Art Dis Father         s/p stent    Kidney Cancer Sister     Thyroid Cancer Sister     Hypertension Sister     Hypertension Sister     Cancer Sister     Ovarian Cancer Sister        Social History     Tobacco Use    Smoking status: Current Every Day Smoker     Packs/day: 0.75     Years: 40.00     Pack years: 30.00     Types: Cigarettes    Smokeless tobacco: Never Used   Substance Use Topics    Alcohol use: Yes     Frequency: Monthly or less     Drinks per session: 1 or 2     Binge frequency: Never     Comment: rare    Drug use: Never          Functional Status: The patient is able to ambulate and perform activities of daily living without the use of an assistive device. ROS: For more complete ROS answered by the patient, please see . Constitutional: Denies fevers, chills, night sweats, unintentional weight loss     Skin: Denies rash or skin changes     Eyes: Denies vision changes    Ears/Nose/Throat: Denies nasal congestion or sore throat     Respiratory: Denies SOB or cough     Cardiovascular: Denies CP, palpitations, edema      Gastrointestinal: Denies abdominal pain,  N/V, constipation, or diarrhea    Genitourinary: Denies urinary symptoms    Neurologic: See HPI.     MSK: See HPI. Psychiatric: Denies sleep disturbance, anxiety, depression    Hematologic/Lymphatic/Immunologic: Denies bruising       Physical Exam:   Blood pressure 117/75, pulse 89, height 5' 11\" (1.803 m), weight 264 lb (119.7 kg). General: well developed and well nourished in no acute distress. Body habitus is obese  HEENT: No rhinorrhea, sneezing, yawning, or lacrimation. No scleral icterus or conjunctival injection. Resp: symmetrical chest expansion, unlabored breathing, respirations unlabored. CV: Heart rate is regular. Peripheral pulses are palpable  Lymph: No visible regional lymphadenopathy. Skin: No rashes or ecchymosis. Normal turgor. Psych: Mood is calm. Affect is normal.   Ext: No edema noted     MSK:   Back/Hip Exam:   Inspection: Pelvis was symmetric. Lumbar lordotic curvature was decreased. There was no scoliosis. No ecchymoses or erythema. Palpation: Palpatory exam revealed tenderness along lumbosacral paraspinals, no ttp midline spine, SI joint sulcus, greater trochanters and TFL on both side. Neurological Exam:  Strength:   R  L  Hip Flex  5  5  Knee Ext  5  5  Ankle dorsi  5  5  EHL   5 5  Ankle Plantar  5  5    Sensory:  Decreased sensation LT stocking distribution to mid shin. Decreased vibratory sense bilateral MM  Proprioception slightly impaired. Reflexes:   R  L  Patellar  (2+) (2+)  Ankle Jerk  (0) (0)      Gait is Normal.      Impression:   Geovany Blanca is a 54 y.o. male     1. Bilateral foot pain    2. Numbness and tingling        Plan:   · Will do EMG r/o peripheral neuropathy.  The patient was educated about the diagnosis, prognosis, indications, risks and benefits of treatment. An opportunity to ask questions was given to the patient and questions were answered. The patient agreed to proceed with the recommended treatment as described above.  Follow up at EMG      Thank you for the consultation and for allowing me to participate in the care of this patient.         Sincerely,     Era James DO, [facility]   Board Certified Physical Medicine and Rehabilitation

## 2021-05-19 ENCOUNTER — HOSPITAL ENCOUNTER (OUTPATIENT)
Dept: NEUROLOGY | Age: 56
Discharge: HOME | End: 2021-05-19
Payer: MEDICARE

## 2021-05-19 VITALS — WEIGHT: 264 LBS | HEIGHT: 71 IN | BODY MASS INDEX: 36.96 KG/M2

## 2021-05-19 DIAGNOSIS — M79.672 BILATERAL FOOT PAIN: Primary | ICD-10-CM

## 2021-05-19 DIAGNOSIS — R20.0 NUMBNESS AND TINGLING: ICD-10-CM

## 2021-05-19 DIAGNOSIS — M79.671 BILATERAL FOOT PAIN: ICD-10-CM

## 2021-05-19 DIAGNOSIS — M79.671 BILATERAL FOOT PAIN: Primary | ICD-10-CM

## 2021-05-19 DIAGNOSIS — M54.50 CHRONIC BILATERAL LOW BACK PAIN WITHOUT SCIATICA: ICD-10-CM

## 2021-05-19 DIAGNOSIS — M79.672 BILATERAL FOOT PAIN: ICD-10-CM

## 2021-05-19 DIAGNOSIS — G89.29 CHRONIC BILATERAL LOW BACK PAIN WITHOUT SCIATICA: ICD-10-CM

## 2021-05-19 DIAGNOSIS — R20.2 NUMBNESS AND TINGLING: ICD-10-CM

## 2021-05-19 PROCEDURE — 95909 NRV CNDJ TST 5-6 STUDIES: CPT | Performed by: PHYSICAL MEDICINE & REHABILITATION

## 2021-05-19 PROCEDURE — 95909 NRV CNDJ TST 5-6 STUDIES: CPT

## 2021-05-19 PROCEDURE — 95886 MUSC TEST DONE W/N TEST COMP: CPT

## 2021-05-19 PROCEDURE — 95886 MUSC TEST DONE W/N TEST COMP: CPT | Performed by: PHYSICAL MEDICINE & REHABILITATION

## 2021-05-19 NOTE — PROCEDURES
[address] [facility]  [address] [place] [address], [address] [place] [address]  Phone: [phone]  Fax: [phone]      Referring Provider: Awa Thorne  Primary Care Physician: Tiffany Randall MD  Patient Name: Celia Leach  Patient YOB: 1965  Gender: male  BMI: Body mass index is 36.82 kg/m². Height 5' 11\" (1.803 m), weight 264 lb (119.7 kg). 5/19/2021    Description of clinical problem:   CC: bilateral foot pain    Pain Yes, Numbness/tingling  Yes; Weakness  No       Brief physical exam:   Patient examined. Office note reviewed. No changes. Motor NCS      Nerve / Sites Lat. Lat Diff Amplitude Amp. 1-2 Distance Velocity Temp.    ms ms mV % cm m/s °C   R Peroneal - EDB      Ankle 3.85  6.1 100 8  34.1      Pop fossa 12.08 8.23 6.1 101 39 47 34.3   R Tibial - AH      Ankle 3.49  13.2 100 8  34.1      Pop fossa 13.13 9.64 10.0 75.7 42 44 34.3       Sensory NCS      Nerve / Sites Onset Lat Peak Lat PP Amp Distance Velocity Temp.    ms ms µV cm m/s °C   R Superficial peroneal - Ankle      Lat leg 2.66 3.33 9.3 10 38 34.4   R Sural - Ankle (Calf)      Calf 1.67 2.45 6.8 14 84 34       F  Wave      Nerve F Lat M Lat F-M Lat    ms ms ms   R Peroneal - EDB 48.3 3.8 44.5   R Tibial - AH 50.7 4.4 46.3       H Reflex      Nerve Lat Hmax    ms   R Tibial - Soleus 32.3   L Tibial - Soleus 33.3       EMG         EMG Summary Table     Spontaneous MUAP Recruitment   Muscle IA Fib PSW Fasc H.F. Amp Dur. PPP Pattern   R. Tibialis anterior N None None None None N N N N   R. Extensor hallucis longus N None None None None N N N N   R. Gastrocnemius (Medial head) Incr None None None None N N N N   R. Vastus medialis N None None None None N N N N   R.  Biceps femoris (short head) N None None None None N N N N   R. Lumbar paraspinals (mid) N None None None None N N N N   R. Lumbar paraspinals (low) N None None None None N N N N        Study Limitations:  None     Summary of

## 2021-06-10 DIAGNOSIS — I25.119 CORONARY ARTERY DISEASE INVOLVING NATIVE HEART WITH ANGINA PECTORIS, UNSPECIFIED VESSEL OR LESION TYPE (HCC): ICD-10-CM

## 2021-06-10 RX ORDER — BUSPIRONE HYDROCHLORIDE 15 MG/1
TABLET ORAL
Qty: 60 TABLET | Refills: 0 | OUTPATIENT
Start: 2021-06-10

## 2021-06-10 RX ORDER — IBUPROFEN 800 MG/1
800 TABLET ORAL EVERY 6 HOURS PRN
Qty: 120 TABLET | Refills: 0 | Status: SHIPPED
Start: 2021-06-10 | End: 2021-08-13

## 2021-06-10 RX ORDER — ASPIRIN 81 MG/1
81 TABLET ORAL DAILY
Qty: 30 TABLET | Refills: 0 | Status: SHIPPED
Start: 2021-06-10 | End: 2021-07-20 | Stop reason: SDUPTHER

## 2021-06-10 NOTE — LETTER
[facility]   [address] [facility], [address] [place] [address]  Phone: [phone]  Fax: [phone]      6/10/2021  [address] [place] [address]       Dear Jeffry Crowe:    Your health and follow-up medical care are important to us. Please call our office as soon as possible at [phone] so that we may reschedule your appointment. Please note that if you have three cancelled appointments or do not show for three consecutive appointments, no medication refills or paperwork requests will be honored until an appointment is scheduled and completed. If you have already rescheduled your appointment, please disregard this letter. We look forward to seeing you soon.        Sincerely,         Yoandy Gilbert LPN

## 2021-06-11 RX ORDER — BUSPIRONE HYDROCHLORIDE 15 MG/1
15 TABLET ORAL 2 TIMES DAILY
Qty: 60 TABLET | Refills: 0 | Status: SHIPPED
Start: 2021-06-11 | End: 2021-07-20 | Stop reason: SDUPTHER

## 2021-07-20 DIAGNOSIS — I25.119 CORONARY ARTERY DISEASE INVOLVING NATIVE HEART WITH ANGINA PECTORIS, UNSPECIFIED VESSEL OR LESION TYPE (HCC): ICD-10-CM

## 2021-07-20 RX ORDER — BUSPIRONE HYDROCHLORIDE 15 MG/1
15 TABLET ORAL 2 TIMES DAILY
Qty: 60 TABLET | Refills: 0 | Status: SHIPPED
Start: 2021-07-20 | End: 2021-08-19

## 2021-07-20 RX ORDER — ASPIRIN 81 MG/1
81 TABLET ORAL DAILY
Qty: 30 TABLET | Refills: 0 | Status: SHIPPED
Start: 2021-07-20 | End: 2021-08-05 | Stop reason: SDUPTHER

## 2021-07-20 RX ORDER — OMEPRAZOLE 40 MG/1
40 CAPSULE, DELAYED RELEASE ORAL
Qty: 30 CAPSULE | Refills: 0 | Status: SHIPPED
Start: 2021-07-20 | End: 2021-08-05 | Stop reason: SDUPTHER

## 2021-07-20 NOTE — TELEPHONE ENCOUNTER
Last Appointment:  4-6-21  Future Appointments   Date Time Provider Fatou Megan   8/5/2021  1:00 PM Yelena Nance MD [facility]

## 2021-08-02 NOTE — PROGRESS NOTES
[facility] Internal Medicine      SUBJECTIVE:  Avelina Osler (:  1965) is a 64 y.o. male here for evaluation of the following chief complaint(s):  3 Month Follow-Up, Mass (left side inner thigh small purple bump), and Testicle Pain (scrotum swollen and pain)    Fermín Solitario is a 64 y.o. male with PMHx of CAD, HTN, T2DM, colon polys, chronic back pain, renal cell carcinoma, anxiety, tobacco abuse, who is seen at [facility] clinic today for routine 3-month follow up. Last seen 2021. Patient complains of shortness of breath both at rest and on exertion, associated with dizziness and blurry vision. He is currently on Advair twice daily and Spiriva daily. He reports he is using nebulizer. He states that he needed a albuterol inhaler multiple times on some days. He also states that he has sharp chest pain in substernal area, radiating to left shoulder and arm. SPO2 98% advised to enter 95% during ambulating in the hallway. EKG today unremarkable. Discussed with patient and will order CTA coronary arteries. May need to refer to cardiology if positive. Instructed patient to follow-up with Dr. Chuck Dowling. Increase Advair to 2 puffs twice daily. Continue Spiriva and albuterol inhaler. Patient reports dark skin lesion between scrotum and anus. First noticed in January and decreased in size, tender, no erythema. Concerns for melanoma. Will refer to dermatology for biopsy. Patient complains scrotal tenderness and swelling. Concerns for epididymitis. Will order ciprofloxacin today. PMH  CAD  · On ASA 81 mg daily, lipitor 40 mg daily, metoprolol 25 mg   · EKG today unremarkable  · Stress test in 2019 unremarkable  · Echo 2019 shows LV EF 60%, stage I DD    HTN  · On metoprolol 25 mg   · Controlled. 114/68 mmHg today.   · Need to order blood pressure cuff at next visit    HLD  · On lipitor 40 mg daily  · , , HDL 51,  (2019)  · Lipid panel ordered    SOB, H/o lung cancer s/p right upper and middle lobectomy  · Patient has been complaining of SOB since 2019. Since then he has followed up with pulmonology (Dr Karyna Gutierrez, last seen as OP 8/2019) and cardiology both. Work up have been unremarkable. He has undergone echocardiogram, stress test and PFT in the past 2 years. He has still been smoking. · Spirometry FEV1 71% pre >> 63% post, predicted FEV1/FVC 94% pre >> 92% post  · On albuterol inhaler, advair, and spiriva  · Report using nebulizer, not sure which one  · Use albuterol inhaler up to several times daily  · Continue to have SOB, dizziness, blurry vision  · SpO2 98% at rest and 95% while ambulating  · Increase advair to 2 puffs BID  · Referred to Dr. Karyna Gutierrez today. BPH?  · On flomax 0.4 mg daily  · PSA 1.66 on 8/2019  · Repeat PSA ordered today    Pulmonary hypertension? · On Revatio 20 mg PRN    Chronic back pain with sciatica  · Patient reluctant to go to PMR because he has gone to them in the past and did not like the results of steroid injections. Report PT not helping  · On gabapentin 600 mg TID and ibuprofen 800 Q6h PRN  · MRI lumbar pending  Impression   Multilevel mild posterior bridging hard disc and spur. This is   somewhat greater centrally and on the left at L4-5. In combination   with facet arthrosis and narrowed interpedicular distance, there is   moderate stenosis at this level        Colon polys    Renal cell carcinoma s/p R nephrectomy in 2005    Anxiety  · On buspar 15 mg BID and trazodone 100 mg nightly  · Follow LISW    Tobacco abuse  · Smoking since age of 15 and increased to 1 PPD. Currently 3/4 pack per day  · Smoking cessation counseling.  Patient states that smoking calms him down  · Refused nicotine patch and gum  · Discuss chantix at next visit    HM  -COVID vaccine  -colonoscopy 12/2019: tubular adenoma  -HgA1c 5.5 (3/2020)              Review of Systems   Constitutional: Negative for chills, fever and unexpected weight change. HENT: Negative for sore throat. Eyes: Positive for visual disturbance. Respiratory: Positive for shortness of breath. Cardiovascular: Negative for chest pain, palpitations and leg swelling. Gastrointestinal: Positive for abdominal pain, diarrhea and nausea. Genitourinary: Positive for scrotal swelling. Negative for difficulty urinating, dysuria and hematuria. Musculoskeletal: Positive for back pain. Skin: Positive for color change. Neurological: Positive for headaches. Negative for weakness and light-headedness. Hematological: Negative for adenopathy. Current Outpatient Medications on File Prior to Visit   Medication Sig Dispense Refill    busPIRone (BUSPAR) 15 MG tablet Take 15 mg by mouth 2 times daily 60 tablet 0    ibuprofen (ADVIL;MOTRIN) 800 MG tablet Take 1 tablet by mouth every 6 hours as needed for Pain 120 tablet 0    sildenafil (REVATIO) 20 MG tablet Take 1 tablet by mouth as needed (as directed 1-2 tablets) 30 tablet 1    albuterol sulfate HFA (PROVENTIL HFA) 108 (90 Base) MCG/ACT inhaler Inhale 1-2 puffs into the lungs every 4 hours as needed for Wheezing or Shortness of Breath 3 Inhaler 3    tiotropium (SPIRIVA HANDIHALER) 18 MCG inhalation capsule Inhale 1 capsule into the lungs daily 90 capsule 1     No current facility-administered medications on file prior to visit. OBJECTIVE:    VS:   Vitals:    08/05/21 1329   BP: 114/68   Site: Left Upper Arm   Position: Sitting   Cuff Size: Medium Adult   Pulse: 82   Resp: 20   Temp: 97.5 °F (36.4 °C)   TempSrc: Oral   SpO2: 98%   Weight: 262 lb 12.8 oz (119.2 kg)   Height: 5' 11\" (1.803 m)     Physical Exam  Vitals reviewed. Exam conducted with a chaperone present. Constitutional:       General: He is not in acute distress. Appearance: Normal appearance. HENT:      Head: Normocephalic and atraumatic. Mouth/Throat:      Pharynx: Oropharynx is clear.    Cardiovascular:      Rate and Rhythm: Normal rate and regular rhythm. Pulses: Normal pulses. Heart sounds: Normal heart sounds. No murmur heard. No friction rub. No gallop. Pulmonary:      Effort: Pulmonary effort is normal. No respiratory distress. Breath sounds: Normal breath sounds. No wheezing, rhonchi or rales. Abdominal:      General: Bowel sounds are normal.      Palpations: Abdomen is soft. Tenderness: There is no abdominal tenderness. Genitourinary:     Comments: Scrotal swelling present  Musculoskeletal:         General: No swelling. Cervical back: Neck supple. Right lower leg: No edema. Left lower leg: No edema. Skin:     General: Skin is warm and dry. Comments: Dark raised skin lesion between scrotum and anus, tender, no erythema or drainage. See media. Neurological:      General: No focal deficit present. Mental Status: He is alert. ASSESSMENT/PLAN:  1. Diabetes mellitus screening  -     POCT Hb A1C 5.4% today  2. Mixed hyperlipidemia  -     Continue lipitor 40 mg daily   - LIPID PANEL; Future  -     CT CARDIAC CALCIUM SCORING; Future  3. Coronary artery disease involving native heart with angina pectoris, unspecified vessel or lesion type (HCC)  -     Continue aspirin EC 81 mg daily and lipitor 40 mg daily  - Reports substernal sharp pain radiated to left shoulder and arm  -     EKG 12 lead today unremarkable  -     CT CARDIAC CALCIUM SCORING; Future  -     BASIC METABOLIC PANEL; Future  4. Chronic pain syndrome  -     gabapentin (NEURONTIN) 600 MG tablet; Take 1 tablet by mouth 3 times daily for 60 days. , Disp-180 tablet, R-2Normal  5. Hypertension, unspecified type  -     Continue metoprolol succinate (TOPROL XL) 25 mg daily  -     BP controlled  6. Benign prostatic hyperplasia (BPH) with straining on urination  -     PSA SCREENING; Future  -     Continue tamsulosin (FLOMAX) 0.4 mg daily  7. Sleep disorder  -     traZODone (DESYREL) 100 MG tablet;  Take 1 tablet by mouth nightly, Disp-30 tablet, R-3Normal  8. Hx of renal cell carcinoma  9. Tobacco abuse  -     CT CARDIAC CALCIUM SCORING; Future  10 . Prostate cancer screening  -     PSA SCREENING; Future  11. Nevus  -    Growing in size concerning for melanoma  - Sejal Palacios DO, Dermatology, [place] [facility]) for biopsy  12. Near sighted, bilateral  -     Demarcus Delcid MD, Ophthalmology, [place] [facility])  13. SOB  -    Increase advair to 2 puffs per day. Continue spiriva and albuterol inhaler   - [facility] - Mack Espinosa MD, Pulmonary, [facility]  14. Tobacco abuse   -     Cessation counseling  - Refuse nicotine patch and gum  - Discuss chantix at next visit  15. Epididymitis        - Ciprofloxacin 500 mg BID for 7 days    RTC:  Return in about 3 months (around 11/5/2021).       I have reviewed my findings and recommendations with Morteza Dupree and Dr. Bethany Londono MD   8/5/2021 10:13 PM

## 2021-08-05 ENCOUNTER — OFFICE VISIT (OUTPATIENT)
Dept: INTERNAL MEDICINE | Age: 56
End: 2021-08-05
Payer: MEDICARE

## 2021-08-05 VITALS
HEIGHT: 71 IN | HEART RATE: 82 BPM | SYSTOLIC BLOOD PRESSURE: 114 MMHG | RESPIRATION RATE: 20 BRPM | WEIGHT: 262.8 LBS | OXYGEN SATURATION: 98 % | DIASTOLIC BLOOD PRESSURE: 68 MMHG | TEMPERATURE: 97.5 F | BODY MASS INDEX: 36.79 KG/M2

## 2021-08-05 DIAGNOSIS — R39.16 BENIGN PROSTATIC HYPERPLASIA (BPH) WITH STRAINING ON URINATION: ICD-10-CM

## 2021-08-05 DIAGNOSIS — G47.9 SLEEP DISORDER: ICD-10-CM

## 2021-08-05 DIAGNOSIS — E78.2 MIXED HYPERLIPIDEMIA: ICD-10-CM

## 2021-08-05 DIAGNOSIS — Z12.5 PROSTATE CANCER SCREENING: ICD-10-CM

## 2021-08-05 DIAGNOSIS — E78.9 LIPID DISORDER: ICD-10-CM

## 2021-08-05 DIAGNOSIS — E66.01 SEVERE OBESITY (BMI 35.0-35.9 WITH COMORBIDITY) (HCC): ICD-10-CM

## 2021-08-05 DIAGNOSIS — J44.1 COPD EXACERBATION (HCC): ICD-10-CM

## 2021-08-05 DIAGNOSIS — G89.4 CHRONIC PAIN SYNDROME: ICD-10-CM

## 2021-08-05 DIAGNOSIS — R79.9 ABNORMAL FINDING OF BLOOD CHEMISTRY, UNSPECIFIED: ICD-10-CM

## 2021-08-05 DIAGNOSIS — N40.1 BENIGN PROSTATIC HYPERPLASIA (BPH) WITH STRAINING ON URINATION: ICD-10-CM

## 2021-08-05 DIAGNOSIS — I10 HYPERTENSION, UNSPECIFIED TYPE: ICD-10-CM

## 2021-08-05 DIAGNOSIS — D22.9 NEVUS: ICD-10-CM

## 2021-08-05 DIAGNOSIS — Z72.0 TOBACCO ABUSE: ICD-10-CM

## 2021-08-05 DIAGNOSIS — H52.13 NEAR SIGHTED, BILATERAL: ICD-10-CM

## 2021-08-05 DIAGNOSIS — Z85.528 HX OF RENAL CELL CARCINOMA: ICD-10-CM

## 2021-08-05 DIAGNOSIS — Z13.1 DIABETES MELLITUS SCREENING: Primary | ICD-10-CM

## 2021-08-05 DIAGNOSIS — N45.1 EPIDIDYMITIS: ICD-10-CM

## 2021-08-05 DIAGNOSIS — I25.119 CORONARY ARTERY DISEASE INVOLVING NATIVE HEART WITH ANGINA PECTORIS, UNSPECIFIED VESSEL OR LESION TYPE (HCC): ICD-10-CM

## 2021-08-05 PROBLEM — E08.8 DIABETES MELLITUS DUE TO UNDERLYING CONDITION WITH COMPLICATION, WITHOUT LONG-TERM CURRENT USE OF INSULIN (HCC): Status: RESOLVED | Noted: 2021-04-16 | Resolved: 2021-08-05

## 2021-08-05 LAB — HBA1C MFR BLD: 5.4 %

## 2021-08-05 PROCEDURE — 93005 ELECTROCARDIOGRAM TRACING: CPT | Performed by: INTERNAL MEDICINE

## 2021-08-05 PROCEDURE — 83036 HEMOGLOBIN GLYCOSYLATED A1C: CPT | Performed by: INTERNAL MEDICINE

## 2021-08-05 PROCEDURE — 99212 OFFICE O/P EST SF 10 MIN: CPT | Performed by: INTERNAL MEDICINE

## 2021-08-05 PROCEDURE — 99215 OFFICE O/P EST HI 40 MIN: CPT | Performed by: INTERNAL MEDICINE

## 2021-08-05 RX ORDER — GABAPENTIN 600 MG/1
600 TABLET ORAL 3 TIMES DAILY
Qty: 180 TABLET | Refills: 2 | Status: SHIPPED
Start: 2021-08-05 | End: 2021-12-19 | Stop reason: SDUPTHER

## 2021-08-05 RX ORDER — CIPROFLOXACIN 500 MG/1
500 TABLET, FILM COATED ORAL 2 TIMES DAILY
Qty: 14 TABLET | Refills: 0 | Status: SHIPPED | OUTPATIENT
Start: 2021-08-05 | End: 2021-08-12

## 2021-08-05 RX ORDER — TAMSULOSIN HYDROCHLORIDE 0.4 MG/1
0.4 CAPSULE ORAL DAILY
Qty: 90 CAPSULE | Refills: 1 | Status: SHIPPED
Start: 2021-08-05 | End: 2021-12-20 | Stop reason: SDUPTHER

## 2021-08-05 RX ORDER — METOPROLOL SUCCINATE 25 MG/1
TABLET, EXTENDED RELEASE ORAL
Qty: 90 TABLET | Refills: 1 | Status: SHIPPED
Start: 2021-08-05 | End: 2021-12-20 | Stop reason: SDUPTHER

## 2021-08-05 RX ORDER — ATORVASTATIN CALCIUM 40 MG/1
40 TABLET, FILM COATED ORAL DAILY
Qty: 90 TABLET | Refills: 1 | Status: SHIPPED
Start: 2021-08-05 | End: 2021-12-20 | Stop reason: SDUPTHER

## 2021-08-05 RX ORDER — ASPIRIN 81 MG/1
81 TABLET ORAL DAILY
Qty: 30 TABLET | Refills: 0 | Status: SHIPPED
Start: 2021-08-05 | End: 2021-09-27

## 2021-08-05 RX ORDER — TRAZODONE HYDROCHLORIDE 100 MG/1
100 TABLET ORAL NIGHTLY
Qty: 30 TABLET | Refills: 3 | Status: SHIPPED
Start: 2021-08-05 | End: 2021-12-20 | Stop reason: SDUPTHER

## 2021-08-05 RX ORDER — OMEPRAZOLE 40 MG/1
40 CAPSULE, DELAYED RELEASE ORAL
Qty: 30 CAPSULE | Refills: 0 | Status: SHIPPED
Start: 2021-08-05 | End: 2021-12-08

## 2021-08-05 ASSESSMENT — ENCOUNTER SYMPTOMS
COLOR CHANGE: 1
BACK PAIN: 1
SHORTNESS OF BREATH: 1
SORE THROAT: 0
NAUSEA: 1
DIARRHEA: 1
ABDOMINAL PAIN: 1

## 2021-08-05 NOTE — PROGRESS NOTES
[facility]     Attending Physician Statement  I have discussed the case, including pertinent history and exam findings with the resident physician. I have seen and examined the patient and the key elements of the encounter have been performed by me. I agree with the assessment, plan and orders as documented by the resident. I have reviewed all pertinent PMHx, PSHx, FamHx, SocialHx, medications, and allergies and updated history as appropriate. Patient here for routine follow up of medical problems. 1. HTN: controlled at this time, continue current medications   2. HLD:continue current medications    The 10-year ASCVD risk score (Lon Yen, et al., 2013) is: 9.9%    Values used to calculate the score:      Age: 64 years      Sex: Male      Is Non- : No      Diabetic: No      Tobacco smoker: Yes      Systolic Blood Pressure: 369 mmHg      Is BP treated: No      HDL Cholesterol: 51 mg/dL      Total Cholesterol: 221 mg/dL    3. Hx of Lung Cancer s/p Right Upper and Middle Lobectomy: discussed patient needs to follow with Dr. Elmo Maldonado; has not seen pulmonologist in 2 years   4. Hx CAD: normal stress test in 2019; CTA coronary arteries 2/2 CORTEZ today with radiation to neck and jaw; based on results will refer to cardiology vs stress test   5. COPD: increase advair to maximum dosage 2/2 continued need for albuterol as well as recurrent CORTEZ; continue LAMA, and albuterol; patient was not hypoxic while ambulating today (stayed SpO2: 95% during ambulation in hallway),   6. Tobacco Abuse: will discuss chantix at next appointment; enforced cessation today   7. Epididymitis: ciprofloxacin   8. Myopia: ophthalmology referral   9. Skin Lesion: dermatology referral for biopsy 2/2 melanoma concern    Remainder of medical problems as per resident note.     Doris Palmer DO  8/5/2021 1:52 PM    Encounter time including independent chart review, discussion with patient, interpreting test results and/or external communications: 61'

## 2021-08-05 NOTE — PROGRESS NOTES
Patient given instruction by Dr Jacob Encarnacion. 3 month  follow up  Will be scheduled. Printed AVS given to patient.

## 2021-08-05 NOTE — PATIENT INSTRUCTIONS
Lab Tests to get prior to next Visit  1. Blood work   2. Chest CT of heart     Changes in Medications  1. Stop Advair and start new advair 500 twice per day  2. Ciprofloxacin for Scrotal Pain     Referrals   1. Dermatology  2. [facility] for Lungs    Please follow up 3 months with our clinic. Please call if your symptoms worsen or fail to improve.

## 2021-08-06 ENCOUNTER — TELEPHONE (OUTPATIENT)
Dept: INTERNAL MEDICINE | Age: 56
End: 2021-08-06

## 2021-08-06 NOTE — TELEPHONE ENCOUNTER
I spoke to patient at 10:20. He is aware of the appointment with Dr. Christophe Barton 8-12-21 @ 12:30. He is also he needs to make sure if he is not going to call and cancelled. I gave him the phone #. He was agreeable.

## 2021-08-09 ENCOUNTER — HOSPITAL ENCOUNTER (OUTPATIENT)
Age: 56
Discharge: HOME | End: 2021-08-09
Payer: MEDICARE

## 2021-08-09 DIAGNOSIS — I25.119 CORONARY ARTERY DISEASE INVOLVING NATIVE HEART WITH ANGINA PECTORIS, UNSPECIFIED VESSEL OR LESION TYPE (HCC): ICD-10-CM

## 2021-08-09 DIAGNOSIS — N40.1 BENIGN PROSTATIC HYPERPLASIA (BPH) WITH STRAINING ON URINATION: ICD-10-CM

## 2021-08-09 DIAGNOSIS — R39.16 BENIGN PROSTATIC HYPERPLASIA (BPH) WITH STRAINING ON URINATION: ICD-10-CM

## 2021-08-09 DIAGNOSIS — Z13.1 DIABETES MELLITUS SCREENING: ICD-10-CM

## 2021-08-09 DIAGNOSIS — E78.2 MIXED HYPERLIPIDEMIA: ICD-10-CM

## 2021-08-09 DIAGNOSIS — I10 HYPERTENSION, UNSPECIFIED TYPE: ICD-10-CM

## 2021-08-09 DIAGNOSIS — Z12.5 PROSTATE CANCER SCREENING: ICD-10-CM

## 2021-08-09 LAB
ANION GAP SERPL CALCULATED.3IONS-SCNC: 9 MMOL/L (ref 7–16)
BUN BLDV-MCNC: 13 MG/DL (ref 6–20)
CALCIUM SERPL-MCNC: 9.3 MG/DL (ref 8.6–10.2)
CHLORIDE BLD-SCNC: 105 MMOL/L (ref 98–107)
CHOLESTEROL, TOTAL: 197 MG/DL (ref 0–199)
CO2: 25 MMOL/L (ref 22–29)
CREAT SERPL-MCNC: 1 MG/DL (ref 0.7–1.2)
GFR AFRICAN AMERICAN: >60
GFR NON-AFRICAN AMERICAN: >60 ML/MIN/1.73
GLUCOSE BLD-MCNC: 111 MG/DL (ref 74–99)
HCT VFR BLD CALC: 46.4 % (ref 37–54)
HDLC SERPL-MCNC: 40 MG/DL
HEMOGLOBIN: 15.5 G/DL (ref 12.5–16.5)
LDL CHOLESTEROL CALCULATED: 99 MG/DL (ref 0–99)
MCH RBC QN AUTO: 31.1 PG (ref 26–35)
MCHC RBC AUTO-ENTMCNC: 33.4 % (ref 32–34.5)
MCV RBC AUTO: 93 FL (ref 80–99.9)
PDW BLD-RTO: 12.5 FL (ref 11.5–15)
PLATELET # BLD: 313 E9/L (ref 130–450)
PMV BLD AUTO: 9.9 FL (ref 7–12)
POTASSIUM SERPL-SCNC: 4 MMOL/L (ref 3.5–5)
PROSTATE SPECIFIC ANTIGEN: 1.25 NG/ML (ref 0–4)
RBC # BLD: 4.99 E12/L (ref 3.8–5.8)
SODIUM BLD-SCNC: 139 MMOL/L (ref 132–146)
TRIGL SERPL-MCNC: 290 MG/DL (ref 0–149)
VLDLC SERPL CALC-MCNC: 58 MG/DL
WBC # BLD: 10.7 E9/L (ref 4.5–11.5)

## 2021-08-09 PROCEDURE — 80061 LIPID PANEL: CPT

## 2021-08-09 PROCEDURE — 80048 BASIC METABOLIC PNL TOTAL CA: CPT

## 2021-08-09 PROCEDURE — G0103 PSA SCREENING: HCPCS

## 2021-08-09 PROCEDURE — 85027 COMPLETE CBC AUTOMATED: CPT

## 2021-08-09 PROCEDURE — 36415 COLL VENOUS BLD VENIPUNCTURE: CPT

## 2021-08-10 ENCOUNTER — TELEPHONE (OUTPATIENT)
Dept: INTERNAL MEDICINE | Age: 56
End: 2021-08-10

## 2021-08-10 NOTE — TELEPHONE ENCOUNTER
----- Message from Willie Baeza., DO sent at 8/10/2021  8:22 AM EDT -----  Please let patient know that his prostate testing was stable as compared to a year ago; his triglycerides are still elevated and we may need to start him on another medication at his next visit. All his other blood work is normal.  Thank you!     Alex Turcios

## 2021-08-11 NOTE — TELEPHONE ENCOUNTER
Called and spoke with pt via phone. Pt notified of note per Dr. Sourav Rosario. Pt denies any questions or concerns.

## 2021-08-13 RX ORDER — IBUPROFEN 800 MG/1
800 TABLET ORAL EVERY 6 HOURS PRN
Qty: 120 TABLET | Refills: 0 | Status: SHIPPED
Start: 2021-08-13 | End: 2021-09-27

## 2021-08-19 ENCOUNTER — TELEPHONE (OUTPATIENT)
Dept: INTERNAL MEDICINE | Age: 56
End: 2021-08-19

## 2021-08-19 NOTE — TELEPHONE ENCOUNTER
Last Appointment:  8/5/21 RTO 3 months  Future Appointments   Date Time Provider Fatou Guzman   8/24/2021  3:00 PM [facility] CT SCAN 3 SEYZ CT [facility]   11/22/2021  1:00 PM [facility]

## 2021-08-19 NOTE — TELEPHONE ENCOUNTER
----- Message from Libby Holden sent at 8/19/2021  2:09 PM EDT -----  Subject: Refill Request    QUESTIONS  Name of Medication? aspirin EC 81 MG EC tablet  Patient-reported dosage and instructions? Once a day  How many days do you have left? 3  Preferred Pharmacy? [facility]  Pharmacy phone number (if available)? [phone]  ---------------------------------------------------------------------------  --------------,  Name of Medication? busPIRone (BUSPAR) 15 MG tablet  Patient-reported dosage and instructions? 2X a day  How many days do you have left? 1  Preferred Pharmacy? [facility]  Pharmacy phone number (if available)? [phone]  ---------------------------------------------------------------------------  --------------  CALL BACK INFO  What is the best way for the office to contact you? OK to leave message on   voicemail  Preferred Call Back Phone Number?  [phone]

## 2021-08-20 ENCOUNTER — TELEPHONE (OUTPATIENT)
Dept: INTERNAL MEDICINE | Age: 56
End: 2021-08-20

## 2021-08-20 RX ORDER — BUSPIRONE HYDROCHLORIDE 15 MG/1
TABLET ORAL
Qty: 180 TABLET | Refills: 0 | Status: SHIPPED
Start: 2021-08-20 | End: 2021-11-16

## 2021-09-16 ENCOUNTER — HOSPITAL ENCOUNTER (OUTPATIENT)
Dept: CT IMAGING | Age: 56
Discharge: HOME | End: 2021-09-18
Payer: MEDICARE

## 2021-09-16 DIAGNOSIS — Z72.0 TOBACCO ABUSE: ICD-10-CM

## 2021-09-16 DIAGNOSIS — I10 HYPERTENSION, UNSPECIFIED TYPE: ICD-10-CM

## 2021-09-16 DIAGNOSIS — E66.01 SEVERE OBESITY (BMI 35.0-35.9 WITH COMORBIDITY) (HCC): ICD-10-CM

## 2021-09-16 DIAGNOSIS — I25.119 CORONARY ARTERY DISEASE INVOLVING NATIVE HEART WITH ANGINA PECTORIS, UNSPECIFIED VESSEL OR LESION TYPE (HCC): ICD-10-CM

## 2021-09-16 DIAGNOSIS — E78.2 MIXED HYPERLIPIDEMIA: ICD-10-CM

## 2021-09-16 DIAGNOSIS — E78.9 LIPID DISORDER: ICD-10-CM

## 2021-09-16 PROCEDURE — 75571 CT HRT W/O DYE W/CA TEST: CPT

## 2021-09-16 PROCEDURE — 75571 CT HRT W/O DYE W/CA TEST: CPT | Performed by: INTERNAL MEDICINE

## 2021-09-20 ENCOUNTER — TELEPHONE (OUTPATIENT)
Dept: INTERNAL MEDICINE | Age: 56
End: 2021-09-20

## 2021-09-20 NOTE — TELEPHONE ENCOUNTER
----- Message from Gayatri Grossman., DO sent at 9/20/2021  8:26 AM EDT -----  Please let patient know that his CT to evaluate the blood flow to the heart was normal at this time and did not show any concerns for reduced blood flow to the heart. Thank you!

## 2021-09-20 NOTE — TELEPHONE ENCOUNTER
Patient notified that Ct scan to evaluate the blood flow to the heart was normal at this time and did not show any concerns for reduced blood flow to the heart.

## 2021-09-26 DIAGNOSIS — I25.119 CORONARY ARTERY DISEASE INVOLVING NATIVE HEART WITH ANGINA PECTORIS, UNSPECIFIED VESSEL OR LESION TYPE (HCC): ICD-10-CM

## 2021-09-27 RX ORDER — HYDROGEN PEROXIDE 2.65 ML/100ML
LIQUID ORAL; TOPICAL
Qty: 30 TABLET | Refills: 2 | Status: SHIPPED
Start: 2021-09-27 | End: 2021-12-08

## 2021-09-27 RX ORDER — IBUPROFEN 800 MG/1
800 TABLET ORAL EVERY 6 HOURS PRN
Qty: 120 TABLET | Refills: 1 | Status: SHIPPED
Start: 2021-09-27 | End: 2021-12-20 | Stop reason: SDUPTHER

## 2021-09-27 NOTE — TELEPHONE ENCOUNTER
Last Appointment:  8/5/21  Future Appointments   Date Time Provider Fatou Guzman   11/22/2021  1:00 PM Sandra Nunez DO [facility]   12/20/2021  1:30 PM Yosvany Oneil MD [facility] [place]

## 2021-11-16 RX ORDER — BUSPIRONE HYDROCHLORIDE 15 MG/1
TABLET ORAL
Qty: 180 TABLET | Refills: 0 | Status: SHIPPED
Start: 2021-11-16 | End: 2022-02-15

## 2021-12-08 DIAGNOSIS — I25.119 CORONARY ARTERY DISEASE INVOLVING NATIVE HEART WITH ANGINA PECTORIS, UNSPECIFIED VESSEL OR LESION TYPE (HCC): ICD-10-CM

## 2021-12-08 RX ORDER — ASPIRIN 81 MG/1
TABLET ORAL
Qty: 90 TABLET | Refills: 0 | Status: SHIPPED
Start: 2021-12-08 | End: 2022-03-02 | Stop reason: SDUPTHER

## 2021-12-08 RX ORDER — OMEPRAZOLE 40 MG/1
CAPSULE, DELAYED RELEASE ORAL
Qty: 30 CAPSULE | Refills: 0 | Status: SHIPPED
Start: 2021-12-08 | End: 2021-12-19 | Stop reason: SDUPTHER

## 2021-12-08 NOTE — TELEPHONE ENCOUNTER
Last Appointment:  8/5/21  Future Appointments   Date Time Provider Fatou Guzman   12/20/2021  1:30 PM Haja Solis MD [facility]

## 2021-12-08 NOTE — TELEPHONE ENCOUNTER
Last Appointment:  8/5/21  Future Appointments   Date Time Provider Fatou Guzman   12/20/2021  1:30 PM Jalen Angeles MD [facility]

## 2021-12-14 NOTE — PROGRESS NOTES
lipitor 40 mg daily  · , LDL 99, HDL 40,  (8/2021) <--- , , HDL 51,  (5/2019)     SOB, H/o lung cancer s/p right upper and middle lobectomy  · Patient has been complaining of SOB since 2019. Since then he has followed up with pulmonology (Dr Lisa Walters, last seen as OP 8/2019) and cardiology both. Work up have been unremarkable. He has undergone echocardiogram, stress test and PFT in the past 2 years. He has still been smoking. · Spirometry FEV1 71% pre >> 63% post, predicted FEV1/FVC 94% pre >> 92% post  · On albuterol inhaler, advair, and spiriva  · Report using nebulizer, not sure which one  · Use albuterol inhaler up to several times daily  · Continue to have SOB, dizziness, blurry vision  · SpO2 98% at rest and 95% while ambulating  · Increase advair to 2 puffs BID  · Referred to Dr. Lisa Walters.     BPH?  · On flomax 0.4 mg daily  · PSA 1.25 (8/2021) <--- 1.66 (8/2019)     Pulmonary hypertension? · On Revatio 20 mg PRN     GERD  · On omeprazole 40 mg daily    Chronic back pain with sciatica  · Patient reluctant to go to PMR because he has gone to them in the past and did not like the results of steroid injections. Report PT not helping  · On gabapentin 600 mg TID and ibuprofen 800 Q6h PRN  · MRI lumbar pending  Impression   Multilevel mild posterior bridging hard disc and spur. This is   somewhat greater centrally and on the left at L4-5. In combination   with facet arthrosis and narrowed interpedicular distance, there is   moderate stenosis at this level         Colon polys  · Colonoscope 12/2019: tubular adenoma     Renal cell carcinoma s/p R nephrectomy in 2005     Anxiety  · On buspar 15 mg BID and trazodone 100 mg nightly  · Follow LISW     Tobacco abuse  · Smoking since age of 15 and increased to 1 PPD. Currently 3/4 pack per day  · Smoking cessation counseling.  Patient states that smoking calms him down  · Refused nicotine patch and gum  · Discuss chantix at next visit       -COVID vaccine  -colonoscopy 12/2019: tubular adenoma  -HgA1c 5.4 (8/2021) <--- 5.5 (3/2020)              Review of Systems   Constitutional: Negative for chills and fatigue. HENT: Negative for rhinorrhea. Respiratory: Negative for cough and shortness of breath. Cardiovascular: Negative for chest pain and leg swelling. Gastrointestinal: Positive for abdominal pain and diarrhea. Negative for nausea and vomiting. Genitourinary: Negative for difficulty urinating. Musculoskeletal: Positive for back pain. Negative for joint swelling. Skin: Negative for rash. Neurological: Negative for light-headedness and headaches. Hematological: Negative for adenopathy. Psychiatric/Behavioral: Negative for dysphoric mood, sleep disturbance and suicidal ideas. Current Outpatient Medications on File Prior to Visit   Medication Sig Dispense Refill    SONIA ASPIRIN EC LOW DOSE 81 MG EC tablet Take 1 tablet by mouth once daily 90 tablet 0    busPIRone (BUSPAR) 15 MG tablet Take 1 tablet by mouth twice daily 180 tablet 0     No current facility-administered medications on file prior to visit. OBJECTIVE:    VS:   Vitals:    12/20/21 1329   BP: 116/76   Site: Left Upper Arm   Position: Sitting   Cuff Size: Large Adult   Pulse: 70   Resp: 16   Temp: 98 °F (36.7 °C)   TempSrc: Oral   Weight: 271 lb (122.9 kg)   Height: 5' 11\" (1.803 m)     Physical Exam  Constitutional:       General: He is not in acute distress. Appearance: Normal appearance. HENT:      Head: Normocephalic and atraumatic. Mouth/Throat:      Mouth: Mucous membranes are moist.   Eyes:      Pupils: Pupils are equal, round, and reactive to light. Cardiovascular:      Rate and Rhythm: Normal rate and regular rhythm. Pulses: Normal pulses. Heart sounds: Normal heart sounds. No murmur heard. No friction rub. No gallop.     Pulmonary:      Effort: Pulmonary effort is normal.      Breath sounds: Normal breath sounds. No wheezing, rhonchi or rales. Abdominal:      General: Bowel sounds are normal.      Palpations: Abdomen is soft. Tenderness: There is abdominal tenderness in the epigastric area and left upper quadrant. Musculoskeletal:      Cervical back: Neck supple. Right lower leg: No edema. Left lower leg: No edema. Skin:     General: Skin is warm and dry. Neurological:      General: No focal deficit present. Mental Status: He is alert. Psychiatric:         Mood and Affect: Mood normal.            ASSESSMENT/PLAN:  1. Fatigue, unspecified type  -     CBC WITH AUTO DIFFERENTIAL; Future  -     TSH; Future  -     COMPREHENSIVE METABOLIC PANEL; Future  - Vitamin B12 and folate; Future  -     Baseline Diagnostic Sleep Study; Future  2. Hyperlipidemia   -     , LDL 99, HDL 40,  (8/2021)  - atorvastatin (LIPITOR) 40 MG tablet; Take 1 tablet by mouth daily, Disp-90 tablet, R-1Normal  3. Chronic pain syndrome  -     gabapentin (NEURONTIN) 600 MG tablet; Take 1 tablet by mouth 3 times daily for 60 days. , Disp-270 tablet, R-1Normal  -     ibuprofen (ADVIL;MOTRIN) 800 MG tablet; Take 1 tablet by mouth every 6 hours as needed for Pain, Disp-120 tablet, R-3Normal  4. Hypertension, unspecified type  -    controlled   - metoprolol succinate (TOPROL XL) 25 MG extended release tablet; Take 1 tablet by mouth once daily, Disp-90 tablet, R-1Normal  6. Benign prostatic hyperplasia (BPH) with straining on urination  -     tamsulosin (FLOMAX) 0.4 MG capsule; Take 1 capsule by mouth daily, Disp-90 capsule, R-1Normal  7. Sleep disorder  -     traZODone (DESYREL) 100 MG tablet; Take 1 tablet by mouth nightly, Disp-90 tablet, R-1Normal  8. Erectile dysfunction, unspecified erectile dysfunction type  -     sildenafil (REVATIO) 20 MG tablet; Take 1 tablet by mouth as needed (as directed 1-2 tablets), Disp-30 tablet, R-3Normal  9.  Shortness of breath  -    stable   - tiotropium (SPIRIVA HANDIHALER) 18 MCG inhalation capsule; Inhale 1 capsule into the lungs daily, Disp-90 capsule, R-1Normal  -     albuterol sulfate HFA (PROVENTIL HFA) 108 (90 Base) MCG/ACT inhaler; Inhale 1-2 puffs into the lungs every 4 hours as needed for Wheezing or Shortness of Breath, Disp-1 each, R-5Normal  -     ADVAIR DISKUS 500-50 MCG/DOSE diskus inhaler; Inhale 1 puff into the lungs every 12 hours, Disp-1 each, R-5, DAWNormal       RTC:  Return in about 3 months (around 3/20/2022), or Follow up. I have reviewed my findings and recommendations with Dickson Crenshaw and Dr. Cathleen Jordan.     Benny Landrum MD   12/20/2021 6:10 PM

## 2021-12-19 RX ORDER — METOPROLOL SUCCINATE 25 MG/1
TABLET, EXTENDED RELEASE ORAL
Qty: 90 TABLET | Refills: 1 | Status: CANCELLED | OUTPATIENT
Start: 2021-12-19

## 2021-12-19 RX ORDER — BUSPIRONE HYDROCHLORIDE 15 MG/1
TABLET ORAL
Qty: 180 TABLET | Refills: 1 | Status: CANCELLED | OUTPATIENT
Start: 2021-12-19

## 2021-12-19 RX ORDER — ATORVASTATIN CALCIUM 40 MG/1
40 TABLET, FILM COATED ORAL DAILY
Qty: 90 TABLET | Refills: 1 | Status: CANCELLED | OUTPATIENT
Start: 2021-12-19

## 2021-12-19 RX ORDER — TAMSULOSIN HYDROCHLORIDE 0.4 MG/1
0.4 CAPSULE ORAL DAILY
Qty: 90 CAPSULE | Refills: 1 | Status: CANCELLED | OUTPATIENT
Start: 2021-12-19

## 2021-12-19 RX ORDER — ASPIRIN 81 MG/1
TABLET ORAL
Qty: 90 TABLET | Refills: 1 | Status: CANCELLED | OUTPATIENT
Start: 2021-12-19

## 2021-12-20 ENCOUNTER — OFFICE VISIT (OUTPATIENT)
Dept: INTERNAL MEDICINE | Age: 56
End: 2021-12-20
Payer: MEDICARE

## 2021-12-20 VITALS
BODY MASS INDEX: 37.94 KG/M2 | WEIGHT: 271 LBS | RESPIRATION RATE: 16 BRPM | SYSTOLIC BLOOD PRESSURE: 116 MMHG | DIASTOLIC BLOOD PRESSURE: 76 MMHG | HEIGHT: 71 IN | HEART RATE: 70 BPM | TEMPERATURE: 98 F

## 2021-12-20 DIAGNOSIS — R53.83 FATIGUE, UNSPECIFIED TYPE: Primary | ICD-10-CM

## 2021-12-20 DIAGNOSIS — N52.9 ERECTILE DYSFUNCTION, UNSPECIFIED ERECTILE DYSFUNCTION TYPE: ICD-10-CM

## 2021-12-20 DIAGNOSIS — I25.119 CORONARY ARTERY DISEASE INVOLVING NATIVE HEART WITH ANGINA PECTORIS, UNSPECIFIED VESSEL OR LESION TYPE (HCC): ICD-10-CM

## 2021-12-20 DIAGNOSIS — G89.4 CHRONIC PAIN SYNDROME: ICD-10-CM

## 2021-12-20 DIAGNOSIS — G47.9 SLEEP DISORDER: ICD-10-CM

## 2021-12-20 DIAGNOSIS — R39.16 BENIGN PROSTATIC HYPERPLASIA (BPH) WITH STRAINING ON URINATION: ICD-10-CM

## 2021-12-20 DIAGNOSIS — J44.1 COPD EXACERBATION (HCC): ICD-10-CM

## 2021-12-20 DIAGNOSIS — I10 HYPERTENSION, UNSPECIFIED TYPE: ICD-10-CM

## 2021-12-20 DIAGNOSIS — E78.9 LIPID DISORDER: ICD-10-CM

## 2021-12-20 DIAGNOSIS — N40.1 BENIGN PROSTATIC HYPERPLASIA (BPH) WITH STRAINING ON URINATION: ICD-10-CM

## 2021-12-20 DIAGNOSIS — R06.02 SHORTNESS OF BREATH: ICD-10-CM

## 2021-12-20 PROCEDURE — G8417 CALC BMI ABV UP PARAM F/U: HCPCS | Performed by: INTERNAL MEDICINE

## 2021-12-20 PROCEDURE — G8926 SPIRO NO PERF OR DOC: HCPCS | Performed by: INTERNAL MEDICINE

## 2021-12-20 PROCEDURE — G8484 FLU IMMUNIZE NO ADMIN: HCPCS | Performed by: INTERNAL MEDICINE

## 2021-12-20 PROCEDURE — 3023F SPIROM DOC REV: CPT | Performed by: INTERNAL MEDICINE

## 2021-12-20 PROCEDURE — 3017F COLORECTAL CA SCREEN DOC REV: CPT | Performed by: INTERNAL MEDICINE

## 2021-12-20 PROCEDURE — 99214 OFFICE O/P EST MOD 30 MIN: CPT | Performed by: INTERNAL MEDICINE

## 2021-12-20 PROCEDURE — G8427 DOCREV CUR MEDS BY ELIG CLIN: HCPCS | Performed by: INTERNAL MEDICINE

## 2021-12-20 PROCEDURE — 99212 OFFICE O/P EST SF 10 MIN: CPT | Performed by: INTERNAL MEDICINE

## 2021-12-20 PROCEDURE — 4004F PT TOBACCO SCREEN RCVD TLK: CPT | Performed by: INTERNAL MEDICINE

## 2021-12-20 RX ORDER — SILDENAFIL CITRATE 20 MG/1
20 TABLET ORAL PRN
Qty: 30 TABLET | Refills: 3 | Status: SHIPPED
Start: 2021-12-20 | End: 2022-03-03 | Stop reason: SDUPTHER

## 2021-12-20 RX ORDER — GABAPENTIN 600 MG/1
600 TABLET ORAL 3 TIMES DAILY
Qty: 270 TABLET | Refills: 1 | Status: SHIPPED
Start: 2021-12-20 | End: 2022-03-03 | Stop reason: SDUPTHER

## 2021-12-20 RX ORDER — IBUPROFEN 800 MG/1
800 TABLET ORAL EVERY 6 HOURS PRN
Qty: 120 TABLET | Refills: 3 | Status: SHIPPED
Start: 2021-12-20 | End: 2022-03-03 | Stop reason: SDUPTHER

## 2021-12-20 RX ORDER — OMEPRAZOLE 40 MG/1
CAPSULE, DELAYED RELEASE ORAL
Qty: 90 CAPSULE | Refills: 1 | Status: SHIPPED
Start: 2021-12-20 | End: 2022-03-03 | Stop reason: SDUPTHER

## 2021-12-20 RX ORDER — TRAZODONE HYDROCHLORIDE 100 MG/1
100 TABLET ORAL NIGHTLY
Qty: 90 TABLET | Refills: 1 | Status: SHIPPED
Start: 2021-12-20 | End: 2022-03-03 | Stop reason: SDUPTHER

## 2021-12-20 RX ORDER — METOPROLOL SUCCINATE 25 MG/1
TABLET, EXTENDED RELEASE ORAL
Qty: 90 TABLET | Refills: 1 | Status: SHIPPED
Start: 2021-12-20 | End: 2022-03-03 | Stop reason: SDUPTHER

## 2021-12-20 RX ORDER — TAMSULOSIN HYDROCHLORIDE 0.4 MG/1
0.4 CAPSULE ORAL DAILY
Qty: 90 CAPSULE | Refills: 1 | Status: SHIPPED
Start: 2021-12-20 | End: 2022-03-03 | Stop reason: SDUPTHER

## 2021-12-20 RX ORDER — TIOTROPIUM BROMIDE 18 UG/1
18 CAPSULE ORAL; RESPIRATORY (INHALATION) DAILY
Qty: 90 CAPSULE | Refills: 1 | Status: SHIPPED
Start: 2021-12-20 | End: 2022-03-03 | Stop reason: ALTCHOICE

## 2021-12-20 RX ORDER — ALBUTEROL SULFATE 90 UG/1
1-2 AEROSOL, METERED RESPIRATORY (INHALATION) EVERY 4 HOURS PRN
Qty: 1 EACH | Refills: 5 | Status: SHIPPED
Start: 2021-12-20 | End: 2022-03-03 | Stop reason: SDUPTHER

## 2021-12-20 RX ORDER — ATORVASTATIN CALCIUM 40 MG/1
40 TABLET, FILM COATED ORAL DAILY
Qty: 90 TABLET | Refills: 1 | Status: SHIPPED
Start: 2021-12-20 | End: 2022-03-03 | Stop reason: SDUPTHER

## 2021-12-20 SDOH — ECONOMIC STABILITY: FOOD INSECURITY: WITHIN THE PAST 12 MONTHS, YOU WORRIED THAT YOUR FOOD WOULD RUN OUT BEFORE YOU GOT MONEY TO BUY MORE.: NEVER TRUE

## 2021-12-20 SDOH — ECONOMIC STABILITY: FOOD INSECURITY: WITHIN THE PAST 12 MONTHS, THE FOOD YOU BOUGHT JUST DIDN'T LAST AND YOU DIDN'T HAVE MONEY TO GET MORE.: NEVER TRUE

## 2021-12-20 ASSESSMENT — ENCOUNTER SYMPTOMS
RHINORRHEA: 0
ABDOMINAL PAIN: 1
SHORTNESS OF BREATH: 0
DIARRHEA: 1
COUGH: 0
VOMITING: 0
NAUSEA: 0
BACK PAIN: 1

## 2021-12-20 ASSESSMENT — PATIENT HEALTH QUESTIONNAIRE - PHQ9
5. POOR APPETITE OR OVEREATING: 2
SUM OF ALL RESPONSES TO PHQ QUESTIONS 1-9: 8
6. FEELING BAD ABOUT YOURSELF - OR THAT YOU ARE A FAILURE OR HAVE LET YOURSELF OR YOUR FAMILY DOWN: 0
SUM OF ALL RESPONSES TO PHQ QUESTIONS 1-9: 8
SUM OF ALL RESPONSES TO PHQ9 QUESTIONS 1 & 2: 3
9. THOUGHTS THAT YOU WOULD BE BETTER OFF DEAD, OR OF HURTING YOURSELF: 0
3. TROUBLE FALLING OR STAYING ASLEEP: 0
2. FEELING DOWN, DEPRESSED OR HOPELESS: 0
8. MOVING OR SPEAKING SO SLOWLY THAT OTHER PEOPLE COULD HAVE NOTICED. OR THE OPPOSITE, BEING SO FIGETY OR RESTLESS THAT YOU HAVE BEEN MOVING AROUND A LOT MORE THAN USUAL: 0
1. LITTLE INTEREST OR PLEASURE IN DOING THINGS: 3
7. TROUBLE CONCENTRATING ON THINGS, SUCH AS READING THE NEWSPAPER OR WATCHING TELEVISION: 0
4. FEELING TIRED OR HAVING LITTLE ENERGY: 3
SUM OF ALL RESPONSES TO PHQ QUESTIONS 1-9: 8
10. IF YOU CHECKED OFF ANY PROBLEMS, HOW DIFFICULT HAVE THESE PROBLEMS MADE IT FOR YOU TO DO YOUR WORK, TAKE CARE OF THINGS AT HOME, OR GET ALONG WITH OTHER PEOPLE: 1

## 2021-12-20 ASSESSMENT — SOCIAL DETERMINANTS OF HEALTH (SDOH): HOW HARD IS IT FOR YOU TO PAY FOR THE VERY BASICS LIKE FOOD, HOUSING, MEDICAL CARE, AND HEATING?: NOT HARD AT ALL

## 2021-12-20 NOTE — PATIENT INSTRUCTIONS
Please complete blood test. Please complete sleep study. Please be complaint with medications. Please follow up with our clinic in 3 months.

## 2021-12-20 NOTE — PROGRESS NOTES
[facility]    Attending Physician Statement  I have discussed the case, including pertinent history and exam findings with the resident physician. I have seen and examined the patient and the key elements of the encounter have been performed by me. I agree with the assessment, plan and orders as documented by the resident. I have reviewed all pertinent PMHx, PSHx, FamHx, SocialHx, medications, and allergies and updated history as appropriate. Patient presents for routine follow up of medical problems. Patient complains of increased fatigue with ambulation. No SOB. Weight gain over the past year along with cold intolerance. PHQ-9 is 8. He feels that his appetite is less and also complains of RUQ abdominal pain and nausea. CT abdomen last year was normal.  Fecal studies ordered were not yet completed. Tubular adenoma in 2019. Will need in December 2022. Some early satiety. EGD - 2019 which was unremarkable. Recent cardiac work-up with Echo in 2019, stress test negative in 2019 and 0% coronary CTA in September, 2021. Sleep is ok which helps patient to sleep. Agree with checking TSH, CBC, CMP, B12/Folate   Sleep apnea may also be contributing to this ongoing fatigue. The patient reports an abnormal sleep study in the past.    If above normal, will next pursue GI cause. CAD - uncertain as calcium score was 0% on most recent CTA of coronaries. Doubt this is contributing to above symptoms. HTN - controlled. Continue metoprolol. Hyperlipidemia - stable   Continue atorvastatin    Lung nodule - follows with Dr. Jeanna Nuñez - Last imaging was 12/1/2020. Follow up with pulmonary    Remainder of medical problems as per resident note.     Kaitlin Miranda MD  12/20/2021 3:12 PM

## 2021-12-20 NOTE — PROGRESS NOTES
Neck circumference 21 inches  Sleep study order given to scheduling    Patient discharged per [facility]  AVS mailed  Printed prescription for lab work given to patient per [facility]

## 2022-02-15 ENCOUNTER — TELEPHONE (OUTPATIENT)
Dept: PULMONOLOGY | Age: 57
End: 2022-02-15

## 2022-02-15 DIAGNOSIS — R91.1 LUNG NODULE: Primary | ICD-10-CM

## 2022-02-15 NOTE — PROGRESS NOTES
Dr. Ludmila Rodriguez requesting low dose lung screen prior to pt appointment. Order placed at this time.

## 2022-02-15 NOTE — TELEPHONE ENCOUNTER
Mailed a letter to patient informing him that his CT Lung Screening is scheduled for 3-8-22 at 1:00 pm at the [facility]. He must arrive by 12:30 pm. There is no prep for this test

## 2022-02-16 RX ORDER — BUSPIRONE HYDROCHLORIDE 15 MG/1
TABLET ORAL
Qty: 60 TABLET | Refills: 0 | Status: SHIPPED
Start: 2022-02-16 | End: 2022-03-02 | Stop reason: SDUPTHER

## 2022-02-24 ENCOUNTER — OFFICE VISIT (OUTPATIENT)
Dept: PULMONOLOGY | Age: 57
End: 2022-02-24
Payer: MEDICARE

## 2022-02-24 VITALS
DIASTOLIC BLOOD PRESSURE: 69 MMHG | HEIGHT: 71 IN | WEIGHT: 261 LBS | HEART RATE: 102 BPM | RESPIRATION RATE: 16 BRPM | TEMPERATURE: 99.1 F | SYSTOLIC BLOOD PRESSURE: 131 MMHG | BODY MASS INDEX: 36.54 KG/M2 | OXYGEN SATURATION: 97 %

## 2022-02-24 DIAGNOSIS — F17.209 TOBACCO USE DISORDER, CONTINUOUS: Primary | ICD-10-CM

## 2022-02-24 LAB
EXPIRATORY TIME-POST: 6.97 SEC
EXPIRATORY TIME: 7.18 SEC
FEF 25-75% %CHNG: 24
FEF 25-75% %PRED-POST: 58 %
FEF 25-75% %PRED-PRE: 46 L/SEC
FEF 25-75% PRED: 3.26 L/SEC
FEF 25-75%-POST: 1.89 L/SEC
FEF 25-75%-PRE: 1.53 L/SEC
FEV1 %PRED-POST: 53 %
FEV1 %PRED-PRE: 50 %
FEV1 PRED: 3.83 L
FEV1-POST: 2.07 L
FEV1-PRE: 1.93 L
FEV1/FVC %PRED-POST: 91 %
FEV1/FVC %PRED-PRE: 93 %
FEV1/FVC PRED: 78 %
FEV1/FVC-POST: 71 %
FEV1/FVC-PRE: 73 %
FVC %PRED-POST: 58 L
FVC %PRED-PRE: 53 %
FVC PRED: 4.93 L
FVC-POST: 2.9 L
FVC-PRE: 2.65 L
PEF %PRED-POST: 32 %
PEF %PRED-PRE: 37 L/SEC
PEF PRED: 9.67 L/SEC
PEF%CHNG: -11
PEF-POST: 3.17 L/SEC
PEF-PRE: 3.59 L/SEC

## 2022-02-24 PROCEDURE — 99203 OFFICE O/P NEW LOW 30 MIN: CPT | Performed by: INTERNAL MEDICINE

## 2022-02-24 PROCEDURE — 4004F PT TOBACCO SCREEN RCVD TLK: CPT | Performed by: INTERNAL MEDICINE

## 2022-02-24 PROCEDURE — 94060 EVALUATION OF WHEEZING: CPT | Performed by: INTERNAL MEDICINE

## 2022-02-24 PROCEDURE — 99214 OFFICE O/P EST MOD 30 MIN: CPT | Performed by: INTERNAL MEDICINE

## 2022-02-24 PROCEDURE — 99358 PROLONG SERVICE W/O CONTACT: CPT | Performed by: INTERNAL MEDICINE

## 2022-02-24 PROCEDURE — 99406 BEHAV CHNG SMOKING 3-10 MIN: CPT | Performed by: INTERNAL MEDICINE

## 2022-02-24 PROCEDURE — G8484 FLU IMMUNIZE NO ADMIN: HCPCS | Performed by: INTERNAL MEDICINE

## 2022-02-24 PROCEDURE — G8428 CUR MEDS NOT DOCUMENT: HCPCS | Performed by: INTERNAL MEDICINE

## 2022-02-24 PROCEDURE — G8417 CALC BMI ABV UP PARAM F/U: HCPCS | Performed by: INTERNAL MEDICINE

## 2022-02-24 PROCEDURE — 3017F COLORECTAL CA SCREEN DOC REV: CPT | Performed by: INTERNAL MEDICINE

## 2022-02-24 RX ORDER — BUDESONIDE, GLYCOPYRROLATE, AND FORMOTEROL FUMARATE 160; 9; 4.8 UG/1; UG/1; UG/1
2 AEROSOL, METERED RESPIRATORY (INHALATION) 2 TIMES DAILY
Qty: 1 EACH | Refills: 3 | Status: SHIPPED
Start: 2022-02-24 | End: 2022-08-10 | Stop reason: SDUPTHER

## 2022-02-24 ASSESSMENT — PULMONARY FUNCTION TESTS
FVC_PERCENT_PREDICTED_POST: 58
FEV1_POST: 2.07
FEV1_PERCENT_PREDICTED_POST: 53
FVC_PRE: 2.65
FVC_PERCENT_PREDICTED_PRE: 53
FEV1/FVC_PREDICTED: 78
FVC_PREDICTED: 4.93
FEV1/FVC_POST: 71
FEV1/FVC_PRE: 73
FEV1_PREDICTED: 3.83
FEV1/FVC_PERCENT_PREDICTED_POST: 91
FVC_POST: 2.90
FEV1/FVC_PERCENT_PREDICTED_PRE: 93
FEV1_PERCENT_PREDICTED_PRE: 50
FEV1_PRE: 1.93

## 2022-02-24 NOTE — PATIENT INSTRUCTIONS
[facility]  [address] [facility] [place] [address]  Office: [phone]      Your were seen in the office today for COPD      We did make  changes to your medications today. Stop Advair and Spiriva, Start Breztri 2 puffs twice a day. Testing ordered today was none      Vaccines recommended influenza, pneumonia, covid-19    Follow up in 3 months. Please do not hesitate to call the office with any questions.

## 2022-02-24 NOTE — PROGRESS NOTES
Pt presents to clinic to establish care for management of his COPD. Pt given samples of Breztri and is to stop Advair and Spiriva. Prescription sent to the pharmacy for Comiso. Will attempt to get medical records from [facility] in [facility] from prior lung procedure. Pt previously ordered CT annual lung screen and is to complete this on 03/08/2022. Pt to follow up in clinic in 3 months.

## 2022-02-25 ENCOUNTER — TELEPHONE (OUTPATIENT)
Dept: PULMONOLOGY | Age: 57
End: 2022-02-25

## 2022-02-25 NOTE — PROGRESS NOTES
[facility]     HISTORY OF PRESENT ILLNESS:    Kim Ceja is a 64y.o. year old male here for evaluation of rest of breath. Mr. Kerry Cat reports that he has had increasing shortness of breath and increasing coughing. He states that sometimes he does cough up into the point of being dizzy. He states that his shortness of breath is worse at night he is currently using both Spiriva and Advair. He also uses a Proventil inhaler as a rescue inhaler he reports that on a good day he may not need to use his Proventil at all however on a bad day with his breathing he is using it 3-4 times a night. He was previously seen once in our office by my colleague on August 19 of 2019. He reports to me that he does have a history of renal cell carcinoma he states that he had a procedure done at [facility] in [facility] in 2014 in which they took a piece of his upper lobe of his right lung and lower lobe of his right lung and he was told that he had cancer however he never had any chemotherapy or any radiation therapy it appears through review of his records that Dr. Jeff BELTRE was unable to obtain those records in 2019. The patient subsequently had a CAT scan in 2020 where he was found to have a lung nodule and was supposed to follow-up however was lost to follow-up at that time. Regarding his overall history he reports that he started smoking when he was 13 he did smoke up to 2 packs/day and is now down to 1 pack/day. 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 He reports that he has 1 dog. His hobbies include walking in the woods and looking for beckford mushrooms.       ALLERGIES:    Allergies   Allergen Reactions    Latex Rash     Skin turns red       PAST MEDICAL HISTORY:       Diagnosis Date    Acute gastritis without hemorrhage     CAD (coronary artery disease)     [facility] [address]     [facility]chronic obstructive [facility]) [email or web address]     Difficulty sleeping     ED (erectile dysfunction)     History of BPH     Hyperlipidemia     Lung cancer ([email or web address]     Tobacco abuse        MEDICATIONS:   Current Outpatient Medications   Medication Sig Dispense Refill    Budeson-Glycopyrrol-Formoterol (BREZTRI AEROSPHERE) 160-9-4.8 MCG/ACT AERO Inhale 2 puffs into the lungs in the morning and at bedtime 1 each 3    busPIRone (BUSPAR) 15 MG tablet Take 1 tablet by mouth twice daily 60 tablet 0    omeprazole (PRILOSEC) 40 MG delayed release capsule TAKE 1 CAPSULE BY MOUTH ONCE DAILY IN THE MORNING BEFORE BREAKFAST 90 capsule 1    gabapentin (NEURONTIN) 600 MG tablet Take 1 tablet by mouth 3 times daily for 60 days. 270 tablet 1    traZODone (DESYREL) 100 MG tablet Take 1 tablet by mouth nightly 90 tablet 1    sildenafil (REVATIO) 20 MG tablet Take 1 tablet by mouth as needed (as directed 1-2 tablets) 30 tablet 3    tiotropium (SPIRIVA HANDIHALER) 18 MCG inhalation capsule Inhale 1 capsule into the lungs daily 90 capsule 1    albuterol sulfate HFA (PROVENTIL HFA) 108 (90 Base) MCG/ACT inhaler Inhale 1-2 puffs into the lungs every 4 hours as needed for Wheezing or Shortness of Breath 1 each 5    ADVAIR DISKUS 500-50 MCG/DOSE diskus inhaler Inhale 1 puff into the lungs every 12 hours 1 each 5    ibuprofen (ADVIL;MOTRIN) 800 MG tablet Take 1 tablet by mouth every 6 hours as needed for Pain 120 tablet 3    atorvastatin (LIPITOR) 40 MG tablet Take 1 tablet by mouth daily 90 tablet 1    metoprolol succinate (TOPROL XL) 25 MG extended release tablet Take 1 tablet by mouth once daily 90 tablet 1    tamsulosin (FLOMAX) 0.4 MG capsule Take 1 capsule by mouth daily 90 capsule 1    SONIA ASPIRIN EC LOW DOSE 81 MG EC tablet Take 1 tablet by mouth once daily 90 tablet 0     No current facility-administered medications for this visit.        SOCIAL AND OCCUPATIONAL HEALTH: The patient is a current smoker. He smokes 1 pack/day which is down from 2 packs a day that he smoked previously. 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 He is currently on disability. He is going to the gym and weightlifting 4 days a week. He also likes to walk in the woods looking for beckford mushrooms and he has 1 dog. He denies any travel    SURGICAL HISTORY:   Past Surgical History:   Procedure Laterality Date    CARDIAC CATHETERIZATION  2015    Non-obstructive coronary disease by Dr. Jewel Loo COLONOSCOPY N/A 12/20/2019    COLONOSCOPY POLYPECTOMY SNARE/COLD BIOPSY performed by Alexandro Tran MD at [address] [facility] COLONOSCOPY  12/20/2019    COLONOSCOPY SUBMUCOSAL/BOTOX INJECTION performed by Alexandro Tran MD at [address] Right     part of the right     TOTAL NEPHRECTOMY Right     UPPER GASTROINTESTINAL ENDOSCOPY N/A 12/20/2019    EGD BIOPSY performed by Alexandro Tran MD at [address] [facility] [address]: No family history of cancer, blood clots patient reports that his father grandmother and sister have COPD and that they all also smoked    REVIEW OF SYSTEMS:  Constitutional: No fevers, chills, unintentional weight loss  Skin: No rashes or lesions  EENT: No change in vision, change in hearing, change in taste, change in smell  Cardiovascular: Denies chest pain, chest pressure, palpitations  Respiration: Positive for increased coughing and wheezing. Gastrointestinal: Denies nausea, vomiting, diarrhea  Musculoskeletal: Denies joint or muscle pain  Neurological: Denies syncope, headache, seizures  Psychological: Denies anxiety or depression  Endocrine: Denies polyuria polydipsia  Hematopoietic/lymphatic: Denies easy bruising        PHYSICAL EXAMINATION:  Constitutional: Well-nourished, well-developed. No acute distress  EENT: PERRL, EOMI, no oropharyngeal erythema.   No palpable adenopathy  Neck: Trachea and thyroid midline  Respiratory: Overall diminished bilaterally  Cardiovascular: Regular rate and rhythm, no murmurs rubs or gallops  Pulses: Equal bilaterally  Abdomen: Soft nontender bowel sounds present  Lymphatic: No palpable adenopathy  Musculoskeletal: Gait steady  Extremities: No clubbing, cyanosis, edema. Skin: No rashes or lesions  Neurological/Psychiatric: Neurologically intact, no focal deficits. Affect appropriate    DATA: Spirometry was completed today which shows FVC of 2.65 L which is 53% of predicted FEV1 is 1.93 L which is 50% of predicted FEV1 FVC ratio is 73. MVV is 45 which is 30% of predicted. SVC is 2.10 which is 42% of predicted. Flow volume loops are consistent with a combined obstruction and restriction. Compared to the prior spirometry on 2019 the patient has had a 20% decline in both his FEV1 and FVC. IMPRESSION:       1. Severe COPD  2. Morbid obesity  3. Current tobacco abuse  4. History of lung nodule  5. History of renal cell carcinoma  6. History of prior bronchoscopy/possible VATS? 7.  Poor historian     8. Possible DEJON  9. Vaccine counseling             PLAN:      1. Regarding the patient's severe COPD I did discuss with him his prior spirometry from 2019 and the spirometry today showing him that his lung function has declined significantly over the last 3 years. I did discuss with him the importance of smoking cessation along with this we will stop his Advair and Spiriva and start the patient on Trelegy. He is to continue using his as needed albuterol inhaler  2. The patient is currently trying to increase his activity and change his diet in order to lose weight. He has had a 30 pound weight gain since 2019.  3.  I discussed with the patient extensively the importance of smoking cessation. I did offer him either nicotine patches, lozenges, gums, or Chantix which he declined at this time.   He is aware that continued smoking will lead to a faster decline in his lung function along with increased risk of coronary artery disease and cancer. He reports he is going to try to quit. 4.  Patient did have a lung nodule noted on a CT that he had back in 2020. He was supposed to have a repeat CT at that time however he has been lost to follow-up. We currently have a CT scheduled for him next week and I did impress upon him the importance of keeping that appointment. 5.  The patient reports that in 2014 he had a biopsy done at [facility] in [place] and that at that time that they took biopsies of the right upper lobe and the right lower lobe and that he was told that he had cancer in his lung however he did not have any treatment with either chemotherapy or radiation therapy. My nurse has made multiple calls to this facility in order to try to obtain any operative reports and or pathology reports. Thus far the only report that we have been able to obtain shows a bronchoscopy and EBUS which was completed on October 2014 where there was apparently an FNA of the right paratracheal node which showed both caseating and noncaseating granulomas with anthracotic and silica deposition. It appears that the AFB and fungal stains were negative and a FNA of an azygous node showed noncaseating granulomas with a negative stain. Flow cytometry was negative for lymphoma. However we have so far been unable to get his operative report or the reports of the actual open lung biopsy that he likely had we will continue to attempt this  6. I strongly recommended the influenza, Pneumovax, and COVID-19 vaccinations to the patient. He adamantly refuses at this time.   7.  Patient reports that he was previously told that he had sleep apnea however he does not wear a CPAP and reports that he does not want to be dependent upon such things I discussed with him that the risk of an treated sleep apnea can lead to pulmonary hypertension, increased risk of strokes and

## 2022-02-25 NOTE — TELEPHONE ENCOUNTER
Sent fax requesting medical records for bronchoscopy and all associated documentation including notes, pathology, op note, and images for bronchoscopy completed on 03/08/2014 in [facility] in .

## 2022-03-03 ENCOUNTER — OFFICE VISIT (OUTPATIENT)
Dept: INTERNAL MEDICINE | Age: 57
End: 2022-03-03
Payer: MEDICARE

## 2022-03-03 VITALS
HEIGHT: 71 IN | HEART RATE: 67 BPM | TEMPERATURE: 98.6 F | SYSTOLIC BLOOD PRESSURE: 128 MMHG | DIASTOLIC BLOOD PRESSURE: 76 MMHG | BODY MASS INDEX: 37.66 KG/M2 | RESPIRATION RATE: 16 BRPM | WEIGHT: 269 LBS

## 2022-03-03 DIAGNOSIS — N52.9 ERECTILE DYSFUNCTION, UNSPECIFIED ERECTILE DYSFUNCTION TYPE: ICD-10-CM

## 2022-03-03 DIAGNOSIS — K21.9 GASTROESOPHAGEAL REFLUX DISEASE, UNSPECIFIED WHETHER ESOPHAGITIS PRESENT: ICD-10-CM

## 2022-03-03 DIAGNOSIS — R06.02 SHORTNESS OF BREATH: ICD-10-CM

## 2022-03-03 DIAGNOSIS — I25.119 CORONARY ARTERY DISEASE INVOLVING NATIVE HEART WITH ANGINA PECTORIS, UNSPECIFIED VESSEL OR LESION TYPE (HCC): ICD-10-CM

## 2022-03-03 DIAGNOSIS — R11.2 NAUSEA AND VOMITING, INTRACTABILITY OF VOMITING NOT SPECIFIED, UNSPECIFIED VOMITING TYPE: ICD-10-CM

## 2022-03-03 DIAGNOSIS — F32.A DEPRESSION, UNSPECIFIED DEPRESSION TYPE: Primary | ICD-10-CM

## 2022-03-03 DIAGNOSIS — N40.1 BENIGN PROSTATIC HYPERPLASIA (BPH) WITH STRAINING ON URINATION: ICD-10-CM

## 2022-03-03 DIAGNOSIS — F41.9 ANXIETY: ICD-10-CM

## 2022-03-03 DIAGNOSIS — G89.4 CHRONIC PAIN SYNDROME: ICD-10-CM

## 2022-03-03 DIAGNOSIS — B35.1 ONYCHOMYCOSIS OF GREAT TOE: ICD-10-CM

## 2022-03-03 DIAGNOSIS — E66.01 SEVERE OBESITY (BMI 35.0-39.9) WITH COMORBIDITY (HCC): ICD-10-CM

## 2022-03-03 DIAGNOSIS — G47.9 SLEEP DISORDER: ICD-10-CM

## 2022-03-03 DIAGNOSIS — Z13.9 ENCOUNTER FOR SCREENING INVOLVING SOCIAL DETERMINANTS OF HEALTH (SDOH): ICD-10-CM

## 2022-03-03 DIAGNOSIS — E78.9 LIPID DISORDER: ICD-10-CM

## 2022-03-03 DIAGNOSIS — R39.16 BENIGN PROSTATIC HYPERPLASIA (BPH) WITH STRAINING ON URINATION: ICD-10-CM

## 2022-03-03 DIAGNOSIS — I10 HYPERTENSION, UNSPECIFIED TYPE: ICD-10-CM

## 2022-03-03 DIAGNOSIS — K52.9 CHRONIC DIARRHEA: ICD-10-CM

## 2022-03-03 PROCEDURE — 99212 OFFICE O/P EST SF 10 MIN: CPT | Performed by: INTERNAL MEDICINE

## 2022-03-03 PROCEDURE — 3017F COLORECTAL CA SCREEN DOC REV: CPT | Performed by: INTERNAL MEDICINE

## 2022-03-03 PROCEDURE — G8417 CALC BMI ABV UP PARAM F/U: HCPCS | Performed by: INTERNAL MEDICINE

## 2022-03-03 PROCEDURE — G8484 FLU IMMUNIZE NO ADMIN: HCPCS | Performed by: INTERNAL MEDICINE

## 2022-03-03 PROCEDURE — 99214 OFFICE O/P EST MOD 30 MIN: CPT | Performed by: INTERNAL MEDICINE

## 2022-03-03 PROCEDURE — 4004F PT TOBACCO SCREEN RCVD TLK: CPT | Performed by: INTERNAL MEDICINE

## 2022-03-03 PROCEDURE — G8427 DOCREV CUR MEDS BY ELIG CLIN: HCPCS | Performed by: INTERNAL MEDICINE

## 2022-03-03 RX ORDER — ATORVASTATIN CALCIUM 40 MG/1
40 TABLET, FILM COATED ORAL DAILY
Qty: 90 TABLET | Refills: 1 | Status: SHIPPED
Start: 2022-03-03 | End: 2022-06-16 | Stop reason: SDUPTHER

## 2022-03-03 RX ORDER — SILDENAFIL CITRATE 20 MG/1
20 TABLET ORAL PRN
Qty: 30 TABLET | Refills: 0 | Status: SHIPPED
Start: 2022-03-03 | End: 2022-06-16 | Stop reason: SDUPTHER

## 2022-03-03 RX ORDER — IBUPROFEN 800 MG/1
800 TABLET ORAL EVERY 6 HOURS PRN
Qty: 120 TABLET | Refills: 3 | Status: SHIPPED
Start: 2022-03-03 | End: 2022-09-28 | Stop reason: SDUPTHER

## 2022-03-03 RX ORDER — TERBINAFINE HYDROCHLORIDE 250 MG/1
250 TABLET ORAL DAILY
Qty: 84 TABLET | Refills: 0 | Status: SHIPPED
Start: 2022-03-03 | End: 2022-06-16 | Stop reason: ALTCHOICE

## 2022-03-03 RX ORDER — PROMETHAZINE HYDROCHLORIDE 25 MG/1
25 TABLET ORAL 3 TIMES DAILY PRN
Qty: 12 TABLET | Refills: 0 | Status: SHIPPED
Start: 2022-03-03 | End: 2022-03-03

## 2022-03-03 RX ORDER — OMEPRAZOLE 40 MG/1
CAPSULE, DELAYED RELEASE ORAL
Qty: 90 CAPSULE | Refills: 1 | Status: SHIPPED
Start: 2022-03-03 | End: 2022-06-16 | Stop reason: SDUPTHER

## 2022-03-03 RX ORDER — ALBUTEROL SULFATE 90 UG/1
1-2 AEROSOL, METERED RESPIRATORY (INHALATION) EVERY 4 HOURS PRN
Qty: 1 EACH | Refills: 5 | Status: SHIPPED
Start: 2022-03-03 | End: 2022-06-16 | Stop reason: SDUPTHER

## 2022-03-03 RX ORDER — METOPROLOL SUCCINATE 25 MG/1
TABLET, EXTENDED RELEASE ORAL
Qty: 90 TABLET | Refills: 1 | Status: SHIPPED
Start: 2022-03-03 | End: 2022-06-16 | Stop reason: SDUPTHER

## 2022-03-03 RX ORDER — TRAZODONE HYDROCHLORIDE 100 MG/1
100 TABLET ORAL NIGHTLY
Qty: 90 TABLET | Refills: 1 | Status: SHIPPED
Start: 2022-03-03 | End: 2022-06-16 | Stop reason: SDUPTHER

## 2022-03-03 RX ORDER — PROMETHAZINE HYDROCHLORIDE 25 MG/1
25 TABLET ORAL 3 TIMES DAILY PRN
Qty: 90 TABLET | Refills: 3 | Status: SHIPPED
Start: 2022-03-03 | End: 2022-06-16 | Stop reason: SDUPTHER

## 2022-03-03 RX ORDER — CHOLESTYRAMINE 4 G/9G
1 POWDER, FOR SUSPENSION ORAL
Qty: 90 PACKET | Refills: 3 | Status: SHIPPED
Start: 2022-03-03 | End: 2022-06-16 | Stop reason: ALTCHOICE

## 2022-03-03 RX ORDER — ASPIRIN 81 MG/1
81 TABLET ORAL DAILY
Qty: 90 TABLET | Refills: 1 | Status: SHIPPED
Start: 2022-03-03 | End: 2022-06-16 | Stop reason: SDUPTHER

## 2022-03-03 RX ORDER — TAMSULOSIN HYDROCHLORIDE 0.4 MG/1
0.4 CAPSULE ORAL DAILY
Qty: 90 CAPSULE | Refills: 1 | Status: SHIPPED
Start: 2022-03-03 | End: 2022-06-16 | Stop reason: SDUPTHER

## 2022-03-03 RX ORDER — EZETIMIBE 10 MG/1
10 TABLET ORAL DAILY
Qty: 30 TABLET | Refills: 3 | Status: SHIPPED
Start: 2022-03-03 | End: 2022-06-16 | Stop reason: SDUPTHER

## 2022-03-03 RX ORDER — SILDENAFIL CITRATE 20 MG/1
20 TABLET ORAL PRN
Qty: 30 TABLET | Refills: 3 | Status: SHIPPED
Start: 2022-03-03 | End: 2022-03-03

## 2022-03-03 RX ORDER — BUSPIRONE HYDROCHLORIDE 15 MG/1
15 TABLET ORAL 2 TIMES DAILY
Qty: 60 TABLET | Refills: 1 | Status: SHIPPED
Start: 2022-03-03 | End: 2022-03-03

## 2022-03-03 RX ORDER — GABAPENTIN 600 MG/1
600 TABLET ORAL 3 TIMES DAILY
Qty: 270 TABLET | Refills: 1 | Status: SHIPPED
Start: 2022-03-03 | End: 2022-06-16 | Stop reason: SDUPTHER

## 2022-03-03 RX ORDER — BUSPIRONE HYDROCHLORIDE 15 MG/1
15 TABLET ORAL 2 TIMES DAILY
Qty: 60 TABLET | Refills: 3 | Status: SHIPPED
Start: 2022-03-03 | End: 2022-06-16 | Stop reason: SDUPTHER

## 2022-03-03 ASSESSMENT — ENCOUNTER SYMPTOMS
NAUSEA: 0
CONSTIPATION: 0
VOMITING: 0
DIARRHEA: 0
COUGH: 0
BLOOD IN STOOL: 0
BACK PAIN: 0
SHORTNESS OF BREATH: 1
SINUS PAIN: 0
ABDOMINAL PAIN: 0

## 2022-03-03 NOTE — PATIENT INSTRUCTIONS
Please take lamisil 1 tablet daily for 12 weeks to treat the condition in your toe nail. Please follow up with Podiatry. Please start taking phenergan 1 tablet three times per day as needed for nausea. Please start taking zetia 1 tablet daily to lower cholesterol. Please start taking cholestyramine 1 packet with meal, three times per day for diarrhea. Blood and stool test were ordered today. Please complete the tests before next visit (1 pm on 6/16/2022).

## 2022-03-03 NOTE — PROGRESS NOTES
[facility] Internal Medicine      SUBJECTIVE:  Mariaa Corral (:  1965) is a 64 y.o. male here for evaluation of the following chief complaint(s):  3 Month Follow-Up and Medication Refill    Frantz Kohli is a 64 y.o. male with PMHx of CAD, HTN, HLD, colon polys, chronic back pain, renal cell carcinoma, obesity, anxiety, tobacco abuse, who is seen at [facility] clinic today for routine 3-month follow up. Last seen 2021. Acute complaint:  Patient notice bilateral toenail discoloration of great toes, progressively worsening. Toe nails appear thickened and brittle. Denies pain, itchiness, foul smelling. Start oral lamisil 250 mg daily for 12 weeks. Monitor liver function while on Lamisil. Podiatry referred today. Saw pulmonology 2022. Spirometry showed significant decline in lung function compared with 2019. Patient declined flu, COVID, and Pneumovax vaccine and sleep study and is not ready to quit smoking. Started Breztri and stopped Advair and Spiriva. Patient states that he needs albuterol 1-2 times/day when he is not active and multiple times if active after switched to [place]. He says that he is cutting down cigarettes to 3/4 pack/day. Refused nicotine gum/patch or Chantix with his parents. CT lung cancer screening scheduled on 3/8/2022.      Patient continue to have chronic nausea, vomiting, diarrhea, epigastric/periumbilical pain. Vomit up to several times a day and improved with Phenergan. 4-5 bowel movements per day, soft to watery. No improvement in abdominal pain with bowel movement. No weight loss. History of cholecystectomy. On Prilosec since 2019. ?  Microscopic colitis and he will need random biopsies. Colonoscopy in 2019 showed tubular adenoma. Lipase and amylase WNL (2020). Fecal fat, leukocyte, and c-diff ordered previously but was not done. Re-ordered fecal fat and leukocyte today. Trial of cholestyramine.   Phenergan re-initiated. Patient has decreased visual acuity for many years, possible myopia. He was unable to see ophthalmologist due to insurance coverage.  was consulted. LDL 99 and triglyceride 290 with 10-year ASCVD risk 13%. On Lipitor 40 mg daily. We will add Zetia. BP controlled. Denies chest pain, palpitations, headache, lightheadedness, or leg swelling.     PMH  HLD  · On lipitor 40 mg daily  · Zetia added today  · , LDL 99, HDL 40,  (8/2021) <--- , , HDL 51,  (5/2019)  The 10-year ASCVD risk score (Caleb Mello., et al., 2013) is: 13%    Values used to calculate the score:      Age: 64 years      Sex: Male      Is Non- : No      Diabetic: No      Tobacco smoker: Yes      Systolic Blood Pressure: 663 mmHg      Is BP treated: No      HDL Cholesterol: 40 mg/dL      Total Cholesterol: 197 mg/dL     SOB, H/o lung nodule s/p right sided VATS with right upper lobe wedge resection with biopsy showing diffuse granulomatous changes and noncaseating granulomas  · Patient has been complaining of SOB since 2019. Followed up with pulmonology and cardiology both. Seen by Dr. Belgica Rodriguez 2/25/2022. He has undergone echocardiogram, stress test and PFT. He has still been smoking. · Spirometry showed significant decline in lung function from 2019 to 2022   · Started Breztri and stopped Advair and Spiriva 2/2022  · Needs albuterol inhaler up to several times daily  · Refused sleep study  · Refuse influenza, Pneumovax, and COVID-19 vaccine  · Report cutting cigarettes to 3/4 pack/day. · CT lung screening scheduled 3/8/2022     Chronic nausea, emesis, diarrhea  · s/p cholecystectomy. · Colonoscopy in 2019 showed tubular adenoma. · Lipase and amylase WNL (9/2020). Fecal fat, leukocyte, and c-diff ordered previously but was not done. · Re-ordered fecal fat and leukocyte today. · Trial of cholestyramine. · Phenergan re-initiated.     ?CAD, stable  · Denies chest pain, palpitations, lightheadedness, leg swelling  · On ASA 81 mg daily, lipitor 40 mg daily, metoprolol 25 mg   · EKG today unremarkable  · Stress test in 8/2019 unremarkable  · Echo 12/2019 shows LV EF 60%, stage I DD  · Coronary CTA: calcium score 0 (9/2021)     HTN  · On metoprolol 25 mg   · Controlled. Initial 147/80 and repeat 128/76 mmHg today. · Need to order blood pressure cuff at next visit     ?ED  · On Revatio 20 mg PRN  · Will discuss at next visit    ? BPH  · On flomax 0.4 mg daily  · PSA 1.25 (8/2021) <--- 1.66 (8/2019)     GERD  · On omeprazole 40 mg daily     Chronic back pain with sciatica  · Patient reluctant to go to PMR because he has gone to them in the past and did not like the results of steroid injections. Report PT not helping  · On gabapentin 600 mg TID and ibuprofen 800 Q6h PRN  · MRI lumbar pending  Impression   Multilevel mild posterior bridging hard disc and spur. This is   somewhat greater centrally and on the left at L4-5. In combination   with facet arthrosis and narrowed interpedicular distance, there is   moderate stenosis at this level      Colon polys  · Colonoscope 12/2019: tubular adenoma  · Repeat in 3 years     Renal cell carcinoma s/p R nephrectomy in 2005     Anxiety  · On buspar 15 mg BID and trazodone 100 mg nightly  · Follow LISW     Tobacco abuse  · Smoking since age of 15. Currently 3/4 pack per day  · Smoking cessation counseling. Patient states that smoking calms him down  · Refused nicotine patch and gum and chantix      HM  -refused influenza, Pneumovax, and COVID-19 vaccine  -refused sleep study  -colonoscopy 12/2019: tubular adenoma  -CT lung cancer screening: scheduled 3/8/22  -HgA1c 5.4 (8/2021) <--- 5.5 (3/2020)           Review of Systems   Constitutional: Negative for chills, fatigue and fever. HENT: Negative for congestion and sinus pain. Eyes: Positive for visual disturbance. Respiratory: Positive for shortness of breath. Negative for cough. Cardiovascular: Negative for chest pain, palpitations and leg swelling. Gastrointestinal: Negative for abdominal pain, blood in stool, constipation, diarrhea, nausea and vomiting. Endocrine: Negative for polyuria. Genitourinary: Negative for dysuria and hematuria. Musculoskeletal: Negative for arthralgias, back pain and myalgias. Skin: Negative for rash and wound. Neurological: Negative for weakness, light-headedness, numbness and headaches. Hematological: Does not bruise/bleed easily. Psychiatric/Behavioral: Negative for dysphoric mood. Current Outpatient Medications on File Prior to Visit   Medication Sig Dispense Refill    Budeson-Glycopyrrol-Formoterol (BREZTRI AEROSPHERE) 160-9-4.8 MCG/ACT AERO Inhale 2 puffs into the lungs in the morning and at bedtime 1 each 3    tiotropium (SPIRIVA HANDIHALER) 18 MCG inhalation capsule Inhale 1 capsule into the lungs daily (Patient not taking: Reported on 3/3/2022) 90 capsule 1    ADVAIR DISKUS 500-50 MCG/DOSE diskus inhaler Inhale 1 puff into the lungs every 12 hours (Patient not taking: Reported on 3/3/2022) 1 each 5     No current facility-administered medications on file prior to visit. OBJECTIVE:    VS:   Vitals:    03/03/22 1252 03/03/22 1307   BP: (!) 147/80 128/76   Site: Left Upper Arm    Position: Sitting    Cuff Size: Medium Adult    Pulse: 67    Resp: 16    Temp: 98.6 °F (37 °C)    TempSrc: Oral    Weight: 269 lb (122 kg)    Height: 5' 11\" (1.803 m)      Physical Exam  Vitals reviewed. Constitutional:       General: He is not in acute distress. Appearance: Normal appearance. He is obese. HENT:      Head: Normocephalic and atraumatic. Nose: No rhinorrhea. Mouth/Throat:      Mouth: Mucous membranes are moist.      Pharynx: Oropharynx is clear. Eyes:      Extraocular Movements: Extraocular movements intact.       Conjunctiva/sclera: Conjunctivae normal.      Pupils: Pupils are equal, round, and reactive to light. Neck:      Vascular: No carotid bruit. Cardiovascular:      Rate and Rhythm: Normal rate and regular rhythm. Pulses: Normal pulses. Heart sounds: Normal heart sounds. No murmur heard. No friction rub. No gallop. Pulmonary:      Effort: Pulmonary effort is normal.      Breath sounds: No wheezing, rhonchi or rales. Comments: Diminished breath sound bilaterally  Abdominal:      General: Bowel sounds are normal.      Palpations: Abdomen is soft. Tenderness: There is no abdominal tenderness. Musculoskeletal:         General: No swelling, tenderness or deformity. Cervical back: Neck supple. No tenderness. Right lower leg: No edema. Left lower leg: No edema. Comments: Lumbar spinous process and paraspinal tenderness   Skin:     General: Skin is warm and dry. Neurological:      General: No focal deficit present. Mental Status: He is alert. Cranial Nerves: No cranial nerve deficit. Psychiatric:         Mood and Affect: Mood normal.            ASSESSMENT/PLAN:  1. Onychomycosis of great toe  -     terbinafine (LAMISIL) 250 MG tablet; Take 1 tablet by mouth daily, Disp-84 tablet, R-0Normal  -     Josselyn Herrera DPM, Podiatry, [place]  -     Hepatic Function Panel; Future  2. Chronic diarrhea  -     cholestyramine (QUESTRAN) 4 g packet; Take 1 packet by mouth 3 times daily (with meals), Disp-90 packet, R-3Normal  -     FECAL LEUKOCYTES; Future  -     FECAL FAT, QUALITATIVE; Future  3. Nausea and vomiting, intractability of vomiting not specified, unspecified vomiting type  -     promethazine (PHENERGAN) 25 MG tablet; Take 1 tablet by mouth 3 times daily as needed for Nausea, Disp-90 tablet, R-3Normal  4. Shortness of breath  -     Continue Breztri  - Continue albuterol inhaler as needed   - Smoking cessation counseling provided  - CT lung screening scheduled  5.   Hyperlipidemia  -     Continue atorvastatin (LIPITOR) 40 MG tablet; Take 1 tablet by mouth daily, Disp-90 tablet, R-1Normal  -     Start ezetimibe (ZETIA) 10 MG tablet; Take 1 tablet by mouth daily, Disp-30 tablet, R-3Normal  6. Chronic low back pain   -     gabapentin (NEURONTIN) 600 MG tablet; Take 1 tablet by mouth 3 times daily for 60 days. , Disp-270 tablet, R-1Normal  -     ibuprofen (ADVIL;MOTRIN) 800 MG tablet; Take 1 tablet by mouth every 6 hours as needed for Pain, Disp-120 tablet, R-3Normal  7. Hypertension, unspecified type  -    controlled   - Continue metoprolol succinate (TOPROL XL) 25 MG extended release tablet; Take 1 tablet by mouth once daily, Disp-90 tablet, R-1Normal  8. Questionable coronary artery disease  -     Asymptomatic  - Continue aspirin, Lipitor, Toprol-XL  9. Erectile dysfunction, unspecified erectile dysfunction type  -     sildenafil (REVATIO) 20 MG tablet; Take 1 tablet by mouth as needed (as directed 1-2 tablets), Disp-30 tablet, R-0Normal  10. Benign prostatic hyperplasia (BPH) with straining on urination  -     tamsulosin (FLOMAX) 0.4 MG capsule; Take 1 capsule by mouth daily, Disp-90 capsule, R-1Normal  11. Anxiety and depression  -     busPIRone (BUSPAR) 15 MG tablet; Take 15 mg by mouth 2 times daily, Disp-60 tablet, R-3Normal  -     traZODone (DESYREL) 100 MG tablet; Take 1 tablet by mouth nightly, Disp-90 tablet, R-1Normal  12. Gastroesophageal reflux disease, unspecified whether esophagitis present  -     omeprazole (PRILOSEC) 40 MG delayed release capsule; TAKE 1 CAPSULE BY MOUTH ONCE DAILY IN THE MORNING BEFORE BREAKFAST, Disp-90 capsule, R-1Normal  13. Encounter for screening involving social determinants of health (SDoH)  -     [id number] Referral to Social Work (Primary Care Only)  14. Severe obesity (BMI 35.0-39. 9) with comorbidity ([address]        -     dietary counseling provided       RTC:  Return in about 3 months (around 6/3/2022). I have reviewed my findings and recommendations with Joon Metcalf and Dr. Graciela Uriostegui.     Elham Kumar Love Crespo MD   3/3/2022 3:54 PM

## 2022-03-03 NOTE — PROGRESS NOTES
All instructions given to patient by Cydney Ross  Printed avs with fu appointment and lab scripts mailed to patient   Called pt and instructed  him to stop at the office next Tuesday to  his stool collection supplies  Understanding verbalized Patient brought an urine specimen to the office to be sent for culture. Symptoms: pain in back and lower abdomen. Dr. Ruvalcaba says to give patient Augmentin BID for 4 days.

## 2022-03-03 NOTE — PROGRESS NOTES
[facility]  [facility] Residency [facility]    Attending Physician Statement  I have discussed the case, including pertinent history and exam findings with the resident physician. I agree with the assessment, plan and orders as documented by the resident. I have reviewed all pertinent PMHx, PSHx, FamHx, SocialHx, medications, and allergies and updated history as appropriate. Patient here for routine follow up of medical problems. 1. Acutely with onychomycosis of toe - will start oral antigungal and refer to podiatry. Monitor liver function (normal in 2020). 2. Severe COPD with ongoing tobacco use. Patient declines referral to Smoking Cessation program but patient says he will try to stop on his own. Recently changed to Trelegy. D/c advair and spiriva from [facility]  3. Pulmonary nodule with ongoing tobacco use. ?biopsy in the past - Pulm clinic attempting to get records  4. HTN, controlled  5. CAD, stable  6. HLD, tolerating statin however still with elevated triglyceride. Will add Zetia. 7. Chronic diarrhea + nausea - malabsorption testing ordered in the September but not done yet. This is less likely infection. Hx of cholecystectomy - trial of cholestyramine. EGD normal. Colonoscopy with tubular adenoma - rpt in 2020. CT abdomen normal. a1c normal. Has been on PPI for prolonged time -?microscopic colitis and need random biopsies  8. BPH, on flomax. PSA normal.  9. Tubular adenoma (Dec 2019) - rpt colonoscopy this year  10. Chronic back pain on gabapentin  11. RCC s/p nephrectomy  12. Refusing vaccination    Remainder of medical problems as per resident note. Betzaida Baugh MD  3/3/2022 1:47 PM

## 2022-03-07 ENCOUNTER — TELEPHONE (OUTPATIENT)
Dept: CASE MANAGEMENT | Age: 57
End: 2022-03-07

## 2022-03-07 NOTE — TELEPHONE ENCOUNTER
I called the patient and left a message reminding him of his CT lung screening at [facility] on 3/8/2022 at 1:00 pm with an 12:30 pm arrival.  If unable to keep this appt call the office at [phone] to get rescheduled.           Electronically signed by Brent Coto on 3/7/22 at 3:49 PM EST

## 2022-03-08 ENCOUNTER — HOSPITAL ENCOUNTER (OUTPATIENT)
Dept: CT IMAGING | Age: 57
Discharge: HOME | End: 2022-03-10
Payer: MEDICARE

## 2022-03-08 ENCOUNTER — HOSPITAL ENCOUNTER (OUTPATIENT)
Age: 57
Discharge: HOME | End: 2022-03-08
Payer: MEDICARE

## 2022-03-08 DIAGNOSIS — R91.1 LUNG NODULE: ICD-10-CM

## 2022-03-08 DIAGNOSIS — R53.83 FATIGUE, UNSPECIFIED TYPE: ICD-10-CM

## 2022-03-08 DIAGNOSIS — B35.1 ONYCHOMYCOSIS OF GREAT TOE: ICD-10-CM

## 2022-03-08 LAB
ALBUMIN SERPL-MCNC: 4.2 G/DL (ref 3.5–5.2)
ALP BLD-CCNC: 63 U/L (ref 40–129)
ALT SERPL-CCNC: 19 U/L (ref 0–40)
ANION GAP SERPL CALCULATED.3IONS-SCNC: 14 MMOL/L (ref 7–16)
AST SERPL-CCNC: 18 U/L (ref 0–39)
BASOPHILS ABSOLUTE: 0.1 E9/L (ref 0–0.2)
BASOPHILS RELATIVE PERCENT: 1 % (ref 0–2)
BILIRUB SERPL-MCNC: 0.3 MG/DL (ref 0–1.2)
BILIRUBIN DIRECT: <0.2 MG/DL (ref 0–0.3)
BILIRUBIN, INDIRECT: NORMAL MG/DL (ref 0–1)
BUN BLDV-MCNC: 15 MG/DL (ref 6–20)
CALCIUM SERPL-MCNC: 9.5 MG/DL (ref 8.6–10.2)
CHLORIDE BLD-SCNC: 105 MMOL/L (ref 98–107)
CO2: 23 MMOL/L (ref 22–29)
CREAT SERPL-MCNC: 1 MG/DL (ref 0.7–1.2)
EOSINOPHILS ABSOLUTE: 0.25 E9/L (ref 0.05–0.5)
EOSINOPHILS RELATIVE PERCENT: 2.5 % (ref 0–6)
GFR AFRICAN AMERICAN: >60
GFR NON-AFRICAN AMERICAN: >60 ML/MIN/1.73
GLUCOSE BLD-MCNC: 75 MG/DL (ref 74–99)
HCT VFR BLD CALC: 43 % (ref 37–54)
HEMOGLOBIN: 14.2 G/DL (ref 12.5–16.5)
IMMATURE GRANULOCYTES #: 0.06 E9/L
IMMATURE GRANULOCYTES %: 0.6 % (ref 0–5)
LYMPHOCYTES ABSOLUTE: 3.51 E9/L (ref 1.5–4)
LYMPHOCYTES RELATIVE PERCENT: 35.6 % (ref 20–42)
MCH RBC QN AUTO: 30.1 PG (ref 26–35)
MCHC RBC AUTO-ENTMCNC: 33 % (ref 32–34.5)
MCV RBC AUTO: 91.3 FL (ref 80–99.9)
MONOCYTES ABSOLUTE: 0.92 E9/L (ref 0.1–0.95)
MONOCYTES RELATIVE PERCENT: 9.3 % (ref 2–12)
NEUTROPHILS ABSOLUTE: 5.03 E9/L (ref 1.8–7.3)
NEUTROPHILS RELATIVE PERCENT: 51 % (ref 43–80)
PDW BLD-RTO: 13 FL (ref 11.5–15)
PLATELET # BLD: 333 E9/L (ref 130–450)
PMV BLD AUTO: 10.8 FL (ref 7–12)
POTASSIUM SERPL-SCNC: 4.2 MMOL/L (ref 3.5–5)
RBC # BLD: 4.71 E12/L (ref 3.8–5.8)
SODIUM BLD-SCNC: 142 MMOL/L (ref 132–146)
TOTAL PROTEIN: 6.7 G/DL (ref 6.4–8.3)
TSH SERPL DL<=0.05 MIU/L-ACNC: 2.16 UIU/ML (ref 0.27–4.2)
WBC # BLD: 9.9 E9/L (ref 4.5–11.5)

## 2022-03-08 PROCEDURE — 82248 BILIRUBIN DIRECT: CPT

## 2022-03-08 PROCEDURE — 71271 CT THORAX LUNG CANCER SCR C-: CPT

## 2022-03-08 PROCEDURE — 82607 VITAMIN B-12: CPT

## 2022-03-08 PROCEDURE — 82746 ASSAY OF FOLIC ACID SERUM: CPT

## 2022-03-08 PROCEDURE — 36415 COLL VENOUS BLD VENIPUNCTURE: CPT

## 2022-03-08 PROCEDURE — 84443 ASSAY THYROID STIM HORMONE: CPT

## 2022-03-08 PROCEDURE — 85025 COMPLETE CBC W/AUTO DIFF WBC: CPT

## 2022-03-08 PROCEDURE — 80053 COMPREHEN METABOLIC PANEL: CPT

## 2022-03-09 LAB
FOLATE: 6.3 NG/ML (ref 4.8–24.2)
VITAMIN B-12: 312 PG/ML (ref 211–946)

## 2022-03-10 ENCOUNTER — TELEPHONE (OUTPATIENT)
Dept: CASE MANAGEMENT | Age: 57
End: 2022-03-10

## 2022-03-10 ENCOUNTER — TELEPHONE (OUTPATIENT)
Dept: PULMONOLOGY | Age: 57
End: 2022-03-10

## 2022-03-10 NOTE — TELEPHONE ENCOUNTER
Contacted pt via phone and informed him that his chest CT was normal and we will continue with the annual lung screens yearly. Pt verbalized understanding and denied any questions or concerns at this time.

## 2022-03-10 NOTE — TELEPHONE ENCOUNTER
No call, encounter opened to process CT Lung Screening. CT Lung Screen: 3/8/2022    Impression   No pulmonary nodules.       Stable right lower lobe herniation of lung again demonstrated.       LUNG RADS:   Per ACR Lung-RADS Version 1.1       Lung rads 1.  Continue annual low-dose CT screening of the chest.       RECOMMENDATIONS:   If you would like to register your patient with the [facility], please contact the Nurse Navigator at   [phone]. Pack years: 27    Social History     Tobacco Use  Smoking Status: Current Every Day Smoker    Start Date:    Quit Date:    Types: Cigarettes   Packs/Day: 0.75   Years: 40   Pack Years: 27   Smokeless Tobacco: Never Used         Results letter sent to patient via my chart or mailed.      [address]

## 2022-03-28 ENCOUNTER — TELEPHONE (OUTPATIENT)
Dept: INTERNAL MEDICINE | Age: 57
End: 2022-03-28

## 2022-03-28 NOTE — TELEPHONE ENCOUNTER
Initiated call to pt; states he has been attempting to schedule an eye exam and is in need of prescriptions glasses as his OTC far and near vision glasses are not meeting his needs. Pt has Medicare insurance and only [place] portion of Medicaid which does not cover eye care; reviewed chart and pt is not being treated for diabetes, therefore can not qualify for Medicare exam  Reviewed possible resource with [facility] ; also explored thru [facility] and made aware of services only for over 65 and under 18.   Mailed pt [facility] info to call and to advise LSW of outcome after contact

## 2022-06-16 ENCOUNTER — TELEPHONE (OUTPATIENT)
Dept: INTERNAL MEDICINE | Age: 57
End: 2022-06-16

## 2022-06-16 ENCOUNTER — OFFICE VISIT (OUTPATIENT)
Dept: INTERNAL MEDICINE | Age: 57
End: 2022-06-16
Payer: MEDICARE

## 2022-06-16 VITALS
RESPIRATION RATE: 16 BRPM | TEMPERATURE: 97.9 F | SYSTOLIC BLOOD PRESSURE: 108 MMHG | WEIGHT: 259.6 LBS | BODY MASS INDEX: 36.34 KG/M2 | HEIGHT: 71 IN | OXYGEN SATURATION: 95 % | HEART RATE: 80 BPM | DIASTOLIC BLOOD PRESSURE: 69 MMHG

## 2022-06-16 DIAGNOSIS — I50.9 CONGESTIVE HEART FAILURE, UNSPECIFIED HF CHRONICITY, UNSPECIFIED HEART FAILURE TYPE (HCC): Primary | ICD-10-CM

## 2022-06-16 DIAGNOSIS — R11.2 NAUSEA AND VOMITING, INTRACTABILITY OF VOMITING NOT SPECIFIED, UNSPECIFIED VOMITING TYPE: ICD-10-CM

## 2022-06-16 DIAGNOSIS — B35.1 ONYCHOMYCOSIS OF GREAT TOE: ICD-10-CM

## 2022-06-16 DIAGNOSIS — G89.4 CHRONIC PAIN SYNDROME: ICD-10-CM

## 2022-06-16 DIAGNOSIS — I25.119 CORONARY ARTERY DISEASE INVOLVING NATIVE HEART WITH ANGINA PECTORIS, UNSPECIFIED VESSEL OR LESION TYPE (HCC): ICD-10-CM

## 2022-06-16 DIAGNOSIS — N40.1 BENIGN PROSTATIC HYPERPLASIA (BPH) WITH STRAINING ON URINATION: ICD-10-CM

## 2022-06-16 DIAGNOSIS — F41.9 ANXIETY: ICD-10-CM

## 2022-06-16 DIAGNOSIS — Z12.11 COLON CANCER SCREENING: ICD-10-CM

## 2022-06-16 DIAGNOSIS — G47.9 SLEEP DISORDER: ICD-10-CM

## 2022-06-16 DIAGNOSIS — J30.89 NON-SEASONAL ALLERGIC RHINITIS, UNSPECIFIED TRIGGER: ICD-10-CM

## 2022-06-16 DIAGNOSIS — E78.9 LIPID DISORDER: ICD-10-CM

## 2022-06-16 DIAGNOSIS — N52.9 ERECTILE DYSFUNCTION, UNSPECIFIED ERECTILE DYSFUNCTION TYPE: ICD-10-CM

## 2022-06-16 DIAGNOSIS — K52.9 CHRONIC DIARRHEA: ICD-10-CM

## 2022-06-16 DIAGNOSIS — K21.9 GASTROESOPHAGEAL REFLUX DISEASE, UNSPECIFIED WHETHER ESOPHAGITIS PRESENT: ICD-10-CM

## 2022-06-16 DIAGNOSIS — R06.02 SHORTNESS OF BREATH: ICD-10-CM

## 2022-06-16 DIAGNOSIS — J44.9 COPD, SEVERE (HCC): ICD-10-CM

## 2022-06-16 DIAGNOSIS — I10 HYPERTENSION, UNSPECIFIED TYPE: ICD-10-CM

## 2022-06-16 DIAGNOSIS — R39.16 BENIGN PROSTATIC HYPERPLASIA (BPH) WITH STRAINING ON URINATION: ICD-10-CM

## 2022-06-16 PROBLEM — D53.9 MACROCYTIC ANEMIA: Status: RESOLVED | Noted: 2020-09-04 | Resolved: 2022-06-16

## 2022-06-16 PROCEDURE — 99213 OFFICE O/P EST LOW 20 MIN: CPT | Performed by: INTERNAL MEDICINE

## 2022-06-16 PROCEDURE — 4004F PT TOBACCO SCREEN RCVD TLK: CPT | Performed by: INTERNAL MEDICINE

## 2022-06-16 PROCEDURE — G8417 CALC BMI ABV UP PARAM F/U: HCPCS | Performed by: INTERNAL MEDICINE

## 2022-06-16 PROCEDURE — 3017F COLORECTAL CA SCREEN DOC REV: CPT | Performed by: INTERNAL MEDICINE

## 2022-06-16 PROCEDURE — 3023F SPIROM DOC REV: CPT | Performed by: INTERNAL MEDICINE

## 2022-06-16 PROCEDURE — G8427 DOCREV CUR MEDS BY ELIG CLIN: HCPCS | Performed by: INTERNAL MEDICINE

## 2022-06-16 PROCEDURE — 99214 OFFICE O/P EST MOD 30 MIN: CPT | Performed by: INTERNAL MEDICINE

## 2022-06-16 RX ORDER — ALBUTEROL SULFATE 90 UG/1
1-2 AEROSOL, METERED RESPIRATORY (INHALATION) EVERY 4 HOURS PRN
Qty: 1 EACH | Refills: 5 | Status: SHIPPED
Start: 2022-06-16 | End: 2022-09-28 | Stop reason: SDUPTHER

## 2022-06-16 RX ORDER — CHOLESTYRAMINE 4 G/9G
1 POWDER, FOR SUSPENSION ORAL
Qty: 90 PACKET | Refills: 3 | Status: CANCELLED | OUTPATIENT
Start: 2022-06-16

## 2022-06-16 RX ORDER — SILDENAFIL CITRATE 20 MG/1
20 TABLET ORAL PRN
Qty: 30 TABLET | Refills: 0 | Status: SHIPPED
Start: 2022-06-16 | End: 2022-09-28 | Stop reason: SDUPTHER

## 2022-06-16 RX ORDER — GABAPENTIN 600 MG/1
600 TABLET ORAL 3 TIMES DAILY
Qty: 270 TABLET | Refills: 1 | Status: SHIPPED
Start: 2022-06-16 | End: 2022-09-28 | Stop reason: SDUPTHER

## 2022-06-16 RX ORDER — EZETIMIBE 10 MG/1
10 TABLET ORAL DAILY
Qty: 30 TABLET | Refills: 3 | Status: SHIPPED
Start: 2022-06-16 | End: 2022-09-28 | Stop reason: SDUPTHER

## 2022-06-16 RX ORDER — BUSPIRONE HYDROCHLORIDE 15 MG/1
15 TABLET ORAL 2 TIMES DAILY
Qty: 60 TABLET | Refills: 3 | Status: SHIPPED
Start: 2022-06-16 | End: 2022-09-28 | Stop reason: SDUPTHER

## 2022-06-16 RX ORDER — METOPROLOL SUCCINATE 25 MG/1
12.5 TABLET, EXTENDED RELEASE ORAL DAILY
Qty: 45 TABLET | Refills: 1 | Status: SHIPPED
Start: 2022-06-16 | End: 2022-06-16

## 2022-06-16 RX ORDER — OMEPRAZOLE 40 MG/1
CAPSULE, DELAYED RELEASE ORAL
Qty: 90 CAPSULE | Refills: 1 | Status: SHIPPED
Start: 2022-06-16 | End: 2022-09-28 | Stop reason: SDUPTHER

## 2022-06-16 RX ORDER — TRAZODONE HYDROCHLORIDE 100 MG/1
100 TABLET ORAL NIGHTLY
Qty: 90 TABLET | Refills: 1 | Status: SHIPPED
Start: 2022-06-16 | End: 2022-09-28 | Stop reason: SDUPTHER

## 2022-06-16 RX ORDER — TAMSULOSIN HYDROCHLORIDE 0.4 MG/1
0.8 CAPSULE ORAL DAILY
Qty: 180 CAPSULE | Refills: 1 | Status: SHIPPED
Start: 2022-06-16 | End: 2022-09-28

## 2022-06-16 RX ORDER — FINASTERIDE 5 MG/1
5 TABLET, FILM COATED ORAL DAILY
Qty: 30 TABLET | Refills: 3 | Status: SHIPPED
Start: 2022-06-16 | End: 2022-06-16 | Stop reason: CLARIF

## 2022-06-16 RX ORDER — FLUTICASONE PROPIONATE 50 MCG
1 SPRAY, SUSPENSION (ML) NASAL DAILY
Qty: 16 G | Refills: 3 | Status: SHIPPED
Start: 2022-06-16 | End: 2022-09-28 | Stop reason: SDUPTHER

## 2022-06-16 RX ORDER — PROMETHAZINE HYDROCHLORIDE 25 MG/1
25 TABLET ORAL 3 TIMES DAILY PRN
Qty: 90 TABLET | Refills: 3 | Status: SHIPPED
Start: 2022-06-16 | End: 2022-09-28 | Stop reason: SDUPTHER

## 2022-06-16 RX ORDER — ASPIRIN 81 MG/1
81 TABLET ORAL DAILY
Qty: 90 TABLET | Refills: 1 | Status: SHIPPED
Start: 2022-06-16 | End: 2022-09-28 | Stop reason: SDUPTHER

## 2022-06-16 RX ORDER — METOPROLOL SUCCINATE 25 MG/1
12.5 TABLET, EXTENDED RELEASE ORAL DAILY
Qty: 45 TABLET | Refills: 1 | Status: SHIPPED
Start: 2022-06-16 | End: 2022-09-28 | Stop reason: SDUPTHER

## 2022-06-16 RX ORDER — ATORVASTATIN CALCIUM 40 MG/1
40 TABLET, FILM COATED ORAL DAILY
Qty: 90 TABLET | Refills: 1 | Status: SHIPPED
Start: 2022-06-16 | End: 2022-09-28 | Stop reason: SINTOL

## 2022-06-16 ASSESSMENT — ENCOUNTER SYMPTOMS
DIARRHEA: 1
COUGH: 1
EYE ITCHING: 1
RHINORRHEA: 1
SHORTNESS OF BREATH: 1
VOMITING: 1
NAUSEA: 1
BLOOD IN STOOL: 0
CONSTIPATION: 0
ABDOMINAL PAIN: 1
CHEST TIGHTNESS: 0
WHEEZING: 0

## 2022-06-16 ASSESSMENT — PATIENT HEALTH QUESTIONNAIRE - PHQ9
SUM OF ALL RESPONSES TO PHQ QUESTIONS 1-9: 6
6. FEELING BAD ABOUT YOURSELF - OR THAT YOU ARE A FAILURE OR HAVE LET YOURSELF OR YOUR FAMILY DOWN: 0
8. MOVING OR SPEAKING SO SLOWLY THAT OTHER PEOPLE COULD HAVE NOTICED. OR THE OPPOSITE, BEING SO FIGETY OR RESTLESS THAT YOU HAVE BEEN MOVING AROUND A LOT MORE THAN USUAL: 2
9. THOUGHTS THAT YOU WOULD BE BETTER OFF DEAD, OR OF HURTING YOURSELF: 0
SUM OF ALL RESPONSES TO PHQ QUESTIONS 1-9: 6
2. FEELING DOWN, DEPRESSED OR HOPELESS: 0
1. LITTLE INTEREST OR PLEASURE IN DOING THINGS: 0
3. TROUBLE FALLING OR STAYING ASLEEP: 0
10. IF YOU CHECKED OFF ANY PROBLEMS, HOW DIFFICULT HAVE THESE PROBLEMS MADE IT FOR YOU TO DO YOUR WORK, TAKE CARE OF THINGS AT HOME, OR GET ALONG WITH OTHER PEOPLE: 1
7. TROUBLE CONCENTRATING ON THINGS, SUCH AS READING THE NEWSPAPER OR WATCHING TELEVISION: 2
SUM OF ALL RESPONSES TO PHQ9 QUESTIONS 1 & 2: 0
SUM OF ALL RESPONSES TO PHQ QUESTIONS 1-9: 6
5. POOR APPETITE OR OVEREATING: 0
4. FEELING TIRED OR HAVING LITTLE ENERGY: 2
SUM OF ALL RESPONSES TO PHQ QUESTIONS 1-9: 6

## 2022-06-16 NOTE — PROGRESS NOTES
Printed lab scripts and all instructions given to patient   Instructed to stop at the  to schedule his fu appt and  his printed avs

## 2022-06-16 NOTE — PROGRESS NOTES
[facility] [address]  Internal Medicine Residency Clinic    Attending Physician Statement  I have discussed the case, including pertinent history and exam findings with the resident physician. I agree with the assessment, plan and orders as documented by the resident. I have reviewed all pertinent PMHx, PSHx, FamHx, SocialHx, medications, and allergies and updated history as appropriate. Patient here for routine follow up of medical problems. 1. Severe COPD, ongoing tobacco use (actively trying to cut down now). Resting and ambulatory pulse ox at 95%. He is compliant with Breztri and albuterol. He is having more episodes of CORTEZ, orthopnea. Will evaluate with rpt Echo to r/o cardiac cause. 2. Pulmonary nodule following with Dr Albin Li  3. HTN on BB.  - 120s/70s since March with episodes of lightheaded - reduce BB dose  4. LUTS - has not been taking flomax at 0.4 because he feels it is not working. Increase flomax to 0.8, obtain UA and PSA  5. Onychomycosis has not improved with antifungal, LFT as of 3 months ago was normal. Would refer to Podiatry for additional treatment options  6. HLD - needs repeat LDL (was on zetia only for 2 months, problems with pharmacy). 7. Tubular adenoma (Dec 2019) - rpt colonoscopy this year  8. Chronic back pain on gabapentin  9. RCC s/p nephrectomy  10. Chronic diarrhea not improved with cholestyramine. Nausea improved with prn phenergan. Likely not infectious, malabsorption work up not done. He has been on PPI since 2019. Will refer to GI for further work up. Remainder of medical problems as per resident note. Kelsy Garcia MD  6/16/2022 1:54 PM

## 2022-06-16 NOTE — PROGRESS NOTES
[facility] Medicine      SUBJECTIVE:  Anthony Laguerre (:  1965) is a 62 y.o. male here for evaluation of the following chief complaint(s):  3 Month Follow-Up    Marlen Oliver is a 62 y.o. male with PMHx of CAD, HTN, HLD, colon polys, chronic back pain, renal cell carcinoma, obesity, anxiety, tobacco abuse, who is seen at [facility] clinic today for routine 3-month follow up. Last seen 3/3/2022.     Acute complaint:    Patient complains worsening SOB recently. He cannot walk a block without stopping to catch his breath. He has othorpnea and PND as well. Denies chest pain, palpitations, leg swelling. SpO2 95% on room air, remains 95% after ambulating. Saw pulmonary in February and PFT shows declining of pulmonary function, severe COPD. Next appointment in August. Echo in 2019 showed EF 60%, TRISH, RVSP 25 mmHg. proBNP 262 in 3/2020. Will repeat echo today. Patient states he has chronic nasal congestion, running nose, and itchy eye for years, no significant change recently. Flonase ordered. Patient reports urinary hesitancy, straining and weak urine stream, dribbling. PSA 1.25 (2021). He states PSA 0.4 mg not working. Increase dose to 0.8 mg. Oral lamisil 250 mg daily for 12 weeks ordered at last visit for toenail onychomycosis. Patient completed the course and continues to have toenail discoloration. LFT ordered today and Podiatry referral sent. Reviews labs from 3/8/22 with patient. CBC, CMP, TSH, B12/folate all WNL       HLD  · On lipitor 40 mg daily  · Zetia added 3/22 and patient was taking for 2 months  · TC 197, LDL 99, HDL 40, TG 290 (2021) <--- , , HDL 51,  (2019)  · Repeat lipid panel   The 10-year ASCVD risk score (Bhavik Cota., et al., 2013) is: 10.3%    Values used to calculate the score:      Age: 62 years      Sex: Male      Is Non- : No      Diabetic: No      Tobacco smoker:  Yes Systolic Blood Pressure: 480 mmHg      Is BP treated: No      HDL Cholesterol: 40 mg/dL      Total Cholesterol: 197 mg/dL     Severe COPD, H/o lung nodule s/p right sided VATS with right upper lobe wedge resection with biopsy showing diffuse granulomatous changes and noncaseating granulomas  · Patient has been complaining of SOB since 2019. Followed up with pulmonology and cardiology both. Seen by Dr. Loomis Breeding 2/25/2022. Next appintment 8/10/2022. He has undergone echocardiogram, stress test and PFT. He has still been smoking. · Spirometry showed significant decline in lung function from 2019 to 2022. FVC 75 -> 53%, FEV1% 71 -> 50%   · Started Breztri and stopped Advair and Spiriva 2/2022  · Needs albuterol inhaler 4-5 times/day  · Refused sleep study  · Refuse influenza, Pneumovax, and COVID-19 vaccine  · Report cutting cigarettes to 3/4 pack/day -> 1/2 pack/day. Refused nicotine gum/patch or Chantix  · CT lung screening negative 3/8/2022     Chronic nausea, emesis, diarrhea  · s/p cholecystectomy. · Colonoscopy in 2019 showed tubular adenoma. · Lipase and amylase WNL (9/2020). Fecal fat, leukocyte, and c-diff ordered previously but was not done. · EGD normal. Colonoscopy with tubular adenoma. CT A/P normal.   · Has been on PPI for prolonged time. Microscopic colitis? Need random biopsies. · Was on cholestyramine, not helping. Still have 5-6 watery BM/day, with N/V and abdominal pain. Discontinued today. · Nausea improved with Phenergan   · Advised patient to complete fecal fat and leukocyte      ?CAD, stable  · Denies chest pain, palpitations, lightheadedness, leg swelling  · On ASA 81 mg daily, lipitor 40 mg daily, metoprolol 25 mg   · EKG today unremarkable  · Stress test in 8/2019 unremarkable  · Echo 12/2019 shows LV EF 60%, stage I DD  · Coronary CTA: calcium score 0 (9/2021)  · Repeat echo     HTN  · Was on metoprolol 25 mg   · /71 mmHg today. Patient had lightheadedness when standing up. Orthostatic negative  · Decrease metoprolol to 12.5 mg daily  · Need to order blood pressure cuff at next visit     ?ED  · On Revatio 20 mg PRN  · Will discuss at next visit     BPH  · Was on flomax 0.4 mg daily  · PSA 1.25 (8/2021) <--- 1.66 (8/2019)  · Increase flomax to 0.8 mg daily     GERD  · On omeprazole 40 mg daily     Chronic back pain with sciatica  · Patient reluctant to go to PMR because he has gone to them in the past and did not like the results of steroid injections. Report PT not helping  · On gabapentin 600 mg TID and ibuprofen 800 Q6h PRN. · If repeat LFT normal, consider to switch ibuprofen to tylenol to protect renal function. Patient has one kidney. · MRI lumbar pending  Impression   Multilevel mild posterior bridging hard disc and spur. This is   somewhat greater centrally and on the left at L4-5. In combination   with facet arthrosis and narrowed interpedicular distance, there is   moderate stenosis at this level      Colon polys  · Colonoscope 12/2019: tubular adenoma  · Repeat in 3 years  · GI referred for colonoscopy     Renal cell carcinoma s/p R nephrectomy in 2005     Anxiety  · On buspar 15 mg BID and trazodone 100 mg nightly  · Follow LISW     Tobacco abuse  · Smoking since age of 15. Cut to half pack per day  · Smoking cessation counseling. Patient states that smoking calms him down  · Refused nicotine patch and gum and chantix      HM  -refused influenza, Pneumovax, and COVID-19 vaccine  -refused sleep study  -colonoscopy 12/2019: tubular adenoma. Repeat this year  -CT lung cancer screening: scheduled 3/8/22  -HgA1c 5.4 (8/2021) <--- 5.5 (3/2020)              Review of Systems   Constitutional: Positive for activity change. Negative for appetite change, fever and unexpected weight change. HENT: Positive for congestion and rhinorrhea. Eyes: Positive for itching and visual disturbance. Respiratory: Positive for cough and shortness of breath.  Negative for chest tightness and wheezing. Cardiovascular: Negative for chest pain, palpitations and leg swelling. Gastrointestinal: Positive for abdominal pain, diarrhea, nausea and vomiting. Negative for blood in stool and constipation. Genitourinary: Positive for difficulty urinating. Negative for dysuria, frequency and hematuria. Musculoskeletal: Positive for neck pain. Negative for arthralgias. Skin: Negative for wound. Neurological: Positive for light-headedness. Negative for syncope, weakness and headaches. Psychiatric/Behavioral: Negative for dysphoric mood. Current Outpatient Medications on File Prior to Visit   Medication Sig Dispense Refill    ibuprofen (ADVIL;MOTRIN) 800 MG tablet Take 1 tablet by mouth every 6 hours as needed for Pain 120 tablet 3    Budeson-Glycopyrrol-Formoterol (BREZTRI AEROSPHERE) 160-9-4.8 MCG/ACT AERO Inhale 2 puffs into the lungs in the morning and at bedtime 1 each 3     No current facility-administered medications on file prior to visit. OBJECTIVE:    VS:   Vitals:    06/16/22 1305 06/16/22 1400 06/16/22 1404 06/16/22 1408   BP: 110/71 117/67 107/67 108/69   Site: Right Upper Arm Right Upper Arm     Position: Sitting Supine Sitting Standing   Cuff Size: Medium Adult      Pulse: 81 72 75 80   Resp: 16      Temp: 97.9 °F (36.6 °C)      TempSrc: Temporal      SpO2: 95%      Weight: 259 lb 9.6 oz (117.8 kg)      Height: 5' 11\" (1.803 m)        Physical Exam  Constitutional:       General: He is not in acute distress. Appearance: Normal appearance. HENT:      Head: Normocephalic and atraumatic. Nose: No congestion or rhinorrhea. Mouth/Throat:      Mouth: Mucous membranes are moist.   Eyes:      General:         Right eye: No discharge. Left eye: No discharge. Pupils: Pupils are equal, round, and reactive to light. Cardiovascular:      Rate and Rhythm: Normal rate and regular rhythm. Pulses: Normal pulses. Heart sounds: Normal heart sounds.  No murmur heard. No friction rub. No gallop. Pulmonary:      Effort: No respiratory distress. Breath sounds: Normal breath sounds. No wheezing, rhonchi or rales. Abdominal:      General: Bowel sounds are normal.      Palpations: Abdomen is soft. Tenderness: There is abdominal tenderness. There is no guarding or rebound. Musculoskeletal:      Cervical back: Neck supple. Tenderness present. Right lower leg: No edema. Left lower leg: No edema. Skin:     General: Skin is warm and dry. Neurological:      General: No focal deficit present. Mental Status: He is alert. Psychiatric:         Mood and Affect: Mood normal.            ASSESSMENT/PLAN:  1. SOB, COPD vs CHF  -     Continue Breztri 2 puffs BID  -     albuterol sulfate HFA (PROVENTIL HFA) 108 (90 Base) MCG/ACT inhaler; Inhale 1-2 puffs into the lungs every 4 hours as needed for Wheezing or Shortness of Breath, Disp-1 each, R-5Normal  -     Echocardiogram complete; Future  2. Hypertension  -    BP soft with lightheadedness.   - Decrease to 12.5 mg daily. metoprolol succinate (TOPROL XL) 25 MG extended release tablet; Take 0.5 tablets by mouth daily, Disp-45 tablet, R-1Normal  3. Benign prostatic hyperplasia (BPH) with straining on urination  -     tamsulosin (FLOMAX) 0.4 MG capsule; Take 2 capsules by mouth daily, Disp-180 capsule, R-1Normal  -     PSA Screening; Future  4. Chronic diarrhea  -     Magnesium; Future  -     AFL - Rhodia Colon, DO, Gastroenterology, [place]  5. Colon cancer screening  -     AFL - Rhodia Colon, DO, Gastroenterology, [place]  6. Nausea and vomiting, intractability of vomiting not specified, unspecified vomiting type        -     promethazine (PHENERGAN) 25 MG tablet; Take 1 tablet by mouth 3 times daily as needed for Nausea, Disp-90 tablet, R-3Normal  7. Dyslipidemia   -     atorvastatin (LIPITOR) 40 MG tablet;  Take 1 tablet by mouth daily, Disp-90 tablet, R-1Normal  -     ezetimibe (ZETIA) 10 MG tablet; Take 1 tablet by mouth daily, Disp-30 tablet, R-3Normal  -     LIPID PANEL; Future  8. Onychomycosis of great toe  -    Complete lamisil for 12 weeks   - Hepatic Function Panel; Future  -     Ephraim Bumpers, DPM, Podiatry, [place] ([facility])  9. Non-seasonal allergic rhinitis, unspecified trigger  -     fluticasone (FLONASE) 50 MCG/ACT nasal spray; 1 spray by Each Nostril route daily, Disp-16 g, R-3Normal  10. Coronary artery disease involving native heart with angina pectoris, unspecified vessel or lesion type (HCC)  -     aspirin (SONIA ASPIRIN EC LOW DOSE) 81 MG EC tablet; Take 1 tablet by mouth daily, Disp-90 tablet, R-1Normal  11. Anxiety  -     busPIRone (BUSPAR) 15 MG tablet; Take 15 mg by mouth 2 times daily, Disp-60 tablet, R-3Normal  12. Chronic pain syndrome  -     gabapentin (NEURONTIN) 600 MG tablet; Take 1 tablet by mouth 3 times daily for 60 days. , Disp-270 tablet, R-1Normal  13. Erectile dysfunction, unspecified erectile dysfunction type  -     sildenafil (REVATIO) 20 MG tablet; Take 1 tablet by mouth as needed (as directed 1-2 tablets), Disp-30 tablet, R-0Normal  14. Gastroesophageal reflux disease, unspecified whether esophagitis present  -     omeprazole (PRILOSEC) 40 MG delayed release capsule; TAKE 1 CAPSULE BY MOUTH ONCE DAILY IN THE MORNING BEFORE BREAKFAST, Disp-90 capsule, R-1Normal  15. Sleep disorder  -     traZODone (DESYREL) 100 MG tablet; Take 1 tablet by mouth nightly, Disp-90 tablet, R-1Normal         RTC:  Return in about 3 months (around 9/16/2022) for Follow up. I have reviewed my findings and recommendations with Myriam Bell and Dr. Teddy Tobin.     Maren Mitchell MD   6/16/2022 4:19 PM

## 2022-06-16 NOTE — PATIENT INSTRUCTIONS
Flomax increased to 0.8 mg for urinary issue. Please take 2 tablets daily. Please complete echocardiogram to check heart function. Please decrease metoprolol to 12.5 mg for low blood pressure. We referred you to gastroenterology for diarrhea and abdominal pain. They can also have your colonoscopy done. Please follow up. Please complete blood tests. Please follow up with podiatry for toenail issue.

## 2022-08-10 ENCOUNTER — OFFICE VISIT (OUTPATIENT)
Dept: PULMONOLOGY | Age: 57
End: 2022-08-10
Payer: MEDICARE

## 2022-08-10 ENCOUNTER — HOSPITAL ENCOUNTER (OUTPATIENT)
Age: 57
Discharge: HOME | End: 2022-08-10
Payer: MEDICARE

## 2022-08-10 VITALS
WEIGHT: 263 LBS | OXYGEN SATURATION: 98 % | HEIGHT: 71 IN | HEART RATE: 85 BPM | TEMPERATURE: 96.9 F | SYSTOLIC BLOOD PRESSURE: 151 MMHG | DIASTOLIC BLOOD PRESSURE: 82 MMHG | RESPIRATION RATE: 16 BRPM | BODY MASS INDEX: 36.82 KG/M2

## 2022-08-10 DIAGNOSIS — J44.9 CHRONIC OBSTRUCTIVE PULMONARY DISEASE, UNSPECIFIED COPD TYPE (HCC): ICD-10-CM

## 2022-08-10 DIAGNOSIS — Z72.0 TOBACCO USE: ICD-10-CM

## 2022-08-10 DIAGNOSIS — R06.2 WHEEZING: ICD-10-CM

## 2022-08-10 DIAGNOSIS — E78.9 LIPID DISORDER: ICD-10-CM

## 2022-08-10 DIAGNOSIS — K52.9 CHRONIC DIARRHEA: ICD-10-CM

## 2022-08-10 DIAGNOSIS — R06.2 WHEEZING: Primary | ICD-10-CM

## 2022-08-10 DIAGNOSIS — B35.1 ONYCHOMYCOSIS OF GREAT TOE: ICD-10-CM

## 2022-08-10 DIAGNOSIS — R06.02 SHORTNESS OF BREATH: ICD-10-CM

## 2022-08-10 LAB
ALBUMIN SERPL-MCNC: 4.3 G/DL (ref 3.5–5.2)
ALP BLD-CCNC: 61 U/L (ref 40–129)
ALT SERPL-CCNC: 23 U/L (ref 0–40)
AST SERPL-CCNC: 20 U/L (ref 0–39)
BILIRUB SERPL-MCNC: 0.4 MG/DL (ref 0–1.2)
BILIRUBIN DIRECT: <0.2 MG/DL (ref 0–0.3)
BILIRUBIN, INDIRECT: NORMAL MG/DL (ref 0–1)
CHOLESTEROL, TOTAL: 201 MG/DL (ref 0–199)
EOSINOPHILS ABSOLUTE COUNT: 280 /UL (ref 50–250)
HDLC SERPL-MCNC: 44 MG/DL
LDL CHOLESTEROL CALCULATED: 110 MG/DL (ref 0–99)
MAGNESIUM: 1.8 MG/DL (ref 1.6–2.6)
TOTAL PROTEIN: 6.7 G/DL (ref 6.4–8.3)
TRIGL SERPL-MCNC: 234 MG/DL (ref 0–149)
VLDLC SERPL CALC-MCNC: 47 MG/DL

## 2022-08-10 PROCEDURE — 83735 ASSAY OF MAGNESIUM: CPT

## 2022-08-10 PROCEDURE — G8427 DOCREV CUR MEDS BY ELIG CLIN: HCPCS | Performed by: INTERNAL MEDICINE

## 2022-08-10 PROCEDURE — 99406 BEHAV CHNG SMOKING 3-10 MIN: CPT | Performed by: INTERNAL MEDICINE

## 2022-08-10 PROCEDURE — 82785 ASSAY OF IGE: CPT

## 2022-08-10 PROCEDURE — 86003 ALLG SPEC IGE CRUDE XTRC EA: CPT

## 2022-08-10 PROCEDURE — 80061 LIPID PANEL: CPT

## 2022-08-10 PROCEDURE — 3023F SPIROM DOC REV: CPT | Performed by: INTERNAL MEDICINE

## 2022-08-10 PROCEDURE — 4004F PT TOBACCO SCREEN RCVD TLK: CPT | Performed by: INTERNAL MEDICINE

## 2022-08-10 PROCEDURE — 99213 OFFICE O/P EST LOW 20 MIN: CPT | Performed by: INTERNAL MEDICINE

## 2022-08-10 PROCEDURE — 85048 AUTOMATED LEUKOCYTE COUNT: CPT

## 2022-08-10 PROCEDURE — 80076 HEPATIC FUNCTION PANEL: CPT

## 2022-08-10 PROCEDURE — 36415 COLL VENOUS BLD VENIPUNCTURE: CPT

## 2022-08-10 PROCEDURE — G8417 CALC BMI ABV UP PARAM F/U: HCPCS | Performed by: INTERNAL MEDICINE

## 2022-08-10 PROCEDURE — 3017F COLORECTAL CA SCREEN DOC REV: CPT | Performed by: INTERNAL MEDICINE

## 2022-08-10 RX ORDER — BUDESONIDE, GLYCOPYRROLATE, AND FORMOTEROL FUMARATE 160; 9; 4.8 UG/1; UG/1; UG/1
2 AEROSOL, METERED RESPIRATORY (INHALATION) 2 TIMES DAILY
Qty: 1 EACH | Refills: 3 | Status: SHIPPED | OUTPATIENT
Start: 2022-08-10

## 2022-08-10 NOTE — PATIENT INSTRUCTIONS
[facility]  [address] [facility], [address]  Office: [phone]      Your were seen in the office today for COPD      We did not make  changes to your medications today. Continue breztri    Testing ordered today was lab work      Vaccines recommended influenza    Follow up in 6 months. Please do not hesitate to call the office with any questions.

## 2022-08-10 NOTE — PROGRESS NOTES
[facility]     HISTORY OF PRESENT ILLNESS:    Dayday Pagan is a 62y.o. year old male here for evaluation of rest of breath. Mr. Angela Moon reports that he has had increasing shortness of breath and increasing coughing. He states that sometimes he does cough up into the point of being dizzy. He states that his shortness of breath is worse at night he is currently using both Spiriva and Advair. He also uses a Proventil inhaler as a rescue inhaler he reports that on a good day he may not need to use his Proventil at all however on a bad day with his breathing he is using it 3-4 times a night. He was previously seen once in our office by my colleague on August 19 of 2019. He reports to me that he does have a history of renal cell carcinoma he states that he had a procedure done at [facility] in [facility] in 2014 in which they took a piece of his upper lobe of his right lung and lower lobe of his right lung and he was told that he had cancer however he never had any chemotherapy or any radiation therapy it appears through review of his records that Dr. Donnice Frankel B was unable to obtain those records in 2019. The patient subsequently had a CAT scan in 2020 where he was found to have a lung nodule and was supposed to follow-up however was lost to follow-up at that time. Regarding his overall history he reports that he started smoking when he was 13 he did smoke up to 2 packs/day and is now down to 1 pack/day. 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 He reports that he has 1 dog. His hobbies include walking in the woods and looking for beckford mushrooms. Updated HPI as of August 10, 2022  Patient seen and examined. Reports that he is still smoking half pack to 15 cigarettes a day. He reports that he feels that the [place] does help.   He is using his albuterol inhaler 2 times at the gym and going to the gym 5 times a week. He reports that his breathing is significantly worse with humidity. Otherwise he denies any new respiratory complaints. ALLERGIES:    Allergies   Allergen Reactions    Latex Rash     Skin turns red       PAST MEDICAL HISTORY:       Diagnosis Date    Acute gastritis without hemorrhage     CAD (coronary artery disease)     Cancer of kidney (HCC)     COPD (chronic obstructive pulmonary disease) ([facility])     COPD, severe ([address] 6/16/2022    Difficulty sleeping     ED (erectile dysfunction)     History of BPH     Hyperlipidemia     Lung cancer ([facility])     Tobacco abuse        MEDICATIONS:   Current Outpatient Medications   Medication Sig Dispense Refill    busPIRone (BUSPAR) 15 MG tablet Take 15 mg by mouth 2 times daily 60 tablet 3    tamsulosin (FLOMAX) 0.4 MG capsule Take 2 capsules by mouth daily 180 capsule 1    gabapentin (NEURONTIN) 600 MG tablet Take 1 tablet by mouth 3 times daily for 60 days.  270 tablet 1    aspirin (SONIA ASPIRIN EC LOW DOSE) 81 MG EC tablet Take 1 tablet by mouth daily 90 tablet 1    sildenafil (REVATIO) 20 MG tablet Take 1 tablet by mouth as needed (as directed 1-2 tablets) 30 tablet 0    omeprazole (PRILOSEC) 40 MG delayed release capsule TAKE 1 CAPSULE BY MOUTH ONCE DAILY IN THE MORNING BEFORE BREAKFAST 90 capsule 1    atorvastatin (LIPITOR) 40 MG tablet Take 1 tablet by mouth daily 90 tablet 1    albuterol sulfate HFA (PROVENTIL HFA) 108 (90 Base) MCG/ACT inhaler Inhale 1-2 puffs into the lungs every 4 hours as needed for Wheezing or Shortness of Breath 1 each 5    traZODone (DESYREL) 100 MG tablet Take 1 tablet by mouth nightly 90 tablet 1    fluticasone (FLONASE) 50 MCG/ACT nasal spray 1 spray by Each Nostril route daily 16 g 3    metoprolol succinate (TOPROL XL) 25 MG extended release tablet Take 0.5 tablets by mouth daily 45 tablet 1    ezetimibe (ZETIA) 10 MG tablet Take 1 tablet by mouth daily 30 tablet 3 promethazine (PHENERGAN) 25 MG tablet Take 1 tablet by mouth 3 times daily as needed for Nausea 90 tablet 3    ibuprofen (ADVIL;MOTRIN) 800 MG tablet Take 1 tablet by mouth every 6 hours as needed for Pain 120 tablet 3    Budeson-Glycopyrrol-Formoterol (BREZTRI AEROSPHERE) 160-9-4.8 MCG/ACT AERO Inhale 2 puffs into the lungs in the morning and at bedtime 1 each 3     No current facility-administered medications for this visit. SOCIAL AND OCCUPATIONAL HEALTH:  The patient is a current smoker. He smokes 1 pack/day which is down from 2 packs a day that he smoked previously. 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 He is currently on disability. He is going to the gym and weightlifting 4 days a week. He also likes to walk in the woods looking for beckford mushrooms and he has 1 dog. He denies any travel. Reports that he does work lift beside the [employer] in [id number] and is concerned about fumes.       SURGICAL HISTORY:   Past Surgical History:   Procedure Laterality Date    CARDIAC CATHETERIZATION  2015    Non-obstructive coronary disease by Dr. Marly Medeiros N/A 12/20/2019    COLONOSCOPY POLYPECTOMY SNARE/COLD BIOPSY performed by Lia Byers MD at [place] [address]  12/20/2019    COLONOSCOPY SUBMUCOSAL/BOTOX INJECTION performed by Lia Byers MD at [address] [place] [address] Right     part of the right     TOTAL NEPHRECTOMY Right     UPPER GASTROINTESTINAL ENDOSCOPY N/A 12/20/2019    EGD BIOPSY performed by Lia Byers MD at [facility]: No family history of cancer, blood clots patient reports that his father grandmother and sister have COPD and that they all also smoked    REVIEW OF SYSTEMS:  Constitutional: No fevers, chills, unintentional weight loss  Skin: No rashes or lesions  EENT: No change in vision, change in hearing, change in taste, change in smell  Cardiovascular: Denies chest pain, chest pressure, palpitations  Respiration: Positive for increased coughing and wheezing, worse with humidity  Gastrointestinal: Denies nausea, vomiting, diarrhea  Musculoskeletal: Denies joint or muscle pain  Neurological: Denies syncope, headache, seizures  Psychological: Denies anxiety or depression  Endocrine: Denies polyuria polydipsia  Hematopoietic/lymphatic: Denies easy bruising        PHYSICAL EXAMINATION:  Constitutional: Well-nourished, well-developed. No acute distress  EENT: PERRL, EOMI, no oropharyngeal erythema. No palpable adenopathy  Neck: Trachea and thyroid midline  Respiratory: Overall diminished bilaterally  Cardiovascular: Regular rate and rhythm, no murmurs rubs or gallops  Pulses: Equal bilaterally  Abdomen: Soft nontender bowel sounds present  Lymphatic: No palpable adenopathy  Musculoskeletal: Gait steady  Extremities: No clubbing, cyanosis, edema. Skin: No rashes or lesions  Neurological/Psychiatric: Neurologically intact, no focal deficits. Affect appropriate    DATA: Prior spirometry shows FVC of 2.65 L which is 53% of predicted FEV1 is 1.93 L which is 50% of predicted FEV1 FVC ratio is 73. MVV is 45 which is 30% of predicted. SVC is 2.10 which is 42% of predicted. Flow volume loops are consistent with a combined obstruction and restriction. Compared to the prior spirometry on 2019 the patient has had a 20% decline in both his FEV1 and FVC. CT of the lung on May 5, 2022 shows stable right lower lobe pulmonary herniation. No nodules or masses. IMPRESSION:       1. Severe COPD  2. Morbid obesity  3. Current tobacco abuse  4. History of lung nodule  5. History of renal cell carcinoma  6. History of prior bronchoscopy/possible VATS? 7.  Poor historian     8. Possible DEJON  9. Vaccine counseling             PLAN:      1. We will continue the patient on Breztri and as needed albuterol.   2.  The patient is currently trying to increase his activity and change his diet in order to lose weight. Currently going to the gym for 2 to 3 hours 5 times a week. 3.  We again discussed smoking cessation for approximately 11 minutes. He is currently continuing to smoke somewhere between 10 to 15 cigarettes a day. I again emphasized that his lung function will continue to worsen and the importance of cessation. Also discussed risks of cardiac disease. He is trying to cut back. Nicotine replacement therapy was offered  4. CT of chest reviewed with patient. Currently no lung nodules    5. Respiratory allergen panel, IgE and eosinophils ordered for possible asthma/COPD overlap    6. I strongly recommended the influenza, Pneumovax, and COVID-19 vaccinations to the patient. He adamantly refuses at this time. 7.  Patient reports that he was previously told that he had sleep apnea however he does not wear a CPAP and reports that he does not want to be dependent upon such things I discussed with him that the risk of an treated sleep apnea can lead to pulmonary hypertension, increased risk of strokes and increased risks of heart attack. He did not want to pursue a sleep study at this time. I hope this updates you on my evaluation and clinical thinking. Thank you for allowing me to participate in his care.      Sincerely,        [address] [place] [address]  Office: [phone]  Fax: [phone]

## 2022-08-12 ENCOUNTER — HOSPITAL ENCOUNTER (OUTPATIENT)
Dept: NON INVASIVE DIAGNOSTICS | Age: 57
Discharge: HOME | End: 2022-08-12
Payer: MEDICARE

## 2022-08-12 LAB
LV EF: 58 %
LVEF MODALITY: NORMAL

## 2022-08-12 PROCEDURE — 6360000004 HC RX CONTRAST MEDICATION: Performed by: INTERNAL MEDICINE

## 2022-08-12 PROCEDURE — 93306 TTE W/DOPPLER COMPLETE: CPT

## 2022-08-12 RX ADMIN — PERFLUTREN 1.65 MG: 6.52 INJECTION, SUSPENSION INTRAVENOUS at 14:57

## 2022-08-13 LAB — IGE: 61 KU/L

## 2022-08-15 LAB
2000687N OAK TREE IGE: <0.1 KU/L (ref 0–0.34)
ALLERGEN BERMUDA GRASS IGE: <0.1 KU/L (ref 0–0.34)
ALLERGEN BIRCH IGE: <0.1 KU/L (ref 0–0.34)
ALLERGEN DOG DANDER IGE: <0.1 KU/L (ref 0–0.34)
ALLERGEN GERMAN COCKROACH IGE: <0.1 KU/L (ref 0–0.34)
ALLERGEN HORMODENDRUM IGE: <0.1 KUL/L (ref 0–0.34)
ALLERGEN MOUSE EPITHELIA IGE: <0.1 KU/L (ref 0–0.34)
ALLERGEN PECAN TREE IGE: <0.1 KU/L (ref 0–0.34)
ALLERGEN PIGWEED ROUGH IGE: <0.1 KU/L (ref 0–0.34)
ALLERGEN SHEEP SORREL (W18) IGE: <0.1 KU/L (ref 0–0.34)
ALLERGEN TREE SYCAMORE: <0.1 KU/L (ref 0–0.34)
ALLERGEN WALNUT TREE IGE: <0.1 KU/L (ref 0–0.34)
ALLERGEN WHITE MULBERRY TREE, IGE: <0.1 KU/L (ref 0–0.34)
ALLERGEN, TREE, WHITE ASH IGE: <0.1 KU/L (ref 0–0.34)
ALTERNARIA ALTERNATA: <0.1 KU/L (ref 0–0.34)
ASPERGILLUS FUMIGATUS: <0.1 KU/L (ref 0–0.34)
CAT DANDER ANTIBODY: <0.1 KU/L (ref 0–0.34)
COTTONWOOD TREE: <0.1 KU/L (ref 0–0.34)
D. FARINAE: <0.1 KU/L (ref 0–0.34)
D. PTERONYSSINUS: <0.1 KU/L (ref 0–0.34)
ELM TREE: <0.1 KU/L (ref 0–0.34)
IGE: 69 IU/ML
MAPLE/BOXELDER TREE: <0.1 KU/L (ref 0–0.34)
MOUNTAIN CEDAR TREE: <0.1 KU/L (ref 0–0.34)
MUCOR RACEMOSUS: <0.1 KU/L (ref 0–0.34)
P. NOTATUM: <0.1 KU/L (ref 0–0.34)
RUSSIAN THISTLE: <0.1 KU/L (ref 0–0.34)
SHORT RAGWD(A ARTEMIS.) IGE: <0.1 KU/L (ref 0–0.34)
TIMOTHY GRASS: <0.1 KU/L (ref 0–0.34)

## 2022-08-16 ENCOUNTER — TELEPHONE (OUTPATIENT)
Dept: INTERNAL MEDICINE | Age: 57
End: 2022-08-16

## 2022-08-17 RX ORDER — ACETAMINOPHEN 500 MG
500 TABLET ORAL 4 TIMES DAILY PRN
Qty: 120 TABLET | Refills: 0 | Status: SHIPPED | OUTPATIENT
Start: 2022-08-17

## 2022-08-17 RX ORDER — PRAVASTATIN SODIUM 20 MG
20 TABLET ORAL DAILY
Qty: 45 TABLET | Refills: 0 | Status: SHIPPED
Start: 2022-08-17 | End: 2022-09-28 | Stop reason: SINTOL

## 2022-08-17 NOTE — TELEPHONE ENCOUNTER
Discussed with Mr. Medina his results including lipid panel, hepatic function and Mg. Lipids still suboptimal.  Stopped taking atorvastatin 3-4 days ago as this may him feel incredibly fatigued and he is now feeling better after stopping. Still taking ezetimibe. I discussed that he could continue off of a statin and focus on intensive dietary therapy or try an alternative statin along with taking a supplemental Co-enzyme Q tablet. Mr. Dunia Hagan would like to try an alternative. I will prescribe pravastatin, 20 mg daily and assess response to this medication at his upcoming PCP visit. Mr. Dunia Hagan also requests a prescription for Tylenol.       The 10-year ASCVD risk score (Valencia Julian, et al., 2013) is: 16.9%    Values used to calculate the score:      Age: 62 years      Sex: Male      Is Non- : No      Diabetic: No      Tobacco smoker: Yes      Systolic Blood Pressure: 161 mmHg      Is BP treated: No      HDL Cholesterol: 44 mg/dL      Total Cholesterol: 201 mg/dL    Najma Narayanan MD  8/17/2022

## 2022-08-18 ENCOUNTER — TELEPHONE (OUTPATIENT)
Dept: INTERNAL MEDICINE CLINIC | Age: 57
End: 2022-08-18

## 2022-08-18 NOTE — TELEPHONE ENCOUNTER
Discussed with Mr. Medina his echo results. Heart function is grossly normal and will not explain his SOB. The most likely cause of SOB is COPD and worsening pulmonary function. Patient still smokes 10-15 cigarette per day. Smoking cessation is emphasized. Patient states he is working on cutting back and he is not interested in attending smoking cessation program or nicotine replacement therapy at this point.       Electronically signed by Antoni King MD on 8/18/2022 at 7:28 PM

## 2022-09-28 ENCOUNTER — OFFICE VISIT (OUTPATIENT)
Dept: INTERNAL MEDICINE | Age: 57
End: 2022-09-28
Payer: MEDICARE

## 2022-09-28 VITALS
DIASTOLIC BLOOD PRESSURE: 82 MMHG | HEIGHT: 71 IN | WEIGHT: 268.9 LBS | RESPIRATION RATE: 20 BRPM | SYSTOLIC BLOOD PRESSURE: 122 MMHG | TEMPERATURE: 97.3 F | BODY MASS INDEX: 37.65 KG/M2 | OXYGEN SATURATION: 97 % | HEART RATE: 71 BPM

## 2022-09-28 DIAGNOSIS — J44.9 COPD, SEVERE (HCC): ICD-10-CM

## 2022-09-28 DIAGNOSIS — N50.82 SCROTAL PAIN: ICD-10-CM

## 2022-09-28 DIAGNOSIS — I10 PRIMARY HYPERTENSION: ICD-10-CM

## 2022-09-28 DIAGNOSIS — J30.89 NON-SEASONAL ALLERGIC RHINITIS, UNSPECIFIED TRIGGER: ICD-10-CM

## 2022-09-28 DIAGNOSIS — N50.89 SCROTAL EDEMA: ICD-10-CM

## 2022-09-28 DIAGNOSIS — R52 GENERALIZED PAIN: ICD-10-CM

## 2022-09-28 DIAGNOSIS — G47.9 SLEEP DISORDER: ICD-10-CM

## 2022-09-28 DIAGNOSIS — I25.119 CORONARY ARTERY DISEASE INVOLVING NATIVE HEART WITH ANGINA PECTORIS, UNSPECIFIED VESSEL OR LESION TYPE (HCC): ICD-10-CM

## 2022-09-28 DIAGNOSIS — R11.2 NAUSEA AND VOMITING, INTRACTABILITY OF VOMITING NOT SPECIFIED, UNSPECIFIED VOMITING TYPE: ICD-10-CM

## 2022-09-28 DIAGNOSIS — N52.9 ERECTILE DYSFUNCTION, UNSPECIFIED ERECTILE DYSFUNCTION TYPE: ICD-10-CM

## 2022-09-28 DIAGNOSIS — F41.9 ANXIETY: ICD-10-CM

## 2022-09-28 DIAGNOSIS — G89.4 CHRONIC PAIN SYNDROME: ICD-10-CM

## 2022-09-28 DIAGNOSIS — K21.9 GASTROESOPHAGEAL REFLUX DISEASE, UNSPECIFIED WHETHER ESOPHAGITIS PRESENT: ICD-10-CM

## 2022-09-28 DIAGNOSIS — E78.9 LIPID DISORDER: ICD-10-CM

## 2022-09-28 PROCEDURE — 99214 OFFICE O/P EST MOD 30 MIN: CPT | Performed by: INTERNAL MEDICINE

## 2022-09-28 PROCEDURE — 3023F SPIROM DOC REV: CPT | Performed by: INTERNAL MEDICINE

## 2022-09-28 PROCEDURE — 99213 OFFICE O/P EST LOW 20 MIN: CPT | Performed by: INTERNAL MEDICINE

## 2022-09-28 PROCEDURE — 3017F COLORECTAL CA SCREEN DOC REV: CPT | Performed by: INTERNAL MEDICINE

## 2022-09-28 PROCEDURE — 4004F PT TOBACCO SCREEN RCVD TLK: CPT | Performed by: INTERNAL MEDICINE

## 2022-09-28 PROCEDURE — G8427 DOCREV CUR MEDS BY ELIG CLIN: HCPCS | Performed by: INTERNAL MEDICINE

## 2022-09-28 PROCEDURE — G8417 CALC BMI ABV UP PARAM F/U: HCPCS | Performed by: INTERNAL MEDICINE

## 2022-09-28 RX ORDER — ACETAMINOPHEN 500 MG
500 TABLET ORAL 4 TIMES DAILY PRN
Qty: 120 TABLET | Refills: 0 | Status: CANCELLED | OUTPATIENT
Start: 2022-09-27

## 2022-09-28 RX ORDER — TAMSULOSIN HYDROCHLORIDE 0.4 MG/1
0.8 CAPSULE ORAL DAILY
Qty: 180 CAPSULE | Refills: 1 | Status: CANCELLED | OUTPATIENT
Start: 2022-09-28

## 2022-09-28 RX ORDER — IBUPROFEN 800 MG/1
800 TABLET ORAL EVERY 6 HOURS PRN
Qty: 120 TABLET | Refills: 3 | Status: SHIPPED | OUTPATIENT
Start: 2022-09-28

## 2022-09-28 RX ORDER — ASPIRIN 81 MG/1
81 TABLET ORAL DAILY
Qty: 90 TABLET | Refills: 1 | Status: SHIPPED | OUTPATIENT
Start: 2022-09-28

## 2022-09-28 RX ORDER — PROMETHAZINE HYDROCHLORIDE 25 MG/1
25 TABLET ORAL 3 TIMES DAILY PRN
Qty: 90 TABLET | Refills: 3 | Status: SHIPPED | OUTPATIENT
Start: 2022-09-28

## 2022-09-28 RX ORDER — PRAVASTATIN SODIUM 20 MG
20 TABLET ORAL DAILY
Qty: 45 TABLET | Refills: 0 | Status: CANCELLED | OUTPATIENT
Start: 2022-09-28

## 2022-09-28 RX ORDER — OMEPRAZOLE 40 MG/1
CAPSULE, DELAYED RELEASE ORAL
Qty: 90 CAPSULE | Refills: 1 | Status: SHIPPED | OUTPATIENT
Start: 2022-09-28

## 2022-09-28 RX ORDER — EZETIMIBE 10 MG/1
10 TABLET ORAL DAILY
Qty: 30 TABLET | Refills: 3 | Status: SHIPPED | OUTPATIENT
Start: 2022-09-28

## 2022-09-28 RX ORDER — ALBUTEROL SULFATE 90 UG/1
1-2 AEROSOL, METERED RESPIRATORY (INHALATION) EVERY 4 HOURS PRN
Qty: 1 EACH | Refills: 5 | Status: SHIPPED | OUTPATIENT
Start: 2022-09-28

## 2022-09-28 RX ORDER — METOPROLOL SUCCINATE 25 MG/1
12.5 TABLET, EXTENDED RELEASE ORAL DAILY
Qty: 45 TABLET | Refills: 1 | Status: SHIPPED | OUTPATIENT
Start: 2022-09-28

## 2022-09-28 RX ORDER — BUSPIRONE HYDROCHLORIDE 15 MG/1
15 TABLET ORAL 2 TIMES DAILY
Qty: 60 TABLET | Refills: 5 | Status: SHIPPED | OUTPATIENT
Start: 2022-09-28

## 2022-09-28 RX ORDER — SILDENAFIL CITRATE 20 MG/1
20 TABLET ORAL PRN
Qty: 30 TABLET | Refills: 0 | Status: SHIPPED | OUTPATIENT
Start: 2022-09-28

## 2022-09-28 RX ORDER — TRAZODONE HYDROCHLORIDE 100 MG/1
100 TABLET ORAL NIGHTLY
Qty: 90 TABLET | Refills: 1 | Status: SHIPPED | OUTPATIENT
Start: 2022-09-28

## 2022-09-28 RX ORDER — FLUTICASONE PROPIONATE 50 MCG
1 SPRAY, SUSPENSION (ML) NASAL DAILY
Qty: 16 G | Refills: 3 | Status: SHIPPED | OUTPATIENT
Start: 2022-09-28

## 2022-09-28 RX ORDER — GABAPENTIN 600 MG/1
600 TABLET ORAL 3 TIMES DAILY
Qty: 270 TABLET | Refills: 1 | Status: SHIPPED | OUTPATIENT
Start: 2022-09-28 | End: 2022-11-27

## 2022-09-28 ASSESSMENT — ENCOUNTER SYMPTOMS
BLOOD IN STOOL: 0
DIARRHEA: 0
NAUSEA: 0
ABDOMINAL PAIN: 1
COUGH: 0
SHORTNESS OF BREATH: 1
CONSTIPATION: 0
VOMITING: 0
SINUS PAIN: 0

## 2022-09-28 NOTE — PROGRESS NOTES
Is Non- : No      Diabetic: No      Tobacco smoker: Yes      Systolic Blood Pressure: 302 mmHg      Is BP treated: No      HDL Cholesterol: 44 mg/dL      Total Cholesterol: 201 mg/dL      Severe COPD, H/o lung nodule s/p right sided VATS with right upper lobe wedge resection with biopsy showing diffuse granulomatous changes and noncaseating granulomas  Patient has been complaining of SOB since 2019. Followed up with pulmonology and cardiology both. Seen by Dr. Nilson Douglas 2/25/2022. Next appintment 8/10/2022. He has undergone echocardiogram, stress test and PFT. He has still been smoking. Spirometry showed significant decline in lung function from 2019 to 2022. FVC 75 -> 53%, FEV1% 71 -> 50%   Started Breztri and stopped Advair and Spiriva 2/2022  Needs albuterol inhaler 4-5 times/day  Refused sleep study  Refuse influenza, Pneumovax, and COVID-19 vaccine  Report cutting cigarettes to 3/4 pack/day -> 1/2 pack/day. Refused nicotine gum/patch or Chantix  CT lung screening negative 3/8/2022  Pulmonary rehab ordered today  Follow pulmonology (Dr. Nilson Douglas)     Chronic nausea, emesis, diarrhea  s/p cholecystectomy. Colonoscopy in 2019 showed tubular adenoma. Lipase and amylase WNL (9/2020). Fecal fat, leukocyte, and c-diff ordered previously but was not done. EGD normal. Colonoscopy with tubular adenoma. CT A/P normal.   Has been on PPI for prolonged time. Microscopic colitis? Need random biopsies. Was on cholestyramine, not helping. Still have 5-6 watery BM/day, with N/V and abdominal pain. Discontinued today. Nausea improved with Phenergan   Advised patient to complete fecal fat and leukocyte      ? CAD, stable  Denies chest pain, palpitations, lightheadedness, leg swelling  On ASA 81 mg daily, lipitor 40 mg daily, metoprolol 25 mg   non-obstructive disease noted on [facility] 2015 in [place], [place]  EKG today unremarkable  Stress test in 8/2019 unremarkable  Echo 8/2022 shows LV EF 55-60%, no regional wall motion abnormalities   Coronary CTA: calcium score 0 (9/2021)     HTN  Was on metoprolol 25 mg   /71 mmHg today. Patient had lightheadedness when standing up. Orthostatic negative  Decrease metoprolol to 12.5 mg daily  BP cuff ordered     ED  On Revatio 20 mg PRN  Use ~3 times/month, helpful     BPH  Flomax increased from 0.4 to 0.8 mg daily. Patient reports his ED worsened. Not taking the med now  PSA 1.25 (8/2021) <--- 1.66 (8/2019)     GERD  On omeprazole 40 mg daily     Chronic back pain with sciatica  Patient reluctant to go to PMR because he has gone to them in the past and did not like the results of steroid injections. Report PT and tylenol not helping  On gabapentin 600 mg TID and ibuprofen 800 Q6h PRN. If repeat LFT normal, consider to switch ibuprofen to tylenol to protect renal function. Patient has one kidney. MRI lumbar WO contrast 5/2019  Impression   Multilevel mild posterior bridging hard disc and spur. This is   somewhat greater centrally and on the left at L4-5. In combination   with facet arthrosis and narrowed interpedicular distance, there is   moderate stenosis at this level      Renal cell carcinoma s/p R nephrectomy in 2005     Anxiety  On buspar 15 mg BID and trazodone 100 mg nightly  Follow [facility]     Tobacco abuse  Smoking since age of 15. Cut to half pack per day  Smoking cessation counseling. Patient states that he will quit if he has to  Refused nicotine patch and gum and chantix      HM  -refused influenza, Pneumovax, and COVID-19 vaccine  -refused sleep study  -colonoscopy 12/2019: tubular adenoma. Repeat in 3 years  -CT lung cancer screening: negative for lung nodules (3/8/22)  -HgA1c 5.4 (8/2021) <--- 5.5 (3/2020)           Review of Systems   Constitutional:  Negative for chills, fatigue, fever and unexpected weight change. HENT:  Negative for congestion and sinus pain. Eyes:  Positive for visual disturbance.    Respiratory:  Positive for shortness of breath. Negative for cough. Cardiovascular:  Negative for chest pain, palpitations and leg swelling. Gastrointestinal:  Positive for abdominal pain. Negative for blood in stool, constipation, diarrhea, nausea and vomiting. Endocrine: Negative for polyuria. Genitourinary:  Positive for scrotal swelling and testicular pain. Negative for decreased urine volume, dysuria, flank pain, frequency, hematuria and penile discharge. Musculoskeletal:  Negative for arthralgias and joint swelling. Generalized body aches   Skin:  Negative for rash and wound. Neurological:  Negative for weakness, light-headedness, numbness and headaches. Hematological:  Does not bruise/bleed easily. Psychiatric/Behavioral:  Negative for dysphoric mood. Current Outpatient Medications on File Prior to Visit   Medication Sig Dispense Refill    Budeson-Glycopyrrol-Formoterol (BREZTRI AEROSPHERE) 160-9-4.8 MCG/ACT AERO Inhale 2 puffs into the lungs in the morning and at bedtime 1 each 3    acetaminophen (TYLENOL) 500 MG tablet Take 1 tablet by mouth 4 times daily as needed for Pain (Patient not taking: Reported on 9/28/2022) 120 tablet 0     No current facility-administered medications on file prior to visit. OBJECTIVE:    VS:   Vitals:    09/28/22 1324   BP: 122/82   Site: Left Upper Arm   Position: Sitting   Cuff Size: Large Adult   Pulse: 71   Resp: 20   Temp: 97.3 °F (36.3 °C)   TempSrc: Temporal   SpO2: 97%   Weight: 268 lb 14.4 oz (122 kg)   Height: 5' 11\" (1.803 m)     Physical Exam  Vitals reviewed. Exam conducted with a chaperone present. Constitutional:       General: He is not in acute distress. Appearance: Normal appearance. He is obese. HENT:      Head: Normocephalic and atraumatic. Nose: No rhinorrhea. Mouth/Throat:      Mouth: Mucous membranes are moist.      Pharynx: Oropharynx is clear. Eyes:      Pupils: Pupils are equal, round, and reactive to light.    Neck:      Vascular: No carotid bruit. Cardiovascular:      Rate and Rhythm: Normal rate and regular rhythm. Pulses: Normal pulses. Heart sounds: Normal heart sounds. No murmur heard. No friction rub. No gallop. Pulmonary:      Effort: Pulmonary effort is normal.      Breath sounds: Decreased air movement present. No wheezing, rhonchi or rales. Abdominal:      General: Bowel sounds are normal.      Palpations: Abdomen is soft. Tenderness: There is no right CVA tenderness or left CVA tenderness. Hernia: No hernia is present. Comments: Lower abdominal tenderness   Genitourinary:     Testes:         Right: Swelling present. Left: Swelling not present. Epididymis:      Right: Tenderness present. Musculoskeletal:         General: No swelling, tenderness or deformity. Cervical back: Neck supple. No tenderness. Right lower leg: No edema. Left lower leg: No edema. Skin:     General: Skin is warm and dry. Findings: No rash. Neurological:      General: No focal deficit present. Mental Status: He is alert. Cranial Nerves: No cranial nerve deficit. Motor: No weakness. Gait: Gait normal.   Psychiatric:         Mood and Affect: Mood normal.          ASSESSMENT/PLAN:  1. Scrotal edema  -     Urinalysis; Future  -     Culture, Urine; Future  -     US DUP ABD PEL RETRO SCROT COMPLETE; Future  -     C.trachomatis N.gonorrhoeae DNA; Future  2. Scrotal pain  -     Urinalysis; Future  -     Culture, Urine; Future  -     US DUP ABD PEL RETRO SCROT COMPLETE; Future  -     C.trachomatis N.gonorrhoeae DNA; Future  3. Generalized pain  -     Comprehensive Metabolic Panel; Future  -     CK; Future  -     CBC with Auto Differential; Future  4. Chronic pain syndrome  -     gabapentin (NEURONTIN) 600 MG tablet; Take 1 tablet by mouth 3 times daily for 60 days. , Disp-270 tablet, R-1Normal  -     ibuprofen (ADVIL;MOTRIN) 800 MG tablet;  Take 1 tablet by mouth every 6 hours as about 3 months (around 12/28/2022) for Follow up. I have reviewed my findings and recommendations with Tony Marley and Dr. Chang Navas.     Ana Portillo MD   9/28/2022 6:10 PM

## 2022-09-28 NOTE — PROGRESS NOTES
[facility]    Attending Physician Statement  I have discussed the case, including pertinent history and exam findings with the resident physician. I agree with the assessment, plan and orders as documented by the resident. I have reviewed all pertinent PMHx, PSHx, FamHx, SocialHx, medications, and allergies and updated history as appropriate. Patient here for routine follow up of medical problems. Generalized Muscle Aches: not viral or infectious in nature; blood work and trial off pravastatin; likely 2/2 increased oxygen demands 2/2 COPD     Right Testicular Swelling: no signs of torsion; pain in epididymis on palpation; no signs of edema, no signs of hernia; urinalysis and US for evaluation; treatment for epididymitis based on labs      COPD: pulmonary rehab; follows with pulmonology; compliant with medications     Remainder of medical problems as per resident note.     Erika Mcneal DO  9/28/2022 2:19 PM    Encounter time including independent chart review, discussion with patient, interpreting test results and/or external communications: 30'

## 2022-10-07 ENCOUNTER — HOSPITAL ENCOUNTER (OUTPATIENT)
Dept: ULTRASOUND IMAGING | Age: 57
Discharge: HOME | End: 2022-10-09
Payer: MEDICARE

## 2022-10-07 ENCOUNTER — HOSPITAL ENCOUNTER (OUTPATIENT)
Age: 57
Discharge: HOME | End: 2022-10-07
Payer: MEDICARE

## 2022-10-07 DIAGNOSIS — R52 GENERALIZED PAIN: ICD-10-CM

## 2022-10-07 DIAGNOSIS — N50.89 SCROTAL EDEMA: ICD-10-CM

## 2022-10-07 DIAGNOSIS — N50.82 SCROTAL PAIN: ICD-10-CM

## 2022-10-07 DIAGNOSIS — N50.89 EDEMA OF MALE GENITAL ORGANS: ICD-10-CM

## 2022-10-07 LAB
ALBUMIN SERPL-MCNC: 4.3 G/DL (ref 3.5–5.2)
ALP BLD-CCNC: 64 U/L (ref 40–129)
ALT SERPL-CCNC: 23 U/L (ref 0–40)
ANION GAP SERPL CALCULATED.3IONS-SCNC: 13 MMOL/L (ref 7–16)
AST SERPL-CCNC: 23 U/L (ref 0–39)
BACTERIA: ABNORMAL /HPF
BASOPHILS ABSOLUTE: 0.08 E9/L (ref 0–0.2)
BASOPHILS RELATIVE PERCENT: 0.7 % (ref 0–2)
BILIRUB SERPL-MCNC: 0.4 MG/DL (ref 0–1.2)
BILIRUBIN URINE: NEGATIVE
BLOOD, URINE: NEGATIVE
BUN BLDV-MCNC: 18 MG/DL (ref 6–20)
CALCIUM SERPL-MCNC: 9.8 MG/DL (ref 8.6–10.2)
CHLORIDE BLD-SCNC: 106 MMOL/L (ref 98–107)
CLARITY: CLEAR
CO2: 21 MMOL/L (ref 22–29)
COLOR: YELLOW
CREAT SERPL-MCNC: 1.2 MG/DL (ref 0.7–1.2)
EOSINOPHILS ABSOLUTE: 0.22 E9/L (ref 0.05–0.5)
EOSINOPHILS RELATIVE PERCENT: 2 % (ref 0–6)
GFR AFRICAN AMERICAN: >60
GFR NON-AFRICAN AMERICAN: >60 ML/MIN/1.73
GLUCOSE BLD-MCNC: 75 MG/DL (ref 74–99)
GLUCOSE URINE: NEGATIVE MG/DL
HCT VFR BLD CALC: 41.6 % (ref 37–54)
HEMOGLOBIN: 13.9 G/DL (ref 12.5–16.5)
HYALINE CASTS: ABNORMAL /LPF (ref 0–2)
IMMATURE GRANULOCYTES #: 0.08 E9/L
IMMATURE GRANULOCYTES %: 0.7 % (ref 0–5)
KETONES, URINE: ABNORMAL MG/DL
LEUKOCYTE ESTERASE, URINE: NEGATIVE
LYMPHOCYTES ABSOLUTE: 3.15 E9/L (ref 1.5–4)
LYMPHOCYTES RELATIVE PERCENT: 28.4 % (ref 20–42)
MCH RBC QN AUTO: 32.2 PG (ref 26–35)
MCHC RBC AUTO-ENTMCNC: 33.4 % (ref 32–34.5)
MCV RBC AUTO: 96.3 FL (ref 80–99.9)
MONOCYTES ABSOLUTE: 1.01 E9/L (ref 0.1–0.95)
MONOCYTES RELATIVE PERCENT: 9.1 % (ref 2–12)
MUCUS: PRESENT /LPF
NEUTROPHILS ABSOLUTE: 6.56 E9/L (ref 1.8–7.3)
NEUTROPHILS RELATIVE PERCENT: 59.1 % (ref 43–80)
NITRITE, URINE: NEGATIVE
PDW BLD-RTO: 12.3 FL (ref 11.5–15)
PH UA: 6 (ref 5–9)
PLATELET # BLD: 290 E9/L (ref 130–450)
PMV BLD AUTO: 11 FL (ref 7–12)
POTASSIUM SERPL-SCNC: 4.4 MMOL/L (ref 3.5–5)
PROTEIN UA: ABNORMAL MG/DL
RBC # BLD: 4.32 E12/L (ref 3.8–5.8)
RBC UA: ABNORMAL /HPF (ref 0–2)
SODIUM BLD-SCNC: 140 MMOL/L (ref 132–146)
SPECIFIC GRAVITY UA: 1.02 (ref 1–1.03)
TOTAL CK: 310 U/L (ref 20–200)
TOTAL PROTEIN: 6.6 G/DL (ref 6.4–8.3)
UROBILINOGEN, URINE: 0.2 E.U./DL
WBC # BLD: 11.1 E9/L (ref 4.5–11.5)
WBC UA: ABNORMAL /HPF (ref 0–5)

## 2022-10-07 PROCEDURE — 80053 COMPREHEN METABOLIC PANEL: CPT

## 2022-10-07 PROCEDURE — 93975 VASCULAR STUDY: CPT

## 2022-10-07 PROCEDURE — 87591 N.GONORRHOEAE DNA AMP PROB: CPT

## 2022-10-07 PROCEDURE — 36415 COLL VENOUS BLD VENIPUNCTURE: CPT

## 2022-10-07 PROCEDURE — 76870 US EXAM SCROTUM: CPT

## 2022-10-07 PROCEDURE — 82550 ASSAY OF CK (CPK): CPT

## 2022-10-07 PROCEDURE — 87491 CHLMYD TRACH DNA AMP PROBE: CPT

## 2022-10-07 PROCEDURE — 85025 COMPLETE CBC W/AUTO DIFF WBC: CPT

## 2022-10-07 PROCEDURE — 81001 URINALYSIS AUTO W/SCOPE: CPT

## 2022-10-07 PROCEDURE — 87088 URINE BACTERIA CULTURE: CPT

## 2022-10-09 LAB — URINE CULTURE, ROUTINE: NORMAL

## 2022-10-10 ENCOUNTER — TELEPHONE (OUTPATIENT)
Dept: INTERNAL MEDICINE | Age: 57
End: 2022-10-10

## 2022-10-10 NOTE — TELEPHONE ENCOUNTER
Patient calling requesting lab results from recent labs, he stated he seen in My chart, some were flagged, please advise.

## 2022-10-11 ENCOUNTER — TELEPHONE (OUTPATIENT)
Dept: INTERNAL MEDICINE CLINIC | Age: 57
End: 2022-10-11

## 2022-10-11 ENCOUNTER — TELEPHONE (OUTPATIENT)
Dept: INTERNAL MEDICINE | Age: 57
End: 2022-10-11

## 2022-10-11 NOTE — TELEPHONE ENCOUNTER
Spoke with Mr. Medina over the phone. Updated him on the results of recent blood, urine test and ultrasound. He has moderate bacteria in UA without pyuria, urine culture negative. CBC normal. Explained to him hyaline casts and mucus in urine are non-specific findings. Confirmed he has no dysuria, urinary frequency or urgency. No need for antibiotics at this point. Advised him to call if he does development above symptoms. Regarding left testicle cyst and small hydroceles, we will continue to monitor and repeat ultrasound in the future.     Electronically signed by Sonja Carrera MD on 10/11/2022 at 6:34 PM

## 2022-10-11 NOTE — TELEPHONE ENCOUNTER
Patient called in stating he called yesterday for someone to go over lab results and never received a call back.  Please advise and thank you

## 2022-10-12 LAB
C. TRACHOMATIS DNA ,URINE: NEGATIVE
N. GONORRHOEAE DNA, URINE: NEGATIVE
SOURCE: NORMAL

## 2022-10-13 ENCOUNTER — TELEPHONE (OUTPATIENT)
Dept: PULMONOLOGY | Age: 57
End: 2022-10-13

## 2022-11-10 RX ORDER — PRAVASTATIN SODIUM 20 MG
TABLET ORAL
Qty: 45 TABLET | Refills: 0 | OUTPATIENT
Start: 2022-11-10

## 2023-01-09 ENCOUNTER — TELEPHONE (OUTPATIENT)
Dept: SURGERY | Age: 58
End: 2023-01-09

## 2023-01-09 NOTE — TELEPHONE ENCOUNTER
----- Message from Chantale Porter sent at 1/8/2020 10:50 AM EST -----  Regarding: Repeat Colonoscopy  Please contact patient to schedule 3 year repeat colonoscopy consult with Dr Essence Cunningham.

## 2023-01-09 NOTE — TELEPHONE ENCOUNTER
Unable to leave voicemail to schedule office visit with [facility] for 3 year repeat colonoscopy. Patient prev seen by Fatou Webber [facility] to schedule with first available surgeon due to Dr. Priya Lees not seeing patients.   Electronically signed by Ashia Rivas MA on 1/9/23 at 10:17 AM EST

## 2023-01-11 ENCOUNTER — OFFICE VISIT (OUTPATIENT)
Dept: PULMONOLOGY | Age: 58
End: 2023-01-11
Payer: MEDICARE

## 2023-01-11 VITALS
WEIGHT: 270 LBS | HEART RATE: 80 BPM | OXYGEN SATURATION: 96 % | BODY MASS INDEX: 37.8 KG/M2 | SYSTOLIC BLOOD PRESSURE: 118 MMHG | RESPIRATION RATE: 18 BRPM | HEIGHT: 71 IN | DIASTOLIC BLOOD PRESSURE: 68 MMHG

## 2023-01-11 DIAGNOSIS — J44.9 COPD, SEVERE (HCC): Primary | ICD-10-CM

## 2023-01-11 DIAGNOSIS — Z87.891 PERSONAL HISTORY OF TOBACCO USE: ICD-10-CM

## 2023-01-11 LAB
EXPIRATORY TIME: NORMAL
FEF 25-75% %PRED-PRE: NORMAL
FEF 25-75% PRED: NORMAL
FEF 25-75%-PRE: NORMAL
FEV1 %PRED-PRE: 55 %
FEV1 PRED: 3.79 L
FEV1/FVC %PRED-PRE: 98 %
FEV1/FVC PRED: 78 %
FEV1/FVC: 77 %
FEV1: 2.09 L
FVC %PRED-PRE: 55 %
FVC PRED: 4.9 L
FVC: 2.73 L
PEF %PRED-PRE: NORMAL
PEF PRED: NORMAL
PEF-PRE: NORMAL

## 2023-01-11 PROCEDURE — 99213 OFFICE O/P EST LOW 20 MIN: CPT | Performed by: INTERNAL MEDICINE

## 2023-01-11 PROCEDURE — G8484 FLU IMMUNIZE NO ADMIN: HCPCS | Performed by: INTERNAL MEDICINE

## 2023-01-11 PROCEDURE — 3017F COLORECTAL CA SCREEN DOC REV: CPT | Performed by: INTERNAL MEDICINE

## 2023-01-11 PROCEDURE — 3078F DIAST BP <80 MM HG: CPT | Performed by: INTERNAL MEDICINE

## 2023-01-11 PROCEDURE — 3074F SYST BP LT 130 MM HG: CPT | Performed by: INTERNAL MEDICINE

## 2023-01-11 PROCEDURE — G8417 CALC BMI ABV UP PARAM F/U: HCPCS | Performed by: INTERNAL MEDICINE

## 2023-01-11 PROCEDURE — 4004F PT TOBACCO SCREEN RCVD TLK: CPT | Performed by: INTERNAL MEDICINE

## 2023-01-11 PROCEDURE — G8428 CUR MEDS NOT DOCUMENT: HCPCS | Performed by: INTERNAL MEDICINE

## 2023-01-11 PROCEDURE — 3023F SPIROM DOC REV: CPT | Performed by: INTERNAL MEDICINE

## 2023-01-11 PROCEDURE — 94060 EVALUATION OF WHEEZING: CPT | Performed by: INTERNAL MEDICINE

## 2023-01-11 PROCEDURE — G0296 VISIT TO DETERM LDCT ELIG: HCPCS | Performed by: INTERNAL MEDICINE

## 2023-01-11 PROCEDURE — 94726 PLETHYSMOGRAPHY LUNG VOLUMES: CPT | Performed by: INTERNAL MEDICINE

## 2023-01-11 PROCEDURE — 94010 BREATHING CAPACITY TEST: CPT | Performed by: INTERNAL MEDICINE

## 2023-01-11 ASSESSMENT — PULMONARY FUNCTION TESTS
FEV1: 2.09
FEV1_PERCENT_PREDICTED_PRE: 55
FVC_PREDICTED: 4.90
FEV1/FVC_PREDICTED: 78
FEV1/FVC_PERCENT_PREDICTED_PRE: 98
FEV1_PREDICTED: 3.79
FVC_PERCENT_PREDICTED_PRE: 55
FVC: 2.73

## 2023-01-11 NOTE — PROGRESS NOTES
Patient to follow up with physician in 6 months. Patient to be scheduled for a low dose screening CT Chest in March.

## 2023-01-11 NOTE — PROGRESS NOTES
[facility]     HISTORY OF PRESENT ILLNESS:    Mal Ely is a 62y.o. year old male here for evaluation of rest of breath. Mr. Catina Stahl reports that he has had increasing shortness of breath and increasing coughing. He states that sometimes he does cough up into the point of being dizzy. He states that his shortness of breath is worse at night he is currently using both Spiriva and Advair. He also uses a Proventil inhaler as a rescue inhaler he reports that on a good day he may not need to use his Proventil at all however on a bad day with his breathing he is using it 3-4 times a night. He was previously seen once in our office by my colleague on August 19 of 2019. He reports to me that he does have a history of renal cell carcinoma he states that he had a procedure done at [facility] in [facility] in 2014 in which they took a piece of his upper lobe of his right lung and lower lobe of his right lung and he was told that he had cancer however he never had any chemotherapy or any radiation therapy it appears through review of his records that Dr. Domingo BELTRE was unable to obtain those records in 2019. The patient subsequently had a CAT scan in 2020 where he was found to have a lung nodule and was supposed to follow-up however was lost to follow-up at that time. Regarding his overall history he reports that he started smoking when he was 13 he did smoke up to 2 packs/day and is now down to 1 pack/day. 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 He reports that he has 1 dog. His hobbies include walking in the woods and looking for beckford mushrooms. Updated HPI as of August 10, 2022  Patient seen and examined. Reports that he is still smoking half pack to 15 cigarettes a day. He reports that he feels that the [place] does help.   He is using his albuterol inhaler 2 times at the gym and going to the gym 5 times a week. He reports that his breathing is significantly worse with humidity. Otherwise he denies any new respiratory complaints. Updated HPI as of January 11, 2023. Patient reports that he is currently smoking 15 to 18 packs of cigarettes a day. He is using his rescue inhaler 3-4 times a week. He is continuing to use of Alex No he reports that his family physician filled his last prescription. He is lifting weights every day and also doing cardio multiple times a week. He did report to me that he feels that he is starting to have more bad days with his breathing than good days with his breathing. Unfortunately at this time he is not ready to quit smoking. ALLERGIES:    Allergies   Allergen Reactions    Latex Rash     Skin turns red       PAST MEDICAL HISTORY:       Diagnosis Date    Acute gastritis without hemorrhage     CAD (coronary artery disease)     Cancer of kidney (HCC)     COPD (chronic obstructive pulmonary disease) ([facility])     COPD, severe ([address] 6/16/2022    Difficulty sleeping     ED (erectile dysfunction)     History of BPH     Hyperlipidemia     Lung cancer ([facility])     Tobacco abuse        MEDICATIONS:   Current Outpatient Medications   Medication Sig Dispense Refill    busPIRone (BUSPAR) 15 MG tablet Take 15 mg by mouth 2 times daily 60 tablet 5    gabapentin (NEURONTIN) 600 MG tablet Take 1 tablet by mouth 3 times daily for 60 days.  270 tablet 1    aspirin (SONIA ASPIRIN EC LOW DOSE) 81 MG EC tablet Take 1 tablet by mouth daily 90 tablet 1    sildenafil (REVATIO) 20 MG tablet Take 1 tablet by mouth as needed (as directed 1-2 tablets) 30 tablet 0    omeprazole (PRILOSEC) 40 MG delayed release capsule TAKE 1 CAPSULE BY MOUTH ONCE DAILY IN THE MORNING BEFORE BREAKFAST 90 capsule 1    albuterol sulfate HFA (PROVENTIL HFA) 108 (90 Base) MCG/ACT inhaler Inhale 1-2 puffs into the lungs every 4 hours as needed for Wheezing or Shortness of Breath 1 each 5    metoprolol succinate (TOPROL XL) 25 MG extended release tablet Take 0.5 tablets by mouth daily 45 tablet 1    traZODone (DESYREL) 100 MG tablet Take 1 tablet by mouth nightly 90 tablet 1    fluticasone (FLONASE) 50 MCG/ACT nasal spray 1 spray by Each Nostril route daily 16 g 3    ezetimibe (ZETIA) 10 MG tablet Take 1 tablet by mouth daily 30 tablet 3    promethazine (PHENERGAN) 25 MG tablet Take 1 tablet by mouth 3 times daily as needed for Nausea 90 tablet 3    Misc. Devices KIT One blood pressure cuff 1 kit 0    ibuprofen (ADVIL;MOTRIN) 800 MG tablet Take 1 tablet by mouth every 6 hours as needed for Pain 120 tablet 3    acetaminophen (TYLENOL) 500 MG tablet Take 1 tablet by mouth 4 times daily as needed for Pain (Patient not taking: Reported on 9/28/2022) 120 tablet 0    Budeson-Glycopyrrol-Formoterol (BREZTRI AEROSPHERE) 160-9-4.8 MCG/ACT AERO Inhale 2 puffs into the lungs in the morning and at bedtime 1 each 3     No current facility-administered medications for this visit. SOCIAL AND OCCUPATIONAL HEALTH:  The patient is a current smoker. He smokes 1 pack/day which is down from 2 packs a day that he smoked previously. 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 He is currently on disability. He is going to the gym and weightlifting 4 days a week. He also likes to walk in the woods looking for beckford mushrooms and he has 1 dog. He denies any travel. Reports that he does work lift beside the [employer] in [place] and is concerned about fumes.       SURGICAL HISTORY:   Past Surgical History:   Procedure Laterality Date    CARDIAC CATHETERIZATION  2015    Non-obstructive coronary disease by Dr. Estrella Lobato N/A 12/20/2019    COLONOSCOPY POLYPECTOMY SNARE/COLD BIOPSY performed by [place]ie Snellen[place] at [address] [facility]  12/20/2019    COLONOSCOPY SUBMUCOSAL/BOTOX INJECTION performed by Ale Madrid MD at [address] [facility] [address] Right     part of the right     TOTAL NEPHRECTOMY Right     UPPER GASTROINTESTINAL ENDOSCOPY N/A 12/20/2019    EGD BIOPSY performed by Ale Madrid MD at [address]: No family history of cancer, blood clots patient reports that his father grandmother and sister have COPD and that they all also smoked    REVIEW OF SYSTEMS:  Constitutional: No fevers, chills, unintentional weight loss  Skin: No rashes or lesions  EENT: No change in vision, change in hearing, change in taste, change in smell  Cardiovascular: Denies chest pain, chest pressure, palpitations  Respiration: Positive for increased coughing and wheezing, worse with humidity  Gastrointestinal: Denies nausea, vomiting, diarrhea  Musculoskeletal: Denies joint or muscle pain  Neurological: Denies syncope, headache, seizures  Psychological: Denies anxiety or depression  Endocrine: Denies polyuria polydipsia  Hematopoietic/lymphatic: Denies easy bruising        PHYSICAL EXAMINATION:  Constitutional: Well-nourished, well-developed. No acute distress  EENT: PERRL, EOMI, no oropharyngeal erythema. No palpable adenopathy  Neck: Trachea and thyroid midline  Respiratory: Overall diminished bilaterally  Cardiovascular: Regular rate and rhythm, no murmurs rubs or gallops  Pulses: Equal bilaterally  Abdomen: Soft nontender bowel sounds present  Lymphatic: No palpable adenopathy  Musculoskeletal: Gait steady  Extremities: No clubbing, cyanosis, edema. Skin: No rashes or lesions  Neurological/Psychiatric: Neurologically intact, no focal deficits. Affect appropriate    DATA: Prior spirometry shows FVC of 2.65 L which is 53% of predicted FEV1 is 1.93 L which is 50% of predicted FEV1 FVC ratio is 73. MVV is 45 which is 30% of predicted. SVC is 2.10 which is 42% of predicted. Flow volume loops are consistent with a combined obstruction and restriction.   Compared to the prior spirometry on 2019 the patient has had a 20% decline in both his FEV1 and FVC. Updated spirometry as of January 11, 2022. FVC is 2.73 L which is 55% of predicted. FEV1 is 2.09 L which is 55% of predicted. FEV1 FVC ratio is 77. MVV is 61 which is 41% of predicted lung volumes show SVC of 2.64 L which is 53% of predicted. ERV is 0.15 L which is 8% of predicted. Flow volume loop is consistent with restriction. Overall spirometry is stable compared to prior. CT of the lung on May 5, 2022 shows stable right lower lobe pulmonary herniation. No nodules or masses. IMPRESSION:       1. Severe COPD  2. Morbid obesity  3. Current tobacco abuse  4. History of lung nodule  5. History of renal cell carcinoma  6. History of prior bronchoscopy/possible VATS? 7.  Poor historian     8. Possible DEJON  9. Vaccine counseling             PLAN:      1. We will continue the patient on Breztri and as needed albuterol. 2.  The patient is currently trying to increase his activity and change his diet in order to lose weight. Currently going to the gym for 2 to 3 hours 5 times a week. 3.  We again discussed smoking cessation for approximately 10 minutes. He has actually increased the amount of cigarettes that he is smoking compared to his last visit. He is currently smoking 15 to 18 cigarettes a day. I again emphasized that his lung function will continue to worsen and the importance of cessation. Also discussed risks of cardiac disease. He is trying to cut back. Nicotine replacement therapy was offered    4. Low-dose CT of the chest ordered for lung cancer screening. 5.  Respiratory allergen panel reviewed. IgE reviewed. Eosinophil count is slightly elevated at 280.    6.  Again discussed vaccination recommendations. Patient declined    7. We will again plan to discuss the need for repeat sleep study at next visit. I hope this updates you on my evaluation and clinical thinking. Thank you for allowing me to participate in his care. Sincerely,        Cortney Horan.  Office: [phone]  Fax: [phone]    Discussed with the patient the current USPSTF guidelines released March 9, 2021 for screening for lung cancer. For adults aged 48 to [de-identified] years who have a 20 pack-year smoking history and currently smoke or have quit within the past 15 years the grade B recommendation is to:  Screen for lung cancer with low-dose computed tomography (LDCT) every year. Stop screening once a person has not smoked for 15 years or has a health problem that limits life expectancy or the ability to have lung surgery. The patient  reports that he has been smoking cigarettes. He has a 33.75 pack-year smoking history. He has never used smokeless tobacco.. Discussed with patient the risks and benefits of screening, including over-diagnosis, false positive rate, and total radiation exposure. The patient currently exhibits no signs or symptoms suggestive of lung cancer. Discussed with patient the importance of compliance with yearly annual lung cancer screenings and willingness to undergo diagnosis and treatment if screening scan is positive. In addition, the patient was counseled regarding the importance of remaining smoke free and/or total smoking cessation.     Also reviewed the following if the patient has Medicare that as of February 10, 2022, Medicare only covers LDCT screening in patients aged 51-72 with at least a 20 pack-year smoking history who currently smoke or have quit in the last 15 years

## 2023-01-11 NOTE — PATIENT INSTRUCTIONS
[facility]  [address] [facility] [place]  Office: [phone]      Your were seen in the office today for COPD      We did not make  changes to your medications today. Continue breztri    Testing ordered today was low dose screening CT of the chest      Follow up in 6 months. Please do not hesitate to call the office with any questions. Learning About Lung Cancer Screening  What is screening for lung cancer? Lung cancer screening is a way to find some lung cancers early, before a person has any symptoms of the cancer. Lung cancer screening may help those who have the highest risk for lung cancer--people age 48 and older who are or were heavy smokers. For most people, who aren't at increased risk, screening for lung cancer probably isn't helpful. Screening won't prevent cancer. And it may not find all lung cancers. Lung cancer screening may lower the risk of dying from lung cancer in a small number of people. How is it done? Lung cancer screening is done with a low-dose CT (computed tomography) scan. A CT scan uses X-rays, or radiation, to make detailed pictures of your body. Experts recommend that screening be done in medical centers that focus on finding and treating lung cancer. Who is screening recommended for? Lung cancer screening is recommended for people age 48 and older who are or were heavy smokers. That means people with a smoking history of at least 20 pack years. A pack year is a way to measure how heavy a smoker you are or were. To figure out your pack years, multiply how many packs a day on average (assuming 20 cigarettes per pack) you have smoked by how many years you have smoked. For example: If you smoked 1 pack a day for 20 years, that's 1 times 20. So you have a smoking history of 20 pack years. If you smoked 2 packs a day for 10 years, that's 2 times 10. So you have a smoking history of 20 pack years.   Experts agree that screening is for people who have a high risk of lung cancer. But experts don't agree on what high risk means. Some say people age 48 or older with at least a 20-pack-year smoking history are high risk. Others say it's people age 54 or older with a 30-pack-year history. To see if you could benefit from screening, first find out if you are at high risk for lung cancer. Your doctor can help you decide your lung cancer risk. What are the risks of screening? CT screening for lung cancer isn't perfect. It can show an abnormal result when it turns out there wasn't any cancer. This is called a false-positive result. This means you may need more tests to make sure you don't have cancer. These tests can be harmful and cause a lot of worry. These tests may include more CT scans and invasive testing like a lung biopsy. In a biopsy, the doctor takes a sample of tissue from inside your lung so it can be looked at under a microscope. A biopsy is the only way to tell if you have lung cancer. If the biopsy finds cancer, you and your doctor will have to decide how or whether to treat it. Some lung cancers found on CT scans are harmless and would not have caused a problem if they had not been found through screening. But because doctors can't tell which ones will turn out to be harmless, most will be treated. This means that you may get treatment--including surgery, radiation, or chemotherapy--that you don't need. There is a risk of damage to cells or tissue from being exposed to radiation, including the small amounts used in CTs, X-rays, and other medical tests. Over time, exposure to radiation may cause cancer and other health problems. But in most cases, the risk of getting cancer from being exposed to small amounts of radiation is low. It's not a reason to avoid these tests for most people. What are the benefits of screening? Your scan may be normal (negative).   For some people who are at higher risk, screening lowers the chance of dying of lung cancer. How much and how long you smoked helps to determine your risk level. Screening can find some cancers early, when treatment may be more likely to work. What happens after screening? The results of your CT scan will be sent to your doctor. Someone from your care team will explain the results of your scan and answer any questions you may have. If you need any follow-up, he or she will help you understand what to do next. After a lung cancer screening, you can go back to your usual activities right away. A lung cancer screening test can't tell if you have lung cancer. If your results are positive, your doctor can't tell whether an abnormal finding is a harmless nodule, cancer, or something else without doing more tests. What can you do to help prevent lung cancer? Some lung cancers can't be prevented. But if you smoke, quitting smoking is the best step you can take to prevent lung cancer. If you want to quit, your doctor can recommend medicines or other ways to help. Follow-up care is a key part of your treatment and safety. Be sure to make and go to all appointments, and call your doctor if you are having problems. It's also a good idea to know your test results and keep a list of the medicines you take. Where can you learn more? Go to http://www.rogers.com/ and enter Q940 to learn more about \"Learning About Lung Cancer Screening. \"  Current as of: May 4, 2022               Content Version: 13.5  © 8281-5925 [employer], [employer]. Care instructions adapted under license by [facility] [address] If you have questions about a medical condition or this instruction, always ask your healthcare professional. [place] [address] any warranty or liability for your use of this information.

## 2023-02-22 DIAGNOSIS — G89.4 CHRONIC PAIN SYNDROME: ICD-10-CM

## 2023-02-22 DIAGNOSIS — R06.2 WHEEZING: ICD-10-CM

## 2023-02-22 DIAGNOSIS — J44.9 CHRONIC OBSTRUCTIVE PULMONARY DISEASE, UNSPECIFIED COPD TYPE (HCC): ICD-10-CM

## 2023-02-22 RX ORDER — IBUPROFEN 800 MG/1
800 TABLET ORAL EVERY 6 HOURS PRN
Qty: 120 TABLET | Refills: 0 | OUTPATIENT
Start: 2023-02-22

## 2023-02-23 DIAGNOSIS — G89.4 CHRONIC PAIN SYNDROME: ICD-10-CM

## 2023-02-23 RX ORDER — IBUPROFEN 800 MG/1
800 TABLET ORAL EVERY 8 HOURS PRN
Qty: 90 TABLET | Refills: 0 | Status: SHIPPED | OUTPATIENT
Start: 2023-02-23

## 2023-02-23 NOTE — TELEPHONE ENCOUNTER
Last Appointment:  9/28/2022  Future Appointments   Date Time Provider Fatou Megan   3/2/2023  1:30 PM Silvia Magaña MD [facility]   3/15/2023  1:00 PM [facility] CT SCAN 3 SEYZ CT [facility]   7/11/2023 10:20 AM Selina Denney,  [facility]

## 2023-02-24 RX ORDER — BUDESONIDE, GLYCOPYRROLATE, AND FORMOTEROL FUMARATE 160; 9; 4.8 UG/1; UG/1; UG/1
AEROSOL, METERED RESPIRATORY (INHALATION)
Qty: 11 G | Refills: 0 | Status: SHIPPED | OUTPATIENT
Start: 2023-02-24

## 2023-03-06 ENCOUNTER — HOSPITAL ENCOUNTER (OUTPATIENT)
Dept: CT IMAGING | Age: 58
Discharge: HOME | End: 2023-03-08
Payer: MEDICARE

## 2023-03-06 ENCOUNTER — TELEPHONE (OUTPATIENT)
Dept: INTERNAL MEDICINE | Age: 58
End: 2023-03-06

## 2023-03-06 ENCOUNTER — OFFICE VISIT (OUTPATIENT)
Dept: INTERNAL MEDICINE | Age: 58
End: 2023-03-06
Payer: MEDICARE

## 2023-03-06 VITALS
HEART RATE: 79 BPM | RESPIRATION RATE: 18 BRPM | TEMPERATURE: 97.1 F | SYSTOLIC BLOOD PRESSURE: 123 MMHG | WEIGHT: 265.9 LBS | OXYGEN SATURATION: 96 % | DIASTOLIC BLOOD PRESSURE: 78 MMHG | BODY MASS INDEX: 37.23 KG/M2 | HEIGHT: 71 IN

## 2023-03-06 DIAGNOSIS — R22.1 MASS OF LEFT SIDE OF NECK: ICD-10-CM

## 2023-03-06 DIAGNOSIS — J44.9 COPD, SEVERE (HCC): ICD-10-CM

## 2023-03-06 DIAGNOSIS — E66.01 SEVERE OBESITY (BMI 35.0-39.9) WITH COMORBIDITY (HCC): ICD-10-CM

## 2023-03-06 DIAGNOSIS — G47.9 SLEEP DISORDER: ICD-10-CM

## 2023-03-06 DIAGNOSIS — R59.0 LYMPHADENOPATHY, SUPRACLAVICULAR: ICD-10-CM

## 2023-03-06 DIAGNOSIS — F41.9 ANXIETY: ICD-10-CM

## 2023-03-06 DIAGNOSIS — I10 PRIMARY HYPERTENSION: ICD-10-CM

## 2023-03-06 DIAGNOSIS — N52.9 ERECTILE DYSFUNCTION, UNSPECIFIED ERECTILE DYSFUNCTION TYPE: ICD-10-CM

## 2023-03-06 DIAGNOSIS — N18.9 CHRONIC KIDNEY DISEASE, UNSPECIFIED CKD STAGE: ICD-10-CM

## 2023-03-06 DIAGNOSIS — G89.4 CHRONIC PAIN SYNDROME: ICD-10-CM

## 2023-03-06 DIAGNOSIS — K21.9 GASTROESOPHAGEAL REFLUX DISEASE, UNSPECIFIED WHETHER ESOPHAGITIS PRESENT: ICD-10-CM

## 2023-03-06 DIAGNOSIS — I25.119 CORONARY ARTERY DISEASE INVOLVING NATIVE HEART WITH ANGINA PECTORIS, UNSPECIFIED VESSEL OR LESION TYPE (HCC): ICD-10-CM

## 2023-03-06 DIAGNOSIS — D12.2 ADENOMATOUS POLYP OF ASCENDING COLON: Primary | ICD-10-CM

## 2023-03-06 DIAGNOSIS — E78.9 LIPID DISORDER: ICD-10-CM

## 2023-03-06 DIAGNOSIS — J30.89 NON-SEASONAL ALLERGIC RHINITIS, UNSPECIFIED TRIGGER: ICD-10-CM

## 2023-03-06 LAB
ANION GAP SERPL CALCULATED.3IONS-SCNC: 9 MMOL/L (ref 7–16)
BUN BLDV-MCNC: 20 MG/DL (ref 6–20)
CALCIUM SERPL-MCNC: 10.1 MG/DL (ref 8.6–10.2)
CHLORIDE BLD-SCNC: 105 MMOL/L (ref 98–107)
CO2: 26 MMOL/L (ref 22–29)
CREAT SERPL-MCNC: 1.1 MG/DL (ref 0.7–1.2)
GFR SERPL CREATININE-BSD FRML MDRD: >60 ML/MIN/1.73
GLUCOSE BLD-MCNC: 92 MG/DL (ref 74–99)
POTASSIUM SERPL-SCNC: 5 MMOL/L (ref 3.5–5)
SODIUM BLD-SCNC: 140 MMOL/L (ref 132–146)

## 2023-03-06 PROCEDURE — G1010 CDSM STANSON: HCPCS

## 2023-03-06 PROCEDURE — 36415 COLL VENOUS BLD VENIPUNCTURE: CPT | Performed by: INTERNAL MEDICINE

## 2023-03-06 PROCEDURE — 71270 CT THORAX DX C-/C+: CPT

## 2023-03-06 PROCEDURE — 99213 OFFICE O/P EST LOW 20 MIN: CPT | Performed by: STUDENT IN AN ORGANIZED HEALTH CARE EDUCATION/TRAINING PROGRAM

## 2023-03-06 PROCEDURE — 6360000004 HC RX CONTRAST MEDICATION: Performed by: RADIOLOGY

## 2023-03-06 RX ORDER — BUSPIRONE HYDROCHLORIDE 15 MG/1
15 TABLET ORAL 2 TIMES DAILY
Qty: 60 TABLET | Refills: 5 | Status: SHIPPED | OUTPATIENT
Start: 2023-03-06

## 2023-03-06 RX ORDER — METOPROLOL SUCCINATE 25 MG/1
12.5 TABLET, EXTENDED RELEASE ORAL DAILY
Qty: 45 TABLET | Refills: 1 | Status: SHIPPED | OUTPATIENT
Start: 2023-03-06

## 2023-03-06 RX ORDER — IBUPROFEN 800 MG/1
800 TABLET ORAL EVERY 8 HOURS PRN
Qty: 90 TABLET | Refills: 5 | Status: SHIPPED | OUTPATIENT
Start: 2023-03-06

## 2023-03-06 RX ORDER — TRAZODONE HYDROCHLORIDE 100 MG/1
100 TABLET ORAL NIGHTLY
Qty: 90 TABLET | Refills: 1 | Status: SHIPPED | OUTPATIENT
Start: 2023-03-06

## 2023-03-06 RX ORDER — SILDENAFIL CITRATE 20 MG/1
20 TABLET ORAL PRN
Qty: 30 TABLET | Status: CANCELLED | OUTPATIENT
Start: 2023-03-06

## 2023-03-06 RX ORDER — OMEPRAZOLE 40 MG/1
CAPSULE, DELAYED RELEASE ORAL
Qty: 90 CAPSULE | Refills: 2 | Status: SHIPPED | OUTPATIENT
Start: 2023-03-06

## 2023-03-06 RX ORDER — FLUTICASONE PROPIONATE 50 MCG
1 SPRAY, SUSPENSION (ML) NASAL DAILY
Qty: 16 G | Refills: 3 | Status: SHIPPED | OUTPATIENT
Start: 2023-03-06

## 2023-03-06 RX ORDER — EZETIMIBE 10 MG/1
10 TABLET ORAL DAILY
Qty: 90 TABLET | Refills: 1 | Status: SHIPPED | OUTPATIENT
Start: 2023-03-06

## 2023-03-06 RX ORDER — PROMETHAZINE HYDROCHLORIDE 25 MG/1
25 TABLET ORAL 3 TIMES DAILY PRN
Qty: 90 TABLET | Refills: 3 | Status: SHIPPED | OUTPATIENT
Start: 2023-03-06

## 2023-03-06 RX ORDER — ASPIRIN 81 MG/1
81 TABLET ORAL DAILY
Qty: 90 TABLET | Refills: 1 | Status: SHIPPED | OUTPATIENT
Start: 2023-03-06

## 2023-03-06 RX ORDER — ALBUTEROL SULFATE 90 UG/1
1-2 AEROSOL, METERED RESPIRATORY (INHALATION) EVERY 4 HOURS PRN
Qty: 1 EACH | Refills: 5 | Status: SHIPPED | OUTPATIENT
Start: 2023-03-06

## 2023-03-06 RX ORDER — GABAPENTIN 600 MG/1
600 TABLET ORAL 3 TIMES DAILY
Qty: 270 TABLET | Refills: 1 | Status: SHIPPED | OUTPATIENT
Start: 2023-03-06 | End: 2023-05-05

## 2023-03-06 RX ADMIN — IOPAMIDOL 90 ML: 755 INJECTION, SOLUTION INTRAVENOUS at 16:03

## 2023-03-06 SDOH — ECONOMIC STABILITY: FOOD INSECURITY: WITHIN THE PAST 12 MONTHS, THE FOOD YOU BOUGHT JUST DIDN'T LAST AND YOU DIDN'T HAVE MONEY TO GET MORE.: SOMETIMES TRUE

## 2023-03-06 SDOH — ECONOMIC STABILITY: HOUSING INSECURITY
IN THE LAST 12 MONTHS, WAS THERE A TIME WHEN YOU DID NOT HAVE A STEADY PLACE TO SLEEP OR SLEPT IN A SHELTER (INCLUDING NOW)?: NO

## 2023-03-06 SDOH — ECONOMIC STABILITY: INCOME INSECURITY: HOW HARD IS IT FOR YOU TO PAY FOR THE VERY BASICS LIKE FOOD, HOUSING, MEDICAL CARE, AND HEATING?: VERY HARD

## 2023-03-06 SDOH — ECONOMIC STABILITY: FOOD INSECURITY: WITHIN THE PAST 12 MONTHS, YOU WORRIED THAT YOUR FOOD WOULD RUN OUT BEFORE YOU GOT MONEY TO BUY MORE.: NEVER TRUE

## 2023-03-06 ASSESSMENT — ENCOUNTER SYMPTOMS
RHINORRHEA: 0
FACIAL SWELLING: 0
COUGH: 0
VOMITING: 0
VOICE CHANGE: 0
SHORTNESS OF BREATH: 1
SORE THROAT: 0
CONSTIPATION: 0
BACK PAIN: 1
DIARRHEA: 1
ABDOMINAL PAIN: 0
TROUBLE SWALLOWING: 0
EYE DISCHARGE: 0
NAUSEA: 1
WHEEZING: 0

## 2023-03-06 ASSESSMENT — PATIENT HEALTH QUESTIONNAIRE - PHQ9
9. THOUGHTS THAT YOU WOULD BE BETTER OFF DEAD, OR OF HURTING YOURSELF: 0
7. TROUBLE CONCENTRATING ON THINGS, SUCH AS READING THE NEWSPAPER OR WATCHING TELEVISION: 3
8. MOVING OR SPEAKING SO SLOWLY THAT OTHER PEOPLE COULD HAVE NOTICED. OR THE OPPOSITE, BEING SO FIGETY OR RESTLESS THAT YOU HAVE BEEN MOVING AROUND A LOT MORE THAN USUAL: 1
SUM OF ALL RESPONSES TO PHQ QUESTIONS 1-9: 12
10. IF YOU CHECKED OFF ANY PROBLEMS, HOW DIFFICULT HAVE THESE PROBLEMS MADE IT FOR YOU TO DO YOUR WORK, TAKE CARE OF THINGS AT HOME, OR GET ALONG WITH OTHER PEOPLE: 0
3. TROUBLE FALLING OR STAYING ASLEEP: 3
SUM OF ALL RESPONSES TO PHQ QUESTIONS 1-9: 12
1. LITTLE INTEREST OR PLEASURE IN DOING THINGS: 0
5. POOR APPETITE OR OVEREATING: 2
2. FEELING DOWN, DEPRESSED OR HOPELESS: 0
4. FEELING TIRED OR HAVING LITTLE ENERGY: 3
6. FEELING BAD ABOUT YOURSELF - OR THAT YOU ARE A FAILURE OR HAVE LET YOURSELF OR YOUR FAMILY DOWN: 0
SUM OF ALL RESPONSES TO PHQ9 QUESTIONS 1 & 2: 0

## 2023-03-06 NOTE — PROGRESS NOTES
[facility]  [facility]  Attending Physician Statement:  Danielle Stahl M.D., F.A.C.P. I have discussed the case, including pertinent history and exam findings with the resident. Time spent with review of medical records/labs- last visits, coordinating care with residents, nurses and patient. Addressed when applicable- Health maintenance issues of vaccinations, depression screening, tobacco cessation    Billiing assessed by medical complexity of case  Patient is seen for fu visit today. -- acute and chronic problems addressed  I agree with the assessment, plan and orders as documented by the resident. Noted new R cervical lyphadenaopthy and Rsupraclavicular mass  Weight loss, remote hx of renal cell CA  Ruleout lung CA- fu pulm  CT lung screening negative 3/8/2022  Breathing stable severe copd  Copd +tobacco, order in place for screening lung CA  Will refer to ENT - possible triple scope/squamous, if negative  Then ENT to bx lymph node    High ASCVD 11% ldl noted- but not taking lipitor bc fatigue  We discussed at  least trial back on zetia    Gi issues, phenergan helps nausea  For diarrhea workup  He hasn't   Fecal fat, leukocyte, and c-diff ordered previously but was not done.

## 2023-03-06 NOTE — PROGRESS NOTES
Stat lab was drawn per ordered and sent to Lab   CT's of neck and lung scans were ordered and scheduled later today Pt. was given instructions not to eat or drink after 1:30p. m.and to be here by 4:00p.m. for his test    Dr. Deisi Kennedy office was called and informed we are sending a referral to office and that pt is having CT scans today per ordered

## 2023-03-06 NOTE — PROGRESS NOTES
[facility]  Internal Medicine Residency Program  ACC Note      SUBJECTIVE:  CC: had concerns including Mass (Left lower side of neck  in  which pain extends to shoulder blade and collar bone) and Other (Some difficulty swollowing noted ). HPI:  Pillo Pena is a 62 y. o.male with PMH of CAD, HTN, HLD, colon polys, chronic back pain, renal cell carcinoma, obesity, anxiety, tobacco abuse presenting to [facility] for 6 month follow up. HLD  - Discontinued lipitor due to fatigue and pravastatin due to generalized body aches  - On Zetia, state had not been taking it, will resume  - Last lipid panel 8/2022  - The 10-year ASCVD risk score (Matt DK, et al., 2019) is: 12.1%    Values used to calculate the score:      Age: 62 years      Sex: Male      Is Non- : No      Diabetic: No      Tobacco smoker: Yes      Systolic Blood Pressure: 132 mmHg      Is BP treated: No      HDL Cholesterol: 44 mg/dL      Total Cholesterol: 201 mg/dL    Left Neck Mass  - Started with pain in left clavicle 2 weeks, noted there was a left neck mass about 1 week ago, the mass is tender to the touch  - Has associated left arm numbness and tingling which does not improve with anything  - Had worsening nausea and poor appetite since this was noted  - Denies noting any weight loss  - Has been lifting weights in the gym 5 days a week for 2 years, states no incidents injuring himself while lifting  - Called ENT office, Dr. Jasmine Meléndez, who requested CT done today, pt agreeable with STAT imaging of chest and neck     Severe COPD  - Follow with Dr. Karime Damon, last visit 1/2023  - Continues to smoke, down to 0.5 ppd, states feels ready to quit  - On Breztri  - Needs albuterol inhaler 3-4 times/day  - Refused sleep study  - CT lung screening negative 3/8/2022  - Has been exercising 5 times a week, to continue     Chronic nausea, emesis, diarrhea  - s/p cholecystectomy. - Lipase and amylase WNL (9/2020). Fecal fat, leukocyte, and c-diff ordered previously but was not done. - EGD normal. Colonoscopy with tubular adenoma. CT A/P normal.   - Has been on PPI for prolonged time. Microscopic colitis? Need random biopsies.  - Trialed previously on cholestyramine, did not help  - Nausea improved with Phenergan      CAD  - Denies chest pain, palpitations, lightheadedness, leg swelling  - On ASA 81 mg daily, lipitor 40 mg daily, metoprolol 25 mg   - non-obstructive disease noted on [facility] 2015 in [place], [place]  - Stress test in 8/2019 unremarkable  - Echo 8/2022 shows LV EF 55-60%, no regional wall motion abnormalities   - Coronary CTA: calcium score 0 (9/2021)     HTN  - On metoprolol 12.5 mg daily  - /78 mmHg today     ED  - On Sildenafil 20 mg PRN  - Use ~3 times/month, helpful     BPH  - Had been on Flomax 0.8 mg daily previously, but stopped taking when ED worsened  - PSA 1.25 (8/2021) <--- 1.66 (8/2019)     GERD  - On omeprazole 40 mg daily, sometimes takes twice per day, but more often daily     Chronic back pain with sciatica  - Patient reluctant to go to PMR because he has gone to them in the past and did not like the results of steroid injections. Report PT and tylenol not helping  - On gabapentin 600 mg TID and ibuprofen 800 Q6h PRN. - Recommeded no ibuprofen given patient has one kidney, pt states this is the only medication that works for him, he is agreeable with serial BMP for monitoring his renal function      Renal cell carcinoma   - s/p R nephrectomy in 2005     Anxiety  - On buspar 15 mg BID and trazodone 100 mg nightly  - Follow with [facility]     Tobacco abuse  - Smoking since age of 15. Cut to half pack per day  - Smoking cessation counseling.  Patient states that he will quit if he has to  - Refused nicotine patch and gum and chantix      HCM  - Refused influenza, Pneumovax, and COVID-19 vaccine  - Refused sleep study, states would not want to use CPAP even if positive  - Colonoscopy 12/2019: tubular adenoma, due for repeat since 12/2022  - CT lung cancer screening: negative for lung nodules (3/8/22), repeat scheduled for 3/15/2023  - Due for AWV, to get scheduled with PCP      Review of Systems   Constitutional:  Positive for appetite change and fatigue. Negative for activity change, chills, fever and unexpected weight change. HENT:  Negative for congestion, facial swelling, rhinorrhea, sore throat, tinnitus, trouble swallowing and voice change. Eyes:  Negative for discharge and visual disturbance. Respiratory:  Positive for shortness of breath. Negative for cough and wheezing. Cardiovascular:  Negative for chest pain, palpitations and leg swelling. Gastrointestinal:  Positive for diarrhea and nausea. Negative for abdominal pain, constipation and vomiting. Genitourinary:  Negative for difficulty urinating and dysuria. Musculoskeletal:  Positive for back pain, neck pain and neck stiffness. Neurological:  Positive for numbness. Negative for dizziness, syncope, weakness, light-headedness and headaches. Hematological:  Positive for adenopathy. Psychiatric/Behavioral:  Negative for behavioral problems.       Outpatient Medications Marked as Taking for the 3/6/23 encounter (Office Visit) with Lala Burr MD   Medication Sig Dispense Refill    ibuprofen (ADVIL;MOTRIN) 800 MG tablet Take 1 tablet by mouth every 8 hours as needed for Pain 90 tablet 5    busPIRone (BUSPAR) 15 MG tablet Take 15 mg by mouth 2 times daily 60 tablet 5    aspirin (SONIA ASPIRIN EC LOW DOSE) 81 MG EC tablet Take 1 tablet by mouth daily 90 tablet 1    omeprazole (PRILOSEC) 40 MG delayed release capsule TAKE 1 CAPSULE BY MOUTH ONCE DAILY IN THE MORNING BEFORE BREAKFAST 90 capsule 2    albuterol sulfate HFA (PROVENTIL HFA) 108 (90 Base) MCG/ACT inhaler Inhale 1-2 puffs into the lungs every 4 hours as needed for Wheezing or Shortness of Breath 1 each 5    metoprolol succinate (TOPROL XL) 25 MG extended release tablet Take 0.5 tablets by mouth daily 45 tablet 1    traZODone (DESYREL) 100 MG tablet Take 1 tablet by mouth nightly 90 tablet 1    fluticasone (FLONASE) 50 MCG/ACT nasal spray 1 spray by Each Nostril route daily 16 g 3    gabapentin (NEURONTIN) 600 MG tablet Take 1 tablet by mouth 3 times daily for 60 days. 270 tablet 1    ezetimibe (ZETIA) 10 MG tablet Take 1 tablet by mouth daily 90 tablet 1    promethazine (PHENERGAN) 25 MG tablet Take 1 tablet by mouth 3 times daily as needed for Nausea 90 tablet 3    BREZTRI AEROSPHERE 160-9-4.8 MCG/ACT AERO INHALE 2 PUFFS IN THE MORNING AND AT BEDTIME 11 g 0    sildenafil (REVATIO) 20 MG tablet Take 1 tablet by mouth as needed (as directed 1-2 tablets) 30 tablet 0       OBJECTIVE:    VS:   /78 (Site: Left Upper Arm, Position: Sitting, Cuff Size: Large Adult)   Pulse 79   Temp 97.1 °F (36.2 °C) (Temporal)   Resp 18   Ht 5' 11\" (1.803 m)   Wt 265 lb 14.4 oz (120.6 kg)   SpO2 96%   BMI 37.09 kg/m²     EXAM:  Physical Exam  Vitals reviewed. Constitutional:       General: He is not in acute distress. Appearance: Normal appearance. He is morbidly obese. HENT:      Head: Normocephalic and atraumatic. Eyes:      Extraocular Movements: Extraocular movements intact. Pupils: Pupils are equal, round, and reactive to light. Neck:      Thyroid: No thyroid mass. Comments: Tender left neck mass  Cardiovascular:      Rate and Rhythm: Normal rate and regular rhythm. Pulses: Normal pulses. Heart sounds: Normal heart sounds. Pulmonary:      Effort: Pulmonary effort is normal.      Breath sounds: Normal breath sounds. No wheezing, rhonchi or rales. Abdominal:      General: Bowel sounds are normal.      Palpations: Abdomen is soft. Tenderness: There is no abdominal tenderness. Musculoskeletal:      Cervical back: Decreased range of motion. Lymphadenopathy:      Upper Body:      Left upper body: Supraclavicular adenopathy present.    Skin: Capillary Refill: Capillary refill takes less than 2 seconds. Neurological:      General: No focal deficit present. Mental Status: He is alert and oriented to person, place, and time. Mental status is at baseline. Sensory: Sensory deficit (left arm down to hand) present. Motor: No weakness. ASSESSMENT/PLAN:  I have reviewed all pertinent PMH, PSH, FH, SH, medications and allergies and updated history as appropriate. Yocasta was seen today for mass and other. Diagnoses and all orders for this visit:    Adenomatous polyp of ascending colon  -     [facility]    Chronic pain syndrome  -     ibuprofen (ADVIL;MOTRIN) 800 MG tablet; Take 1 tablet by mouth every 8 hours as needed for Pain  -     gabapentin (NEURONTIN) 600 MG tablet; Take 1 tablet by mouth 3 times daily for 60 days. Anxiety  -     busPIRone (BUSPAR) 15 MG tablet; Take 15 mg by mouth 2 times daily    Coronary artery disease involving native heart with angina pectoris, unspecified vessel or lesion type (HCC)  -     aspirin (SONIA ASPIRIN EC LOW DOSE) 81 MG EC tablet; Take 1 tablet by mouth daily    Erectile dysfunction, unspecified erectile dysfunction type    Gastroesophageal reflux disease, unspecified whether esophagitis present  -     omeprazole (PRILOSEC) 40 MG delayed release capsule; TAKE 1 CAPSULE BY MOUTH ONCE DAILY IN THE MORNING BEFORE BREAKFAST    COPD, severe (HCC)  -     albuterol sulfate HFA (PROVENTIL HFA) 108 (90 Base) MCG/ACT inhaler; Inhale 1-2 puffs into the lungs every 4 hours as needed for Wheezing or Shortness of Breath    Primary hypertension  -     metoprolol succinate (TOPROL XL) 25 MG extended release tablet; Take 0.5 tablets by mouth daily    Sleep disorder  -     traZODone (DESYREL) 100 MG tablet;  Take 1 tablet by mouth nightly    Non-seasonal allergic rhinitis, unspecified trigger  -     fluticasone (FLONASE) 50 MCG/ACT nasal spray; 1 spray by Each Nostril route daily    Lipid disorder  -     ezetimibe (ZETIA) 10 MG tablet; Take 1 tablet by mouth daily    Severe obesity (BMI 35.0-39. 9) with comorbidity ([facility] [address]    Chronic kidney disease, unspecified CKD stage  -     Cancel: Basic Metabolic Panel; Future  -     Basic Metabolic Panel; Future    Mass of left side of neck  -     Cancel: [facility] - Destiny Yi, DO, Otolaryngology, [address]; Future  -     CT SOFT TISSUE NECK W WO CONTRAST; Future    Lymphadenopathy, supraclavicular  -     CT CHEST W WO CONTRAST; Future  -     CT SOFT TISSUE NECK W WO CONTRAST; Future    Other orders  -     promethazine (PHENERGAN) 25 MG tablet;  Take 1 tablet by mouth 3 times daily as needed for Nausea        RTC: 2 weeks for AWV      I have reviewed my findings and recommendations with Danika Morejon and Dr Indy Hollis MD PGY-3  3/8/2023 9:49 AM

## 2023-03-06 NOTE — PATIENT INSTRUCTIONS
Dear Yocasta Medina,    Thank you for coming to your appointment today. I hope we have addressed all of your needs.     Please make sure to do the following:  - Continue your medications as listed.  - Will get labs today in office  - We ordered CT of neck and chest  - Referrals have been made to ENT for neck mass, General Surgery for colonoscopy:  If you do not hear from the office in 1 week, please call the number listed.  - We will see each other again in 2 weeks for annual wellness visit with your primary care doctor    Call for a sooner appointment if you develop any new or worsening symptoms.    Have a great day!    Sincerely,  Leland Spears M.D  3/6/2023  10:53 AM      FOOD ASSISTANCE   Help Network of [facility]  Text the words “HELP NETWORK” to : 154947   At the prompt jenny “1” for information about food pantries or “2” to learn about free meal sites   Website:https://www.helpSwipeStationworkneo.org/  [facility] [phone]  [facility] [phone]  Michael Mak [place], & [address] [place] [phone]  [place] [phone]    Community [employer]   [address] [place] [address]  Hours: Mon-Sat 6am-1:30PM Sun Closed  Phone: [phone]    [facility] [place]  [address] [place], [place] [address] [phone]  Two full meals are served daily to the public at our Emergency Shelter located at [address] [place] [address] in [place].  Breakfast: 6:15 a.m.-7:15 a.m.  Dinner: 6:00 p.m.-7:00 p.m. All are welcome. No proof of income or ID is required.      [facility]  Meals on Wheels of [facility]   [address] [place] [address]   Phone: [phone]   Hot meal and sack lunch (for evening meal) Monday-Friday to residents of [facility]. Cost for weekly meals, discounted for two people.         [facility] [place] [id number]   Phone: [phone]   Meal delivery  options for residents of [facility], [place], [place], [place], [place], [address], [place], [place] [facility], [place], [place], [place], [address] , Evette dukes, [place], [place], [place] and [place]. Delivered Monday-Friday. Frozen meals for Saturday and Sunday delivered on Friday. Cost for regular and special diet meals. Natalietomas Sotelo , [place], [address]   Phone: [phone]   Delivers hot meals to homebound individuals living in the [place] [address] and [place]. Monday-Friday. If eligible and funds available, two frozen meals for Saturday and Sunday. Office of Elderly Affairs   [address]   Phone: [phone]   Office Hours: Mon-Fri 8:30am-4:30pm   Nutrition program, meal sites and home delivered meals if eligible       [facility] [place] [address] [facility] ,[place], 8 [facility]   Phone: [phone]   Meal delivery option. [facility])     [facility] Services/Meals  [address], 939 [facility], [facility]   Phone: [phone]   Home delivered to [place] and [facility] residents. Donation for meals. Mobile Meals of [place]  [facility]ne [facility], [address]   Phone: [phone]   Home delivered hot meal and cold meal to homebound residents of [facility]. Cost for meals Monday-Friday. Home Delivered Meals     [employer]   Website: Hairdressr   [address] [facility], [address]   Phone: [phone]   Delivers frozen meals. Special diets. Private pay, government funded programs and some insurance plans. Current Medicaid/Medicare recipients contact  for eligibility. Mom's Meals [facility]our[facility]  Website: momsmeals. com   [address] Tir[place] [address], [address]   Phone: [phone]   Office Hours: Mon-Fri 8:00am-6:00pm Delivers refrigerated meals to heat. Special diets. Private pay, government funded programs and some insurance plans. Current Medicaid and Medicare recipients contact . Luke MOMIN  Website: Mai Londono. net   [address] \[facility]\"" Robin LANGLEY   Phone: [phone], [phone]   Delivers vacuum packaged, refrigerated meals ready to heat. Weekly deliveries, refrigerate or freeze. All meals are diabetic friendly and low sodium. Private pay, government funded programs and some insurance plans. Current Medicaid/Medicare recipients contact  to determine eligibility. 1319 [facility]  [email or web address].[email or web address] org/  [place] and [place]yl[place] [address] [facility] [address] [place] [address], [address] [place] [address] [phone]  [address] [place] [address] [employer] and [employer] [facility]  Phone: [phone]  [email or web address].[email or web address]/    MARELY LASSITER [facility] Only)  [facility] 112, [facility] [address] [place] [address]  Phone: [phone]  [employer].    986 [facility] Residents Only)  [facility] [address]  Phone: [phone]   [email or web address].info/    Community Action Agency [facility] Residents Only)  [address] [facility], [facility] [address]   [address] [place] [address]  InsuranceSquad.      [address]  Phone: [phone]  Call if you are looking for financial assistance with a bill for services provided by a [facility]) doctor or hospital      [address] [facility]   Phone: [phone]  Can apply assistance if under-insured or without drug coverage and/or meets income guidelines. Call to further determine eligibility and to initiate application process    Good RX:  VipAnalysis.is. com/    Phone: [phone]: https://rxoutreach. org/   Phone: Jose Adams ([phone])    [address] [place] [address].   [address] [place] [address] Covert [place]isa[place]   Phone: [phone]   Contact: Ethan Porter. Offers free cataract surgery for people who have no insurance and no means to pay. Call for application. [facility] (OSHIIP)   44 [facility] , [facility], [address]   Phone: [phone]  Website: insurance. [place].gov  Free health insurance information and services for people with Medicare. Assists with understanding Medicare coverage for seniors and persons with disabilities including prescription drug plans, advantage plans, supplemental insurance, long-term care insurance and limited income assistance. [facility] [place] [address]   Phone: [phone]   Website: [employer]  Office Hours: Mon-Fri 8:00am-4:30pm   Assists persons uninsured and underinsured that meet financial eligibility criteria with eye care services (optometry and ophthalmology). Augie Yuriy  Yvonne Peterson 930, 12 [facility], [address] [place] [address]   Phone: [phone]   Office Hours: Mon-Fri 9:00am-4:00pm   Website: Image MetricslyserviceCoinHoldings  Emergency food assistance, limited assistance to restore/prevent utility disconnection, for life sustaining medication; and prevent eviction; counseling; referrals; advocacy.

## 2023-03-06 NOTE — TELEPHONE ENCOUNTER
Proventil HFA aerosol ordered earlier requires Prior Auth. Called Pharmacy states if we change to Ventolin no prior auth is needed , Dr. Meyer Held notified and verbal was given.

## 2023-03-07 ENCOUNTER — TELEPHONE (OUTPATIENT)
Dept: INTERNAL MEDICINE | Age: 58
End: 2023-03-07

## 2023-03-07 NOTE — TELEPHONE ENCOUNTER
Called to discuss CT Chest and Neck results, no significant findings. Recommended patient to take a week off going to the gym and watching for improvement of swelling as this may be musculoskeletal in nature. If not improving within 1-2 weeks, he will contact us for a follow up appointment.

## 2023-03-14 ENCOUNTER — TELEPHONE (OUTPATIENT)
Dept: CASE MANAGEMENT | Age: 58
End: 2023-03-14

## 2023-04-10 ENCOUNTER — PREP FOR PROCEDURE (OUTPATIENT)
Dept: SURGERY | Age: 58
End: 2023-04-10

## 2023-04-10 PROBLEM — Z86.010 PERSONAL HISTORY OF COLONIC POLYPS: Status: ACTIVE | Noted: 2023-04-10

## 2023-04-10 PROBLEM — R12 HEARTBURN: Status: ACTIVE | Noted: 2023-04-10

## 2023-04-10 PROBLEM — Z86.0100 PERSONAL HISTORY OF COLONIC POLYPS: Status: ACTIVE | Noted: 2023-04-10

## 2023-04-17 ENCOUNTER — HOSPITAL ENCOUNTER (INPATIENT)
Age: 58
LOS: 4 days | Discharge: HOME | DRG: 190 | End: 2023-04-22
Attending: EMERGENCY MEDICINE | Admitting: INTERNAL MEDICINE
Payer: MEDICARE

## 2023-04-17 ENCOUNTER — APPOINTMENT (OUTPATIENT)
Dept: GENERAL RADIOLOGY | Age: 58
DRG: 190 | End: 2023-04-17
Payer: MEDICARE

## 2023-04-17 DIAGNOSIS — J44.1 COPD EXACERBATION (HCC): Primary | ICD-10-CM

## 2023-04-17 DIAGNOSIS — J44.9 CHRONIC OBSTRUCTIVE PULMONARY DISEASE, UNSPECIFIED COPD TYPE (HCC): ICD-10-CM

## 2023-04-17 DIAGNOSIS — R06.2 WHEEZING: ICD-10-CM

## 2023-04-17 DIAGNOSIS — J44.9 COPD, SEVERE (HCC): ICD-10-CM

## 2023-04-17 LAB
ALBUMIN SERPL-MCNC: 4.4 G/DL (ref 3.5–5.2)
ALP SERPL-CCNC: 75 U/L (ref 40–129)
ALT SERPL-CCNC: 23 U/L (ref 0–40)
ANION GAP SERPL CALCULATED.3IONS-SCNC: 11 MMOL/L (ref 7–16)
AST SERPL-CCNC: 22 U/L (ref 0–39)
B.E.: -6 MMOL/L (ref -3–3)
BASOPHILS # BLD: 0.12 E9/L (ref 0–0.2)
BASOPHILS NFR BLD: 0.8 % (ref 0–2)
BILIRUB SERPL-MCNC: 0.4 MG/DL (ref 0–1.2)
BUN SERPL-MCNC: 13 MG/DL (ref 6–20)
CALCIUM SERPL-MCNC: 9.5 MG/DL (ref 8.6–10.2)
CHLORIDE SERPL-SCNC: 110 MMOL/L (ref 98–107)
CO2 SERPL-SCNC: 25 MMOL/L (ref 22–29)
COHB: 2 % (ref 0–1.5)
CREAT SERPL-MCNC: 1 MG/DL (ref 0.7–1.2)
CRITICAL: ABNORMAL
DATE ANALYZED: ABNORMAL
DATE OF COLLECTION: ABNORMAL
EOSINOPHIL # BLD: 0.58 E9/L (ref 0.05–0.5)
EOSINOPHIL NFR BLD: 3.8 % (ref 0–6)
ERYTHROCYTE [DISTWIDTH] IN BLOOD BY AUTOMATED COUNT: 12.6 FL (ref 11.5–15)
FLUAV RNA RESP QL NAA+PROBE: NOT DETECTED
FLUBV RNA RESP QL NAA+PROBE: NOT DETECTED
GLUCOSE SERPL-MCNC: 130 MG/DL (ref 74–99)
HCO3: 19.9 MMOL/L (ref 22–26)
HCT VFR BLD AUTO: 47.5 % (ref 37–54)
HGB BLD-MCNC: 15.5 G/DL (ref 12.5–16.5)
HHB: 9.6 % (ref 0–5)
IMM GRANULOCYTES # BLD: 0.08 E9/L
IMM GRANULOCYTES NFR BLD: 0.5 % (ref 0–5)
LAB: ABNORMAL
LYMPHOCYTES # BLD: 1.77 E9/L (ref 1.5–4)
LYMPHOCYTES NFR BLD: 11.6 % (ref 20–42)
Lab: ABNORMAL
MCH RBC QN AUTO: 30.9 PG (ref 26–35)
MCHC RBC AUTO-ENTMCNC: 32.6 % (ref 32–34.5)
MCV RBC AUTO: 94.8 FL (ref 80–99.9)
METHB: 0.2 % (ref 0–1.5)
MODE: ABNORMAL
MONOCYTES # BLD: 1.02 E9/L (ref 0.1–0.95)
MONOCYTES NFR BLD: 6.7 % (ref 2–12)
NEUTROPHILS # BLD: 11.74 E9/L (ref 1.8–7.3)
NEUTS SEG NFR BLD: 76.6 % (ref 43–80)
O2 CONTENT: 19.2 ML/DL
O2 SATURATION: 90.2 % (ref 92–98.5)
O2HB: 88.2 % (ref 94–97)
OPERATOR ID: 2244
PATIENT TEMP: 37 C
PCO2: 40.8 MMHG (ref 35–45)
PH BLOOD GAS: 7.31 (ref 7.35–7.45)
PLATELET # BLD AUTO: 297 E9/L (ref 130–450)
PMV BLD AUTO: 10.8 FL (ref 7–12)
PO2: 60.8 MMHG (ref 75–100)
POTASSIUM SERPL-SCNC: 4.7 MMOL/L (ref 3.5–5)
PROT SERPL-MCNC: 7.3 G/DL (ref 6.4–8.3)
RBC # BLD AUTO: 5.01 E12/L (ref 3.8–5.8)
SARS-COV-2 RNA RESP QL NAA+PROBE: NOT DETECTED
SODIUM SERPL-SCNC: 146 MMOL/L (ref 132–146)
SOURCE, BLOOD GAS: ABNORMAL
THB: 15.5 G/DL (ref 11.5–16.5)
TIME ANALYZED: 2356
TROPONIN, HIGH SENSITIVITY: 10 NG/L (ref 0–11)
WBC # BLD: 15.3 E9/L (ref 4.5–11.5)

## 2023-04-17 PROCEDURE — 6360000002 HC RX W HCPCS: Performed by: STUDENT IN AN ORGANIZED HEALTH CARE EDUCATION/TRAINING PROGRAM

## 2023-04-17 PROCEDURE — 96374 THER/PROPH/DIAG INJ IV PUSH: CPT

## 2023-04-17 PROCEDURE — 6370000000 HC RX 637 (ALT 250 FOR IP): Performed by: STUDENT IN AN ORGANIZED HEALTH CARE EDUCATION/TRAINING PROGRAM

## 2023-04-17 PROCEDURE — 93005 ELECTROCARDIOGRAM TRACING: CPT | Performed by: STUDENT IN AN ORGANIZED HEALTH CARE EDUCATION/TRAINING PROGRAM

## 2023-04-17 PROCEDURE — 71045 X-RAY EXAM CHEST 1 VIEW: CPT

## 2023-04-17 PROCEDURE — 87636 SARSCOV2 & INF A&B AMP PRB: CPT

## 2023-04-17 PROCEDURE — 85025 COMPLETE CBC W/AUTO DIFF WBC: CPT

## 2023-04-17 PROCEDURE — 96375 TX/PRO/DX INJ NEW DRUG ADDON: CPT

## 2023-04-17 PROCEDURE — 80053 COMPREHEN METABOLIC PANEL: CPT

## 2023-04-17 PROCEDURE — 82805 BLOOD GASES W/O2 SATURATION: CPT

## 2023-04-17 PROCEDURE — 99285 EMERGENCY DEPT VISIT HI MDM: CPT

## 2023-04-17 PROCEDURE — 84484 ASSAY OF TROPONIN QUANT: CPT

## 2023-04-17 RX ORDER — IPRATROPIUM BROMIDE AND ALBUTEROL SULFATE 2.5; .5 MG/3ML; MG/3ML
2 SOLUTION RESPIRATORY (INHALATION) ONCE
Status: COMPLETED | OUTPATIENT
Start: 2023-04-17 | End: 2023-04-18

## 2023-04-17 RX ORDER — METHYLPREDNISOLONE SODIUM SUCCINATE 125 MG/2ML
125 INJECTION, POWDER, LYOPHILIZED, FOR SOLUTION INTRAMUSCULAR; INTRAVENOUS ONCE
Status: COMPLETED | OUTPATIENT
Start: 2023-04-17 | End: 2023-04-17

## 2023-04-17 RX ORDER — IPRATROPIUM BROMIDE AND ALBUTEROL SULFATE 2.5; .5 MG/3ML; MG/3ML
3 SOLUTION RESPIRATORY (INHALATION) ONCE
Status: COMPLETED | OUTPATIENT
Start: 2023-04-17 | End: 2023-04-17

## 2023-04-17 RX ORDER — MAGNESIUM SULFATE IN WATER 40 MG/ML
2000 INJECTION, SOLUTION INTRAVENOUS ONCE
Status: COMPLETED | OUTPATIENT
Start: 2023-04-17 | End: 2023-04-18

## 2023-04-17 RX ADMIN — IPRATROPIUM BROMIDE AND ALBUTEROL SULFATE 3 AMPULE: .5; 2.5 SOLUTION RESPIRATORY (INHALATION) at 22:45

## 2023-04-17 RX ADMIN — METHYLPREDNISOLONE SODIUM SUCCINATE 125 MG: 125 INJECTION, POWDER, FOR SOLUTION INTRAMUSCULAR; INTRAVENOUS at 22:49

## 2023-04-17 ASSESSMENT — PAIN - FUNCTIONAL ASSESSMENT: PAIN_FUNCTIONAL_ASSESSMENT: NONE - DENIES PAIN

## 2023-04-18 ENCOUNTER — APPOINTMENT (OUTPATIENT)
Dept: CT IMAGING | Age: 58
DRG: 190 | End: 2023-04-18
Payer: MEDICARE

## 2023-04-18 PROBLEM — J96.01 ACUTE RESPIRATORY FAILURE WITH HYPOXIA (HCC): Status: ACTIVE | Noted: 2023-04-18

## 2023-04-18 LAB
ANION GAP SERPL CALCULATED.3IONS-SCNC: 15 MMOL/L (ref 7–16)
ANISOCYTOSIS: ABNORMAL
B PARAP IS1001 DNA NPH QL NAA+NON-PROBE: NOT DETECTED
B PERT.PT PRMT NPH QL NAA+NON-PROBE: NOT DETECTED
BASOPHILS # BLD: 0 E9/L (ref 0–0.2)
BASOPHILS NFR BLD: 0.4 % (ref 0–2)
BNP BLD-MCNC: 106 PG/ML (ref 0–125)
BUN SERPL-MCNC: 14 MG/DL (ref 6–20)
C PNEUM DNA NPH QL NAA+NON-PROBE: NOT DETECTED
CALCIUM SERPL-MCNC: 9.2 MG/DL (ref 8.6–10.2)
CHLORIDE SERPL-SCNC: 107 MMOL/L (ref 98–107)
CK SERPL-CCNC: 335 U/L (ref 20–200)
CO2 SERPL-SCNC: 19 MMOL/L (ref 22–29)
CREAT SERPL-MCNC: 0.9 MG/DL (ref 0.7–1.2)
EKG ATRIAL RATE: 92 BPM
EKG P AXIS: 63 DEGREES
EKG P-R INTERVAL: 148 MS
EKG Q-T INTERVAL: 382 MS
EKG QRS DURATION: 74 MS
EKG QTC CALCULATION (BAZETT): 472 MS
EKG R AXIS: 20 DEGREES
EKG T AXIS: 56 DEGREES
EKG VENTRICULAR RATE: 92 BPM
EOSINOPHIL # BLD: 0 E9/L (ref 0.05–0.5)
EOSINOPHIL NFR BLD: 0.3 % (ref 0–6)
ERYTHROCYTE [DISTWIDTH] IN BLOOD BY AUTOMATED COUNT: 12.4 FL (ref 11.5–15)
FLUAV RNA NPH QL NAA+NON-PROBE: NOT DETECTED
FLUBV RNA NPH QL NAA+NON-PROBE: NOT DETECTED
GLUCOSE SERPL-MCNC: 157 MG/DL (ref 74–99)
HADV DNA NPH QL NAA+NON-PROBE: NOT DETECTED
HCOV 229E RNA NPH QL NAA+NON-PROBE: NOT DETECTED
HCOV HKU1 RNA NPH QL NAA+NON-PROBE: NOT DETECTED
HCOV NL63 RNA NPH QL NAA+NON-PROBE: NOT DETECTED
HCOV OC43 RNA NPH QL NAA+NON-PROBE: NOT DETECTED
HCT VFR BLD AUTO: 44.2 % (ref 37–54)
HGB BLD-MCNC: 14.8 G/DL (ref 12.5–16.5)
HMPV RNA NPH QL NAA+NON-PROBE: NOT DETECTED
HPIV1 RNA NPH QL NAA+NON-PROBE: NOT DETECTED
HPIV2 RNA NPH QL NAA+NON-PROBE: NOT DETECTED
HPIV3 RNA NPH QL NAA+NON-PROBE: NOT DETECTED
HPIV4 RNA NPH QL NAA+NON-PROBE: NOT DETECTED
LACTATE BLDV-SCNC: 1.3 MMOL/L (ref 0.5–2.2)
LEGIONELLA AG UR QL: NORMAL
LYMPHOCYTES # BLD: 0.69 E9/L (ref 1.5–4)
LYMPHOCYTES NFR BLD: 5.3 % (ref 20–42)
M PNEUMO DNA NPH QL NAA+NON-PROBE: NOT DETECTED
MCH RBC QN AUTO: 31.2 PG (ref 26–35)
MCHC RBC AUTO-ENTMCNC: 33.5 % (ref 32–34.5)
MCV RBC AUTO: 93.2 FL (ref 80–99.9)
MONOCYTES # BLD: 0.14 E9/L (ref 0.1–0.95)
MONOCYTES NFR BLD: 0.9 % (ref 2–12)
NEUTROPHILS # BLD: 12.88 E9/L (ref 1.8–7.3)
NEUTS SEG NFR BLD: 93.8 % (ref 43–80)
PLATELET # BLD AUTO: 294 E9/L (ref 130–450)
PMV BLD AUTO: 10.7 FL (ref 7–12)
POTASSIUM SERPL-SCNC: 4.4 MMOL/L (ref 3.5–5)
PROCALCITONIN: 0.05 NG/ML (ref 0–0.08)
RBC # BLD AUTO: 4.74 E12/L (ref 3.8–5.8)
RSV RNA NPH QL NAA+NON-PROBE: NOT DETECTED
RV+EV RNA NPH QL NAA+NON-PROBE: NOT DETECTED
S PNEUM AG SPEC QL: NORMAL
SARS-COV-2 RNA NPH QL NAA+NON-PROBE: NOT DETECTED
SODIUM SERPL-SCNC: 141 MMOL/L (ref 132–146)
WBC # BLD: 13.7 E9/L (ref 4.5–11.5)

## 2023-04-18 PROCEDURE — 2580000003 HC RX 258: Performed by: EMERGENCY MEDICINE

## 2023-04-18 PROCEDURE — 94640 AIRWAY INHALATION TREATMENT: CPT

## 2023-04-18 PROCEDURE — 99222 1ST HOSP IP/OBS MODERATE 55: CPT | Performed by: INTERNAL MEDICINE

## 2023-04-18 PROCEDURE — 2500000003 HC RX 250 WO HCPCS: Performed by: EMERGENCY MEDICINE

## 2023-04-18 PROCEDURE — 6370000000 HC RX 637 (ALT 250 FOR IP)

## 2023-04-18 PROCEDURE — 83605 ASSAY OF LACTIC ACID: CPT

## 2023-04-18 PROCEDURE — 6370000000 HC RX 637 (ALT 250 FOR IP): Performed by: STUDENT IN AN ORGANIZED HEALTH CARE EDUCATION/TRAINING PROGRAM

## 2023-04-18 PROCEDURE — 80048 BASIC METABOLIC PNL TOTAL CA: CPT

## 2023-04-18 PROCEDURE — 71275 CT ANGIOGRAPHY CHEST: CPT

## 2023-04-18 PROCEDURE — 2580000003 HC RX 258

## 2023-04-18 PROCEDURE — 84145 PROCALCITONIN (PCT): CPT

## 2023-04-18 PROCEDURE — 6360000002 HC RX W HCPCS: Performed by: EMERGENCY MEDICINE

## 2023-04-18 PROCEDURE — 6360000002 HC RX W HCPCS: Performed by: INTERNAL MEDICINE

## 2023-04-18 PROCEDURE — 6360000002 HC RX W HCPCS: Performed by: STUDENT IN AN ORGANIZED HEALTH CARE EDUCATION/TRAINING PROGRAM

## 2023-04-18 PROCEDURE — 0202U NFCT DS 22 TRGT SARS-COV-2: CPT

## 2023-04-18 PROCEDURE — 87040 BLOOD CULTURE FOR BACTERIA: CPT

## 2023-04-18 PROCEDURE — 6360000002 HC RX W HCPCS

## 2023-04-18 PROCEDURE — 93010 ELECTROCARDIOGRAM REPORT: CPT | Performed by: INTERNAL MEDICINE

## 2023-04-18 PROCEDURE — 87449 NOS EACH ORGANISM AG IA: CPT

## 2023-04-18 PROCEDURE — 6360000002 HC RX W HCPCS: Performed by: CHIROPRACTOR

## 2023-04-18 PROCEDURE — 36415 COLL VENOUS BLD VENIPUNCTURE: CPT

## 2023-04-18 PROCEDURE — 2060000000 HC ICU INTERMEDIATE R&B

## 2023-04-18 PROCEDURE — 2700000000 HC OXYGEN THERAPY PER DAY

## 2023-04-18 PROCEDURE — 83880 ASSAY OF NATRIURETIC PEPTIDE: CPT

## 2023-04-18 PROCEDURE — 6360000004 HC RX CONTRAST MEDICATION: Performed by: RADIOLOGY

## 2023-04-18 PROCEDURE — 85025 COMPLETE CBC W/AUTO DIFF WBC: CPT

## 2023-04-18 PROCEDURE — 82550 ASSAY OF CK (CPK): CPT

## 2023-04-18 RX ORDER — ASPIRIN 81 MG/1
81 TABLET ORAL DAILY
Status: DISCONTINUED | OUTPATIENT
Start: 2023-04-18 | End: 2023-04-22 | Stop reason: HOSPADM

## 2023-04-18 RX ORDER — DEXTROSE MONOHYDRATE 100 MG/ML
INJECTION, SOLUTION INTRAVENOUS CONTINUOUS PRN
Status: DISCONTINUED | OUTPATIENT
Start: 2023-04-18 | End: 2023-04-22 | Stop reason: HOSPADM

## 2023-04-18 RX ORDER — SODIUM CHLORIDE 9 MG/ML
INJECTION, SOLUTION INTRAVENOUS PRN
Status: DISCONTINUED | OUTPATIENT
Start: 2023-04-18 | End: 2023-04-22 | Stop reason: HOSPADM

## 2023-04-18 RX ORDER — SODIUM CHLORIDE 0.9 % (FLUSH) 0.9 %
5-40 SYRINGE (ML) INJECTION PRN
Status: DISCONTINUED | OUTPATIENT
Start: 2023-04-18 | End: 2023-04-18

## 2023-04-18 RX ORDER — DOXYCYCLINE HYCLATE 100 MG/1
100 CAPSULE ORAL EVERY 12 HOURS SCHEDULED
Status: COMPLETED | OUTPATIENT
Start: 2023-04-18 | End: 2023-04-22

## 2023-04-18 RX ORDER — FENTANYL CITRATE 50 UG/ML
50 INJECTION, SOLUTION INTRAMUSCULAR; INTRAVENOUS ONCE
Status: COMPLETED | OUTPATIENT
Start: 2023-04-18 | End: 2023-04-18

## 2023-04-18 RX ORDER — GABAPENTIN 300 MG/1
600 CAPSULE ORAL 3 TIMES DAILY
Status: DISCONTINUED | OUTPATIENT
Start: 2023-04-18 | End: 2023-04-22 | Stop reason: HOSPADM

## 2023-04-18 RX ORDER — BENZONATATE 100 MG/1
100 CAPSULE ORAL 3 TIMES DAILY PRN
Status: DISCONTINUED | OUTPATIENT
Start: 2023-04-18 | End: 2023-04-22 | Stop reason: HOSPADM

## 2023-04-18 RX ORDER — IPRATROPIUM BROMIDE AND ALBUTEROL SULFATE 2.5; .5 MG/3ML; MG/3ML
1 SOLUTION RESPIRATORY (INHALATION)
Status: DISCONTINUED | OUTPATIENT
Start: 2023-04-18 | End: 2023-04-18

## 2023-04-18 RX ORDER — PREDNISONE 20 MG/1
40 TABLET ORAL DAILY
Status: DISCONTINUED | OUTPATIENT
Start: 2023-04-18 | End: 2023-04-18

## 2023-04-18 RX ORDER — ENOXAPARIN SODIUM 100 MG/ML
30 INJECTION SUBCUTANEOUS 2 TIMES DAILY
Status: DISCONTINUED | OUTPATIENT
Start: 2023-04-18 | End: 2023-04-22 | Stop reason: HOSPADM

## 2023-04-18 RX ORDER — LEVALBUTEROL INHALATION SOLUTION 1.25 MG/3ML
1.25 SOLUTION RESPIRATORY (INHALATION) EVERY 6 HOURS PRN
Status: DISCONTINUED | OUTPATIENT
Start: 2023-04-18 | End: 2023-04-22 | Stop reason: HOSPADM

## 2023-04-18 RX ORDER — BUDESONIDE 0.5 MG/2ML
0.5 INHALANT ORAL 2 TIMES DAILY
Status: DISCONTINUED | OUTPATIENT
Start: 2023-04-18 | End: 2023-04-22 | Stop reason: HOSPADM

## 2023-04-18 RX ORDER — SODIUM CHLORIDE 0.9 % (FLUSH) 0.9 %
5-40 SYRINGE (ML) INJECTION EVERY 12 HOURS SCHEDULED
Status: DISCONTINUED | OUTPATIENT
Start: 2023-04-18 | End: 2023-04-22 | Stop reason: HOSPADM

## 2023-04-18 RX ORDER — KETOROLAC TROMETHAMINE 30 MG/ML
15 INJECTION, SOLUTION INTRAMUSCULAR; INTRAVENOUS ONCE
Status: COMPLETED | OUTPATIENT
Start: 2023-04-18 | End: 2023-04-18

## 2023-04-18 RX ORDER — ACETAMINOPHEN 325 MG/1
650 TABLET ORAL EVERY 6 HOURS PRN
Status: DISCONTINUED | OUTPATIENT
Start: 2023-04-18 | End: 2023-04-22 | Stop reason: HOSPADM

## 2023-04-18 RX ORDER — EZETIMIBE 10 MG/1
10 TABLET ORAL DAILY
Status: DISCONTINUED | OUTPATIENT
Start: 2023-04-18 | End: 2023-04-22 | Stop reason: HOSPADM

## 2023-04-18 RX ORDER — ARFORMOTEROL TARTRATE 15 UG/2ML
15 SOLUTION RESPIRATORY (INHALATION) 2 TIMES DAILY
Status: DISCONTINUED | OUTPATIENT
Start: 2023-04-18 | End: 2023-04-22 | Stop reason: HOSPADM

## 2023-04-18 RX ORDER — ONDANSETRON 2 MG/ML
4 INJECTION INTRAMUSCULAR; INTRAVENOUS EVERY 6 HOURS PRN
Status: DISCONTINUED | OUTPATIENT
Start: 2023-04-18 | End: 2023-04-22 | Stop reason: HOSPADM

## 2023-04-18 RX ORDER — DOXYCYCLINE HYCLATE 100 MG/1
100 CAPSULE ORAL EVERY 12 HOURS SCHEDULED
Status: DISCONTINUED | OUTPATIENT
Start: 2023-04-18 | End: 2023-04-18

## 2023-04-18 RX ORDER — TRAZODONE HYDROCHLORIDE 50 MG/1
100 TABLET ORAL NIGHTLY
Status: DISCONTINUED | OUTPATIENT
Start: 2023-04-18 | End: 2023-04-22 | Stop reason: HOSPADM

## 2023-04-18 RX ORDER — METHYLPREDNISOLONE SODIUM SUCCINATE 40 MG/ML
40 INJECTION, POWDER, LYOPHILIZED, FOR SOLUTION INTRAMUSCULAR; INTRAVENOUS EVERY 12 HOURS
Status: DISCONTINUED | OUTPATIENT
Start: 2023-04-18 | End: 2023-04-22 | Stop reason: HOSPADM

## 2023-04-18 RX ORDER — ACETAMINOPHEN 650 MG/1
650 SUPPOSITORY RECTAL EVERY 6 HOURS PRN
Status: DISCONTINUED | OUTPATIENT
Start: 2023-04-18 | End: 2023-04-22 | Stop reason: HOSPADM

## 2023-04-18 RX ORDER — PANTOPRAZOLE SODIUM 40 MG/1
40 TABLET, DELAYED RELEASE ORAL
Status: DISCONTINUED | OUTPATIENT
Start: 2023-04-19 | End: 2023-04-22 | Stop reason: HOSPADM

## 2023-04-18 RX ORDER — ONDANSETRON 4 MG/1
4 TABLET, ORALLY DISINTEGRATING ORAL EVERY 8 HOURS PRN
Status: DISCONTINUED | OUTPATIENT
Start: 2023-04-18 | End: 2023-04-22 | Stop reason: HOSPADM

## 2023-04-18 RX ORDER — POLYETHYLENE GLYCOL 3350 17 G/17G
17 POWDER, FOR SOLUTION ORAL DAILY PRN
Status: DISCONTINUED | OUTPATIENT
Start: 2023-04-18 | End: 2023-04-22 | Stop reason: HOSPADM

## 2023-04-18 RX ORDER — SILDENAFIL CITRATE 20 MG/1
20 TABLET ORAL PRN
Status: DISCONTINUED | OUTPATIENT
Start: 2023-04-18 | End: 2023-04-18

## 2023-04-18 RX ORDER — METOPROLOL SUCCINATE 25 MG/1
12.5 TABLET, EXTENDED RELEASE ORAL DAILY
Status: DISCONTINUED | OUTPATIENT
Start: 2023-04-18 | End: 2023-04-22 | Stop reason: HOSPADM

## 2023-04-18 RX ADMIN — FENTANYL CITRATE 50 MCG: 50 INJECTION, SOLUTION INTRAMUSCULAR; INTRAVENOUS at 01:02

## 2023-04-18 RX ADMIN — GABAPENTIN 600 MG: 300 CAPSULE ORAL at 16:10

## 2023-04-18 RX ADMIN — BENZONATATE 100 MG: 100 CAPSULE ORAL at 21:04

## 2023-04-18 RX ADMIN — IPRATROPIUM BROMIDE 0.5 MG: 0.5 SOLUTION RESPIRATORY (INHALATION) at 10:30

## 2023-04-18 RX ADMIN — IPRATROPIUM BROMIDE 0.5 MG: 0.5 SOLUTION RESPIRATORY (INHALATION) at 15:47

## 2023-04-18 RX ADMIN — GABAPENTIN 600 MG: 300 CAPSULE ORAL at 09:22

## 2023-04-18 RX ADMIN — IOPAMIDOL 85 ML: 755 INJECTION, SOLUTION INTRAVENOUS at 16:34

## 2023-04-18 RX ADMIN — ACETAMINOPHEN 650 MG: 325 TABLET ORAL at 21:04

## 2023-04-18 RX ADMIN — BUSPIRONE HYDROCHLORIDE 15 MG: 10 TABLET ORAL at 21:04

## 2023-04-18 RX ADMIN — MAGNESIUM SULFATE HEPTAHYDRATE 2000 MG: 40 INJECTION, SOLUTION INTRAVENOUS at 00:57

## 2023-04-18 RX ADMIN — ASPIRIN 81 MG: 81 TABLET, COATED ORAL at 09:22

## 2023-04-18 RX ADMIN — WATER 1000 MG: 1 INJECTION INTRAMUSCULAR; INTRAVENOUS; SUBCUTANEOUS at 00:52

## 2023-04-18 RX ADMIN — ARFORMOTEROL TARTRATE 15 MCG: 15 SOLUTION RESPIRATORY (INHALATION) at 20:02

## 2023-04-18 RX ADMIN — DOXYCYCLINE HYCLATE 100 MG: 100 CAPSULE ORAL at 21:04

## 2023-04-18 RX ADMIN — IPRATROPIUM BROMIDE 0.5 MG: 0.5 SOLUTION RESPIRATORY (INHALATION) at 20:02

## 2023-04-18 RX ADMIN — TRAZODONE HYDROCHLORIDE 100 MG: 50 TABLET ORAL at 21:04

## 2023-04-18 RX ADMIN — GABAPENTIN 600 MG: 300 CAPSULE ORAL at 21:04

## 2023-04-18 RX ADMIN — ENOXAPARIN SODIUM 30 MG: 100 INJECTION SUBCUTANEOUS at 21:04

## 2023-04-18 RX ADMIN — DOXYCYCLINE 100 MG: 100 INJECTION, POWDER, LYOPHILIZED, FOR SOLUTION INTRAVENOUS at 01:25

## 2023-04-18 RX ADMIN — EZETIMIBE 10 MG: 10 TABLET ORAL at 12:10

## 2023-04-18 RX ADMIN — BUSPIRONE HYDROCHLORIDE 15 MG: 10 TABLET ORAL at 12:10

## 2023-04-18 RX ADMIN — ARFORMOTEROL TARTRATE 15 MCG: 15 SOLUTION RESPIRATORY (INHALATION) at 10:30

## 2023-04-18 RX ADMIN — DOXYCYCLINE HYCLATE 100 MG: 100 CAPSULE ORAL at 09:22

## 2023-04-18 RX ADMIN — METOPROLOL SUCCINATE 12.5 MG: 25 TABLET, EXTENDED RELEASE ORAL at 09:22

## 2023-04-18 RX ADMIN — BUDESONIDE 500 MCG: 0.5 INHALANT RESPIRATORY (INHALATION) at 10:30

## 2023-04-18 RX ADMIN — KETOROLAC TROMETHAMINE 15 MG: 30 INJECTION, SOLUTION INTRAMUSCULAR at 12:12

## 2023-04-18 RX ADMIN — BUDESONIDE 500 MCG: 0.5 INHALANT RESPIRATORY (INHALATION) at 20:02

## 2023-04-18 RX ADMIN — METHYLPREDNISOLONE SODIUM SUCCINATE 40 MG: 40 INJECTION, POWDER, FOR SOLUTION INTRAMUSCULAR; INTRAVENOUS at 16:05

## 2023-04-18 RX ADMIN — PREDNISONE 40 MG: 20 TABLET ORAL at 09:22

## 2023-04-18 RX ADMIN — IPRATROPIUM BROMIDE AND ALBUTEROL SULFATE 2 AMPULE: .5; 2.5 SOLUTION RESPIRATORY (INHALATION) at 00:09

## 2023-04-18 RX ADMIN — Medication 10 ML: at 12:05

## 2023-04-18 RX ADMIN — Medication 10 ML: at 21:06

## 2023-04-18 ASSESSMENT — PAIN SCALES - GENERAL: PAINLEVEL_OUTOF10: 0

## 2023-04-18 NOTE — PLAN OF CARE
CXR, ProBNP, and prcalcitonin are unremarkable. Pt is afebrile. He reports minimal improvement after being treated with steroid and breathing treatment. Will get a CTA to evaluate for PE.      Electronically signed by Meenu Jesus MD on 4/18/2023 at 2:15 PM

## 2023-04-18 NOTE — ED PROVIDER NOTES
Making/Differential Diagnosis:    CC/HPI Summary, Pertinent Physical Exam Findings, Social Determinants of health, Records Reviewed, DDx, testing done/not done, ED Course, Reassessment, disposition considerations/shared decision making with patient, consults, disposition:        Medical Decision Making:   I, Dr. Ortiz Pitt am the resident physician of record. History From: Patient     Limitations to history : None     Yocasta Hartley is a 62 y.o. male who presents to the ED for SOB  Vital signs upon arrival /84   Pulse (!) 101   Temp 98 °F (36.7 °C)   Resp 20   SpO2 92%     On initial evaluation, patient was nontoxic-appearing, afebrile, hemodynamically stable and in moderate acute distress. Mild signs of acute respiratory distress noted. Patient had conversational dyspnea. Audible wheezing with no nasal flaring, grunting, stridor or accessory muscle use. Working diagnoses include but not limited to acute viral syndrome, pneumonia, pulmonary embolism, ACS, anemia, COPD exacerbation, CHF exacerbation, asthma and metabolic or respiratory acidosis. Physical exam remarkable for moderate end expiratory wheezes diffusely on auscultation of the lungs. No rales or rhonchi noted. No other concerning physical exam findings. Abdomen was soft, nontender nondistended. No significant pretibial edema. Heart regular rate and rhythm. Concern for COPD exacerbation. Patient was given IV Solu-Medrol, IV magnesium sulfate and DuoNeb treatments x5 in the ED. On reevaluations he was noting improvement in his presenting symptoms. Chest x-ray was also unremarkable for developing pneumonia. Imaging as interpreted by me detailed below with official radiology read above. Patient empirically treated with IV doxycycline and IV Rocephin. Lab work-up as interpreted by me includes CBC with leukocytosis and left shift. WBC 13.7 and neutrophil count 93.8. Hemoglobin stable at 14.8.   Platelet count normal at

## 2023-04-18 NOTE — H&P
of 100, blood pressure 154/98, afebrile. His labs were significant for CBC: Leukocytosis 15.3, CMP showed potassium 4.7, BUN 13, creatinine 1.0.  COVID test negative, troponin 10, ABG showed pH 7.30, PCO2 40.8, PO2 60.8, HCO3 19.9. CK3 3 5. CXR: Bilateral lower lung consolidation. ED Meds: Patient was given methylprednisone 125 mg, DuoNeb solution 3 ampoules, magnesium sulfate 2 g, ceftriaxone 1000 mg, doxycycline 100 mg. Past Medical History:      Diagnosis Date    Acute gastritis without hemorrhage     CAD (coronary artery disease)     Cancer of kidney (HCC)     COPD (chronic obstructive pulmonary disease) (HCC)     COPD, severe ([facility] 6/16/2022    Difficulty sleeping     ED (erectile dysfunction)     History of BPH     Hyperlipidemia     Lung cancer ([facility]     Tobacco abuse        Past Surgical History:        Procedure Laterality Date    CARDIAC CATHETERIZATION  2015    Non-obstructive coronary disease by Dr. Daiana Espinoza N/A 12/20/2019    COLONOSCOPY POLYPECTOMY SNARE/COLD BIOPSY performed by Grisel Keys MD at [address]  12/20/2019    COLONOSCOPY SUBMUCOSAL/BOTOX INJECTION performed by Grisel Keys MD at [address] Right     part of the right     TOTAL NEPHRECTOMY Right     UPPER GASTROINTESTINAL ENDOSCOPY N/A 12/20/2019    EGD BIOPSY performed by Grisel Keys MD at [facility] END[facility]COPY       Medications Prior to Admission:    Prior to Admission medications    Medication Sig Start Date End Date Taking?  Authorizing Provider   ibuprofen (ADVIL;MOTRIN) 800 MG tablet Take 1 tablet by mouth every 8 hours as needed for Pain 3/6/23   Donny Ellison MD   busPIRone (BUSPAR) 15 MG tablet Take 15 mg by mouth 2 times daily 3/6/23   Donny Ellison MD   aspirin (SONIA ASPIRIN EC LOW DOSE) 81 MG EC tablet Take 1 tablet by mouth daily 3/6/23   Donny Ellison MD   omeprazole (PRILOSEC) 40 MG delayed release capsule

## 2023-04-18 NOTE — ED NOTES
Report given to receiving nurse on 4SE. Pt placed in transport at this time.       Matias Montero RN  04/18/23 4519

## 2023-04-19 LAB
ANION GAP SERPL CALCULATED.3IONS-SCNC: 12 MMOL/L (ref 7–16)
BASOPHILS # BLD: 0 E9/L (ref 0–0.2)
BASOPHILS NFR BLD: 0.2 % (ref 0–2)
BUN SERPL-MCNC: 27 MG/DL (ref 6–20)
CALCIUM SERPL-MCNC: 9.6 MG/DL (ref 8.6–10.2)
CHLORIDE SERPL-SCNC: 108 MMOL/L (ref 98–107)
CO2 SERPL-SCNC: 21 MMOL/L (ref 22–29)
CREAT SERPL-MCNC: 1 MG/DL (ref 0.7–1.2)
EOSINOPHIL # BLD: 0 E9/L (ref 0.05–0.5)
EOSINOPHIL NFR BLD: 0.1 % (ref 0–6)
ERYTHROCYTE [DISTWIDTH] IN BLOOD BY AUTOMATED COUNT: 12.9 FL (ref 11.5–15)
GLUCOSE SERPL-MCNC: 144 MG/DL (ref 74–99)
HCT VFR BLD AUTO: 43.5 % (ref 37–54)
HGB BLD-MCNC: 14 G/DL (ref 12.5–16.5)
LYMPHOCYTES # BLD: 0.64 E9/L (ref 1.5–4)
LYMPHOCYTES NFR BLD: 1.7 % (ref 20–42)
MCH RBC QN AUTO: 30.8 PG (ref 26–35)
MCHC RBC AUTO-ENTMCNC: 32.2 % (ref 32–34.5)
MCV RBC AUTO: 95.6 FL (ref 80–99.9)
METAMYELOCYTES NFR BLD MANUAL: 1.7 % (ref 0–1)
MONOCYTES # BLD: 0.32 E9/L (ref 0.1–0.95)
MONOCYTES NFR BLD: 0.9 % (ref 2–12)
NEUTROPHILS # BLD: 31.23 E9/L (ref 1.8–7.3)
NEUTS SEG NFR BLD: 95.7 % (ref 43–80)
OVALOCYTES: ABNORMAL
PLATELET # BLD AUTO: 348 E9/L (ref 130–450)
PMV BLD AUTO: 10.8 FL (ref 7–12)
POIKILOCYTES: ABNORMAL
POTASSIUM SERPL-SCNC: 4.5 MMOL/L (ref 3.5–5)
RBC # BLD AUTO: 4.55 E12/L (ref 3.8–5.8)
SODIUM SERPL-SCNC: 141 MMOL/L (ref 132–146)
WBC # BLD: 32.2 E9/L (ref 4.5–11.5)

## 2023-04-19 PROCEDURE — 6360000002 HC RX W HCPCS: Performed by: CHIROPRACTOR

## 2023-04-19 PROCEDURE — 80048 BASIC METABOLIC PNL TOTAL CA: CPT

## 2023-04-19 PROCEDURE — 6370000000 HC RX 637 (ALT 250 FOR IP): Performed by: INTERNAL MEDICINE

## 2023-04-19 PROCEDURE — 2060000000 HC ICU INTERMEDIATE R&B

## 2023-04-19 PROCEDURE — 6370000000 HC RX 637 (ALT 250 FOR IP): Performed by: CHIROPRACTOR

## 2023-04-19 PROCEDURE — 6360000002 HC RX W HCPCS: Performed by: INTERNAL MEDICINE

## 2023-04-19 PROCEDURE — 6360000002 HC RX W HCPCS: Performed by: STUDENT IN AN ORGANIZED HEALTH CARE EDUCATION/TRAINING PROGRAM

## 2023-04-19 PROCEDURE — 99221 1ST HOSP IP/OBS SF/LOW 40: CPT | Performed by: INTERNAL MEDICINE

## 2023-04-19 PROCEDURE — 99232 SBSQ HOSP IP/OBS MODERATE 35: CPT | Performed by: INTERNAL MEDICINE

## 2023-04-19 PROCEDURE — 6370000000 HC RX 637 (ALT 250 FOR IP)

## 2023-04-19 PROCEDURE — 36415 COLL VENOUS BLD VENIPUNCTURE: CPT

## 2023-04-19 PROCEDURE — 6370000000 HC RX 637 (ALT 250 FOR IP): Performed by: STUDENT IN AN ORGANIZED HEALTH CARE EDUCATION/TRAINING PROGRAM

## 2023-04-19 PROCEDURE — 2700000000 HC OXYGEN THERAPY PER DAY

## 2023-04-19 PROCEDURE — 85025 COMPLETE CBC W/AUTO DIFF WBC: CPT

## 2023-04-19 PROCEDURE — 6360000002 HC RX W HCPCS

## 2023-04-19 PROCEDURE — 94669 MECHANICAL CHEST WALL OSCILL: CPT

## 2023-04-19 PROCEDURE — 94640 AIRWAY INHALATION TREATMENT: CPT

## 2023-04-19 RX ORDER — GUAIFENESIN/DEXTROMETHORPHAN 100-10MG/5
15 SYRUP ORAL EVERY 4 HOURS PRN
Status: DISCONTINUED | OUTPATIENT
Start: 2023-04-19 | End: 2023-04-22 | Stop reason: HOSPADM

## 2023-04-19 RX ORDER — IPRATROPIUM BROMIDE AND ALBUTEROL SULFATE 2.5; .5 MG/3ML; MG/3ML
1 SOLUTION RESPIRATORY (INHALATION)
Status: DISCONTINUED | OUTPATIENT
Start: 2023-04-20 | End: 2023-04-22 | Stop reason: HOSPADM

## 2023-04-19 RX ORDER — GUAIFENESIN/DEXTROMETHORPHAN 100-10MG/5
5 SYRUP ORAL EVERY 4 HOURS PRN
Status: DISCONTINUED | OUTPATIENT
Start: 2023-04-19 | End: 2023-04-19

## 2023-04-19 RX ORDER — LIDOCAINE 4 G/G
1 PATCH TOPICAL ONCE
Status: COMPLETED | OUTPATIENT
Start: 2023-04-19 | End: 2023-04-20

## 2023-04-19 RX ADMIN — GUAIFENESIN SYRUP AND DEXTROMETHORPHAN 5 ML: 100; 10 SYRUP ORAL at 02:14

## 2023-04-19 RX ADMIN — BUSPIRONE HYDROCHLORIDE 15 MG: 10 TABLET ORAL at 08:17

## 2023-04-19 RX ADMIN — GUAIFENESIN SYRUP AND DEXTROMETHORPHAN 15 ML: 100; 10 SYRUP ORAL at 18:41

## 2023-04-19 RX ADMIN — BUDESONIDE 500 MCG: 0.5 INHALANT RESPIRATORY (INHALATION) at 07:55

## 2023-04-19 RX ADMIN — GABAPENTIN 600 MG: 300 CAPSULE ORAL at 14:49

## 2023-04-19 RX ADMIN — METHYLPREDNISOLONE SODIUM SUCCINATE 40 MG: 40 INJECTION, POWDER, FOR SOLUTION INTRAMUSCULAR; INTRAVENOUS at 18:41

## 2023-04-19 RX ADMIN — DOXYCYCLINE HYCLATE 100 MG: 100 CAPSULE ORAL at 20:23

## 2023-04-19 RX ADMIN — METHYLPREDNISOLONE SODIUM SUCCINATE 40 MG: 40 INJECTION, POWDER, FOR SOLUTION INTRAMUSCULAR; INTRAVENOUS at 05:20

## 2023-04-19 RX ADMIN — LEVALBUTEROL 1.25 MG: 1.25 SOLUTION RESPIRATORY (INHALATION) at 16:44

## 2023-04-19 RX ADMIN — LEVALBUTEROL 1.25 MG: 1.25 SOLUTION RESPIRATORY (INHALATION) at 07:55

## 2023-04-19 RX ADMIN — GUAIFENESIN SYRUP AND DEXTROMETHORPHAN 5 ML: 100; 10 SYRUP ORAL at 12:45

## 2023-04-19 RX ADMIN — LEVALBUTEROL 1.25 MG: 1.25 SOLUTION RESPIRATORY (INHALATION) at 00:17

## 2023-04-19 RX ADMIN — METOPROLOL SUCCINATE 12.5 MG: 25 TABLET, EXTENDED RELEASE ORAL at 08:18

## 2023-04-19 RX ADMIN — BENZONATATE 100 MG: 100 CAPSULE ORAL at 08:17

## 2023-04-19 RX ADMIN — ASPIRIN 81 MG: 81 TABLET, COATED ORAL at 08:17

## 2023-04-19 RX ADMIN — GABAPENTIN 600 MG: 300 CAPSULE ORAL at 08:17

## 2023-04-19 RX ADMIN — IPRATROPIUM BROMIDE 0.5 MG: 0.5 SOLUTION RESPIRATORY (INHALATION) at 19:33

## 2023-04-19 RX ADMIN — BUDESONIDE 500 MCG: 0.5 INHALANT RESPIRATORY (INHALATION) at 19:33

## 2023-04-19 RX ADMIN — GUAIFENESIN SYRUP AND DEXTROMETHORPHAN 5 ML: 100; 10 SYRUP ORAL at 08:16

## 2023-04-19 RX ADMIN — DOXYCYCLINE HYCLATE 100 MG: 100 CAPSULE ORAL at 08:17

## 2023-04-19 RX ADMIN — ENOXAPARIN SODIUM 30 MG: 100 INJECTION SUBCUTANEOUS at 08:18

## 2023-04-19 RX ADMIN — ARFORMOTEROL TARTRATE 15 MCG: 15 SOLUTION RESPIRATORY (INHALATION) at 07:55

## 2023-04-19 RX ADMIN — IPRATROPIUM BROMIDE 0.5 MG: 0.5 SOLUTION RESPIRATORY (INHALATION) at 07:55

## 2023-04-19 RX ADMIN — ARFORMOTEROL TARTRATE 15 MCG: 15 SOLUTION RESPIRATORY (INHALATION) at 19:33

## 2023-04-19 RX ADMIN — IPRATROPIUM BROMIDE 0.5 MG: 0.5 SOLUTION RESPIRATORY (INHALATION) at 16:43

## 2023-04-19 RX ADMIN — BUSPIRONE HYDROCHLORIDE 15 MG: 10 TABLET ORAL at 20:24

## 2023-04-19 RX ADMIN — PANTOPRAZOLE SODIUM 40 MG: 40 TABLET, DELAYED RELEASE ORAL at 05:20

## 2023-04-19 RX ADMIN — ENOXAPARIN SODIUM 30 MG: 100 INJECTION SUBCUTANEOUS at 20:23

## 2023-04-19 RX ADMIN — GABAPENTIN 600 MG: 300 CAPSULE ORAL at 20:24

## 2023-04-19 RX ADMIN — TRAZODONE HYDROCHLORIDE 100 MG: 50 TABLET ORAL at 20:23

## 2023-04-19 RX ADMIN — BENZONATATE 100 MG: 100 CAPSULE ORAL at 14:49

## 2023-04-19 RX ADMIN — IPRATROPIUM BROMIDE 0.5 MG: 0.5 SOLUTION RESPIRATORY (INHALATION) at 11:47

## 2023-04-19 ASSESSMENT — PAIN SCALES - GENERAL: PAINLEVEL_OUTOF10: 6

## 2023-04-19 NOTE — PLAN OF CARE
Problem: Discharge Planning  Goal: Discharge to home or other facility with appropriate resources  Outcome: Progressing  Flowsheets (Taken 4/18/2023 1937)  Discharge to home or other facility with appropriate resources:   Identify barriers to discharge with patient and caregiver   Identify discharge learning needs (meds, wound care, etc)     Problem: Safety - Adult  Goal: Free from fall injury  Outcome: Progressing

## 2023-04-19 NOTE — CONSULTS
[facility]  Department of Internal Medicine  Division of Pulmonary, Critical Care and Sleep Medicine  Consult Note    Latricia Corral DO, MPH, Susan Santiago, [address] [facility] , Jigna Plata MD      Patient: Greta Garcia  MRN: [id number]  : 1965    Encounter Time: 6:16 PM     Date of Admission: 2023 10:24 PM    Primary Care Physician: Ousmane Juarez MD    Reason for Consultation: COPD exacerbation     HISTORY OF PRESENT ILLNESS : Greta Garcia 62 y.o. male was seen in consultation regarding the above chief compliant. The patient is well-known to me from my care in clinic. He has a known history of COPD. He did have prior renal cancer, previous wedge resection. He is compliant with his Breztri at home along with his rescue inhaler. He remains quite active and goes to the gym frequently. He started having increased shortness of breath on coughing on Friday. He reports that his breathing is slightly better when he came in but he is still requiring oxygen or else his saturations drop. Still with tachycardia with exertion. PAST MEDICAL HISTORY:  has a past medical history of Acute gastritis without hemorrhage, CAD (coronary artery disease), Cancer of kidney ([address] COPD (chronic obstructive pulmonary disease) ([address] COPD, severe ([address] Difficulty sleeping, ED (erectile dysfunction), History of BPH, Hyperlipidemia, Lung cancer ([address] and Tobacco abuse. SURGICAL HISTORY:  has a past surgical history that includes Lung surgery (Right); total nephrectomy (Right); Cardiac catheterization (2015); Cholecystectomy; Upper gastrointestinal endoscopy (N/A, 2019); Colonoscopy (N/A, 2019); and Colonoscopy (2019). SOCIAL HISTORY:  reports that he has been smoking cigarettes. He has a 45.00 pack-year smoking history. He has never used smokeless tobacco. He reports current alcohol use.  He reports that he does not use

## 2023-04-19 NOTE — ACP (ADVANCE CARE PLANNING)
Advance Care Planning   Healthcare Decision Maker:    Primary Decision Maker: Natalie Cole - Domestic Partner - [phone]    Click here to complete Healthcare Decision Makers including selection of the Healthcare Decision Maker Relationship (ie \"Primary\").

## 2023-04-20 LAB
ANION GAP SERPL CALCULATED.3IONS-SCNC: 11 MMOL/L (ref 7–16)
BASOPHILS # BLD: 0.06 E9/L (ref 0–0.2)
BASOPHILS NFR BLD: 0.2 % (ref 0–2)
BUN SERPL-MCNC: 24 MG/DL (ref 6–20)
BURR CELLS: ABNORMAL
CALCIUM SERPL-MCNC: 9.4 MG/DL (ref 8.6–10.2)
CHLORIDE SERPL-SCNC: 108 MMOL/L (ref 98–107)
CO2 SERPL-SCNC: 23 MMOL/L (ref 22–29)
CREAT SERPL-MCNC: 0.9 MG/DL (ref 0.7–1.2)
EOSINOPHIL # BLD: 0.01 E9/L (ref 0.05–0.5)
EOSINOPHIL NFR BLD: 0 % (ref 0–6)
ERYTHROCYTE [DISTWIDTH] IN BLOOD BY AUTOMATED COUNT: 13 FL (ref 11.5–15)
GLUCOSE SERPL-MCNC: 164 MG/DL (ref 74–99)
HCT VFR BLD AUTO: 43.2 % (ref 37–54)
HGB BLD-MCNC: 13.8 G/DL (ref 12.5–16.5)
IMM GRANULOCYTES # BLD: 0.32 E9/L
IMM GRANULOCYTES NFR BLD: 1.2 % (ref 0–5)
LYMPHOCYTES # BLD: 1.77 E9/L (ref 1.5–4)
LYMPHOCYTES NFR BLD: 6.8 % (ref 20–42)
MCH RBC QN AUTO: 30.9 PG (ref 26–35)
MCHC RBC AUTO-ENTMCNC: 31.9 % (ref 32–34.5)
MCV RBC AUTO: 96.6 FL (ref 80–99.9)
MONOCYTES # BLD: 0.89 E9/L (ref 0.1–0.95)
MONOCYTES NFR BLD: 3.4 % (ref 2–12)
NEUTROPHILS # BLD: 23.03 E9/L (ref 1.8–7.3)
NEUTS SEG NFR BLD: 88.4 % (ref 43–80)
OVALOCYTES: ABNORMAL
PLATELET # BLD AUTO: 298 E9/L (ref 130–450)
PMV BLD AUTO: 11.2 FL (ref 7–12)
POIKILOCYTES: ABNORMAL
POLYCHROMASIA: ABNORMAL
POTASSIUM SERPL-SCNC: 4.4 MMOL/L (ref 3.5–5)
PROCALCITONIN: 0.04 NG/ML (ref 0–0.08)
RBC # BLD AUTO: 4.47 E12/L (ref 3.8–5.8)
SODIUM SERPL-SCNC: 142 MMOL/L (ref 132–146)
WBC # BLD: 26.1 E9/L (ref 4.5–11.5)

## 2023-04-20 PROCEDURE — 84145 PROCALCITONIN (PCT): CPT

## 2023-04-20 PROCEDURE — 80048 BASIC METABOLIC PNL TOTAL CA: CPT

## 2023-04-20 PROCEDURE — 94640 AIRWAY INHALATION TREATMENT: CPT

## 2023-04-20 PROCEDURE — 6370000000 HC RX 637 (ALT 250 FOR IP): Performed by: INTERNAL MEDICINE

## 2023-04-20 PROCEDURE — 97530 THERAPEUTIC ACTIVITIES: CPT

## 2023-04-20 PROCEDURE — 2060000000 HC ICU INTERMEDIATE R&B

## 2023-04-20 PROCEDURE — 97165 OT EVAL LOW COMPLEX 30 MIN: CPT

## 2023-04-20 PROCEDURE — 6370000000 HC RX 637 (ALT 250 FOR IP)

## 2023-04-20 PROCEDURE — 2700000000 HC OXYGEN THERAPY PER DAY

## 2023-04-20 PROCEDURE — 6360000002 HC RX W HCPCS: Performed by: CHIROPRACTOR

## 2023-04-20 PROCEDURE — 6370000000 HC RX 637 (ALT 250 FOR IP): Performed by: STUDENT IN AN ORGANIZED HEALTH CARE EDUCATION/TRAINING PROGRAM

## 2023-04-20 PROCEDURE — 6360000002 HC RX W HCPCS: Performed by: STUDENT IN AN ORGANIZED HEALTH CARE EDUCATION/TRAINING PROGRAM

## 2023-04-20 PROCEDURE — 97161 PT EVAL LOW COMPLEX 20 MIN: CPT

## 2023-04-20 PROCEDURE — 97535 SELF CARE MNGMENT TRAINING: CPT

## 2023-04-20 PROCEDURE — 94669 MECHANICAL CHEST WALL OSCILL: CPT

## 2023-04-20 PROCEDURE — 6370000000 HC RX 637 (ALT 250 FOR IP): Performed by: CHIROPRACTOR

## 2023-04-20 PROCEDURE — 99231 SBSQ HOSP IP/OBS SF/LOW 25: CPT | Performed by: INTERNAL MEDICINE

## 2023-04-20 PROCEDURE — 6360000002 HC RX W HCPCS

## 2023-04-20 PROCEDURE — 36415 COLL VENOUS BLD VENIPUNCTURE: CPT

## 2023-04-20 PROCEDURE — 99232 SBSQ HOSP IP/OBS MODERATE 35: CPT | Performed by: INTERNAL MEDICINE

## 2023-04-20 PROCEDURE — 85025 COMPLETE CBC W/AUTO DIFF WBC: CPT

## 2023-04-20 RX ORDER — AZITHROMYCIN 250 MG/1
500 TABLET, FILM COATED ORAL ONCE
Status: COMPLETED | OUTPATIENT
Start: 2023-04-20 | End: 2023-04-20

## 2023-04-20 RX ORDER — AZITHROMYCIN 250 MG/1
250 TABLET, FILM COATED ORAL DAILY
Status: DISCONTINUED | OUTPATIENT
Start: 2023-04-21 | End: 2023-04-22

## 2023-04-20 RX ORDER — NICOTINE 21 MG/24HR
1 PATCH, TRANSDERMAL 24 HOURS TRANSDERMAL ONCE
Status: COMPLETED | OUTPATIENT
Start: 2023-04-20 | End: 2023-04-21

## 2023-04-20 RX ADMIN — DOXYCYCLINE HYCLATE 100 MG: 100 CAPSULE ORAL at 20:50

## 2023-04-20 RX ADMIN — BUSPIRONE HYDROCHLORIDE 15 MG: 10 TABLET ORAL at 20:50

## 2023-04-20 RX ADMIN — ASPIRIN 81 MG: 81 TABLET, COATED ORAL at 08:48

## 2023-04-20 RX ADMIN — ARFORMOTEROL TARTRATE 15 MCG: 15 SOLUTION RESPIRATORY (INHALATION) at 20:24

## 2023-04-20 RX ADMIN — GUAIFENESIN SYRUP AND DEXTROMETHORPHAN 15 ML: 100; 10 SYRUP ORAL at 17:12

## 2023-04-20 RX ADMIN — METOPROLOL SUCCINATE 12.5 MG: 25 TABLET, EXTENDED RELEASE ORAL at 08:48

## 2023-04-20 RX ADMIN — GABAPENTIN 600 MG: 300 CAPSULE ORAL at 20:50

## 2023-04-20 RX ADMIN — GABAPENTIN 600 MG: 300 CAPSULE ORAL at 08:48

## 2023-04-20 RX ADMIN — METHYLPREDNISOLONE SODIUM SUCCINATE 40 MG: 40 INJECTION, POWDER, FOR SOLUTION INTRAMUSCULAR; INTRAVENOUS at 17:12

## 2023-04-20 RX ADMIN — AZITHROMYCIN MONOHYDRATE 500 MG: 250 TABLET ORAL at 04:15

## 2023-04-20 RX ADMIN — IPRATROPIUM BROMIDE AND ALBUTEROL SULFATE 1 AMPULE: .5; 2.5 SOLUTION RESPIRATORY (INHALATION) at 17:14

## 2023-04-20 RX ADMIN — BUSPIRONE HYDROCHLORIDE 15 MG: 10 TABLET ORAL at 08:49

## 2023-04-20 RX ADMIN — GUAIFENESIN SYRUP AND DEXTROMETHORPHAN 15 ML: 100; 10 SYRUP ORAL at 08:48

## 2023-04-20 RX ADMIN — BUDESONIDE 500 MCG: 0.5 INHALANT RESPIRATORY (INHALATION) at 20:24

## 2023-04-20 RX ADMIN — TRAZODONE HYDROCHLORIDE 100 MG: 50 TABLET ORAL at 20:50

## 2023-04-20 RX ADMIN — METHYLPREDNISOLONE SODIUM SUCCINATE 40 MG: 40 INJECTION, POWDER, FOR SOLUTION INTRAMUSCULAR; INTRAVENOUS at 04:15

## 2023-04-20 RX ADMIN — BENZONATATE 100 MG: 100 CAPSULE ORAL at 23:26

## 2023-04-20 RX ADMIN — GABAPENTIN 600 MG: 300 CAPSULE ORAL at 15:28

## 2023-04-20 RX ADMIN — LEVALBUTEROL 1.25 MG: 1.25 SOLUTION RESPIRATORY (INHALATION) at 04:27

## 2023-04-20 RX ADMIN — PANTOPRAZOLE SODIUM 40 MG: 40 TABLET, DELAYED RELEASE ORAL at 05:19

## 2023-04-20 RX ADMIN — BENZONATATE 100 MG: 100 CAPSULE ORAL at 17:12

## 2023-04-20 RX ADMIN — EZETIMIBE 10 MG: 10 TABLET ORAL at 08:58

## 2023-04-20 RX ADMIN — ACETAMINOPHEN 650 MG: 325 TABLET ORAL at 17:14

## 2023-04-20 RX ADMIN — ENOXAPARIN SODIUM 30 MG: 100 INJECTION SUBCUTANEOUS at 20:49

## 2023-04-20 RX ADMIN — IPRATROPIUM BROMIDE AND ALBUTEROL SULFATE 1 AMPULE: .5; 2.5 SOLUTION RESPIRATORY (INHALATION) at 20:24

## 2023-04-20 RX ADMIN — IPRATROPIUM BROMIDE AND ALBUTEROL SULFATE 1 AMPULE: .5; 2.5 SOLUTION RESPIRATORY (INHALATION) at 12:39

## 2023-04-20 RX ADMIN — GUAIFENESIN SYRUP AND DEXTROMETHORPHAN 15 ML: 100; 10 SYRUP ORAL at 04:16

## 2023-04-20 RX ADMIN — IPRATROPIUM BROMIDE AND ALBUTEROL SULFATE 1 AMPULE: .5; 2.5 SOLUTION RESPIRATORY (INHALATION) at 07:45

## 2023-04-20 RX ADMIN — ARFORMOTEROL TARTRATE 15 MCG: 15 SOLUTION RESPIRATORY (INHALATION) at 07:45

## 2023-04-20 RX ADMIN — BUDESONIDE 500 MCG: 0.5 INHALANT RESPIRATORY (INHALATION) at 07:45

## 2023-04-20 RX ADMIN — ENOXAPARIN SODIUM 30 MG: 100 INJECTION SUBCUTANEOUS at 08:48

## 2023-04-20 RX ADMIN — GUAIFENESIN SYRUP AND DEXTROMETHORPHAN 15 ML: 100; 10 SYRUP ORAL at 23:26

## 2023-04-20 RX ADMIN — DOXYCYCLINE HYCLATE 100 MG: 100 CAPSULE ORAL at 08:48

## 2023-04-20 RX ADMIN — BENZONATATE 100 MG: 100 CAPSULE ORAL at 08:48

## 2023-04-20 ASSESSMENT — PAIN SCALES - GENERAL
PAINLEVEL_OUTOF10: 3
PAINLEVEL_OUTOF10: 3

## 2023-04-20 ASSESSMENT — PAIN DESCRIPTION - LOCATION: LOCATION: GENERALIZED

## 2023-04-20 ASSESSMENT — PAIN - FUNCTIONAL ASSESSMENT: PAIN_FUNCTIONAL_ASSESSMENT: ACTIVITIES ARE NOT PREVENTED

## 2023-04-20 ASSESSMENT — PAIN DESCRIPTION - DESCRIPTORS: DESCRIPTORS: ACHING

## 2023-04-20 NOTE — PLAN OF CARE
Confirmed with laboratory that sputum sample collected from the patient yesterday was not sent for culture. Today, RN provided the patient with a second cup to collect sputum sample and send for respiratory culture.     Electronically signed by Timi Zacarias MD on 4/20/2023 at 2:46 PM

## 2023-04-21 LAB
ANION GAP SERPL CALCULATED.3IONS-SCNC: 11 MMOL/L (ref 7–16)
ANION GAP SERPL CALCULATED.3IONS-SCNC: 9 MMOL/L (ref 7–16)
ANISOCYTOSIS: ABNORMAL
BASOPHILS # BLD: 0.06 E9/L (ref 0–0.2)
BASOPHILS NFR BLD: 0.2 % (ref 0–2)
BUN SERPL-MCNC: 24 MG/DL (ref 6–20)
BUN SERPL-MCNC: 35 MG/DL (ref 6–20)
CALCIUM SERPL-MCNC: 9.1 MG/DL (ref 8.6–10.2)
CALCIUM SERPL-MCNC: 9.6 MG/DL (ref 8.6–10.2)
CHLORIDE SERPL-SCNC: 103 MMOL/L (ref 98–107)
CHLORIDE SERPL-SCNC: 107 MMOL/L (ref 98–107)
CO2 SERPL-SCNC: 23 MMOL/L (ref 22–29)
CO2 SERPL-SCNC: 25 MMOL/L (ref 22–29)
CREAT SERPL-MCNC: 1 MG/DL (ref 0.7–1.2)
CREAT SERPL-MCNC: 1 MG/DL (ref 0.7–1.2)
EOSINOPHIL # BLD: 0 E9/L (ref 0.05–0.5)
EOSINOPHIL NFR BLD: 0 % (ref 0–6)
ERYTHROCYTE [DISTWIDTH] IN BLOOD BY AUTOMATED COUNT: 12.8 FL (ref 11.5–15)
GLUCOSE SERPL-MCNC: 154 MG/DL (ref 74–99)
GLUCOSE SERPL-MCNC: 212 MG/DL (ref 74–99)
HCT VFR BLD AUTO: 41.6 % (ref 37–54)
HGB BLD-MCNC: 13.5 G/DL (ref 12.5–16.5)
IMM GRANULOCYTES # BLD: 0.43 E9/L
IMM GRANULOCYTES NFR BLD: 1.8 % (ref 0–5)
LYMPHOCYTES # BLD: 2.17 E9/L (ref 1.5–4)
LYMPHOCYTES NFR BLD: 8.9 % (ref 20–42)
MCH RBC QN AUTO: 31 PG (ref 26–35)
MCHC RBC AUTO-ENTMCNC: 32.5 % (ref 32–34.5)
MCV RBC AUTO: 95.6 FL (ref 80–99.9)
MONOCYTES # BLD: 1.64 E9/L (ref 0.1–0.95)
MONOCYTES NFR BLD: 6.7 % (ref 2–12)
NEUTROPHILS # BLD: 20.14 E9/L (ref 1.8–7.3)
NEUTS SEG NFR BLD: 82.4 % (ref 43–80)
OVALOCYTES: ABNORMAL
PLATELET # BLD AUTO: 299 E9/L (ref 130–450)
PMV BLD AUTO: 10.8 FL (ref 7–12)
POIKILOCYTES: ABNORMAL
POLYCHROMASIA: ABNORMAL
POTASSIUM SERPL-SCNC: 5 MMOL/L (ref 3.5–5)
POTASSIUM SERPL-SCNC: 5.2 MMOL/L (ref 3.5–5)
RBC # BLD AUTO: 4.35 E12/L (ref 3.8–5.8)
SODIUM SERPL-SCNC: 137 MMOL/L (ref 132–146)
SODIUM SERPL-SCNC: 141 MMOL/L (ref 132–146)
WBC # BLD: 24.4 E9/L (ref 4.5–11.5)

## 2023-04-21 PROCEDURE — 99231 SBSQ HOSP IP/OBS SF/LOW 25: CPT | Performed by: INTERNAL MEDICINE

## 2023-04-21 PROCEDURE — 6370000000 HC RX 637 (ALT 250 FOR IP): Performed by: INTERNAL MEDICINE

## 2023-04-21 PROCEDURE — 6360000002 HC RX W HCPCS: Performed by: CHIROPRACTOR

## 2023-04-21 PROCEDURE — 6370000000 HC RX 637 (ALT 250 FOR IP)

## 2023-04-21 PROCEDURE — 85025 COMPLETE CBC W/AUTO DIFF WBC: CPT

## 2023-04-21 PROCEDURE — 36415 COLL VENOUS BLD VENIPUNCTURE: CPT

## 2023-04-21 PROCEDURE — 2700000000 HC OXYGEN THERAPY PER DAY

## 2023-04-21 PROCEDURE — 6370000000 HC RX 637 (ALT 250 FOR IP): Performed by: STUDENT IN AN ORGANIZED HEALTH CARE EDUCATION/TRAINING PROGRAM

## 2023-04-21 PROCEDURE — 6360000002 HC RX W HCPCS

## 2023-04-21 PROCEDURE — 2580000003 HC RX 258

## 2023-04-21 PROCEDURE — 2060000000 HC ICU INTERMEDIATE R&B

## 2023-04-21 PROCEDURE — 94640 AIRWAY INHALATION TREATMENT: CPT

## 2023-04-21 PROCEDURE — 6370000000 HC RX 637 (ALT 250 FOR IP): Performed by: CHIROPRACTOR

## 2023-04-21 PROCEDURE — 80048 BASIC METABOLIC PNL TOTAL CA: CPT

## 2023-04-21 RX ORDER — NICOTINE 21 MG/24HR
1 PATCH, TRANSDERMAL 24 HOURS TRANSDERMAL DAILY
Status: DISCONTINUED | OUTPATIENT
Start: 2023-04-21 | End: 2023-04-22 | Stop reason: HOSPADM

## 2023-04-21 RX ADMIN — BUDESONIDE 500 MCG: 0.5 INHALANT RESPIRATORY (INHALATION) at 07:56

## 2023-04-21 RX ADMIN — BENZONATATE 100 MG: 100 CAPSULE ORAL at 09:12

## 2023-04-21 RX ADMIN — METHYLPREDNISOLONE SODIUM SUCCINATE 40 MG: 40 INJECTION, POWDER, FOR SOLUTION INTRAMUSCULAR; INTRAVENOUS at 16:09

## 2023-04-21 RX ADMIN — IPRATROPIUM BROMIDE AND ALBUTEROL SULFATE 1 AMPULE: .5; 2.5 SOLUTION RESPIRATORY (INHALATION) at 20:33

## 2023-04-21 RX ADMIN — BUDESONIDE 500 MCG: 0.5 INHALANT RESPIRATORY (INHALATION) at 20:33

## 2023-04-21 RX ADMIN — ENOXAPARIN SODIUM 30 MG: 100 INJECTION SUBCUTANEOUS at 09:12

## 2023-04-21 RX ADMIN — METHYLPREDNISOLONE SODIUM SUCCINATE 40 MG: 40 INJECTION, POWDER, FOR SOLUTION INTRAMUSCULAR; INTRAVENOUS at 05:03

## 2023-04-21 RX ADMIN — Medication 10 ML: at 19:57

## 2023-04-21 RX ADMIN — METOPROLOL SUCCINATE 12.5 MG: 25 TABLET, EXTENDED RELEASE ORAL at 09:11

## 2023-04-21 RX ADMIN — ENOXAPARIN SODIUM 30 MG: 100 INJECTION SUBCUTANEOUS at 19:56

## 2023-04-21 RX ADMIN — PANTOPRAZOLE SODIUM 40 MG: 40 TABLET, DELAYED RELEASE ORAL at 05:03

## 2023-04-21 RX ADMIN — ASPIRIN 81 MG: 81 TABLET, COATED ORAL at 09:11

## 2023-04-21 RX ADMIN — ACETAMINOPHEN 650 MG: 325 TABLET ORAL at 16:09

## 2023-04-21 RX ADMIN — GABAPENTIN 600 MG: 300 CAPSULE ORAL at 16:13

## 2023-04-21 RX ADMIN — BUSPIRONE HYDROCHLORIDE 15 MG: 10 TABLET ORAL at 19:56

## 2023-04-21 RX ADMIN — ARFORMOTEROL TARTRATE 15 MCG: 15 SOLUTION RESPIRATORY (INHALATION) at 20:33

## 2023-04-21 RX ADMIN — DOXYCYCLINE HYCLATE 100 MG: 100 CAPSULE ORAL at 09:11

## 2023-04-21 RX ADMIN — BUSPIRONE HYDROCHLORIDE 15 MG: 10 TABLET ORAL at 09:12

## 2023-04-21 RX ADMIN — AZITHROMYCIN MONOHYDRATE 250 MG: 250 TABLET ORAL at 09:12

## 2023-04-21 RX ADMIN — IPRATROPIUM BROMIDE AND ALBUTEROL SULFATE 1 AMPULE: .5; 2.5 SOLUTION RESPIRATORY (INHALATION) at 12:30

## 2023-04-21 RX ADMIN — ARFORMOTEROL TARTRATE 15 MCG: 15 SOLUTION RESPIRATORY (INHALATION) at 07:56

## 2023-04-21 RX ADMIN — TRAZODONE HYDROCHLORIDE 100 MG: 50 TABLET ORAL at 19:56

## 2023-04-21 RX ADMIN — Medication 10 ML: at 05:03

## 2023-04-21 RX ADMIN — GABAPENTIN 600 MG: 300 CAPSULE ORAL at 09:11

## 2023-04-21 RX ADMIN — IPRATROPIUM BROMIDE AND ALBUTEROL SULFATE 1 AMPULE: .5; 2.5 SOLUTION RESPIRATORY (INHALATION) at 07:56

## 2023-04-21 RX ADMIN — EZETIMIBE 10 MG: 10 TABLET ORAL at 09:12

## 2023-04-21 RX ADMIN — GUAIFENESIN SYRUP AND DEXTROMETHORPHAN 15 ML: 100; 10 SYRUP ORAL at 12:12

## 2023-04-21 RX ADMIN — DOXYCYCLINE HYCLATE 100 MG: 100 CAPSULE ORAL at 19:56

## 2023-04-21 RX ADMIN — IPRATROPIUM BROMIDE AND ALBUTEROL SULFATE 1 AMPULE: .5; 2.5 SOLUTION RESPIRATORY (INHALATION) at 16:18

## 2023-04-21 RX ADMIN — GABAPENTIN 600 MG: 300 CAPSULE ORAL at 19:56

## 2023-04-21 ASSESSMENT — PAIN SCALES - GENERAL: PAINLEVEL_OUTOF10: 0

## 2023-04-22 VITALS
BODY MASS INDEX: 37.8 KG/M2 | DIASTOLIC BLOOD PRESSURE: 91 MMHG | RESPIRATION RATE: 18 BRPM | OXYGEN SATURATION: 94 % | HEIGHT: 71 IN | WEIGHT: 270 LBS | SYSTOLIC BLOOD PRESSURE: 139 MMHG | HEART RATE: 76 BPM | TEMPERATURE: 97.7 F

## 2023-04-22 LAB
ANION GAP SERPL CALCULATED.3IONS-SCNC: 6 MMOL/L (ref 7–16)
ANISOCYTOSIS: ABNORMAL
BASOPHILS # BLD: 0 E9/L (ref 0–0.2)
BASOPHILS NFR BLD: 0.2 % (ref 0–2)
BUN SERPL-MCNC: 23 MG/DL (ref 6–20)
BURR CELLS: ABNORMAL
CALCIUM SERPL-MCNC: 9.8 MG/DL (ref 8.6–10.2)
CHLORIDE SERPL-SCNC: 110 MMOL/L (ref 98–107)
CO2 SERPL-SCNC: 27 MMOL/L (ref 22–29)
CREAT SERPL-MCNC: 0.9 MG/DL (ref 0.7–1.2)
EOSINOPHIL # BLD: 0 E9/L (ref 0.05–0.5)
EOSINOPHIL NFR BLD: 0 % (ref 0–6)
ERYTHROCYTE [DISTWIDTH] IN BLOOD BY AUTOMATED COUNT: 12.5 FL (ref 11.5–15)
GLUCOSE SERPL-MCNC: 174 MG/DL (ref 74–99)
HCT VFR BLD AUTO: 41.8 % (ref 37–54)
HGB BLD-MCNC: 13.6 G/DL (ref 12.5–16.5)
LYMPHOCYTES # BLD: 0.8 E9/L (ref 1.5–4)
LYMPHOCYTES NFR BLD: 4.4 % (ref 20–42)
MCH RBC QN AUTO: 30.7 PG (ref 26–35)
MCHC RBC AUTO-ENTMCNC: 32.5 % (ref 32–34.5)
MCV RBC AUTO: 94.4 FL (ref 80–99.9)
MONOCYTES # BLD: 0.6 E9/L (ref 0.1–0.95)
MONOCYTES NFR BLD: 2.6 % (ref 2–12)
NEUTROPHILS # BLD: 18.69 E9/L (ref 1.8–7.3)
NEUTS SEG NFR BLD: 93 % (ref 43–80)
OVALOCYTES: ABNORMAL
PLATELET # BLD AUTO: 294 E9/L (ref 130–450)
PMV BLD AUTO: 10.8 FL (ref 7–12)
POIKILOCYTES: ABNORMAL
POLYCHROMASIA: ABNORMAL
POTASSIUM SERPL-SCNC: 4.8 MMOL/L (ref 3.5–5)
RBC # BLD AUTO: 4.43 E12/L (ref 3.8–5.8)
SCHISTOCYTES: ABNORMAL
SODIUM SERPL-SCNC: 143 MMOL/L (ref 132–146)
TEAR DROP CELLS: ABNORMAL
WBC # BLD: 20.1 E9/L (ref 4.5–11.5)

## 2023-04-22 PROCEDURE — 94669 MECHANICAL CHEST WALL OSCILL: CPT

## 2023-04-22 PROCEDURE — 6370000000 HC RX 637 (ALT 250 FOR IP)

## 2023-04-22 PROCEDURE — 6360000002 HC RX W HCPCS: Performed by: CHIROPRACTOR

## 2023-04-22 PROCEDURE — 6370000000 HC RX 637 (ALT 250 FOR IP): Performed by: STUDENT IN AN ORGANIZED HEALTH CARE EDUCATION/TRAINING PROGRAM

## 2023-04-22 PROCEDURE — 85025 COMPLETE CBC W/AUTO DIFF WBC: CPT

## 2023-04-22 PROCEDURE — 80048 BASIC METABOLIC PNL TOTAL CA: CPT

## 2023-04-22 PROCEDURE — 6360000002 HC RX W HCPCS

## 2023-04-22 PROCEDURE — 99232 SBSQ HOSP IP/OBS MODERATE 35: CPT | Performed by: INTERNAL MEDICINE

## 2023-04-22 PROCEDURE — 99231 SBSQ HOSP IP/OBS SF/LOW 25: CPT | Performed by: INTERNAL MEDICINE

## 2023-04-22 PROCEDURE — 2700000000 HC OXYGEN THERAPY PER DAY

## 2023-04-22 PROCEDURE — 6370000000 HC RX 637 (ALT 250 FOR IP): Performed by: INTERNAL MEDICINE

## 2023-04-22 PROCEDURE — 36415 COLL VENOUS BLD VENIPUNCTURE: CPT

## 2023-04-22 PROCEDURE — 94640 AIRWAY INHALATION TREATMENT: CPT

## 2023-04-22 PROCEDURE — 2580000003 HC RX 258

## 2023-04-22 PROCEDURE — 6370000000 HC RX 637 (ALT 250 FOR IP): Performed by: CHIROPRACTOR

## 2023-04-22 RX ORDER — GUAIFENESIN/DEXTROMETHORPHAN 100-10MG/5
10 SYRUP ORAL EVERY 4 HOURS PRN
Qty: 120 ML | Refills: 1 | Status: SHIPPED | OUTPATIENT
Start: 2023-04-22 | End: 2023-05-02

## 2023-04-22 RX ORDER — PREDNISONE 10 MG/1
TABLET ORAL
Qty: 23 TABLET | Refills: 0 | Status: SHIPPED | OUTPATIENT
Start: 2023-04-23 | End: 2023-04-26 | Stop reason: SDUPTHER

## 2023-04-22 RX ORDER — AZITHROMYCIN 250 MG/1
250 TABLET, FILM COATED ORAL DAILY
Status: DISCONTINUED | OUTPATIENT
Start: 2023-04-23 | End: 2023-04-22 | Stop reason: HOSPADM

## 2023-04-22 RX ORDER — BENZONATATE 100 MG/1
100 CAPSULE ORAL 3 TIMES DAILY PRN
Qty: 42 CAPSULE | Refills: 0 | Status: SHIPPED | OUTPATIENT
Start: 2023-04-22 | End: 2023-05-06

## 2023-04-22 RX ORDER — AZITHROMYCIN 250 MG/1
250 TABLET, FILM COATED ORAL DAILY
Qty: 2 TABLET | Refills: 0 | Status: SHIPPED | OUTPATIENT
Start: 2023-04-23 | End: 2023-04-25

## 2023-04-22 RX ORDER — BUDESONIDE, GLYCOPYRROLATE, AND FORMOTEROL FUMARATE 160; 9; 4.8 UG/1; UG/1; UG/1
AEROSOL, METERED RESPIRATORY (INHALATION)
Qty: 11 G | Refills: 0 | Status: SHIPPED | OUTPATIENT
Start: 2023-04-22

## 2023-04-22 RX ORDER — ALBUTEROL SULFATE 90 UG/1
1-2 AEROSOL, METERED RESPIRATORY (INHALATION) EVERY 4 HOURS PRN
Qty: 1 EACH | Refills: 5 | Status: CANCELLED | OUTPATIENT
Start: 2023-04-22

## 2023-04-22 RX ORDER — ALBUTEROL SULFATE 90 UG/1
1-2 AEROSOL, METERED RESPIRATORY (INHALATION) EVERY 4 HOURS PRN
Qty: 1 EACH | Refills: 5 | Status: SHIPPED | OUTPATIENT
Start: 2023-04-22

## 2023-04-22 RX ADMIN — Medication 10 ML: at 03:32

## 2023-04-22 RX ADMIN — PANTOPRAZOLE SODIUM 40 MG: 40 TABLET, DELAYED RELEASE ORAL at 05:13

## 2023-04-22 RX ADMIN — BENZONATATE 100 MG: 100 CAPSULE ORAL at 00:56

## 2023-04-22 RX ADMIN — METHYLPREDNISOLONE SODIUM SUCCINATE 40 MG: 40 INJECTION, POWDER, FOR SOLUTION INTRAMUSCULAR; INTRAVENOUS at 03:32

## 2023-04-22 RX ADMIN — ASPIRIN 81 MG: 81 TABLET, COATED ORAL at 09:19

## 2023-04-22 RX ADMIN — DOXYCYCLINE HYCLATE 100 MG: 100 CAPSULE ORAL at 09:19

## 2023-04-22 RX ADMIN — GABAPENTIN 600 MG: 300 CAPSULE ORAL at 09:17

## 2023-04-22 RX ADMIN — EZETIMIBE 10 MG: 10 TABLET ORAL at 09:19

## 2023-04-22 RX ADMIN — GUAIFENESIN SYRUP AND DEXTROMETHORPHAN 15 ML: 100; 10 SYRUP ORAL at 00:56

## 2023-04-22 RX ADMIN — METOPROLOL SUCCINATE 12.5 MG: 25 TABLET, EXTENDED RELEASE ORAL at 09:26

## 2023-04-22 RX ADMIN — BUDESONIDE 500 MCG: 0.5 INHALANT RESPIRATORY (INHALATION) at 08:25

## 2023-04-22 RX ADMIN — Medication 10 ML: at 09:27

## 2023-04-22 RX ADMIN — AZITHROMYCIN MONOHYDRATE 250 MG: 250 TABLET ORAL at 09:19

## 2023-04-22 RX ADMIN — ENOXAPARIN SODIUM 30 MG: 100 INJECTION SUBCUTANEOUS at 09:17

## 2023-04-22 RX ADMIN — BUSPIRONE HYDROCHLORIDE 15 MG: 10 TABLET ORAL at 09:17

## 2023-04-22 RX ADMIN — IPRATROPIUM BROMIDE AND ALBUTEROL SULFATE 1 AMPULE: .5; 2.5 SOLUTION RESPIRATORY (INHALATION) at 08:25

## 2023-04-22 RX ADMIN — ARFORMOTEROL TARTRATE 15 MCG: 15 SOLUTION RESPIRATORY (INHALATION) at 08:25

## 2023-04-22 ASSESSMENT — ENCOUNTER SYMPTOMS
RHINORRHEA: 0
CONSTIPATION: 0
DIARRHEA: 0
NAUSEA: 0
WHEEZING: 0
ABDOMINAL DISTENTION: 0
VOMITING: 0
SHORTNESS OF BREATH: 0
COUGH: 1
ABDOMINAL PAIN: 0

## 2023-04-22 ASSESSMENT — PAIN SCALES - GENERAL
PAINLEVEL_OUTOF10: 0
PAINLEVEL_OUTOF10: 0

## 2023-04-22 NOTE — CARE COORDINATION
Choiced patient for home care, patient declined. Patient felt it was not needed at this point. Patient choiced for O2, no preference on DME provider. Ambulated short distance with nursing, dropped to 84% on room air. For questions I can be reached at [phone].  Gaviota Amaya
Discharge order noted, per LSW note if pt needs O2, will need ambulatory pulse ox and order. Ambulatory pulse ox completed, will need order, asked bedside RN to obtained DME order. Referral to Domitila Lau, faxed [facility] [phone]. Pt can discharge after portable tank is delivered to room. Jeanette Bowmans, MSW, LSW
Therapy ordered today. Patient on 2L oxygen this am. Will need walking pulse ox prior to discharge. Plan is home when released. For questions I can be reached at [phone].  Dano Mullins
Yes  Would you like Case Management to discuss the discharge plan with any other family members/significant others, and if so, who? Yes  Plans to Return to Present Housing: Yes  Other Identified Issues/Barriers to RETURNING to current housing: needs oxygen  Potential Assistance needed at discharge: N/A            Potential DME:    Patient expects to discharge to: [address] [place] [address] for transportation at discharge:      Financial    Payor: [address] [place] [address] / Plan: MEDICARE PART A AND B / Product Type: *No Product type* /     Does insurance require precert for SNF: No    Potential assistance Purchasing Medications: No  Meds-to-Beds request: Yes      [address], Malcolm Clemons  [address]  Phone: [phone] Fax: [phone]      Notes:    Factors facilitating achievement of predicted outcomes: Motivated, Cooperative, and Pleasant    Barriers to discharge: oxygen dependent    Additional Case Management Notes: The Plan for Transition of Care is related to the following treatment goals of COPD exacerbation ([facility] [J44.1]  Acute respiratory failure with hypoxia ([facility] [G07.41]    IF APPLICABLE: The Patient and/or patient representative Yocasta and his family were provided with a choice of provider and agrees with the discharge plan. Freedom of choice list with basic dialogue that supports the patient's individualized plan of care/goals and shares the quality data associated with the providers was provided to: Patient   Patient Representative Name:       The Patient and/or Patient Representative Agree with the Discharge Plan? Yes    Note entered by Carmela Carias, student social work, reviewed by GINA Can.   Kirit Morales, [place]  Case Management Department  Ph: [phone] Fax:

## 2023-04-22 NOTE — DISCHARGE SUMMARY
half tablet by mouth once a day x 3 days, then stop. Robitussin; take 10mL by mouth q4 hours as needed for cough   Tessalon pearls; take three times daily as needed for cough   Even if you are feeling better and not having symptoms do not stop taking antibiotic earlier then prescribed   Please contact us if you have any concerns, wish to change or make an appointment:  Internal medicine clinic   Phone: [phone]  Fax: [phone]  [facility] [address]  Or please call the nurse line at [phone]. Should you have further questions in regards to this visit, you can review your clinical note and after visit summary document on your travelfox account. Other questions can be directed to our nurse line at [phone]. Other than any new prescriptions given to you today, the list of home medications on this After Visit Summary are based on information provided to us from you and your healthcare providers. This information, including the list, dose, and frequency of medications is only as accurate as the information you provided. If you have any questions or concerns about your home medications, please contact your Primary Care Physician for further clarification.     Golden Hoyt MD  PGY 1   10:44 AM 4/22/2023

## 2023-04-22 NOTE — PROGRESS NOTES
[facility]  Internal Medicine Residency / [address]    Attending Physician Statement  I have discussed the case, including pertinent history and exam findings with the resident and the team.  I have seen and examined the patient and the key elements of the encounter have been performed by me. I have also personally reviewed imaging studies and labs. I agree with the assessment, plan and orders as documented by the resident. Assessment:  Acute hypoxic respiratory failure 2/2 to COPD exacerbation, improved. Without oxygen, his sats are in low 90s at rest and during ambulation. He desaturates and feels a little dizzy during long coughing spells. Severe COPD  Hx of VATS and RUL wedge resection in the past with lung scarring. No hx of lung cancer. HTN, controlled  RCC s/p R nephrectomy  Tobacco use      Plan: Will discuss discharge planning with Pulmonology  May benefit from long taper of steroids  Complete course of antibiotics  Continue antitussive medication  Will need refills of inhalers and nebulizer solutions once ready for discharge  He has been very active prior to admission. Advised rest after discharge and may also need pulm rehab depending on his recovery  Smoking cessation    Addendum: Documented ambulatory pulse ox now at 84% with ambulation - he will need oxygen on dc. Remainder of medical problems as per resident note. Yazan Harris MD  Internal Medicine
[facility]  Internal Medicine Residency / [address]    Attending Physician Statement  I have discussed the case, including pertinent history and exam findings with the resident and the team.  I have seen and examined the patient and the key elements of the encounter have been performed by me. I have also personally reviewed imaging studies and labs. I agree with the assessment, plan and orders as documented by the resident. Assessment:  Acute hypoxic respiratory failure 2/2 to COPD exacerbation, slowly improving. Sat 90s on RA while at rest. Ambulatory pulse ox dropped to 84% without oxygen after only a few steps. He in on azithromycin and doxycycline. Hx of VATS and RUL wedge resection in the past with lung scarring. No hx of lung cancer. HTN, controlled  RCC s/p R nephrectomy  Tobacco use      Plan:  Given significant drop of saturation with ambulation, we discussed need to continue current treatment while in the hospital. Slow progression but he is showing improvement. Follow up respiratory cx sent today  Appreciate care from all consult services. Remainder of medical problems as per resident note. Giuliana Suarez MD  Internal Medicine
[facility]  Internal Medicine Residency Program  Progress Note - House Team     Patient:  Chencho June 62 y.o. male MRN: [id number]     Date of Service: 4/21/2023     CC: Sob and cough   Overnight events:   No acute events overnight    Subjective     Patient was seen and examined this morning at bedside in no acute distress. Patient reports his cough and shortness of breath has improved. Patient was weaned down to room air. He is eager to be discharged. Given persistent wheezing and drop in O2 sat during ambulation. We will monitor the patient for 1 more day before preparing for discharge. Objective     Physical Exam:  Vitals: BP (!) 140/70   Pulse 78   Temp 98.4 °F (36.9 °C) (Temporal)   Resp 18   Ht 5' 11\" (1.803 m)   Wt 270 lb (122.5 kg)   SpO2 92%   BMI 37.66 kg/m²     I & O - 24hr: No intake/output data recorded. General Appearance: alert, appears stated age, and cooperative, on 4 L nasal cannula  HEENT:  Head: Normal, normocephalic, atraumatic.   Neck: no carotid bruit  Skin: Xanthelasma noted on bilateral eyelids  Lung: Improved bilateral wheezing   Heart: regular rate and rhythm, S1, S2 normal, no murmur, click, rub or gallop  Abdomen: soft, non-tender; bowel sounds normal; no masses,  no organomegaly  Extremities:  extremities normal, atraumatic, no cyanosis or edema  Neurologic: Mental status: Alert, oriented, thought content appropriate    Pertinent Labs & Imaging Studies     CBC:   Lab Results   Component Value Date/Time    WBC 24.4 04/21/2023 05:41 AM    RBC 4.35 04/21/2023 05:41 AM    HGB 13.5 04/21/2023 05:41 AM    HCT 41.6 04/21/2023 05:41 AM    MCV 95.6 04/21/2023 05:41 AM    MCH 31.0 04/21/2023 05:41 AM    MCHC 32.5 04/21/2023 05:41 AM    RDW 12.8 04/21/2023 05:41 AM     04/21/2023 05:41 AM    MPV 10.8 04/21/2023 05:41 AM     CMP:    Lab Results   Component Value Date/Time     04/21/2023 10:40 AM    K 5.0 04/21/2023 10:40 AM    K 4.2
[facility]  Internal Medicine Residency Program  Progress Note - House Team     Patient:  Prasanna Galindo 62 y.o. male MRN: [id number]     Date of Service: 4/20/2023     CC: Sob and cough   Overnight events:   No acute events overnight    Subjective     Patient was seen and examined this morning at bedside in no acute distress. Patient reports his cough is improved. Asked for nicotine patch. Decrease nasal cannula during examination from 5 L/min to 4 L/min. Objective     Physical Exam:  Vitals: /72   Pulse 83   Temp 98.1 °F (36.7 °C) (Oral)   Resp 18   Ht 5' 11\" (1.803 m)   Wt 270 lb (122.5 kg)   SpO2 94%   BMI 37.66 kg/m²     I & O - 24hr: I/O this shift: In: 480 [P.O.:480]  Out: -    General Appearance: alert, appears stated age, and cooperative, on 4 L nasal cannula  HEENT:  Head: Normal, normocephalic, atraumatic.   Neck: no carotid bruit  Skin: Xanthelasma noted on bilateral eyelids  Lung: Improved bilateral wheezing   Heart: regular rate and rhythm, S1, S2 normal, no murmur, click, rub or gallop  Abdomen: soft, non-tender; bowel sounds normal; no masses,  no organomegaly  Extremities:  extremities normal, atraumatic, no cyanosis or edema  Neurologic: Mental status: Alert, oriented, thought content appropriate    Pertinent Labs & Imaging Studies     CBC:   Lab Results   Component Value Date/Time    WBC 26.1 04/20/2023 05:17 AM    RBC 4.47 04/20/2023 05:17 AM    HGB 13.8 04/20/2023 05:17 AM    HCT 43.2 04/20/2023 05:17 AM    MCV 96.6 04/20/2023 05:17 AM    MCH 30.9 04/20/2023 05:17 AM    MCHC 31.9 04/20/2023 05:17 AM    RDW 13.0 04/20/2023 05:17 AM     04/20/2023 05:17 AM    MPV 11.2 04/20/2023 05:17 AM     CMP:    Lab Results   Component Value Date/Time     04/20/2023 05:17 AM    K 4.4 04/20/2023 05:17 AM    K 4.2 03/07/2020 05:10 PM     04/20/2023 05:17 AM    CO2 23 04/20/2023 05:17 AM    BUN 24 04/20/2023 05:17 AM    CREATININE 0.9 04/20/2023
[facility]  Internal Medicine Residency Program  Progress Note - House Team 2    Patient:  Gary Vega 62 y.o. male MRN: [id number]     Date of Service: 4/19/2023     CC: SOB and cough   Overnight events: worsening SOB and cough      Subjective     Pt was examined at bedside and appeared in no acute distress, he was not coughing at that time. Pt states his SOB has improved since last night but he has yet to reach his baseline SOB. He also complains of cough and states that the medications he was given in the morning for his cough only were effective for roughly two hours. When he coughs it is variable on whether he can produce anything but he does feel like he needs to cough something up. Pt states: SOB, Cough, Chest pain/tightness 2/2 coughing, abdominal pain 2/2 coughing, Head aches  Pt denies: nausea, vomiting, diarrhea, urinary changes, changes in vision    Objective     Physical Exam:  Vitals: /74   Pulse 83   Temp 97.7 °F (36.5 °C) (Oral)   Resp 18   Ht 5' 11\" (1.803 m)   Wt 270 lb (122.5 kg)   SpO2 95%   BMI 37.66 kg/m²     I & O - 24hr: No intake/output data recorded. General Appearance: alert, appears stated age, cooperative, and no distress  HEENT:  Head: Normal, normocephalic, atraumatic. Neck: no JVD and supple, symmetrical, trachea midline  Lung: expiratory wheezes bilaterally and on inspiration a growling noise  Heart: regular rate and rhythm, S1, S2 normal, no murmur, click, rub or gallop  Abdomen: abnormal findings:  tenderness mild in the entire abdomen  Extremities:  edema found in the ankles but very minimal and no edema, redness or tenderness in the calves or thighs  Musculokeletal: No joint swelling, no muscle tenderness. ROM normal in all joints of extremities.    Neurologic: Mental status: Alert, oriented, thought content appropriate  Subject  Pertinent Labs & Imaging Studies   tim  CBC with Differential:    Lab Results   Component Value
New consult sent to Dr. Antwon Park via [employer].  Electronically signed by Matt Mg RN on 4/19/2023 at 12:05 PM
PULSE OX ON ROOM AIR SITTING ___92%   PULSE OX ON ROOM AIR AMBULATING __84_%   PULSE OX ON _2_ LITERS AMBULATING RECOVERY _92__%   PULSE OX ON __LITERS SITTING RECOVERY __%
08:14 AM    MCH 30.8 04/19/2023 08:14 AM    MCHC 32.2 04/19/2023 08:14 AM    RDW 12.9 04/19/2023 08:14 AM     04/19/2023 08:14 AM    MPV 10.8 04/19/2023 08:14 AM     CMP:    Lab Results   Component Value Date/Time     04/18/2023 04:54 AM    K 4.4 04/18/2023 04:54 AM    K 4.2 03/07/2020 05:10 PM     04/18/2023 04:54 AM    CO2 19 04/18/2023 04:54 AM    BUN 14 04/18/2023 04:54 AM    CREATININE 0.9 04/18/2023 04:54 AM    GFRAA >60 10/07/2022 01:42 PM    LABGLOM >60 04/18/2023 04:54 AM    GLUCOSE 157 04/18/2023 04:54 AM    PROT 7.3 04/17/2023 10:41 PM    LABALBU 4.4 04/17/2023 10:41 PM    CALCIUM 9.2 04/18/2023 04:54 AM    BILITOT 0.4 04/17/2023 10:41 PM    ALKPHOS 75 04/17/2023 10:41 PM    AST 22 04/17/2023 10:41 PM    ALT 23 04/17/2023 10:41 PM       CTA PULMONARY W CONTRAST   Final Result   1. Limited CT pulmonary angiogram.  No central embolus seen. The lobar,   segmental and subsegmental branches are not well enough enhance to offer a   definitive assessment. 2. Small scattered areas of scarring in the lungs. 3. Small laterally projecting hernia in the intracostal space of the right   6th and 7th ribs, with focal outward protrusion of the right lower lobe. RECOMMENDATIONS:   Unavailable         XR CHEST PORTABLE   Final Result   No acute process. Resident's Assessment and Plan     Assessment and Plan:    Acute hypoxic respiratory failure likely secondary to COPD exacerbation 2/2 missed medications  Patient is not on oxygen at home; currently on 5 L nasal cannula  With require ambulatory pulse ox prior to discharge  Continue doxycycline 100 mg every 12 hours X 5 days  Continue Solu-Medrol 40 mg IV twice daily  Continue breathing treatments;  Xopenex, Pulmicort, Atrovent, Brovana  PEP/Flutter   Robitussin every 4 hours as needed  CTA the chest negative for PE but limited study  No evidence of volume overload on examination, proBNP on admission is within normal limits  Follow
4   Able to Follow Multi-Step Commands INDly   Memory:  good    Sequencing:  good    Problem solving:  good    Judgement/safety:  good   Additional Comments:  Pt was pleasant and cooperative.   Limited Insight    Vitals/Lab Values:  O2 sats remained > 93% for duration of session despite Moderate SOB/Cough w/ minimal exertion     PULSE OX ON 4L SITTING__96__%  PULSE OX ON 4L AMBULATING __93__%  PULSE OX ON __4__LITERS SITTING RECOVERY _93___%  PULSE OX ON __4__LITERS AMBULATING RECOVERY _93__%       Functional Assessment:  AM-PAC Daily Activity Raw Score: 15/24     Initial Eval Status  Date: 4-20-23   Treatment Status  Date: STGs = LTGs  Time frame: 10-14 days   Feeding IND after set up    Seated EOB  Declined chair    NA   Grooming SUP/Set up    Seated EOB  Unable to tolerate ambulating to or standing at sink    Mod I  Standing at the [facility] Dressing SUP/Set up    Simulated - seated EOB  Unable to tolerate item retrieval/transport    Mod I     LB Dressing SUP/Set up  Extended time     Moderate SOB w/ light standing ax  Pt ed for safe/adaptive techs, use of adaptive equip    Mod I     Bathing NT    Pt ed re: Benefits of use of Shower chair/Tub Bench, resources for obtaining equip    Mod I      Toileting NT    Pt declined    Mod I     Bed Mobility  Supine to sit: IND   Sit to supine:  IND     Supine to sit: NA  Sit to supine: NA     Functional Transfers Close SUP     EOB 2x  Pt ed for safety/hand placement    Mod I     Functional Mobility Close SUP w/o AD    Moderate SOB/Cough after ~ 10' 4x w/ extended seated/standing rest break  w/ each trial  Pt ed for safety/improved safety awareness    Mod I     Balance Sitting:     Static:  Remote SUP EOB    Dynamic:  SUP w/ functional ax EOB     Standing:     Static:  Close SUP    Dynamic:  Close SUP w/ functional ax/mobility w/o AD    Sitting:     Static:  IND    Dynamic:  IND w/ functional ax    Standing:     Static:  Mod I w/ AD PRN    Dynamic:  Mod I w/ functional
4/18/2023  EXAMINATION: CTA OF THE CHEST 4/18/2023 4:38 pm TECHNIQUE: CTA of the chest was performed after the administration of intravenous contrast.  Multiplanar reformatted images are provided for review. MIP images are provided for review. Automated exposure control, iterative reconstruction, and/or weight based adjustment of the mA/kV was utilized to reduce the radiation dose to as low as reasonably achievable. COMPARISON: CT of the chest with and without contrast, 03/06/2023. HISTORY: ORDERING SYSTEM PROVIDED HISTORY: r/o PE TECHNOLOGIST PROVIDED HISTORY: Reason for exam:->r/o PE What reading provider will be dictating this exam?->CRC FINDINGS: Pulmonary Arteries: Limited study due to poor pulmonary arterial enhancement. No central pulmonary embolism is seen. The lobar, segmental and subsegmental branches or not well evaluated. The main pulmonary artery is normal in caliber. Mediastinum: The heart is normal in size. Mild atherosclerosis in the thoracic aorta. No aneurysm or dissection. No lymphadenopathy. Mild scattered scarring in the right upper lobe and bilateral lower lobes. There is a small, laterally projecting hernia in the intracostal space of the right 6 and 7th ribs. It results in outward protrusion of the lung the hernia measures approximately 2.7 x 1.6 cm. Lungs/pleura: The lungs are without acute process. No focal consolidation or pulmonary edema. No evidence of pleural effusion or pneumothorax. Upper Abdomen: Limited images of the upper abdomen are unremarkable. Soft Tissues/Bones: No acute bone or soft tissue abnormality. 1. Limited CT pulmonary angiogram.  No central embolus seen. The lobar, segmental and subsegmental branches are not well enough enhance to offer a definitive assessment. 2. Small scattered areas of scarring in the lungs.  3. Small laterally projecting hernia in the intracostal space of the right 6th and 7th ribs, with focal outward protrusion of the right lower
Student  Attending physician: Dr. Elvi Maddox
HISTORY OF PRESENT ILLNESS : Raphael Olvera 62 y.o. male was seen in consultation regarding COPD exacerbation. The patient is well-known to me from my care in clinic. He has a known history of COPD. He did have prior renal cancer, previous wedge resection. He is compliant with his Breztri at home along with his rescue inhaler. He remains quite active and goes to the gym frequently. He started having increased shortness of breath on coughing on Friday. He reports that his breathing is slightly better when he came in but he is still requiring oxygen or else his saturations drop. Still with tachycardia with exertion. Assessment:     Acute hypoxic respiratory failure 2/2 AECOPD, remains on supplental O2 and exertional dyspnea  COPD with exacerbation  Leukocytosis with left shift  Tussive syncope  Tobacco use      Plan:     Continue Brovana, Pulmicort, DuoNebs. Continue Guaifenesin   Continue Solu-Medrol 40 mg IV Q 12hrs  Azithromycin and doxycycline  Given his leukocytosis with a white blood cell count of 32 and left shift I am not convinced that this is all secondary to steroids, pro calcitonin 0.04. Awaiting respiratory culture  Sputum sample remains at bedside. Nursing staff asked to send to lab, was not sent last night. Discussed with bedside nurse to please send culture  Continue to wean FiO2 as tolerated  Smoking cessation again discussed with patient--nicotine patch      Clarification: Patient did not have lung cancer. He had a right sided VATS and right upper lobe wedge resection previously which biopsy which showed diffuse granulomatous changes and noncaseating granulomas.   Please see Dr. Pako Mandel office note dated February 25, 2022 for further details    Case and plan discussed with Dr. Aldair Carr, APRN - CNP
lobe. RECOMMENDATIONS: Unavailable               HISTORY OF PRESENT ILLNESS : Don Jaffe 62 y.o. male was seen in consultation regarding COPD exacerbation. The patient is well-known to me from my care in clinic. He has a known history of COPD. He did have prior renal cancer, previous wedge resection. He is compliant with his Breztri at home along with his rescue inhaler. He remains quite active and goes to the gym frequently. He started having increased shortness of breath on coughing on Friday. He reports that his breathing is slightly better when he came in but he is still requiring oxygen or else his saturations drop. Still with tachycardia with exertion. Assessment:     Acute hypoxic respiratory failure 2/2 AECOPD, remains on supplental O2 with exertional dyspnea  COPD with exacerbation  Persistent cough, nebulized lidocaine Q 8hrs as needed  Leukocytosis with left shift  Tussive syncope  Tobacco use  GERD  DEJON, untreated. Would benefit from repeat sleep study  Chronic herniation of right lung seen on CT chest as far back as 2019        Plan:     Continue oxygen as needed, repeat ambulatory pulse oximetry tomorrow  Continue Brovana, Pulmicort, DuoNebs. Continue Guaifenesin   Continue Solu-Medrol 40 mg IV Q 12hrs, start weaning steroids tomorrow  Azithromycin and doxycycline  Given his leukocytosis with a white blood cell count of 32 and left shift I am not convinced that this is all secondary to steroids, pro calcitonin 0.04. Awaiting respiratory culture  Sputum sample remains at bedside. Nursing staff asked to send to lab, was not sent last night. Discussed with bedside nurse to please send culture  Continue to wean FiO2 as tolerated  Smoking cessation again discussed with patient--nicotine patch      Clarification: Patient did not have lung cancer.   He had a right sided VATS and right upper lobe wedge resection previously which biopsy which showed diffuse granulomatous changes and
minutes  [x] Therapeutic activities 29624 10 minutes  [] Therapeutic exercises 78354 -- minutes  [] Neuromuscular reeducation 78646 -- minutes     Gisella Marsh, PT, DPT  [id number]

## 2023-04-23 LAB
BACTERIA BLD CULT: NORMAL
BACTERIA BLD CULT: NORMAL

## 2023-04-24 ENCOUNTER — TELEPHONE (OUTPATIENT)
Dept: INTERNAL MEDICINE | Age: 58
End: 2023-04-24

## 2023-04-24 ENCOUNTER — CARE COORDINATION (OUTPATIENT)
Dept: CASE MANAGEMENT | Age: 58
End: 2023-04-24

## 2023-04-24 DIAGNOSIS — J44.1 COPD EXACERBATION (HCC): Primary | ICD-10-CM

## 2023-04-24 DIAGNOSIS — J44.9 COPD, SEVERE (HCC): Primary | ICD-10-CM

## 2023-04-24 PROCEDURE — 1111F DSCHRG MED/CURRENT MED MERGE: CPT | Performed by: INTERNAL MEDICINE

## 2023-04-24 NOTE — CARE COORDINATION
appt information. Pt declined CTN's assistance with scheduling a HFU appt with his pcp stating he will call his pcp on his own and that he will call Dr. Mahesh Irby (pulm) too to schedule a f/u appt. Pt did not voice any other needs/concerns. Pt agreeable to subsequent f/u calls from this CTN. Care Transition Nurse reviewed discharge instructions, medical action plan, and red flags with patient who verbalized understanding. The patient was given an opportunity to ask questions and does not have any further questions or concerns at this time. Were discharge instructions available to patient? Yes. Reviewed appropriate site of care based on symptoms and resources available to patient including: PCP  Specialist  Urgent care clinics  When to call 911. The patient agrees to contact the PCP office for questions related to their healthcare. Advance Care Planning:     Healthcare Decision Maker:    Primary Decision Maker: Natalie Cole - Domestic Partner - [phone]    Medication reconciliation was performed with patient, who verbalizes understanding of administration of home medications. Medications reviewed, 1111F entered: yes    Was patient discharged with a pulse oximeter? No. Pt does not have a pulse ox at home. Non-face-to-face services provided:  Scheduled appointment with PCP-Pt declined assist. Prefers to schedule himself. CTN will route a high priority message to pcp's office staff requesting they outreach to pt and offer a 7d HFU appt. Scheduled appointment with Specialist-Dr. Mahesh Irby ([facility])~Pt prefers to call to schedule. Confirmed pt has contact information to office. Also referred pt to his AVS for HFU appt info  Obtained and reviewed discharge summary and/or continuity of care documents  Assessment and support for treatment adherence and medication management-1111F entered. Pt is requesting nebulizer rx.     Offered patient enrollment in the Remote Patient Monitoring (RPM) program for in-home

## 2023-04-24 NOTE — TELEPHONE ENCOUNTER
Called patient earlier and scheduled for a hospital follow up. See Care coordination note. Patient requesting nebulizer RX prior to his appointment.

## 2023-04-26 ENCOUNTER — OFFICE VISIT (OUTPATIENT)
Dept: INTERNAL MEDICINE | Age: 58
End: 2023-04-26

## 2023-04-26 VITALS
WEIGHT: 264 LBS | SYSTOLIC BLOOD PRESSURE: 109 MMHG | DIASTOLIC BLOOD PRESSURE: 73 MMHG | TEMPERATURE: 97 F | HEIGHT: 71 IN | BODY MASS INDEX: 36.96 KG/M2 | OXYGEN SATURATION: 96 % | RESPIRATION RATE: 20 BRPM | HEART RATE: 91 BPM

## 2023-04-26 DIAGNOSIS — G89.4 CHRONIC PAIN SYNDROME: ICD-10-CM

## 2023-04-26 DIAGNOSIS — J44.9 COPD, SEVERE (HCC): Primary | ICD-10-CM

## 2023-04-26 DIAGNOSIS — Z09 HOSPITAL DISCHARGE FOLLOW-UP: Primary | ICD-10-CM

## 2023-04-26 DIAGNOSIS — J44.9 COPD, SEVERE (HCC): ICD-10-CM

## 2023-04-26 RX ORDER — GABAPENTIN 600 MG/1
600 TABLET ORAL 3 TIMES DAILY
Qty: 270 TABLET | Refills: 0 | Status: SHIPPED | OUTPATIENT
Start: 2023-04-26 | End: 2023-06-25

## 2023-04-26 RX ORDER — ALBUTEROL SULFATE 2.5 MG/3ML
2.5 SOLUTION RESPIRATORY (INHALATION) 4 TIMES DAILY PRN
Qty: 120 EACH | Refills: 3 | Status: SHIPPED | OUTPATIENT
Start: 2023-04-26

## 2023-04-26 RX ORDER — PREDNISONE 10 MG/1
TABLET ORAL
Qty: 23 TABLET | Refills: 0 | Status: SHIPPED | OUTPATIENT
Start: 2023-04-26 | End: 2023-05-08

## 2023-04-26 NOTE — PROGRESS NOTES
Post-Discharge Transitional Care  Follow Up      Corwin Templeton   YOB: 1965    Date of Office Visit:  4/26/2023  Date of Hospital Admission: 4/17/23  Date of Hospital Discharge: 4/22/23  Risk of hospital readmission (high >=14%. Medium >=10%) :Readmission Risk Score: 10.6      Care management risk score Rising risk (score 2-5) and Complex Care (Scores >=6): No Risk Score On File     Non face to face  following discharge, date last encounter closed (first attempt may have been earlier): 04/24/2023    Call initiated 2 business days of discharge: Yes    ASSESSMENT/PLAN:   Hospital discharge follow-up  -     SD DISCHARGE MEDS RECONCILED W/ CURRENT OUTPATIENT MED LIST  COPD, severe ([address]  -     Check Pulse Oximetry while ambulating  -     predniSONE (DELTASONE) 10 MG tablet; 4 tablets by mouth once a day x 3 days, then 2 tablets by mouth once a day x 3 days, then 1  tablets by mouth once a day x 3 days, then half tablet by mouth once a day x 3 days, then stop., Disp-23 tablet, R-0Normal  Chronic pain syndrome  -     gabapentin (NEURONTIN) 600 MG tablet; Take 1 tablet by mouth 3 times daily for 60 days. , Disp-270 tablet, R-0Normal      Medical Decision Making: straightforward  Return in about 3 months (around 7/26/2023) for pcp follow up. Subjective:   HPI:  Follow up of Hospital problems/diagnosis(es):   Acute hypoxic respiratory failure secondary to COPD exacerbation    Inpatient course: Discharge summary reviewed- see chart. Interval history/Current status:     Patient today reports he is still short of breath with chronic cough. He completed his azithromycin antibiotic course appropriately. However, he did not take his prednisone taper as prescribed. We will represcribe taper 40 mg X 3 days, 20 mg X 3 days, 10 mg X 3 days, 5 mg X 3 days.      He also reported having to increase his inhaler usage to 4-5 times weekly from previous

## 2023-04-26 NOTE — PATIENT INSTRUCTIONS
Continue your medications as listed   Take Albuterol Nebulizer as needed 4x daily   We will adjust your prednisone taper, I re-ordered the prednisone pills  for you. Only take as prescribed. Take as follows: 4 tablets by mouth once a day x 3 days, then 2 tablets by mouth once a day x 3 days, then 1  tablets by mouth once a day x 3 days, then half tablet by mouth once a day x 3 days, then stop. Please schedule appointment to see Dr. Viri Alexander as soon as possible   If your symptoms worsen, please call the clinic for further recommendations   Call for a sooner appointment if you have additional concerns.         Summer Ramsey MD  Internal Medicine

## 2023-04-26 NOTE — PROGRESS NOTES
[facility]  Internal Medicine Residency Clinic  Attending Physician Statement:  Sarah Chavarria M.D., F.A.C.P. Patient is seen for fu visit today. -- acute and chronic problems addressed  I have seen/discussed the case, including pertinent history and exam findings with the resident, review of last visit medical records/labs/imaging if applicable-     Addressed when applicable- Health maintenance issues of vaccinations, social determinants/depression/CA screening, tobacco cessation etc... I agree with the assessment, plan and orders as documented by the resident.    -Rosas Bhatti assessed by medical complexity of case  My assessment of high points of today's visit as follows:      Seen in hospital:  4/17  [facility] 4/22  Hypoxic in hospital 84%    Hospital course:  59-year-old male with a history of COPD (oxygen at home, chronic smoker, follows with Dr. Jeanna Su). Nicole Vazquez Who presents to the emergency department with shortness of breath and cough. Patient reports that he has not had his inhaler medications for several days as he did not have enough to last.      , afebrile, mild leukocytosis. Chest x-ray was significant for bilateral lower lung consolidation. Respiratory panel including COVID test was negative. Respiratory culture were sent but was delayed due to initial samples not being sent. Physical examination was significant for wheezing. Patient is not on oxygen at home but during his ED course he had to be put on 5 L nasal cannula. He was given methylprednisone 125 mg a dose of ceftriaxone and doxycycline, and was put on breathing treatments. Patient was admitted and managed for COPD exacerbation. He was treated with methylprednisone 40 mg twice daily, and doxycycline 100 mg every 12 hours, with breathing treatments. However, patient continued to have audible wheezing on physical examination, therefore pulmonology were consulted for further management.   Pulmonology modified

## 2023-05-01 ENCOUNTER — TELEPHONE (OUTPATIENT)
Dept: INTERNAL MEDICINE | Age: 58
End: 2023-05-01

## 2023-05-01 DIAGNOSIS — J44.9 COPD, SEVERE (HCC): Primary | ICD-10-CM

## 2023-05-01 RX ORDER — NICOTINE 21 MG/24HR
1 PATCH, TRANSDERMAL 24 HOURS TRANSDERMAL DAILY
Qty: 42 PATCH | Refills: 0 | Status: SHIPPED | OUTPATIENT
Start: 2023-05-01 | End: 2023-06-12

## 2023-05-01 RX ORDER — NICOTINE 21 MG/24HR
1 PATCH, TRANSDERMAL 24 HOURS TRANSDERMAL DAILY
Qty: 14 PATCH | Refills: 0 | Status: SHIPPED | OUTPATIENT
Start: 2023-06-13 | End: 2023-07-25

## 2023-05-01 NOTE — TELEPHONE ENCOUNTER
----- Message from Pastor Duncan sent at 5/1/2023  3:32 PM EDT -----  Subject: Message to Provider    QUESTIONS  Information for Provider? Pt is trying to get in with his lung doctor   having a hard time reaching them after his time in the hospital. wondering   if the office can help get him scheduled. ---------------------------------------------------------------------------  --------------  Caryle Auerbach UOTI  [phone]; OK to leave message on voicemail  ---------------------------------------------------------------------------  --------------  SCRIPT ANSWERS  Relationship to Patient?  Self Recommended yearly dilated eye examinations.

## 2023-05-01 NOTE — TELEPHONE ENCOUNTER
Spoke with Pulmonology office and they stated they are waiting for dr Blake Sullivan to return and they will call the pt to schedule an appt for him. Spoke to patient and let him know , pt is requesting nicotine patches to help him to stop smoking.  Please advise and if ok please send to  [facility] on [address]

## 2023-05-02 ENCOUNTER — TELEPHONE (OUTPATIENT)
Dept: INTERNAL MEDICINE | Age: 58
End: 2023-05-02

## 2023-05-02 ENCOUNTER — CARE COORDINATION (OUTPATIENT)
Dept: CASE MANAGEMENT | Age: 58
End: 2023-05-02

## 2023-05-02 NOTE — TELEPHONE ENCOUNTER
Order for nebulizer faxed to [address] [place] [address] in [facility]  Pt notified.

## 2023-05-02 NOTE — TELEPHONE ENCOUNTER
Pt states he was to get a nebulizer and has not pt lives in [place] wants to possibly get it from [address] [place] [address] .

## 2023-05-02 NOTE — CARE COORDINATION
-CTN clinic Nurse faxed nebulizer order to [facility]  and Nurse notified pt of this.
since last contact:  medications-Prednisone Taper 4 tablets once a day a 3 days, 2 tablets once a day for 3 days then 1 tablets once a day for 3 days 1.5 tablet once a day for three days then stop. Pt confirmed he is taking as ordered. DME-CTN  called PCP office regarding nebulizer Madeline Tigist will message clinic nurse to follow up on this. Client reports the one he has is not working. CTN discussed with pt.that order in computer for 4/26 from his pcp office. Follow Up  Future Appointments   Date Time Provider Fatou Guzman   7/11/2023 10:20 AM [facility]   7/26/2023  9:30 AM Madeleine Pascal MD [facility]   LPN Care Coordinator reviewed medical action plan and red flags with patient and discussed any barriers to care and/or understanding of plan of care after discharge. Discussed appropriate site of care based on symptoms and resources available to patient including: PCP  Specialist  Urgent care clinics  When to call 911. The patient agrees to contact the PCP office for questions related to their healthcare. Care Transitions Subsequent and Final Call    Subsequent and Final Calls  Have your medications changed?: Yes  Patient Reports: Nictotine patches  Do you have any needs or concerns that I can assist you with?: Yes  Care Transitions Interventions     Other Services: Declined (Comment: RPM declined 4/24/23)   Other Interventions:             LPN Care Coordinator provided contact information for future needs. Plan for follow-up call in 7-10 days based on severity of symptoms and risk factors. Plan for next call: symptom management-worsening sob, wheezing,   self management-wearing oxygen as ordered , taking medications as prescribed, using nebulizer   follow-up appointment- Did Pulm f/u with sooner appointment? medication management-Prednisone completed ? New Nebulizer?    Isi Kern LPN

## 2023-05-11 ENCOUNTER — CARE COORDINATION (OUTPATIENT)
Dept: CASE MANAGEMENT | Age: 58
End: 2023-05-11

## 2023-05-11 NOTE — CARE COORDINATION
[facility] Care Transitions Follow Up Call    Patient: Tenisha Perkins  Patient : 1965   MRN: [id number]  Reason for Admission: COPD exacerbation  Discharge Date: 23 RARS: Readmission Risk Score: 10.6      Attempted to reach the patient for subsequent Care Transition call. Message left with CTN's contact information requesting return phone call. Will attempt outreach again next week.       Follow Up  Future Appointments   Date Time Provider Fatou Guzman   2023 10:20 AM [facility]   2023  9:30 AM [facility]       Tia Tomlin RN

## 2023-05-16 ENCOUNTER — CARE COORDINATION (OUTPATIENT)
Dept: CASE MANAGEMENT | Age: 58
End: 2023-05-16

## 2023-05-16 NOTE — CARE COORDINATION
[facility] Care Transitions Follow Up Call    Patient Current Location: [address]  Transition Nurse contacted the patient by telephone to follow up after admission on 23. Patient: Corwin Templeton  Patient : 1965   MRN: [id number]  Reason for Admission: COPD exacerbation  Discharge Date: 23 RARS: Readmission Risk Score: 10.6      2nd attempt to reach the patient for subsequent Care Transition call. Pt answered stating he was unavailable to talk. Pt was encouraged to call this CTN back when he was available. Ensured pt had CTN's contact information and he confirms he does. No further outreaches will be attempted.     If no return call by the end of today, CTN will  sign off and resolve CT episode      Follow Up  Future Appointments   Date Time Provider Fatou Guzman   2023 10:20 AM [facility]   2023  9:30 AM Jay Kimbrough MD [facility]         Salvador Uriostegui RN

## 2023-05-25 DIAGNOSIS — J44.9 CHRONIC OBSTRUCTIVE PULMONARY DISEASE, UNSPECIFIED COPD TYPE (HCC): ICD-10-CM

## 2023-05-25 DIAGNOSIS — R06.2 WHEEZING: ICD-10-CM

## 2023-05-25 RX ORDER — BUDESONIDE, GLYCOPYRROLATE, AND FORMOTEROL FUMARATE 160; 9; 4.8 UG/1; UG/1; UG/1
AEROSOL, METERED RESPIRATORY (INHALATION)
Qty: 1 EACH | Refills: 1 | Status: SHIPPED | OUTPATIENT
Start: 2023-05-25

## 2023-05-26 RX ORDER — BUDESONIDE, GLYCOPYRROLATE, AND FORMOTEROL FUMARATE 160; 9; 4.8 UG/1; UG/1; UG/1
AEROSOL, METERED RESPIRATORY (INHALATION)
Qty: 11 G | Refills: 0 | OUTPATIENT
Start: 2023-05-26

## 2023-06-18 ENCOUNTER — APPOINTMENT (OUTPATIENT)
Dept: GENERAL RADIOLOGY | Age: 58
DRG: 190 | End: 2023-06-18
Payer: MEDICARE

## 2023-06-18 ENCOUNTER — HOSPITAL ENCOUNTER (INPATIENT)
Age: 58
LOS: 7 days | Discharge: HOME | DRG: 190 | End: 2023-06-25
Attending: EMERGENCY MEDICINE | Admitting: INTERNAL MEDICINE
Payer: MEDICARE

## 2023-06-18 DIAGNOSIS — R68.89 INCREASED OXYGEN DEMAND: ICD-10-CM

## 2023-06-18 DIAGNOSIS — I10 PRIMARY HYPERTENSION: ICD-10-CM

## 2023-06-18 DIAGNOSIS — E78.9 LIPID DISORDER: ICD-10-CM

## 2023-06-18 DIAGNOSIS — J44.1 COPD EXACERBATION (HCC): Primary | ICD-10-CM

## 2023-06-18 DIAGNOSIS — D72.829 LEUKOCYTOSIS, UNSPECIFIED TYPE: ICD-10-CM

## 2023-06-18 LAB
ALBUMIN SERPL-MCNC: 4.2 G/DL (ref 3.5–5.2)
ALP SERPL-CCNC: 77 U/L (ref 40–129)
ALT SERPL-CCNC: 15 U/L (ref 0–40)
ANION GAP SERPL CALCULATED.3IONS-SCNC: 10 MMOL/L (ref 7–16)
AST SERPL-CCNC: 17 U/L (ref 0–39)
B.E.: 0.8 MMOL/L (ref -3–3)
BASOPHILS # BLD: 0.17 E9/L (ref 0–0.2)
BASOPHILS NFR BLD: 1.1 % (ref 0–2)
BILIRUB SERPL-MCNC: <0.2 MG/DL (ref 0–1.2)
BUN SERPL-MCNC: 14 MG/DL (ref 6–20)
CALCIUM SERPL-MCNC: 9.6 MG/DL (ref 8.6–10.2)
CHLORIDE SERPL-SCNC: 103 MMOL/L (ref 98–107)
CO2 SERPL-SCNC: 27 MMOL/L (ref 22–29)
COHB: 0.9 % (ref 0–1.5)
CREAT SERPL-MCNC: 0.9 MG/DL (ref 0.7–1.2)
CRITICAL: NORMAL
DATE ANALYZED: NORMAL
DATE OF COLLECTION: NORMAL
EOSINOPHIL # BLD: 1.51 E9/L (ref 0.05–0.5)
EOSINOPHIL NFR BLD: 9.7 % (ref 0–6)
ERYTHROCYTE [DISTWIDTH] IN BLOOD BY AUTOMATED COUNT: 12.7 FL (ref 11.5–15)
GLUCOSE SERPL-MCNC: 80 MG/DL (ref 74–99)
HCO3: 25.6 MMOL/L (ref 22–26)
HCT VFR BLD AUTO: 42.9 % (ref 37–54)
HGB BLD-MCNC: 14.1 G/DL (ref 12.5–16.5)
HHB: 2.7 % (ref 0–5)
IMM GRANULOCYTES # BLD: 0.11 E9/L
IMM GRANULOCYTES NFR BLD: 0.7 % (ref 0–5)
LAB: NORMAL
LACTATE BLDV-SCNC: 1.2 MMOL/L (ref 0.5–1.9)
LYMPHOCYTES # BLD: 3.18 E9/L (ref 1.5–4)
LYMPHOCYTES NFR BLD: 20.4 % (ref 20–42)
Lab: NORMAL
MCH RBC QN AUTO: 30.8 PG (ref 26–35)
MCHC RBC AUTO-ENTMCNC: 32.9 % (ref 32–34.5)
MCV RBC AUTO: 93.7 FL (ref 80–99.9)
METHB: 0.4 % (ref 0–1.5)
MODE: NORMAL
MONOCYTES # BLD: 1.64 E9/L (ref 0.1–0.95)
MONOCYTES NFR BLD: 10.5 % (ref 2–12)
NEUTROPHILS # BLD: 8.96 E9/L (ref 1.8–7.3)
NEUTS SEG NFR BLD: 57.6 % (ref 43–80)
O2 CONTENT: 20.2 ML/DL
O2 SATURATION: 97.3 % (ref 92–98.5)
O2HB: 96 % (ref 94–97)
OPERATOR ID: 7292
PATIENT TEMP: 37 C
PCO2: 41.7 MMHG (ref 35–45)
PH BLOOD GAS: 7.41 (ref 7.35–7.45)
PLATELET # BLD AUTO: 313 E9/L (ref 130–450)
PMV BLD AUTO: 10.7 FL (ref 7–12)
PO2: 93.1 MMHG (ref 75–100)
POLYCHROMASIA: ABNORMAL
POTASSIUM SERPL-SCNC: 4.1 MMOL/L (ref 3.5–5)
PROT SERPL-MCNC: 6.6 G/DL (ref 6.4–8.3)
RBC # BLD AUTO: 4.58 E12/L (ref 3.8–5.8)
SODIUM SERPL-SCNC: 140 MMOL/L (ref 132–146)
SOURCE, BLOOD GAS: NORMAL
TEAR DROP CELLS: ABNORMAL
THB: 14.9 G/DL (ref 11.5–16.5)
TIME ANALYZED: 1706
TROPONIN, HIGH SENSITIVITY: 12 NG/L (ref 0–11)
TROPONIN, HIGH SENSITIVITY: 12 NG/L (ref 0–11)
WBC # BLD: 15.6 E9/L (ref 4.5–11.5)

## 2023-06-18 PROCEDURE — 6370000000 HC RX 637 (ALT 250 FOR IP): Performed by: INTERNAL MEDICINE

## 2023-06-18 PROCEDURE — 71045 X-RAY EXAM CHEST 1 VIEW: CPT

## 2023-06-18 PROCEDURE — 6360000002 HC RX W HCPCS: Performed by: INTERNAL MEDICINE

## 2023-06-18 PROCEDURE — 94640 AIRWAY INHALATION TREATMENT: CPT

## 2023-06-18 PROCEDURE — 2500000003 HC RX 250 WO HCPCS

## 2023-06-18 PROCEDURE — 2140000000 HC CCU INTERMEDIATE R&B

## 2023-06-18 PROCEDURE — 93005 ELECTROCARDIOGRAM TRACING: CPT

## 2023-06-18 PROCEDURE — 99285 EMERGENCY DEPT VISIT HI MDM: CPT

## 2023-06-18 PROCEDURE — 85025 COMPLETE CBC W/AUTO DIFF WBC: CPT

## 2023-06-18 PROCEDURE — 2700000000 HC OXYGEN THERAPY PER DAY

## 2023-06-18 PROCEDURE — 6370000000 HC RX 637 (ALT 250 FOR IP)

## 2023-06-18 PROCEDURE — 87040 BLOOD CULTURE FOR BACTERIA: CPT

## 2023-06-18 PROCEDURE — 82805 BLOOD GASES W/O2 SATURATION: CPT

## 2023-06-18 PROCEDURE — 83605 ASSAY OF LACTIC ACID: CPT

## 2023-06-18 PROCEDURE — 2580000003 HC RX 258

## 2023-06-18 PROCEDURE — 80053 COMPREHEN METABOLIC PANEL: CPT

## 2023-06-18 PROCEDURE — 6360000002 HC RX W HCPCS

## 2023-06-18 PROCEDURE — 96374 THER/PROPH/DIAG INJ IV PUSH: CPT

## 2023-06-18 PROCEDURE — 36415 COLL VENOUS BLD VENIPUNCTURE: CPT

## 2023-06-18 PROCEDURE — 84484 ASSAY OF TROPONIN QUANT: CPT

## 2023-06-18 RX ORDER — MAGNESIUM SULFATE IN WATER 40 MG/ML
2000 INJECTION, SOLUTION INTRAVENOUS ONCE
Status: COMPLETED | OUTPATIENT
Start: 2023-06-18 | End: 2023-06-18

## 2023-06-18 RX ORDER — IPRATROPIUM BROMIDE AND ALBUTEROL SULFATE 2.5; .5 MG/3ML; MG/3ML
1 SOLUTION RESPIRATORY (INHALATION)
Status: COMPLETED | OUTPATIENT
Start: 2023-06-18 | End: 2023-06-18

## 2023-06-18 RX ORDER — PANTOPRAZOLE SODIUM 40 MG/1
40 TABLET, DELAYED RELEASE ORAL
Status: DISCONTINUED | OUTPATIENT
Start: 2023-06-19 | End: 2023-06-25 | Stop reason: HOSPADM

## 2023-06-18 RX ORDER — ENOXAPARIN SODIUM 100 MG/ML
30 INJECTION SUBCUTANEOUS 2 TIMES DAILY
Status: DISCONTINUED | OUTPATIENT
Start: 2023-06-18 | End: 2023-06-25 | Stop reason: HOSPADM

## 2023-06-18 RX ORDER — ONDANSETRON 4 MG/1
4 TABLET, ORALLY DISINTEGRATING ORAL EVERY 8 HOURS PRN
Status: DISCONTINUED | OUTPATIENT
Start: 2023-06-18 | End: 2023-06-25 | Stop reason: HOSPADM

## 2023-06-18 RX ORDER — BUSPIRONE HYDROCHLORIDE 5 MG/1
15 TABLET ORAL 2 TIMES DAILY
Status: DISCONTINUED | OUTPATIENT
Start: 2023-06-18 | End: 2023-06-25 | Stop reason: HOSPADM

## 2023-06-18 RX ORDER — MAGNESIUM SULFATE IN WATER 40 MG/ML
2000 INJECTION, SOLUTION INTRAVENOUS ONCE
Status: DISCONTINUED | OUTPATIENT
Start: 2023-06-18 | End: 2023-06-18

## 2023-06-18 RX ORDER — POLYETHYLENE GLYCOL 3350 17 G/17G
17 POWDER, FOR SOLUTION ORAL DAILY PRN
Status: DISCONTINUED | OUTPATIENT
Start: 2023-06-18 | End: 2023-06-25 | Stop reason: HOSPADM

## 2023-06-18 RX ORDER — ASPIRIN 81 MG/1
81 TABLET ORAL DAILY
Status: DISCONTINUED | OUTPATIENT
Start: 2023-06-19 | End: 2023-06-25 | Stop reason: HOSPADM

## 2023-06-18 RX ORDER — IPRATROPIUM BROMIDE AND ALBUTEROL SULFATE 2.5; .5 MG/3ML; MG/3ML
1 SOLUTION RESPIRATORY (INHALATION) EVERY 4 HOURS
Status: DISCONTINUED | OUTPATIENT
Start: 2023-06-18 | End: 2023-06-25 | Stop reason: HOSPADM

## 2023-06-18 RX ORDER — ACETAMINOPHEN 500 MG
1000 TABLET ORAL ONCE
Status: COMPLETED | OUTPATIENT
Start: 2023-06-18 | End: 2023-06-18

## 2023-06-18 RX ORDER — ACETAMINOPHEN 325 MG/1
650 TABLET ORAL EVERY 6 HOURS PRN
Status: DISCONTINUED | OUTPATIENT
Start: 2023-06-18 | End: 2023-06-25 | Stop reason: HOSPADM

## 2023-06-18 RX ORDER — GABAPENTIN 300 MG/1
600 CAPSULE ORAL 3 TIMES DAILY
Status: DISCONTINUED | OUTPATIENT
Start: 2023-06-18 | End: 2023-06-25 | Stop reason: HOSPADM

## 2023-06-18 RX ORDER — GUAIFENESIN/DEXTROMETHORPHAN 100-10MG/5
10 SYRUP ORAL EVERY 4 HOURS PRN
Status: DISCONTINUED | OUTPATIENT
Start: 2023-06-18 | End: 2023-06-25 | Stop reason: HOSPADM

## 2023-06-18 RX ORDER — TRAZODONE HYDROCHLORIDE 50 MG/1
100 TABLET ORAL NIGHTLY
Status: DISCONTINUED | OUTPATIENT
Start: 2023-06-18 | End: 2023-06-25 | Stop reason: HOSPADM

## 2023-06-18 RX ORDER — SODIUM CHLORIDE 0.9 % (FLUSH) 0.9 %
5-40 SYRINGE (ML) INJECTION EVERY 12 HOURS SCHEDULED
Status: DISCONTINUED | OUTPATIENT
Start: 2023-06-18 | End: 2023-06-25 | Stop reason: HOSPADM

## 2023-06-18 RX ORDER — BUDESONIDE 0.5 MG/2ML
0.5 INHALANT ORAL 2 TIMES DAILY
Status: DISCONTINUED | OUTPATIENT
Start: 2023-06-18 | End: 2023-06-25 | Stop reason: HOSPADM

## 2023-06-18 RX ORDER — METOPROLOL SUCCINATE 25 MG/1
12.5 TABLET, EXTENDED RELEASE ORAL DAILY
Status: DISCONTINUED | OUTPATIENT
Start: 2023-06-19 | End: 2023-06-25 | Stop reason: HOSPADM

## 2023-06-18 RX ORDER — SODIUM CHLORIDE 0.9 % (FLUSH) 0.9 %
5-40 SYRINGE (ML) INJECTION PRN
Status: DISCONTINUED | OUTPATIENT
Start: 2023-06-18 | End: 2023-06-25 | Stop reason: HOSPADM

## 2023-06-18 RX ORDER — ONDANSETRON 2 MG/ML
4 INJECTION INTRAMUSCULAR; INTRAVENOUS EVERY 6 HOURS PRN
Status: DISCONTINUED | OUTPATIENT
Start: 2023-06-18 | End: 2023-06-25 | Stop reason: HOSPADM

## 2023-06-18 RX ORDER — ACETAMINOPHEN 650 MG/1
650 SUPPOSITORY RECTAL EVERY 6 HOURS PRN
Status: DISCONTINUED | OUTPATIENT
Start: 2023-06-18 | End: 2023-06-25 | Stop reason: HOSPADM

## 2023-06-18 RX ORDER — SODIUM CHLORIDE 9 MG/ML
INJECTION, SOLUTION INTRAVENOUS PRN
Status: DISCONTINUED | OUTPATIENT
Start: 2023-06-18 | End: 2023-06-25 | Stop reason: HOSPADM

## 2023-06-18 RX ORDER — ARFORMOTEROL TARTRATE 15 UG/2ML
15 SOLUTION RESPIRATORY (INHALATION) 2 TIMES DAILY
Status: DISCONTINUED | OUTPATIENT
Start: 2023-06-18 | End: 2023-06-25 | Stop reason: HOSPADM

## 2023-06-18 RX ORDER — EZETIMIBE 10 MG/1
10 TABLET ORAL DAILY
Status: DISCONTINUED | OUTPATIENT
Start: 2023-06-19 | End: 2023-06-25 | Stop reason: HOSPADM

## 2023-06-18 RX ADMIN — MAGNESIUM SULFATE HEPTAHYDRATE 2000 MG: 40 INJECTION, SOLUTION INTRAVENOUS at 21:54

## 2023-06-18 RX ADMIN — DOXYCYCLINE 100 MG: 100 INJECTION, POWDER, LYOPHILIZED, FOR SOLUTION INTRAVENOUS at 21:54

## 2023-06-18 RX ADMIN — IPRATROPIUM BROMIDE AND ALBUTEROL SULFATE 1 DOSE: .5; 2.5 SOLUTION RESPIRATORY (INHALATION) at 20:11

## 2023-06-18 RX ADMIN — ARFORMOTEROL TARTRATE 15 MCG: 15 SOLUTION RESPIRATORY (INHALATION) at 23:30

## 2023-06-18 RX ADMIN — IPRATROPIUM BROMIDE AND ALBUTEROL SULFATE 1 DOSE: .5; 2.5 SOLUTION RESPIRATORY (INHALATION) at 20:09

## 2023-06-18 RX ADMIN — IPRATROPIUM BROMIDE AND ALBUTEROL SULFATE 1 DOSE: .5; 3 SOLUTION RESPIRATORY (INHALATION) at 17:50

## 2023-06-18 RX ADMIN — WATER 1000 MG: 1 INJECTION INTRAMUSCULAR; INTRAVENOUS; SUBCUTANEOUS at 21:53

## 2023-06-18 RX ADMIN — WATER 125 MG: 1 INJECTION INTRAMUSCULAR; INTRAVENOUS; SUBCUTANEOUS at 17:48

## 2023-06-18 RX ADMIN — BUDESONIDE 500 MCG: 0.5 INHALANT RESPIRATORY (INHALATION) at 23:30

## 2023-06-18 RX ADMIN — IPRATROPIUM BROMIDE AND ALBUTEROL SULFATE 1 DOSE: .5; 2.5 SOLUTION RESPIRATORY (INHALATION) at 20:10

## 2023-06-18 RX ADMIN — ACETAMINOPHEN 1000 MG: 500 TABLET ORAL at 21:20

## 2023-06-18 RX ADMIN — IPRATROPIUM BROMIDE AND ALBUTEROL SULFATE 1 DOSE: .5; 2.5 SOLUTION RESPIRATORY (INHALATION) at 23:30

## 2023-06-18 RX ADMIN — IPRATROPIUM BROMIDE AND ALBUTEROL SULFATE 1 DOSE: .5; 3 SOLUTION RESPIRATORY (INHALATION) at 18:00

## 2023-06-18 RX ADMIN — IPRATROPIUM BROMIDE AND ALBUTEROL SULFATE 1 DOSE: .5; 3 SOLUTION RESPIRATORY (INHALATION) at 17:40

## 2023-06-18 ASSESSMENT — ENCOUNTER SYMPTOMS
BACK PAIN: 0
RHINORRHEA: 0
COLOR CHANGE: 0
SORE THROAT: 0
COUGH: 1
TROUBLE SWALLOWING: 0
CHEST TIGHTNESS: 1
BLOOD IN STOOL: 0
SHORTNESS OF BREATH: 1

## 2023-06-18 ASSESSMENT — LIFESTYLE VARIABLES
HOW OFTEN DO YOU HAVE A DRINK CONTAINING ALCOHOL: NEVER
HOW MANY STANDARD DRINKS CONTAINING ALCOHOL DO YOU HAVE ON A TYPICAL DAY: PATIENT DOES NOT DRINK

## 2023-06-19 PROBLEM — J98.4 HERNIATION OF RIGHT LUNG: Status: ACTIVE | Noted: 2023-06-19

## 2023-06-19 PROBLEM — G47.33 OSA (OBSTRUCTIVE SLEEP APNEA): Status: ACTIVE | Noted: 2023-06-19

## 2023-06-19 PROBLEM — R12 HEARTBURN: Status: RESOLVED | Noted: 2023-04-10 | Resolved: 2023-06-19

## 2023-06-19 LAB
ANION GAP SERPL CALCULATED.3IONS-SCNC: 13 MMOL/L (ref 7–16)
B PARAP IS1001 DNA NPH QL NAA+NON-PROBE: NOT DETECTED
B PERT.PT PRMT NPH QL NAA+NON-PROBE: NOT DETECTED
BASOPHILS # BLD: 0.08 E9/L (ref 0–0.2)
BASOPHILS NFR BLD: 0.4 % (ref 0–2)
BUN SERPL-MCNC: 17 MG/DL (ref 6–20)
C PNEUM DNA NPH QL NAA+NON-PROBE: NOT DETECTED
CALCIUM SERPL-MCNC: 9.7 MG/DL (ref 8.6–10.2)
CHLORIDE SERPL-SCNC: 102 MMOL/L (ref 98–107)
CO2 SERPL-SCNC: 21 MMOL/L (ref 22–29)
CREAT SERPL-MCNC: 0.9 MG/DL (ref 0.7–1.2)
EKG ATRIAL RATE: 90 BPM
EKG P AXIS: 62 DEGREES
EKG P-R INTERVAL: 154 MS
EKG Q-T INTERVAL: 372 MS
EKG QRS DURATION: 76 MS
EKG QTC CALCULATION (BAZETT): 455 MS
EKG R AXIS: 23 DEGREES
EKG T AXIS: 57 DEGREES
EKG VENTRICULAR RATE: 90 BPM
EOSINOPHIL # BLD: 0.02 E9/L (ref 0.05–0.5)
EOSINOPHIL NFR BLD: 0.1 % (ref 0–6)
ERYTHROCYTE [DISTWIDTH] IN BLOOD BY AUTOMATED COUNT: 12.5 FL (ref 11.5–15)
FLUAV RNA NPH QL NAA+NON-PROBE: NOT DETECTED
FLUBV RNA NPH QL NAA+NON-PROBE: NOT DETECTED
GLUCOSE SERPL-MCNC: 289 MG/DL (ref 74–99)
HADV DNA NPH QL NAA+NON-PROBE: NOT DETECTED
HCOV 229E RNA NPH QL NAA+NON-PROBE: NOT DETECTED
HCOV HKU1 RNA NPH QL NAA+NON-PROBE: NOT DETECTED
HCOV NL63 RNA NPH QL NAA+NON-PROBE: NOT DETECTED
HCOV OC43 RNA NPH QL NAA+NON-PROBE: NOT DETECTED
HCT VFR BLD AUTO: 41.8 % (ref 37–54)
HGB BLD-MCNC: 13.4 G/DL (ref 12.5–16.5)
HMPV RNA NPH QL NAA+NON-PROBE: NOT DETECTED
HPIV1 RNA NPH QL NAA+NON-PROBE: NOT DETECTED
HPIV2 RNA NPH QL NAA+NON-PROBE: NOT DETECTED
HPIV3 RNA NPH QL NAA+NON-PROBE: NOT DETECTED
HPIV4 RNA NPH QL NAA+NON-PROBE: NOT DETECTED
IMM GRANULOCYTES # BLD: 0.22 E9/L
IMM GRANULOCYTES NFR BLD: 1.2 % (ref 0–5)
LACTATE BLDV-SCNC: 2.8 MMOL/L (ref 0.5–1.9)
LYMPHOCYTES # BLD: 1.02 E9/L (ref 1.5–4)
LYMPHOCYTES NFR BLD: 5.7 % (ref 20–42)
M PNEUMO DNA NPH QL NAA+NON-PROBE: NOT DETECTED
MCH RBC QN AUTO: 30.8 PG (ref 26–35)
MCHC RBC AUTO-ENTMCNC: 32.1 % (ref 32–34.5)
MCV RBC AUTO: 96.1 FL (ref 80–99.9)
METER GLUCOSE: 166 MG/DL (ref 74–99)
METER GLUCOSE: 181 MG/DL (ref 74–99)
MONOCYTES # BLD: 0.4 E9/L (ref 0.1–0.95)
MONOCYTES NFR BLD: 2.2 % (ref 2–12)
NEUTROPHILS # BLD: 16.1 E9/L (ref 1.8–7.3)
NEUTS SEG NFR BLD: 90.4 % (ref 43–80)
PLATELET # BLD AUTO: 307 E9/L (ref 130–450)
PMV BLD AUTO: 10.9 FL (ref 7–12)
POTASSIUM SERPL-SCNC: 4.3 MMOL/L (ref 3.5–5)
RBC # BLD AUTO: 4.35 E12/L (ref 3.8–5.8)
RSV RNA NPH QL NAA+NON-PROBE: NOT DETECTED
RV+EV RNA NPH QL NAA+NON-PROBE: NOT DETECTED
SARS-COV-2 RNA NPH QL NAA+NON-PROBE: NOT DETECTED
SODIUM SERPL-SCNC: 136 MMOL/L (ref 132–146)
WBC # BLD: 17.8 E9/L (ref 4.5–11.5)

## 2023-06-19 PROCEDURE — 2140000000 HC CCU INTERMEDIATE R&B

## 2023-06-19 PROCEDURE — 94640 AIRWAY INHALATION TREATMENT: CPT

## 2023-06-19 PROCEDURE — 6370000000 HC RX 637 (ALT 250 FOR IP): Performed by: INTERNAL MEDICINE

## 2023-06-19 PROCEDURE — 82962 GLUCOSE BLOOD TEST: CPT

## 2023-06-19 PROCEDURE — 2500000003 HC RX 250 WO HCPCS: Performed by: INTERNAL MEDICINE

## 2023-06-19 PROCEDURE — 6360000002 HC RX W HCPCS: Performed by: INTERNAL MEDICINE

## 2023-06-19 PROCEDURE — 6370000000 HC RX 637 (ALT 250 FOR IP)

## 2023-06-19 PROCEDURE — 2580000003 HC RX 258: Performed by: INTERNAL MEDICINE

## 2023-06-19 PROCEDURE — 0202U NFCT DS 22 TRGT SARS-COV-2: CPT

## 2023-06-19 PROCEDURE — 85025 COMPLETE CBC W/AUTO DIFF WBC: CPT

## 2023-06-19 PROCEDURE — 99223 1ST HOSP IP/OBS HIGH 75: CPT | Performed by: INTERNAL MEDICINE

## 2023-06-19 PROCEDURE — 2700000000 HC OXYGEN THERAPY PER DAY

## 2023-06-19 PROCEDURE — 36415 COLL VENOUS BLD VENIPUNCTURE: CPT

## 2023-06-19 PROCEDURE — 83605 ASSAY OF LACTIC ACID: CPT

## 2023-06-19 PROCEDURE — 80048 BASIC METABOLIC PNL TOTAL CA: CPT

## 2023-06-19 PROCEDURE — 93010 ELECTROCARDIOGRAM REPORT: CPT | Performed by: INTERNAL MEDICINE

## 2023-06-19 RX ORDER — INSULIN LISPRO 100 [IU]/ML
0-4 INJECTION, SOLUTION INTRAVENOUS; SUBCUTANEOUS
Status: DISCONTINUED | OUTPATIENT
Start: 2023-06-19 | End: 2023-06-25 | Stop reason: HOSPADM

## 2023-06-19 RX ORDER — ALBUTEROL SULFATE 90 UG/1
2 AEROSOL, METERED RESPIRATORY (INHALATION) EVERY 6 HOURS PRN
COMMUNITY

## 2023-06-19 RX ORDER — ASCORBIC ACID 500 MG
500 TABLET ORAL DAILY
COMMUNITY

## 2023-06-19 RX ORDER — NICOTINE 21 MG/24HR
1 PATCH, TRANSDERMAL 24 HOURS TRANSDERMAL EVERY 24 HOURS
COMMUNITY

## 2023-06-19 RX ORDER — INSULIN LISPRO 100 [IU]/ML
0-4 INJECTION, SOLUTION INTRAVENOUS; SUBCUTANEOUS NIGHTLY
Status: DISCONTINUED | OUTPATIENT
Start: 2023-06-19 | End: 2023-06-25 | Stop reason: HOSPADM

## 2023-06-19 RX ORDER — KETOROLAC TROMETHAMINE 30 MG/ML
15 INJECTION, SOLUTION INTRAMUSCULAR; INTRAVENOUS ONCE
Status: COMPLETED | OUTPATIENT
Start: 2023-06-19 | End: 2023-06-19

## 2023-06-19 RX ORDER — NICOTINE 21 MG/24HR
1 PATCH, TRANSDERMAL 24 HOURS TRANSDERMAL DAILY
Status: DISCONTINUED | OUTPATIENT
Start: 2023-06-19 | End: 2023-06-19

## 2023-06-19 RX ORDER — DEXTROSE MONOHYDRATE 100 MG/ML
INJECTION, SOLUTION INTRAVENOUS CONTINUOUS PRN
Status: DISCONTINUED | OUTPATIENT
Start: 2023-06-19 | End: 2023-06-25 | Stop reason: HOSPADM

## 2023-06-19 RX ORDER — NICOTINE 21 MG/24HR
1 PATCH, TRANSDERMAL 24 HOURS TRANSDERMAL DAILY
Status: DISCONTINUED | OUTPATIENT
Start: 2023-06-19 | End: 2023-06-25 | Stop reason: HOSPADM

## 2023-06-19 RX ORDER — OMEPRAZOLE 40 MG/1
40 CAPSULE, DELAYED RELEASE ORAL DAILY
COMMUNITY

## 2023-06-19 RX ORDER — LIDOCAINE 4 G/G
1 PATCH TOPICAL DAILY
Status: DISCONTINUED | OUTPATIENT
Start: 2023-06-19 | End: 2023-06-25 | Stop reason: HOSPADM

## 2023-06-19 RX ORDER — BUDESONIDE, GLYCOPYRROLATE, AND FORMOTEROL FUMARATE 160; 9; 4.8 UG/1; UG/1; UG/1
2 AEROSOL, METERED RESPIRATORY (INHALATION) 2 TIMES DAILY
COMMUNITY

## 2023-06-19 RX ADMIN — WATER 40 MG: 1 INJECTION INTRAMUSCULAR; INTRAVENOUS; SUBCUTANEOUS at 08:28

## 2023-06-19 RX ADMIN — GABAPENTIN 600 MG: 300 CAPSULE ORAL at 08:11

## 2023-06-19 RX ADMIN — GUAIFENESIN SYRUP AND DEXTROMETHORPHAN 10 ML: 100; 10 SYRUP ORAL at 18:38

## 2023-06-19 RX ADMIN — TRAZODONE HYDROCHLORIDE 100 MG: 50 TABLET ORAL at 21:01

## 2023-06-19 RX ADMIN — GABAPENTIN 600 MG: 300 CAPSULE ORAL at 21:01

## 2023-06-19 RX ADMIN — BUSPIRONE HYDROCHLORIDE 15 MG: 5 TABLET ORAL at 00:03

## 2023-06-19 RX ADMIN — DOXYCYCLINE 100 MG: 100 INJECTION, POWDER, LYOPHILIZED, FOR SOLUTION INTRAVENOUS at 08:28

## 2023-06-19 RX ADMIN — ARFORMOTEROL TARTRATE 15 MCG: 15 SOLUTION RESPIRATORY (INHALATION) at 21:13

## 2023-06-19 RX ADMIN — ASPIRIN 81 MG: 81 TABLET, COATED ORAL at 08:11

## 2023-06-19 RX ADMIN — WATER 1000 MG: 1 INJECTION INTRAMUSCULAR; INTRAVENOUS; SUBCUTANEOUS at 08:19

## 2023-06-19 RX ADMIN — DOXYCYCLINE 100 MG: 100 INJECTION, POWDER, LYOPHILIZED, FOR SOLUTION INTRAVENOUS at 21:09

## 2023-06-19 RX ADMIN — IPRATROPIUM BROMIDE AND ALBUTEROL SULFATE 1 DOSE: .5; 2.5 SOLUTION RESPIRATORY (INHALATION) at 12:22

## 2023-06-19 RX ADMIN — BUSPIRONE HYDROCHLORIDE 15 MG: 5 TABLET ORAL at 10:07

## 2023-06-19 RX ADMIN — PANTOPRAZOLE SODIUM 40 MG: 40 TABLET, DELAYED RELEASE ORAL at 08:11

## 2023-06-19 RX ADMIN — Medication 10 ML: at 21:01

## 2023-06-19 RX ADMIN — BUDESONIDE 500 MCG: 0.5 INHALANT RESPIRATORY (INHALATION) at 09:09

## 2023-06-19 RX ADMIN — GABAPENTIN 600 MG: 300 CAPSULE ORAL at 13:45

## 2023-06-19 RX ADMIN — EZETIMIBE 10 MG: 10 TABLET ORAL at 10:07

## 2023-06-19 RX ADMIN — IPRATROPIUM BROMIDE AND ALBUTEROL SULFATE 1 DOSE: .5; 2.5 SOLUTION RESPIRATORY (INHALATION) at 04:03

## 2023-06-19 RX ADMIN — GABAPENTIN 600 MG: 300 CAPSULE ORAL at 00:06

## 2023-06-19 RX ADMIN — WATER 40 MG: 1 INJECTION INTRAMUSCULAR; INTRAVENOUS; SUBCUTANEOUS at 21:02

## 2023-06-19 RX ADMIN — BUDESONIDE 500 MCG: 0.5 INHALANT RESPIRATORY (INHALATION) at 21:13

## 2023-06-19 RX ADMIN — BUSPIRONE HYDROCHLORIDE 15 MG: 5 TABLET ORAL at 21:01

## 2023-06-19 RX ADMIN — Medication 10 ML: at 08:34

## 2023-06-19 RX ADMIN — KETOROLAC TROMETHAMINE 15 MG: 30 INJECTION, SOLUTION INTRAMUSCULAR; INTRAVENOUS at 08:15

## 2023-06-19 RX ADMIN — IPRATROPIUM BROMIDE AND ALBUTEROL SULFATE 1 DOSE: .5; 2.5 SOLUTION RESPIRATORY (INHALATION) at 09:09

## 2023-06-19 RX ADMIN — ARFORMOTEROL TARTRATE 15 MCG: 15 SOLUTION RESPIRATORY (INHALATION) at 09:09

## 2023-06-19 RX ADMIN — METOPROLOL SUCCINATE 12.5 MG: 25 TABLET, EXTENDED RELEASE ORAL at 08:10

## 2023-06-19 RX ADMIN — TRAZODONE HYDROCHLORIDE 100 MG: 50 TABLET ORAL at 00:04

## 2023-06-19 RX ADMIN — IPRATROPIUM BROMIDE AND ALBUTEROL SULFATE 1 DOSE: .5; 2.5 SOLUTION RESPIRATORY (INHALATION) at 21:13

## 2023-06-19 ASSESSMENT — PAIN DESCRIPTION - DIRECTION: RADIATING_TOWARDS: BACK

## 2023-06-19 ASSESSMENT — PAIN DESCRIPTION - FREQUENCY: FREQUENCY: CONTINUOUS

## 2023-06-19 ASSESSMENT — PAIN SCALES - GENERAL
PAINLEVEL_OUTOF10: 0
PAINLEVEL_OUTOF10: 8

## 2023-06-19 ASSESSMENT — PAIN DESCRIPTION - DESCRIPTORS: DESCRIPTORS: PRESSURE;SORE;POUNDING

## 2023-06-19 ASSESSMENT — PAIN DESCRIPTION - PAIN TYPE: TYPE: CHRONIC PAIN

## 2023-06-19 ASSESSMENT — PAIN DESCRIPTION - ORIENTATION: ORIENTATION: RIGHT

## 2023-06-19 ASSESSMENT — PAIN DESCRIPTION - ONSET: ONSET: ON-GOING

## 2023-06-19 ASSESSMENT — PAIN - FUNCTIONAL ASSESSMENT: PAIN_FUNCTIONAL_ASSESSMENT: PREVENTS OR INTERFERES SOME ACTIVE ACTIVITIES AND ADLS

## 2023-06-19 ASSESSMENT — PAIN DESCRIPTION - LOCATION: LOCATION: RIB CAGE

## 2023-06-20 ENCOUNTER — APPOINTMENT (OUTPATIENT)
Dept: CT IMAGING | Age: 58
DRG: 190 | End: 2023-06-20
Payer: MEDICARE

## 2023-06-20 ENCOUNTER — TELEPHONE (OUTPATIENT)
Dept: SURGERY | Age: 58
End: 2023-06-20

## 2023-06-20 LAB
ANION GAP SERPL CALCULATED.3IONS-SCNC: 13 MMOL/L (ref 7–16)
BASOPHILS # BLD: 0.07 E9/L (ref 0–0.2)
BASOPHILS NFR BLD: 0.3 % (ref 0–2)
BUN SERPL-MCNC: 28 MG/DL (ref 6–20)
CALCIUM SERPL-MCNC: 9.9 MG/DL (ref 8.6–10.2)
CHLORIDE SERPL-SCNC: 109 MMOL/L (ref 98–107)
CO2 SERPL-SCNC: 18 MMOL/L (ref 22–29)
CREAT SERPL-MCNC: 1.1 MG/DL (ref 0.7–1.2)
EOSINOPHIL # BLD: 0.01 E9/L (ref 0.05–0.5)
EOSINOPHIL NFR BLD: 0 % (ref 0–6)
ERYTHROCYTE [DISTWIDTH] IN BLOOD BY AUTOMATED COUNT: 13 FL (ref 11.5–15)
GLUCOSE SERPL-MCNC: 126 MG/DL (ref 74–99)
HCT VFR BLD AUTO: 41 % (ref 37–54)
HGB BLD-MCNC: 12.9 G/DL (ref 12.5–16.5)
IMM GRANULOCYTES # BLD: 0.31 E9/L
IMM GRANULOCYTES NFR BLD: 1.4 % (ref 0–5)
LACTATE BLDV-SCNC: 2.2 MMOL/L (ref 0.5–2.2)
LV EF: 60 %
LVEF MODALITY: NORMAL
LYMPHOCYTES # BLD: 2.12 E9/L (ref 1.5–4)
LYMPHOCYTES NFR BLD: 9.7 % (ref 20–42)
MCH RBC QN AUTO: 30.9 PG (ref 26–35)
MCHC RBC AUTO-ENTMCNC: 31.5 % (ref 32–34.5)
MCV RBC AUTO: 98.1 FL (ref 80–99.9)
METER GLUCOSE: 120 MG/DL (ref 74–99)
METER GLUCOSE: 130 MG/DL (ref 74–99)
METER GLUCOSE: 136 MG/DL (ref 74–99)
METER GLUCOSE: 154 MG/DL (ref 74–99)
MONOCYTES # BLD: 0.87 E9/L (ref 0.1–0.95)
MONOCYTES NFR BLD: 4 % (ref 2–12)
NEUTROPHILS # BLD: 18.55 E9/L (ref 1.8–7.3)
NEUTS SEG NFR BLD: 84.6 % (ref 43–80)
PLATELET # BLD AUTO: 159 E9/L (ref 130–450)
PMV BLD AUTO: 11.4 FL (ref 7–12)
POTASSIUM SERPL-SCNC: 5.1 MMOL/L (ref 3.5–5)
RBC # BLD AUTO: 4.18 E12/L (ref 3.8–5.8)
REASON FOR REJECTION: NORMAL
REJECTED TEST: NORMAL
SODIUM SERPL-SCNC: 140 MMOL/L (ref 132–146)
WBC # BLD: 21.9 E9/L (ref 4.5–11.5)

## 2023-06-20 PROCEDURE — 87070 CULTURE OTHR SPECIMN AEROBIC: CPT

## 2023-06-20 PROCEDURE — 2500000003 HC RX 250 WO HCPCS: Performed by: INTERNAL MEDICINE

## 2023-06-20 PROCEDURE — 2580000003 HC RX 258: Performed by: INTERNAL MEDICINE

## 2023-06-20 PROCEDURE — 2700000000 HC OXYGEN THERAPY PER DAY

## 2023-06-20 PROCEDURE — 6360000002 HC RX W HCPCS: Performed by: INTERNAL MEDICINE

## 2023-06-20 PROCEDURE — 85025 COMPLETE CBC W/AUTO DIFF WBC: CPT

## 2023-06-20 PROCEDURE — 2140000000 HC CCU INTERMEDIATE R&B

## 2023-06-20 PROCEDURE — 6370000000 HC RX 637 (ALT 250 FOR IP): Performed by: INTERNAL MEDICINE

## 2023-06-20 PROCEDURE — 6360000004 HC RX CONTRAST MEDICATION: Performed by: INTERNAL MEDICINE

## 2023-06-20 PROCEDURE — 6370000000 HC RX 637 (ALT 250 FOR IP)

## 2023-06-20 PROCEDURE — 87206 SMEAR FLUORESCENT/ACID STAI: CPT

## 2023-06-20 PROCEDURE — 2580000003 HC RX 258: Performed by: STUDENT IN AN ORGANIZED HEALTH CARE EDUCATION/TRAINING PROGRAM

## 2023-06-20 PROCEDURE — 2500000003 HC RX 250 WO HCPCS: Performed by: NURSE PRACTITIONER

## 2023-06-20 PROCEDURE — 80048 BASIC METABOLIC PNL TOTAL CA: CPT

## 2023-06-20 PROCEDURE — 99232 SBSQ HOSP IP/OBS MODERATE 35: CPT | Performed by: INTERNAL MEDICINE

## 2023-06-20 PROCEDURE — 87449 NOS EACH ORGANISM AG IA: CPT

## 2023-06-20 PROCEDURE — 74176 CT ABD & PELVIS W/O CONTRAST: CPT

## 2023-06-20 PROCEDURE — 94660 CPAP INITIATION&MGMT: CPT

## 2023-06-20 PROCEDURE — 6360000002 HC RX W HCPCS: Performed by: STUDENT IN AN ORGANIZED HEALTH CARE EDUCATION/TRAINING PROGRAM

## 2023-06-20 PROCEDURE — 36415 COLL VENOUS BLD VENIPUNCTURE: CPT

## 2023-06-20 PROCEDURE — 83605 ASSAY OF LACTIC ACID: CPT

## 2023-06-20 PROCEDURE — 99222 1ST HOSP IP/OBS MODERATE 55: CPT | Performed by: INTERNAL MEDICINE

## 2023-06-20 PROCEDURE — 82962 GLUCOSE BLOOD TEST: CPT

## 2023-06-20 PROCEDURE — 93306 TTE W/DOPPLER COMPLETE: CPT

## 2023-06-20 PROCEDURE — 94640 AIRWAY INHALATION TREATMENT: CPT

## 2023-06-20 PROCEDURE — 87081 CULTURE SCREEN ONLY: CPT

## 2023-06-20 RX ORDER — SODIUM CHLORIDE, SODIUM LACTATE, POTASSIUM CHLORIDE, CALCIUM CHLORIDE 600; 310; 30; 20 MG/100ML; MG/100ML; MG/100ML; MG/100ML
INJECTION, SOLUTION INTRAVENOUS CONTINUOUS
Status: ACTIVE | OUTPATIENT
Start: 2023-06-20 | End: 2023-06-20

## 2023-06-20 RX ORDER — LIDOCAINE HYDROCHLORIDE 20 MG/ML
5 INJECTION, SOLUTION EPIDURAL; INFILTRATION; INTRACAUDAL; PERINEURAL EVERY 8 HOURS PRN
Status: DISCONTINUED | OUTPATIENT
Start: 2023-06-20 | End: 2023-06-20 | Stop reason: CLARIF

## 2023-06-20 RX ORDER — KETOROLAC TROMETHAMINE 30 MG/ML
15 INJECTION, SOLUTION INTRAMUSCULAR; INTRAVENOUS ONCE
Status: COMPLETED | OUTPATIENT
Start: 2023-06-20 | End: 2023-06-20

## 2023-06-20 RX ORDER — OXYCODONE HYDROCHLORIDE AND ACETAMINOPHEN 5; 325 MG/1; MG/1
1 TABLET ORAL ONCE
Status: COMPLETED | OUTPATIENT
Start: 2023-06-20 | End: 2023-06-20

## 2023-06-20 RX ADMIN — BUSPIRONE HYDROCHLORIDE 15 MG: 5 TABLET ORAL at 20:14

## 2023-06-20 RX ADMIN — ONDANSETRON 4 MG: 4 TABLET, ORALLY DISINTEGRATING ORAL at 03:15

## 2023-06-20 RX ADMIN — METOPROLOL SUCCINATE 12.5 MG: 25 TABLET, EXTENDED RELEASE ORAL at 08:51

## 2023-06-20 RX ADMIN — BUSPIRONE HYDROCHLORIDE 15 MG: 5 TABLET ORAL at 08:47

## 2023-06-20 RX ADMIN — KETOROLAC TROMETHAMINE 15 MG: 30 INJECTION, SOLUTION INTRAMUSCULAR; INTRAVENOUS at 03:15

## 2023-06-20 RX ADMIN — DOXYCYCLINE 100 MG: 100 INJECTION, POWDER, LYOPHILIZED, FOR SOLUTION INTRAVENOUS at 09:06

## 2023-06-20 RX ADMIN — DOXYCYCLINE 100 MG: 100 INJECTION, POWDER, LYOPHILIZED, FOR SOLUTION INTRAVENOUS at 20:07

## 2023-06-20 RX ADMIN — ENOXAPARIN SODIUM 30 MG: 100 INJECTION SUBCUTANEOUS at 08:47

## 2023-06-20 RX ADMIN — Medication 10 ML: at 20:02

## 2023-06-20 RX ADMIN — PANTOPRAZOLE SODIUM 40 MG: 40 TABLET, DELAYED RELEASE ORAL at 07:04

## 2023-06-20 RX ADMIN — Medication 10 ML: at 08:50

## 2023-06-20 RX ADMIN — BUDESONIDE 500 MCG: 0.5 INHALANT RESPIRATORY (INHALATION) at 06:15

## 2023-06-20 RX ADMIN — IPRATROPIUM BROMIDE AND ALBUTEROL SULFATE 1 DOSE: .5; 2.5 SOLUTION RESPIRATORY (INHALATION) at 01:59

## 2023-06-20 RX ADMIN — GABAPENTIN 600 MG: 300 CAPSULE ORAL at 08:48

## 2023-06-20 RX ADMIN — OXYCODONE HYDROCHLORIDE AND ACETAMINOPHEN 1 TABLET: 5; 325 TABLET ORAL at 23:24

## 2023-06-20 RX ADMIN — ARFORMOTEROL TARTRATE 15 MCG: 15 SOLUTION RESPIRATORY (INHALATION) at 20:42

## 2023-06-20 RX ADMIN — PERFLUTREN 1.5 ML: 6.52 INJECTION, SUSPENSION INTRAVENOUS at 10:49

## 2023-06-20 RX ADMIN — ARFORMOTEROL TARTRATE 15 MCG: 15 SOLUTION RESPIRATORY (INHALATION) at 06:16

## 2023-06-20 RX ADMIN — ENOXAPARIN SODIUM 30 MG: 100 INJECTION SUBCUTANEOUS at 20:14

## 2023-06-20 RX ADMIN — BUDESONIDE 500 MCG: 0.5 INHALANT RESPIRATORY (INHALATION) at 08:17

## 2023-06-20 RX ADMIN — EZETIMIBE 10 MG: 10 TABLET ORAL at 08:48

## 2023-06-20 RX ADMIN — IPRATROPIUM BROMIDE AND ALBUTEROL SULFATE 1 DOSE: .5; 2.5 SOLUTION RESPIRATORY (INHALATION) at 12:19

## 2023-06-20 RX ADMIN — IPRATROPIUM BROMIDE AND ALBUTEROL SULFATE 1 DOSE: .5; 2.5 SOLUTION RESPIRATORY (INHALATION) at 08:17

## 2023-06-20 RX ADMIN — IPRATROPIUM BROMIDE AND ALBUTEROL SULFATE 1 DOSE: .5; 2.5 SOLUTION RESPIRATORY (INHALATION) at 16:34

## 2023-06-20 RX ADMIN — SODIUM CHLORIDE, POTASSIUM CHLORIDE, SODIUM LACTATE AND CALCIUM CHLORIDE: 600; 310; 30; 20 INJECTION, SOLUTION INTRAVENOUS at 04:39

## 2023-06-20 RX ADMIN — GUAIFENESIN SYRUP AND DEXTROMETHORPHAN 10 ML: 100; 10 SYRUP ORAL at 03:14

## 2023-06-20 RX ADMIN — BUDESONIDE 500 MCG: 0.5 INHALANT RESPIRATORY (INHALATION) at 20:43

## 2023-06-20 RX ADMIN — IPRATROPIUM BROMIDE AND ALBUTEROL SULFATE 1 DOSE: .5; 2.5 SOLUTION RESPIRATORY (INHALATION) at 20:42

## 2023-06-20 RX ADMIN — Medication 5 ML: at 20:28

## 2023-06-20 RX ADMIN — TRAZODONE HYDROCHLORIDE 100 MG: 50 TABLET ORAL at 20:14

## 2023-06-20 RX ADMIN — GABAPENTIN 600 MG: 300 CAPSULE ORAL at 13:34

## 2023-06-20 RX ADMIN — GUAIFENESIN SYRUP AND DEXTROMETHORPHAN 10 ML: 100; 10 SYRUP ORAL at 10:51

## 2023-06-20 RX ADMIN — WATER 40 MG: 1 INJECTION INTRAMUSCULAR; INTRAVENOUS; SUBCUTANEOUS at 08:59

## 2023-06-20 RX ADMIN — GUAIFENESIN SYRUP AND DEXTROMETHORPHAN 10 ML: 100; 10 SYRUP ORAL at 20:23

## 2023-06-20 RX ADMIN — WATER 1000 MG: 1 INJECTION INTRAMUSCULAR; INTRAVENOUS; SUBCUTANEOUS at 08:47

## 2023-06-20 RX ADMIN — ASPIRIN 81 MG: 81 TABLET, COATED ORAL at 08:49

## 2023-06-20 RX ADMIN — GABAPENTIN 600 MG: 300 CAPSULE ORAL at 20:14

## 2023-06-20 RX ADMIN — WATER 40 MG: 1 INJECTION INTRAMUSCULAR; INTRAVENOUS; SUBCUTANEOUS at 20:02

## 2023-06-20 RX ADMIN — IPRATROPIUM BROMIDE AND ALBUTEROL SULFATE 1 DOSE: .5; 2.5 SOLUTION RESPIRATORY (INHALATION) at 06:15

## 2023-06-20 ASSESSMENT — PAIN SCALES - GENERAL
PAINLEVEL_OUTOF10: 8
PAINLEVEL_OUTOF10: 7
PAINLEVEL_OUTOF10: 10

## 2023-06-20 ASSESSMENT — PAIN DESCRIPTION - DIRECTION: RADIATING_TOWARDS: BACK

## 2023-06-20 ASSESSMENT — PAIN DESCRIPTION - ORIENTATION
ORIENTATION: RIGHT
ORIENTATION: RIGHT;UPPER

## 2023-06-20 ASSESSMENT — PAIN - FUNCTIONAL ASSESSMENT: PAIN_FUNCTIONAL_ASSESSMENT: ACTIVITIES ARE NOT PREVENTED

## 2023-06-20 ASSESSMENT — PAIN DESCRIPTION - DESCRIPTORS: DESCRIPTORS: ACHING;NAGGING;THROBBING

## 2023-06-20 ASSESSMENT — PAIN DESCRIPTION - PAIN TYPE: TYPE: CHRONIC PAIN

## 2023-06-20 ASSESSMENT — PAIN DESCRIPTION - FREQUENCY: FREQUENCY: INTERMITTENT

## 2023-06-20 ASSESSMENT — PAIN DESCRIPTION - LOCATION
LOCATION: ABDOMEN
LOCATION: ABDOMEN

## 2023-06-20 ASSESSMENT — PAIN DESCRIPTION - ONSET: ONSET: ON-GOING

## 2023-06-20 NOTE — TELEPHONE ENCOUNTER
MA contacted Carmen Bansal in surgery scheduling to cx pt procedure scheduled with Dr. Florentino Rivas tomorrow 6/21/23 due to pt currently being admitted.     Electronically signed by Ryan Diop MA on 6/20/2023 at 8:16 AM

## 2023-06-21 LAB
ANION GAP SERPL CALCULATED.3IONS-SCNC: 12 MMOL/L (ref 7–16)
BASOPHILS # BLD: 0.05 E9/L (ref 0–0.2)
BASOPHILS NFR BLD: 0.2 % (ref 0–2)
BUN SERPL-MCNC: 25 MG/DL (ref 6–20)
CALCIUM SERPL-MCNC: 10.1 MG/DL (ref 8.6–10.2)
CHLORIDE SERPL-SCNC: 104 MMOL/L (ref 98–107)
CO2 SERPL-SCNC: 24 MMOL/L (ref 22–29)
CREAT SERPL-MCNC: 0.9 MG/DL (ref 0.7–1.2)
EOSINOPHIL # BLD: 0.01 E9/L (ref 0.05–0.5)
EOSINOPHIL NFR BLD: 0 % (ref 0–6)
ERYTHROCYTE [DISTWIDTH] IN BLOOD BY AUTOMATED COUNT: 12.7 FL (ref 11.5–15)
GLUCOSE SERPL-MCNC: 127 MG/DL (ref 74–99)
HCT VFR BLD AUTO: 42.3 % (ref 37–54)
HGB BLD-MCNC: 13.5 G/DL (ref 12.5–16.5)
IMM GRANULOCYTES # BLD: 0.35 E9/L
IMM GRANULOCYTES NFR BLD: 1.7 % (ref 0–5)
LEGIONELLA AG UR QL: NORMAL
LYMPHOCYTES # BLD: 2.08 E9/L (ref 1.5–4)
LYMPHOCYTES NFR BLD: 10 % (ref 20–42)
MCH RBC QN AUTO: 30.7 PG (ref 26–35)
MCHC RBC AUTO-ENTMCNC: 31.9 % (ref 32–34.5)
MCV RBC AUTO: 96.1 FL (ref 80–99.9)
METER GLUCOSE: 120 MG/DL (ref 74–99)
METER GLUCOSE: 134 MG/DL (ref 74–99)
METER GLUCOSE: 161 MG/DL (ref 74–99)
METER GLUCOSE: 180 MG/DL (ref 74–99)
MONOCYTES # BLD: 1.25 E9/L (ref 0.1–0.95)
MONOCYTES NFR BLD: 6 % (ref 2–12)
NEUTROPHILS # BLD: 17.05 E9/L (ref 1.8–7.3)
NEUTS SEG NFR BLD: 82.1 % (ref 43–80)
PLATELET # BLD AUTO: 288 E9/L (ref 130–450)
PMV BLD AUTO: 11.1 FL (ref 7–12)
POTASSIUM SERPL-SCNC: 4.4 MMOL/L (ref 3.5–5)
RBC # BLD AUTO: 4.4 E12/L (ref 3.8–5.8)
S PNEUM AG SPEC QL: NORMAL
SODIUM SERPL-SCNC: 140 MMOL/L (ref 132–146)
WBC # BLD: 20.8 E9/L (ref 4.5–11.5)

## 2023-06-21 PROCEDURE — 2500000003 HC RX 250 WO HCPCS: Performed by: NURSE PRACTITIONER

## 2023-06-21 PROCEDURE — 2580000003 HC RX 258: Performed by: INTERNAL MEDICINE

## 2023-06-21 PROCEDURE — 82962 GLUCOSE BLOOD TEST: CPT

## 2023-06-21 PROCEDURE — 6370000000 HC RX 637 (ALT 250 FOR IP): Performed by: INTERNAL MEDICINE

## 2023-06-21 PROCEDURE — 6360000002 HC RX W HCPCS: Performed by: INTERNAL MEDICINE

## 2023-06-21 PROCEDURE — 2140000000 HC CCU INTERMEDIATE R&B

## 2023-06-21 PROCEDURE — 85025 COMPLETE CBC W/AUTO DIFF WBC: CPT

## 2023-06-21 PROCEDURE — 6370000000 HC RX 637 (ALT 250 FOR IP)

## 2023-06-21 PROCEDURE — 80048 BASIC METABOLIC PNL TOTAL CA: CPT

## 2023-06-21 PROCEDURE — 99232 SBSQ HOSP IP/OBS MODERATE 35: CPT | Performed by: INTERNAL MEDICINE

## 2023-06-21 PROCEDURE — 6360000002 HC RX W HCPCS: Performed by: STUDENT IN AN ORGANIZED HEALTH CARE EDUCATION/TRAINING PROGRAM

## 2023-06-21 PROCEDURE — 6360000002 HC RX W HCPCS

## 2023-06-21 PROCEDURE — 94640 AIRWAY INHALATION TREATMENT: CPT

## 2023-06-21 PROCEDURE — 36415 COLL VENOUS BLD VENIPUNCTURE: CPT

## 2023-06-21 PROCEDURE — 2500000003 HC RX 250 WO HCPCS: Performed by: INTERNAL MEDICINE

## 2023-06-21 PROCEDURE — 2700000000 HC OXYGEN THERAPY PER DAY

## 2023-06-21 PROCEDURE — 94660 CPAP INITIATION&MGMT: CPT

## 2023-06-21 RX ORDER — KETOROLAC TROMETHAMINE 30 MG/ML
15 INJECTION, SOLUTION INTRAMUSCULAR; INTRAVENOUS ONCE
Status: COMPLETED | OUTPATIENT
Start: 2023-06-21 | End: 2023-06-21

## 2023-06-21 RX ADMIN — IPRATROPIUM BROMIDE AND ALBUTEROL SULFATE 1 DOSE: .5; 2.5 SOLUTION RESPIRATORY (INHALATION) at 09:30

## 2023-06-21 RX ADMIN — IPRATROPIUM BROMIDE AND ALBUTEROL SULFATE 1 DOSE: .5; 2.5 SOLUTION RESPIRATORY (INHALATION) at 13:07

## 2023-06-21 RX ADMIN — BUDESONIDE 500 MCG: 0.5 INHALANT RESPIRATORY (INHALATION) at 09:30

## 2023-06-21 RX ADMIN — METOPROLOL SUCCINATE 12.5 MG: 25 TABLET, EXTENDED RELEASE ORAL at 08:19

## 2023-06-21 RX ADMIN — BUSPIRONE HYDROCHLORIDE 15 MG: 5 TABLET ORAL at 22:01

## 2023-06-21 RX ADMIN — EZETIMIBE 10 MG: 10 TABLET ORAL at 08:20

## 2023-06-21 RX ADMIN — ASPIRIN 81 MG: 81 TABLET, COATED ORAL at 08:19

## 2023-06-21 RX ADMIN — Medication 5 ML: at 16:14

## 2023-06-21 RX ADMIN — ENOXAPARIN SODIUM 30 MG: 100 INJECTION SUBCUTANEOUS at 22:03

## 2023-06-21 RX ADMIN — ACETAMINOPHEN 650 MG: 325 TABLET ORAL at 14:24

## 2023-06-21 RX ADMIN — WATER 40 MG: 1 INJECTION INTRAMUSCULAR; INTRAVENOUS; SUBCUTANEOUS at 22:02

## 2023-06-21 RX ADMIN — ARFORMOTEROL TARTRATE 15 MCG: 15 SOLUTION RESPIRATORY (INHALATION) at 09:30

## 2023-06-21 RX ADMIN — DOXYCYCLINE 100 MG: 100 INJECTION, POWDER, LYOPHILIZED, FOR SOLUTION INTRAVENOUS at 22:06

## 2023-06-21 RX ADMIN — GABAPENTIN 600 MG: 300 CAPSULE ORAL at 22:01

## 2023-06-21 RX ADMIN — WATER 40 MG: 1 INJECTION INTRAMUSCULAR; INTRAVENOUS; SUBCUTANEOUS at 08:22

## 2023-06-21 RX ADMIN — ARFORMOTEROL TARTRATE 15 MCG: 15 SOLUTION RESPIRATORY (INHALATION) at 21:08

## 2023-06-21 RX ADMIN — Medication 5 ML: at 04:51

## 2023-06-21 RX ADMIN — KETOROLAC TROMETHAMINE 15 MG: 30 INJECTION, SOLUTION INTRAMUSCULAR; INTRAVENOUS at 22:12

## 2023-06-21 RX ADMIN — KETOROLAC TROMETHAMINE 15 MG: 30 INJECTION, SOLUTION INTRAMUSCULAR; INTRAVENOUS at 15:03

## 2023-06-21 RX ADMIN — Medication 10 ML: at 08:21

## 2023-06-21 RX ADMIN — Medication 10 ML: at 22:02

## 2023-06-21 RX ADMIN — IPRATROPIUM BROMIDE AND ALBUTEROL SULFATE 1 DOSE: .5; 2.5 SOLUTION RESPIRATORY (INHALATION) at 04:47

## 2023-06-21 RX ADMIN — IPRATROPIUM BROMIDE AND ALBUTEROL SULFATE 1 DOSE: .5; 2.5 SOLUTION RESPIRATORY (INHALATION) at 02:02

## 2023-06-21 RX ADMIN — BUSPIRONE HYDROCHLORIDE 15 MG: 5 TABLET ORAL at 08:19

## 2023-06-21 RX ADMIN — DOXYCYCLINE 100 MG: 100 INJECTION, POWDER, LYOPHILIZED, FOR SOLUTION INTRAVENOUS at 08:32

## 2023-06-21 RX ADMIN — ENOXAPARIN SODIUM 30 MG: 100 INJECTION SUBCUTANEOUS at 08:21

## 2023-06-21 RX ADMIN — PANTOPRAZOLE SODIUM 40 MG: 40 TABLET, DELAYED RELEASE ORAL at 06:03

## 2023-06-21 RX ADMIN — GUAIFENESIN SYRUP AND DEXTROMETHORPHAN 10 ML: 100; 10 SYRUP ORAL at 15:05

## 2023-06-21 RX ADMIN — WATER 1000 MG: 1 INJECTION INTRAMUSCULAR; INTRAVENOUS; SUBCUTANEOUS at 08:21

## 2023-06-21 RX ADMIN — GUAIFENESIN SYRUP AND DEXTROMETHORPHAN 10 ML: 100; 10 SYRUP ORAL at 03:49

## 2023-06-21 RX ADMIN — BUDESONIDE 500 MCG: 0.5 INHALANT RESPIRATORY (INHALATION) at 21:08

## 2023-06-21 RX ADMIN — IPRATROPIUM BROMIDE AND ALBUTEROL SULFATE 1 DOSE: .5; 2.5 SOLUTION RESPIRATORY (INHALATION) at 15:56

## 2023-06-21 RX ADMIN — TRAZODONE HYDROCHLORIDE 100 MG: 50 TABLET ORAL at 22:02

## 2023-06-21 RX ADMIN — GABAPENTIN 600 MG: 300 CAPSULE ORAL at 14:23

## 2023-06-21 RX ADMIN — GABAPENTIN 600 MG: 300 CAPSULE ORAL at 08:19

## 2023-06-21 RX ADMIN — IPRATROPIUM BROMIDE AND ALBUTEROL SULFATE 1 DOSE: .5; 2.5 SOLUTION RESPIRATORY (INHALATION) at 21:08

## 2023-06-21 ASSESSMENT — PAIN SCALES - GENERAL
PAINLEVEL_OUTOF10: 2
PAINLEVEL_OUTOF10: 0
PAINLEVEL_OUTOF10: 10
PAINLEVEL_OUTOF10: 2
PAINLEVEL_OUTOF10: 9
PAINLEVEL_OUTOF10: 10
PAINLEVEL_OUTOF10: 0

## 2023-06-21 ASSESSMENT — PAIN - FUNCTIONAL ASSESSMENT
PAIN_FUNCTIONAL_ASSESSMENT: PREVENTS OR INTERFERES SOME ACTIVE ACTIVITIES AND ADLS
PAIN_FUNCTIONAL_ASSESSMENT: ACTIVITIES ARE NOT PREVENTED

## 2023-06-21 ASSESSMENT — PAIN DESCRIPTION - LOCATION
LOCATION: ABDOMEN
LOCATION: ABDOMEN;BACK

## 2023-06-21 ASSESSMENT — PAIN DESCRIPTION - ONSET
ONSET: ON-GOING
ONSET: ON-GOING

## 2023-06-21 ASSESSMENT — PAIN DESCRIPTION - DESCRIPTORS
DESCRIPTORS: ACHING;DISCOMFORT;THROBBING
DESCRIPTORS: ACHING;CRAMPING;DISCOMFORT
DESCRIPTORS: ACHING;DISCOMFORT;CRAMPING
DESCRIPTORS: ACHING;CRAMPING;DISCOMFORT

## 2023-06-21 ASSESSMENT — PAIN DESCRIPTION - ORIENTATION
ORIENTATION: RIGHT;UPPER
ORIENTATION: RIGHT
ORIENTATION: RIGHT
ORIENTATION: RIGHT;UPPER

## 2023-06-21 ASSESSMENT — PAIN DESCRIPTION - PAIN TYPE
TYPE: ACUTE PAIN
TYPE: ACUTE PAIN

## 2023-06-21 ASSESSMENT — PAIN DESCRIPTION - FREQUENCY
FREQUENCY: INTERMITTENT
FREQUENCY: CONTINUOUS

## 2023-06-22 LAB
ANION GAP SERPL CALCULATED.3IONS-SCNC: 10 MMOL/L (ref 7–16)
BASOPHILS # BLD: 0 E9/L (ref 0–0.2)
BASOPHILS NFR BLD: 0.2 % (ref 0–2)
BUN SERPL-MCNC: 29 MG/DL (ref 6–20)
CALCIUM SERPL-MCNC: 9.9 MG/DL (ref 8.6–10.2)
CHLORIDE SERPL-SCNC: 105 MMOL/L (ref 98–107)
CO2 SERPL-SCNC: 25 MMOL/L (ref 22–29)
CREAT SERPL-MCNC: 1 MG/DL (ref 0.7–1.2)
EOSINOPHIL # BLD: 0 E9/L (ref 0.05–0.5)
EOSINOPHIL NFR BLD: 0 % (ref 0–6)
ERYTHROCYTE [DISTWIDTH] IN BLOOD BY AUTOMATED COUNT: 12.5 FL (ref 11.5–15)
GLUCOSE SERPL-MCNC: 112 MG/DL (ref 74–99)
HCT VFR BLD AUTO: 41.7 % (ref 37–54)
HGB BLD-MCNC: 13.8 G/DL (ref 12.5–16.5)
LYMPHOCYTES # BLD: 3.33 E9/L (ref 1.5–4)
LYMPHOCYTES NFR BLD: 16.2 % (ref 20–42)
MCH RBC QN AUTO: 31.3 PG (ref 26–35)
MCHC RBC AUTO-ENTMCNC: 33.1 % (ref 32–34.5)
MCV RBC AUTO: 94.6 FL (ref 80–99.9)
METER GLUCOSE: 105 MG/DL (ref 74–99)
METER GLUCOSE: 129 MG/DL (ref 74–99)
METER GLUCOSE: 171 MG/DL (ref 74–99)
METER GLUCOSE: 202 MG/DL (ref 74–99)
MONOCYTES # BLD: 0.83 E9/L (ref 0.1–0.95)
MONOCYTES NFR BLD: 4.3 % (ref 2–12)
MRSA SPEC QL CULT: NORMAL
MYELOCYTES NFR BLD MANUAL: 0.9 % (ref 0–0)
NEUTROPHILS # BLD: 16.64 E9/L (ref 1.8–7.3)
NEUTS SEG NFR BLD: 78.6 % (ref 43–80)
OVALOCYTES: ABNORMAL
PLATELET # BLD AUTO: 345 E9/L (ref 130–450)
PMV BLD AUTO: 10.8 FL (ref 7–12)
POIKILOCYTES: ABNORMAL
POTASSIUM SERPL-SCNC: 4.8 MMOL/L (ref 3.5–5)
RBC # BLD AUTO: 4.41 E12/L (ref 3.8–5.8)
SODIUM SERPL-SCNC: 140 MMOL/L (ref 132–146)
WBC # BLD: 20.8 E9/L (ref 4.5–11.5)

## 2023-06-22 PROCEDURE — 6370000000 HC RX 637 (ALT 250 FOR IP)

## 2023-06-22 PROCEDURE — 6370000000 HC RX 637 (ALT 250 FOR IP): Performed by: INTERNAL MEDICINE

## 2023-06-22 PROCEDURE — 2140000000 HC CCU INTERMEDIATE R&B

## 2023-06-22 PROCEDURE — 2580000003 HC RX 258: Performed by: INTERNAL MEDICINE

## 2023-06-22 PROCEDURE — 2500000003 HC RX 250 WO HCPCS: Performed by: INTERNAL MEDICINE

## 2023-06-22 PROCEDURE — 2700000000 HC OXYGEN THERAPY PER DAY

## 2023-06-22 PROCEDURE — 6360000002 HC RX W HCPCS: Performed by: INTERNAL MEDICINE

## 2023-06-22 PROCEDURE — 99232 SBSQ HOSP IP/OBS MODERATE 35: CPT | Performed by: INTERNAL MEDICINE

## 2023-06-22 PROCEDURE — 80048 BASIC METABOLIC PNL TOTAL CA: CPT

## 2023-06-22 PROCEDURE — 6360000002 HC RX W HCPCS

## 2023-06-22 PROCEDURE — 94660 CPAP INITIATION&MGMT: CPT

## 2023-06-22 PROCEDURE — 94640 AIRWAY INHALATION TREATMENT: CPT

## 2023-06-22 PROCEDURE — 94667 MNPJ CHEST WALL 1ST: CPT

## 2023-06-22 PROCEDURE — 82962 GLUCOSE BLOOD TEST: CPT

## 2023-06-22 PROCEDURE — 85025 COMPLETE CBC W/AUTO DIFF WBC: CPT

## 2023-06-22 PROCEDURE — 36415 COLL VENOUS BLD VENIPUNCTURE: CPT

## 2023-06-22 PROCEDURE — 2500000003 HC RX 250 WO HCPCS: Performed by: NURSE PRACTITIONER

## 2023-06-22 RX ORDER — KETOROLAC TROMETHAMINE 30 MG/ML
15 INJECTION, SOLUTION INTRAMUSCULAR; INTRAVENOUS ONCE
Status: COMPLETED | OUTPATIENT
Start: 2023-06-22 | End: 2023-06-22

## 2023-06-22 RX ORDER — TIZANIDINE 4 MG/1
2 TABLET ORAL EVERY 8 HOURS PRN
Status: DISCONTINUED | OUTPATIENT
Start: 2023-06-22 | End: 2023-06-23

## 2023-06-22 RX ADMIN — PANTOPRAZOLE SODIUM 40 MG: 40 TABLET, DELAYED RELEASE ORAL at 05:11

## 2023-06-22 RX ADMIN — GUAIFENESIN SYRUP AND DEXTROMETHORPHAN 10 ML: 100; 10 SYRUP ORAL at 08:54

## 2023-06-22 RX ADMIN — TIZANIDINE 2 MG: 4 TABLET ORAL at 15:21

## 2023-06-22 RX ADMIN — WATER 40 MG: 1 INJECTION INTRAMUSCULAR; INTRAVENOUS; SUBCUTANEOUS at 08:57

## 2023-06-22 RX ADMIN — WATER 40 MG: 1 INJECTION INTRAMUSCULAR; INTRAVENOUS; SUBCUTANEOUS at 21:41

## 2023-06-22 RX ADMIN — GABAPENTIN 600 MG: 300 CAPSULE ORAL at 21:49

## 2023-06-22 RX ADMIN — IPRATROPIUM BROMIDE AND ALBUTEROL SULFATE 1 DOSE: .5; 2.5 SOLUTION RESPIRATORY (INHALATION) at 09:15

## 2023-06-22 RX ADMIN — DOXYCYCLINE 100 MG: 100 INJECTION, POWDER, LYOPHILIZED, FOR SOLUTION INTRAVENOUS at 09:25

## 2023-06-22 RX ADMIN — Medication 5 ML: at 16:40

## 2023-06-22 RX ADMIN — IPRATROPIUM BROMIDE AND ALBUTEROL SULFATE 1 DOSE: .5; 2.5 SOLUTION RESPIRATORY (INHALATION) at 12:28

## 2023-06-22 RX ADMIN — GABAPENTIN 600 MG: 300 CAPSULE ORAL at 13:48

## 2023-06-22 RX ADMIN — Medication 10 ML: at 08:54

## 2023-06-22 RX ADMIN — ENOXAPARIN SODIUM 30 MG: 100 INJECTION SUBCUTANEOUS at 21:43

## 2023-06-22 RX ADMIN — IPRATROPIUM BROMIDE AND ALBUTEROL SULFATE 1 DOSE: .5; 2.5 SOLUTION RESPIRATORY (INHALATION) at 16:36

## 2023-06-22 RX ADMIN — ARFORMOTEROL TARTRATE 15 MCG: 15 SOLUTION RESPIRATORY (INHALATION) at 20:16

## 2023-06-22 RX ADMIN — TRAZODONE HYDROCHLORIDE 100 MG: 50 TABLET ORAL at 21:43

## 2023-06-22 RX ADMIN — BUSPIRONE HYDROCHLORIDE 15 MG: 5 TABLET ORAL at 21:43

## 2023-06-22 RX ADMIN — ARFORMOTEROL TARTRATE 15 MCG: 15 SOLUTION RESPIRATORY (INHALATION) at 09:15

## 2023-06-22 RX ADMIN — METOPROLOL SUCCINATE 12.5 MG: 25 TABLET, EXTENDED RELEASE ORAL at 08:56

## 2023-06-22 RX ADMIN — EZETIMIBE 10 MG: 10 TABLET ORAL at 08:56

## 2023-06-22 RX ADMIN — ASPIRIN 81 MG: 81 TABLET, COATED ORAL at 08:56

## 2023-06-22 RX ADMIN — BUDESONIDE 500 MCG: 0.5 INHALANT RESPIRATORY (INHALATION) at 20:16

## 2023-06-22 RX ADMIN — BUDESONIDE 500 MCG: 0.5 INHALANT RESPIRATORY (INHALATION) at 09:15

## 2023-06-22 RX ADMIN — IPRATROPIUM BROMIDE AND ALBUTEROL SULFATE 1 DOSE: .5; 2.5 SOLUTION RESPIRATORY (INHALATION) at 05:42

## 2023-06-22 RX ADMIN — Medication 10 ML: at 21:49

## 2023-06-22 RX ADMIN — KETOROLAC TROMETHAMINE 15 MG: 30 INJECTION, SOLUTION INTRAMUSCULAR; INTRAVENOUS at 21:44

## 2023-06-22 RX ADMIN — GABAPENTIN 600 MG: 300 CAPSULE ORAL at 08:57

## 2023-06-22 RX ADMIN — GUAIFENESIN SYRUP AND DEXTROMETHORPHAN 10 ML: 100; 10 SYRUP ORAL at 13:48

## 2023-06-22 RX ADMIN — ENOXAPARIN SODIUM 30 MG: 100 INJECTION SUBCUTANEOUS at 08:55

## 2023-06-22 RX ADMIN — BUSPIRONE HYDROCHLORIDE 15 MG: 5 TABLET ORAL at 08:55

## 2023-06-22 RX ADMIN — IPRATROPIUM BROMIDE AND ALBUTEROL SULFATE 1 DOSE: .5; 2.5 SOLUTION RESPIRATORY (INHALATION) at 20:15

## 2023-06-22 RX ADMIN — Medication 5 ML: at 09:41

## 2023-06-22 RX ADMIN — DOXYCYCLINE 100 MG: 100 INJECTION, POWDER, LYOPHILIZED, FOR SOLUTION INTRAVENOUS at 21:59

## 2023-06-22 RX ADMIN — WATER 1000 MG: 1 INJECTION INTRAMUSCULAR; INTRAVENOUS; SUBCUTANEOUS at 08:56

## 2023-06-22 ASSESSMENT — PAIN DESCRIPTION - ONSET
ONSET: ON-GOING
ONSET: ON-GOING

## 2023-06-22 ASSESSMENT — PAIN DESCRIPTION - DESCRIPTORS
DESCRIPTORS: ACHING;DISCOMFORT;SHOOTING
DESCRIPTORS: ACHING;DISCOMFORT;THROBBING
DESCRIPTORS: TENDER;DISCOMFORT;SORE

## 2023-06-22 ASSESSMENT — PAIN DESCRIPTION - ORIENTATION
ORIENTATION: RIGHT;UPPER
ORIENTATION: RIGHT
ORIENTATION: RIGHT

## 2023-06-22 ASSESSMENT — PAIN DESCRIPTION - LOCATION
LOCATION: ABDOMEN

## 2023-06-22 ASSESSMENT — PAIN DESCRIPTION - PAIN TYPE
TYPE: ACUTE PAIN
TYPE: ACUTE PAIN

## 2023-06-22 ASSESSMENT — PAIN DESCRIPTION - FREQUENCY
FREQUENCY: CONTINUOUS
FREQUENCY: CONTINUOUS

## 2023-06-22 ASSESSMENT — PAIN - FUNCTIONAL ASSESSMENT
PAIN_FUNCTIONAL_ASSESSMENT: ACTIVITIES ARE NOT PREVENTED
PAIN_FUNCTIONAL_ASSESSMENT: PREVENTS OR INTERFERES SOME ACTIVE ACTIVITIES AND ADLS

## 2023-06-22 ASSESSMENT — PAIN SCALES - GENERAL
PAINLEVEL_OUTOF10: 8
PAINLEVEL_OUTOF10: 0
PAINLEVEL_OUTOF10: 8
PAINLEVEL_OUTOF10: 10
PAINLEVEL_OUTOF10: 0
PAINLEVEL_OUTOF10: 0
PAINLEVEL_OUTOF10: 4

## 2023-06-23 LAB
ANION GAP SERPL CALCULATED.3IONS-SCNC: 14 MMOL/L (ref 7–16)
BACTERIA BLD CULT ORG #2: NORMAL
BACTERIA BLD CULT: NORMAL
BACTERIA SPEC RESP CULT: NORMAL
BASOPHILS # BLD: 0 E9/L (ref 0–0.2)
BASOPHILS NFR BLD: 0.4 % (ref 0–2)
BUN SERPL-MCNC: 29 MG/DL (ref 6–20)
CALCIUM SERPL-MCNC: 9.6 MG/DL (ref 8.6–10.2)
CHLORIDE SERPL-SCNC: 105 MMOL/L (ref 98–107)
CO2 SERPL-SCNC: 22 MMOL/L (ref 22–29)
CREAT SERPL-MCNC: 0.9 MG/DL (ref 0.7–1.2)
EOSINOPHIL # BLD: 0 E9/L (ref 0.05–0.5)
EOSINOPHIL NFR BLD: 0.1 % (ref 0–6)
ERYTHROCYTE [DISTWIDTH] IN BLOOD BY AUTOMATED COUNT: 12.2 FL (ref 11.5–15)
GLUCOSE SERPL-MCNC: 189 MG/DL (ref 74–99)
HCT VFR BLD AUTO: 40.5 % (ref 37–54)
HGB BLD-MCNC: 13.3 G/DL (ref 12.5–16.5)
LYMPHOCYTES # BLD: 2.55 E9/L (ref 1.5–4)
LYMPHOCYTES NFR BLD: 13.2 % (ref 20–42)
MCH RBC QN AUTO: 30.8 PG (ref 26–35)
MCHC RBC AUTO-ENTMCNC: 32.8 % (ref 32–34.5)
MCV RBC AUTO: 93.8 FL (ref 80–99.9)
METER GLUCOSE: 110 MG/DL (ref 74–99)
METER GLUCOSE: 168 MG/DL (ref 74–99)
METER GLUCOSE: 182 MG/DL (ref 74–99)
METER GLUCOSE: 189 MG/DL (ref 74–99)
MONOCYTES # BLD: 0.78 E9/L (ref 0.1–0.95)
MONOCYTES NFR BLD: 4.4 % (ref 2–12)
NEUTROPHILS # BLD: 16.07 E9/L (ref 1.8–7.3)
NEUTS SEG NFR BLD: 82.4 % (ref 43–80)
OVALOCYTES: ABNORMAL
PLATELET # BLD AUTO: 310 E9/L (ref 130–450)
PMV BLD AUTO: 11 FL (ref 7–12)
POIKILOCYTES: ABNORMAL
POTASSIUM SERPL-SCNC: 4.7 MMOL/L (ref 3.5–5)
RBC # BLD AUTO: 4.32 E12/L (ref 3.8–5.8)
SMEAR, RESPIRATORY: NORMAL
SODIUM SERPL-SCNC: 141 MMOL/L (ref 132–146)
WBC # BLD: 19.6 E9/L (ref 4.5–11.5)

## 2023-06-23 PROCEDURE — 99233 SBSQ HOSP IP/OBS HIGH 50: CPT | Performed by: INTERNAL MEDICINE

## 2023-06-23 PROCEDURE — 6370000000 HC RX 637 (ALT 250 FOR IP): Performed by: INTERNAL MEDICINE

## 2023-06-23 PROCEDURE — 6360000002 HC RX W HCPCS: Performed by: INTERNAL MEDICINE

## 2023-06-23 PROCEDURE — 2140000000 HC CCU INTERMEDIATE R&B

## 2023-06-23 PROCEDURE — 2500000003 HC RX 250 WO HCPCS: Performed by: INTERNAL MEDICINE

## 2023-06-23 PROCEDURE — 2500000003 HC RX 250 WO HCPCS: Performed by: NURSE PRACTITIONER

## 2023-06-23 PROCEDURE — 36415 COLL VENOUS BLD VENIPUNCTURE: CPT

## 2023-06-23 PROCEDURE — 2700000000 HC OXYGEN THERAPY PER DAY

## 2023-06-23 PROCEDURE — 85025 COMPLETE CBC W/AUTO DIFF WBC: CPT

## 2023-06-23 PROCEDURE — 94660 CPAP INITIATION&MGMT: CPT

## 2023-06-23 PROCEDURE — 80048 BASIC METABOLIC PNL TOTAL CA: CPT

## 2023-06-23 PROCEDURE — 6370000000 HC RX 637 (ALT 250 FOR IP)

## 2023-06-23 PROCEDURE — 99232 SBSQ HOSP IP/OBS MODERATE 35: CPT | Performed by: INTERNAL MEDICINE

## 2023-06-23 PROCEDURE — 2580000003 HC RX 258: Performed by: INTERNAL MEDICINE

## 2023-06-23 PROCEDURE — 82962 GLUCOSE BLOOD TEST: CPT

## 2023-06-23 PROCEDURE — 94640 AIRWAY INHALATION TREATMENT: CPT

## 2023-06-23 RX ORDER — TIZANIDINE 4 MG/1
4 TABLET ORAL EVERY 8 HOURS PRN
Status: DISCONTINUED | OUTPATIENT
Start: 2023-06-23 | End: 2023-06-25 | Stop reason: HOSPADM

## 2023-06-23 RX ORDER — PREDNISONE 20 MG/1
20 TABLET ORAL DAILY
Status: DISCONTINUED | OUTPATIENT
Start: 2023-06-24 | End: 2023-06-25 | Stop reason: HOSPADM

## 2023-06-23 RX ADMIN — BUSPIRONE HYDROCHLORIDE 15 MG: 5 TABLET ORAL at 08:57

## 2023-06-23 RX ADMIN — TIZANIDINE 2 MG: 4 TABLET ORAL at 04:23

## 2023-06-23 RX ADMIN — BUDESONIDE 500 MCG: 0.5 INHALANT RESPIRATORY (INHALATION) at 09:39

## 2023-06-23 RX ADMIN — WATER 40 MG: 1 INJECTION INTRAMUSCULAR; INTRAVENOUS; SUBCUTANEOUS at 08:58

## 2023-06-23 RX ADMIN — DOXYCYCLINE 100 MG: 100 INJECTION, POWDER, LYOPHILIZED, FOR SOLUTION INTRAVENOUS at 09:23

## 2023-06-23 RX ADMIN — ASPIRIN 81 MG: 81 TABLET, COATED ORAL at 08:57

## 2023-06-23 RX ADMIN — BUSPIRONE HYDROCHLORIDE 15 MG: 5 TABLET ORAL at 21:20

## 2023-06-23 RX ADMIN — GABAPENTIN 600 MG: 300 CAPSULE ORAL at 14:37

## 2023-06-23 RX ADMIN — GABAPENTIN 600 MG: 300 CAPSULE ORAL at 08:57

## 2023-06-23 RX ADMIN — ENOXAPARIN SODIUM 30 MG: 100 INJECTION SUBCUTANEOUS at 21:23

## 2023-06-23 RX ADMIN — Medication 5 ML: at 13:59

## 2023-06-23 RX ADMIN — TRAZODONE HYDROCHLORIDE 100 MG: 50 TABLET ORAL at 21:22

## 2023-06-23 RX ADMIN — IPRATROPIUM BROMIDE AND ALBUTEROL SULFATE 1 DOSE: .5; 2.5 SOLUTION RESPIRATORY (INHALATION) at 16:30

## 2023-06-23 RX ADMIN — IPRATROPIUM BROMIDE AND ALBUTEROL SULFATE 1 DOSE: .5; 2.5 SOLUTION RESPIRATORY (INHALATION) at 05:23

## 2023-06-23 RX ADMIN — METOPROLOL SUCCINATE 12.5 MG: 25 TABLET, EXTENDED RELEASE ORAL at 08:57

## 2023-06-23 RX ADMIN — ARFORMOTEROL TARTRATE 15 MCG: 15 SOLUTION RESPIRATORY (INHALATION) at 05:25

## 2023-06-23 RX ADMIN — TIZANIDINE 4 MG: 4 TABLET ORAL at 11:33

## 2023-06-23 RX ADMIN — IPRATROPIUM BROMIDE AND ALBUTEROL SULFATE 1 DOSE: .5; 2.5 SOLUTION RESPIRATORY (INHALATION) at 20:54

## 2023-06-23 RX ADMIN — DOXYCYCLINE 100 MG: 100 INJECTION, POWDER, LYOPHILIZED, FOR SOLUTION INTRAVENOUS at 21:35

## 2023-06-23 RX ADMIN — BUDESONIDE 500 MCG: 0.5 INHALANT RESPIRATORY (INHALATION) at 05:25

## 2023-06-23 RX ADMIN — GABAPENTIN 600 MG: 300 CAPSULE ORAL at 21:21

## 2023-06-23 RX ADMIN — IPRATROPIUM BROMIDE AND ALBUTEROL SULFATE 1 DOSE: .5; 2.5 SOLUTION RESPIRATORY (INHALATION) at 13:57

## 2023-06-23 RX ADMIN — WATER 1000 MG: 1 INJECTION INTRAMUSCULAR; INTRAVENOUS; SUBCUTANEOUS at 08:59

## 2023-06-23 RX ADMIN — ARFORMOTEROL TARTRATE 15 MCG: 15 SOLUTION RESPIRATORY (INHALATION) at 20:54

## 2023-06-23 RX ADMIN — IPRATROPIUM BROMIDE AND ALBUTEROL SULFATE 1 DOSE: .5; 2.5 SOLUTION RESPIRATORY (INHALATION) at 09:39

## 2023-06-23 RX ADMIN — PANTOPRAZOLE SODIUM 40 MG: 40 TABLET, DELAYED RELEASE ORAL at 05:50

## 2023-06-23 RX ADMIN — Medication 10 ML: at 09:00

## 2023-06-23 RX ADMIN — BUDESONIDE 500 MCG: 0.5 INHALANT RESPIRATORY (INHALATION) at 20:54

## 2023-06-23 RX ADMIN — ENOXAPARIN SODIUM 30 MG: 100 INJECTION SUBCUTANEOUS at 08:56

## 2023-06-23 RX ADMIN — TIZANIDINE 4 MG: 4 TABLET ORAL at 21:23

## 2023-06-23 RX ADMIN — EZETIMIBE 10 MG: 10 TABLET ORAL at 08:57

## 2023-06-23 RX ADMIN — ARFORMOTEROL TARTRATE 15 MCG: 15 SOLUTION RESPIRATORY (INHALATION) at 09:39

## 2023-06-23 ASSESSMENT — ENCOUNTER SYMPTOMS
SINUS PRESSURE: 0
SORE THROAT: 0
VOMITING: 0
COUGH: 1
ABDOMINAL DISTENTION: 0
COLOR CHANGE: 0
NAUSEA: 0
ABDOMINAL PAIN: 0
DIARRHEA: 0
SHORTNESS OF BREATH: 1
BACK PAIN: 0
WHEEZING: 1

## 2023-06-23 ASSESSMENT — PAIN SCALES - GENERAL
PAINLEVEL_OUTOF10: 0
PAINLEVEL_OUTOF10: 8

## 2023-06-24 LAB
ANION GAP SERPL CALCULATED.3IONS-SCNC: 10 MMOL/L (ref 7–16)
BASOPHILS # BLD: 0 E9/L (ref 0–0.2)
BASOPHILS NFR BLD: 0.5 % (ref 0–2)
BUN SERPL-MCNC: 29 MG/DL (ref 6–20)
CALCIUM SERPL-MCNC: 9.1 MG/DL (ref 8.6–10.2)
CHLORIDE SERPL-SCNC: 103 MMOL/L (ref 98–107)
CO2 SERPL-SCNC: 28 MMOL/L (ref 22–29)
CREAT SERPL-MCNC: 1 MG/DL (ref 0.7–1.2)
EOSINOPHIL # BLD: 0.5 E9/L (ref 0.05–0.5)
EOSINOPHIL NFR BLD: 2.6 % (ref 0–6)
ERYTHROCYTE [DISTWIDTH] IN BLOOD BY AUTOMATED COUNT: 12.4 FL (ref 11.5–15)
GLUCOSE SERPL-MCNC: 105 MG/DL (ref 74–99)
HCT VFR BLD AUTO: 42.2 % (ref 37–54)
HGB BLD-MCNC: 13.5 G/DL (ref 12.5–16.5)
LYMPHOCYTES # BLD: 4.99 E9/L (ref 1.5–4)
LYMPHOCYTES NFR BLD: 25.9 % (ref 20–42)
MCH RBC QN AUTO: 30.8 PG (ref 26–35)
MCHC RBC AUTO-ENTMCNC: 32 % (ref 32–34.5)
MCV RBC AUTO: 96.3 FL (ref 80–99.9)
METER GLUCOSE: 117 MG/DL (ref 74–99)
METER GLUCOSE: 122 MG/DL (ref 74–99)
METER GLUCOSE: 142 MG/DL (ref 74–99)
METER GLUCOSE: 80 MG/DL (ref 74–99)
MONOCYTES # BLD: 1.15 E9/L (ref 0.1–0.95)
MONOCYTES NFR BLD: 6 % (ref 2–12)
MYELOCYTES NFR BLD MANUAL: 4.3 % (ref 0–0)
NEUTROPHILS # BLD: 12.67 E9/L (ref 1.8–7.3)
NEUTS SEG NFR BLD: 61.2 % (ref 43–80)
PLATELET # BLD AUTO: 308 E9/L (ref 130–450)
PMV BLD AUTO: 10.9 FL (ref 7–12)
POTASSIUM SERPL-SCNC: 3.7 MMOL/L (ref 3.5–5)
RBC # BLD AUTO: 4.38 E12/L (ref 3.8–5.8)
RBC MORPH BLD: NORMAL
SODIUM SERPL-SCNC: 141 MMOL/L (ref 132–146)
WBC # BLD: 19.2 E9/L (ref 4.5–11.5)

## 2023-06-24 PROCEDURE — 6360000002 HC RX W HCPCS: Performed by: INTERNAL MEDICINE

## 2023-06-24 PROCEDURE — 94660 CPAP INITIATION&MGMT: CPT

## 2023-06-24 PROCEDURE — 6370000000 HC RX 637 (ALT 250 FOR IP): Performed by: INTERNAL MEDICINE

## 2023-06-24 PROCEDURE — 36415 COLL VENOUS BLD VENIPUNCTURE: CPT

## 2023-06-24 PROCEDURE — 99232 SBSQ HOSP IP/OBS MODERATE 35: CPT | Performed by: INTERNAL MEDICINE

## 2023-06-24 PROCEDURE — 80048 BASIC METABOLIC PNL TOTAL CA: CPT

## 2023-06-24 PROCEDURE — 99233 SBSQ HOSP IP/OBS HIGH 50: CPT | Performed by: INTERNAL MEDICINE

## 2023-06-24 PROCEDURE — 93005 ELECTROCARDIOGRAM TRACING: CPT | Performed by: INTERNAL MEDICINE

## 2023-06-24 PROCEDURE — 94640 AIRWAY INHALATION TREATMENT: CPT

## 2023-06-24 PROCEDURE — 94669 MECHANICAL CHEST WALL OSCILL: CPT

## 2023-06-24 PROCEDURE — 85025 COMPLETE CBC W/AUTO DIFF WBC: CPT

## 2023-06-24 PROCEDURE — 2140000000 HC CCU INTERMEDIATE R&B

## 2023-06-24 PROCEDURE — 82962 GLUCOSE BLOOD TEST: CPT

## 2023-06-24 PROCEDURE — 2580000003 HC RX 258: Performed by: INTERNAL MEDICINE

## 2023-06-24 PROCEDURE — 2700000000 HC OXYGEN THERAPY PER DAY

## 2023-06-24 PROCEDURE — 6370000000 HC RX 637 (ALT 250 FOR IP)

## 2023-06-24 RX ADMIN — Medication 10 ML: at 20:30

## 2023-06-24 RX ADMIN — TRAZODONE HYDROCHLORIDE 100 MG: 50 TABLET ORAL at 20:30

## 2023-06-24 RX ADMIN — GABAPENTIN 600 MG: 300 CAPSULE ORAL at 20:30

## 2023-06-24 RX ADMIN — EZETIMIBE 10 MG: 10 TABLET ORAL at 09:37

## 2023-06-24 RX ADMIN — BUSPIRONE HYDROCHLORIDE 15 MG: 5 TABLET ORAL at 09:37

## 2023-06-24 RX ADMIN — GABAPENTIN 600 MG: 300 CAPSULE ORAL at 09:37

## 2023-06-24 RX ADMIN — BUDESONIDE 500 MCG: 0.5 INHALANT RESPIRATORY (INHALATION) at 19:45

## 2023-06-24 RX ADMIN — IPRATROPIUM BROMIDE AND ALBUTEROL SULFATE 1 DOSE: .5; 2.5 SOLUTION RESPIRATORY (INHALATION) at 12:43

## 2023-06-24 RX ADMIN — ASPIRIN 81 MG: 81 TABLET, COATED ORAL at 09:37

## 2023-06-24 RX ADMIN — GABAPENTIN 600 MG: 300 CAPSULE ORAL at 14:53

## 2023-06-24 RX ADMIN — BUDESONIDE 500 MCG: 0.5 INHALANT RESPIRATORY (INHALATION) at 05:14

## 2023-06-24 RX ADMIN — ENOXAPARIN SODIUM 30 MG: 100 INJECTION SUBCUTANEOUS at 09:38

## 2023-06-24 RX ADMIN — ARFORMOTEROL TARTRATE 15 MCG: 15 SOLUTION RESPIRATORY (INHALATION) at 19:45

## 2023-06-24 RX ADMIN — ENOXAPARIN SODIUM 30 MG: 100 INJECTION SUBCUTANEOUS at 20:30

## 2023-06-24 RX ADMIN — IPRATROPIUM BROMIDE AND ALBUTEROL SULFATE 1 DOSE: .5; 2.5 SOLUTION RESPIRATORY (INHALATION) at 19:45

## 2023-06-24 RX ADMIN — IPRATROPIUM BROMIDE AND ALBUTEROL SULFATE 1 DOSE: .5; 2.5 SOLUTION RESPIRATORY (INHALATION) at 05:13

## 2023-06-24 RX ADMIN — ARFORMOTEROL TARTRATE 15 MCG: 15 SOLUTION RESPIRATORY (INHALATION) at 05:15

## 2023-06-24 RX ADMIN — TIZANIDINE 4 MG: 4 TABLET ORAL at 20:30

## 2023-06-24 RX ADMIN — PREDNISONE 20 MG: 20 TABLET ORAL at 09:37

## 2023-06-24 RX ADMIN — TIZANIDINE 4 MG: 4 TABLET ORAL at 05:34

## 2023-06-24 RX ADMIN — Medication 10 ML: at 09:39

## 2023-06-24 RX ADMIN — PANTOPRAZOLE SODIUM 40 MG: 40 TABLET, DELAYED RELEASE ORAL at 05:34

## 2023-06-24 RX ADMIN — BUSPIRONE HYDROCHLORIDE 15 MG: 5 TABLET ORAL at 20:28

## 2023-06-24 RX ADMIN — IPRATROPIUM BROMIDE AND ALBUTEROL SULFATE 1 DOSE: .5; 2.5 SOLUTION RESPIRATORY (INHALATION) at 15:36

## 2023-06-24 ASSESSMENT — ENCOUNTER SYMPTOMS
ABDOMINAL DISTENTION: 0
VOMITING: 0
COUGH: 1
SORE THROAT: 0
SHORTNESS OF BREATH: 1
WHEEZING: 1
DIARRHEA: 0
COLOR CHANGE: 0
ABDOMINAL PAIN: 0
SINUS PRESSURE: 0
NAUSEA: 0
BACK PAIN: 0

## 2023-06-24 ASSESSMENT — PAIN SCALES - GENERAL: PAINLEVEL_OUTOF10: 0

## 2023-06-25 VITALS
TEMPERATURE: 98 F | HEIGHT: 71 IN | SYSTOLIC BLOOD PRESSURE: 106 MMHG | RESPIRATION RATE: 19 BRPM | WEIGHT: 262 LBS | DIASTOLIC BLOOD PRESSURE: 68 MMHG | BODY MASS INDEX: 36.68 KG/M2 | OXYGEN SATURATION: 94 % | HEART RATE: 74 BPM

## 2023-06-25 LAB
ANION GAP SERPL CALCULATED.3IONS-SCNC: 12 MMOL/L (ref 7–16)
BASOPHILS # BLD: 0 E9/L (ref 0–0.2)
BASOPHILS NFR BLD: 0.7 % (ref 0–2)
BUN SERPL-MCNC: 26 MG/DL (ref 6–20)
CALCIUM SERPL-MCNC: 9.1 MG/DL (ref 8.6–10.2)
CHLORIDE SERPL-SCNC: 106 MMOL/L (ref 98–107)
CO2 SERPL-SCNC: 26 MMOL/L (ref 22–29)
CREAT SERPL-MCNC: 1.1 MG/DL (ref 0.7–1.2)
EOSINOPHIL # BLD: 0.51 E9/L (ref 0.05–0.5)
EOSINOPHIL NFR BLD: 2.6 % (ref 0–6)
ERYTHROCYTE [DISTWIDTH] IN BLOOD BY AUTOMATED COUNT: 12.4 FL (ref 11.5–15)
GLUCOSE SERPL-MCNC: 134 MG/DL (ref 74–99)
HCT VFR BLD AUTO: 42.6 % (ref 37–54)
HGB BLD-MCNC: 13.5 G/DL (ref 12.5–16.5)
LYMPHOCYTES # BLD: 6.05 E9/L (ref 1.5–4)
LYMPHOCYTES NFR BLD: 30.7 % (ref 20–42)
MCH RBC QN AUTO: 30.5 PG (ref 26–35)
MCHC RBC AUTO-ENTMCNC: 31.7 % (ref 32–34.5)
MCV RBC AUTO: 96.4 FL (ref 80–99.9)
METAMYELOCYTES NFR BLD MANUAL: 0.9 % (ref 0–1)
METER GLUCOSE: 103 MG/DL (ref 74–99)
METER GLUCOSE: 137 MG/DL (ref 74–99)
MONOCYTES # BLD: 1.56 E9/L (ref 0.1–0.95)
MONOCYTES NFR BLD: 7.9 % (ref 2–12)
MYELOCYTES NFR BLD MANUAL: 0.9 % (ref 0–0)
NEUTROPHILS # BLD: 11.51 E9/L (ref 1.8–7.3)
NEUTS SEG NFR BLD: 57 % (ref 43–80)
PLATELET # BLD AUTO: 297 E9/L (ref 130–450)
PMV BLD AUTO: 10.8 FL (ref 7–12)
POTASSIUM SERPL-SCNC: 3.7 MMOL/L (ref 3.5–5)
RBC # BLD AUTO: 4.42 E12/L (ref 3.8–5.8)
RBC MORPH BLD: NORMAL
SODIUM SERPL-SCNC: 144 MMOL/L (ref 132–146)
WBC # BLD: 19.5 E9/L (ref 4.5–11.5)

## 2023-06-25 PROCEDURE — 85025 COMPLETE CBC W/AUTO DIFF WBC: CPT

## 2023-06-25 PROCEDURE — 94640 AIRWAY INHALATION TREATMENT: CPT

## 2023-06-25 PROCEDURE — 6370000000 HC RX 637 (ALT 250 FOR IP): Performed by: INTERNAL MEDICINE

## 2023-06-25 PROCEDURE — 82962 GLUCOSE BLOOD TEST: CPT

## 2023-06-25 PROCEDURE — 6360000002 HC RX W HCPCS: Performed by: INTERNAL MEDICINE

## 2023-06-25 PROCEDURE — 2700000000 HC OXYGEN THERAPY PER DAY

## 2023-06-25 PROCEDURE — 94660 CPAP INITIATION&MGMT: CPT

## 2023-06-25 PROCEDURE — 6370000000 HC RX 637 (ALT 250 FOR IP)

## 2023-06-25 PROCEDURE — 36415 COLL VENOUS BLD VENIPUNCTURE: CPT

## 2023-06-25 PROCEDURE — 80048 BASIC METABOLIC PNL TOTAL CA: CPT

## 2023-06-25 PROCEDURE — 2580000003 HC RX 258: Performed by: INTERNAL MEDICINE

## 2023-06-25 RX ORDER — EZETIMIBE 10 MG/1
10 TABLET ORAL DAILY
Qty: 90 TABLET | Refills: 1 | Status: SHIPPED | OUTPATIENT
Start: 2023-06-25

## 2023-06-25 RX ORDER — PREDNISONE 20 MG/1
20 TABLET ORAL DAILY
Qty: 5 TABLET | Refills: 0 | Status: SHIPPED | OUTPATIENT
Start: 2023-06-26 | End: 2023-07-01

## 2023-06-25 RX ORDER — TIZANIDINE 4 MG/1
4 TABLET ORAL EVERY 8 HOURS PRN
Qty: 30 TABLET | Refills: 0 | Status: SHIPPED | OUTPATIENT
Start: 2023-06-25

## 2023-06-25 RX ORDER — METOPROLOL SUCCINATE 25 MG/1
12.5 TABLET, EXTENDED RELEASE ORAL DAILY
Qty: 30 TABLET | Refills: 1 | Status: SHIPPED | OUTPATIENT
Start: 2023-06-25

## 2023-06-25 RX ORDER — PREDNISONE 10 MG/1
10 TABLET ORAL DAILY
Qty: 5 TABLET | Refills: 0 | Status: SHIPPED | OUTPATIENT
Start: 2023-07-02 | End: 2023-07-07

## 2023-06-25 RX ORDER — LIDOCAINE 4 G/G
1 PATCH TOPICAL DAILY
Qty: 30 EACH | Refills: 0 | Status: SHIPPED | OUTPATIENT
Start: 2023-06-26 | End: 2023-07-26

## 2023-06-25 RX ORDER — GUAIFENESIN/DEXTROMETHORPHAN 100-10MG/5
10 SYRUP ORAL EVERY 4 HOURS PRN
Qty: 354 ML | Refills: 2 | Status: SHIPPED | OUTPATIENT
Start: 2023-06-25 | End: 2023-07-05

## 2023-06-25 RX ORDER — PREDNISONE 5 MG/1
5 TABLET ORAL DAILY
Qty: 4 TABLET | Refills: 0 | Status: SHIPPED | OUTPATIENT
Start: 2023-07-08 | End: 2023-07-12

## 2023-06-25 RX ADMIN — EZETIMIBE 10 MG: 10 TABLET ORAL at 09:03

## 2023-06-25 RX ADMIN — IPRATROPIUM BROMIDE AND ALBUTEROL SULFATE 1 DOSE: .5; 2.5 SOLUTION RESPIRATORY (INHALATION) at 05:13

## 2023-06-25 RX ADMIN — ASPIRIN 81 MG: 81 TABLET, COATED ORAL at 09:02

## 2023-06-25 RX ADMIN — IPRATROPIUM BROMIDE AND ALBUTEROL SULFATE 1 DOSE: .5; 2.5 SOLUTION RESPIRATORY (INHALATION) at 09:23

## 2023-06-25 RX ADMIN — TIZANIDINE 4 MG: 4 TABLET ORAL at 06:38

## 2023-06-25 RX ADMIN — ARFORMOTEROL TARTRATE 15 MCG: 15 SOLUTION RESPIRATORY (INHALATION) at 05:14

## 2023-06-25 RX ADMIN — BUSPIRONE HYDROCHLORIDE 15 MG: 5 TABLET ORAL at 09:02

## 2023-06-25 RX ADMIN — GABAPENTIN 600 MG: 300 CAPSULE ORAL at 09:02

## 2023-06-25 RX ADMIN — PANTOPRAZOLE SODIUM 40 MG: 40 TABLET, DELAYED RELEASE ORAL at 06:37

## 2023-06-25 RX ADMIN — ENOXAPARIN SODIUM 30 MG: 100 INJECTION SUBCUTANEOUS at 09:02

## 2023-06-25 RX ADMIN — Medication 10 ML: at 09:07

## 2023-06-25 RX ADMIN — PREDNISONE 20 MG: 20 TABLET ORAL at 09:02

## 2023-06-25 RX ADMIN — BUDESONIDE 500 MCG: 0.5 INHALANT RESPIRATORY (INHALATION) at 05:13

## 2023-06-25 ASSESSMENT — PAIN SCALES - GENERAL: PAINLEVEL_OUTOF10: 0

## 2023-06-26 ENCOUNTER — CARE COORDINATION (OUTPATIENT)
Dept: CASE MANAGEMENT | Age: 58
End: 2023-06-26

## 2023-06-26 DIAGNOSIS — J44.1 COPD EXACERBATION (HCC): Primary | ICD-10-CM

## 2023-06-26 LAB
EKG ATRIAL RATE: 68 BPM
EKG P AXIS: 33 DEGREES
EKG P-R INTERVAL: 134 MS
EKG Q-T INTERVAL: 396 MS
EKG QRS DURATION: 84 MS
EKG QTC CALCULATION (BAZETT): 421 MS
EKG R AXIS: -11 DEGREES
EKG T AXIS: 29 DEGREES
EKG VENTRICULAR RATE: 68 BPM

## 2023-06-26 PROCEDURE — 1111F DSCHRG MED/CURRENT MED MERGE: CPT | Performed by: INTERNAL MEDICINE

## 2023-07-05 ENCOUNTER — CARE COORDINATION (OUTPATIENT)
Dept: CASE MANAGEMENT | Age: 58
End: 2023-07-05

## 2023-07-05 NOTE — CARE COORDINATION
no appointment was previously scheduled, appointment scheduling offered: Yes. Is follow up appointment scheduled within 7 days of discharge? No.    Follow Up  Future Appointments   Date Time Provider [address]   7/7/2023  9:00 AM Andre Lewis MD [facility]   7/11/2023 10:20 AM Mer Garces DO [facility]   7/26/2023  9:30 AM Naya Noe MD [facility]       Care Transition Nurse reviewed discharge instructions with patient and discussed any barriers to care and/or understanding of plan of care after discharge. Discussed appropriate site of care based on symptoms and resources available to patient including: PCP  Specialist  When to call 911  Condition related references. The patient agrees to contact the PCP office for questions related to their healthcare. Advance Care Planning:   reviewed and current. Patients top risk factors for readmission: ineffective coping, lack of knowledge about disease, level of motivation, medical condition-COPD, HTN, and polypharmacy    Offered patient enrollment in the Remote Patient Monitoring (RPM) program for in-home monitoring: Yes, patient enrolled:     Remote Patient Monitoring Enrollment Note      Date/Time: 7/5/2023 4:14 PM    Offered patient enrollment in the [facility] Remote Patient Monitoring (RPM) program for in home monitoring for COPD and HTN. Patient accepted RPM services. Patient will be monitoring the following daily:  activity level, blood pressure heart rate, and pulse ox heart rate    CTN reviewed the information below with patient:    Emergency Contact (name and contact number): Azeb WALDRON) [phone]    [x] A member from the care coordination team will reach out to notify the patient once the RPM kit is ordered. [x] Once the kit is delivered, the [facility] team will contact the patient after UPS deliver to assist with set up.             [x] Determined BP cuff size: large (13.8\"-19.68\")      [] Determined weight scale:

## 2023-07-06 ENCOUNTER — CARE COORDINATION (OUTPATIENT)
Dept: CARE COORDINATION | Age: 58
End: 2023-07-06

## 2023-07-06 DIAGNOSIS — J44.9 COPD, SEVERE (HCC): ICD-10-CM

## 2023-07-06 DIAGNOSIS — I10 ESSENTIAL HYPERTENSION: Primary | ICD-10-CM

## 2023-07-06 NOTE — PROGRESS NOTES
7/6/23 7:43 PM       Remote Patient Monitoring Treatment Plan    Received request from ACM/STEFANIA Valerio  to order remote patient monitoring for in home monitoring of COPD and HTN and order completed. Patient will be monitoring   --blood pressure   --pulse ox   --weight Please set alert for ONLY weight gain of 5# in 7 days. Pt has no documented hx of HF.    --survey questions. Patient will engage in Remote Patient Monitoring each day to develop the skills necessary for self management. RPM Care Team Responsibilities:   Alerts will be reviewed daily and addressed within 2-4 hours during operational hours (Monday -Friday 9 am-4 pm)  Alert response and intervention documented in patient medical record  Alert response escalated to PCP per protocol and documented in patient medical record  Patient monitored over approximately  days  Discharge from program based on self-management readiness    See care coordination encounters for additional details.      Marisa Cabrales DNP, FNP-C, Remote Patient Monitoring NP, (Ph) [phone]
Yes

## 2023-07-06 NOTE — CARE COORDINATION
Remote Patient Kit Ordering Note      Date/Time:  7/6/2023 1:35 PM      [x] CCSS confirmed patient shipping address  [x] Patient will receive package over the next 1-3 business days. Someone 21 years or older must be present to sign for UPS delivery. [x] HRS will contact patient within 24 hours, an [address] [place] [address] will call the patient directly: If the patient does not answer, HRS will follow up with the clinical team notifying them about the unsuccessful attempt to contact the patient. HRS will make three call attempts to the patient. Provide patient with [facility] Virtual install number is: [phone]. [x] ACM will contact patient once equipment is active to welcome them to the program.                                                         [x] Hours of RPM monitoring - Monday-Friday 1657-1164; encourage patient to get vitals entered by [facility] each day to have the alert addressed same day. [x]CCSS mailed RPM Patient flyer to patient. All questions answered at this time. ACM made aware the RPM kit has been ordered. [facility]PM equipment order.

## 2023-07-07 ENCOUNTER — OFFICE VISIT (OUTPATIENT)
Dept: INTERNAL MEDICINE | Age: 58
End: 2023-07-07
Payer: MEDICARE

## 2023-07-07 VITALS
SYSTOLIC BLOOD PRESSURE: 107 MMHG | BODY MASS INDEX: 37.24 KG/M2 | HEART RATE: 98 BPM | WEIGHT: 266 LBS | RESPIRATION RATE: 20 BRPM | DIASTOLIC BLOOD PRESSURE: 71 MMHG | HEIGHT: 71 IN | TEMPERATURE: 97.1 F | OXYGEN SATURATION: 98 %

## 2023-07-07 DIAGNOSIS — F41.9 ANXIETY: ICD-10-CM

## 2023-07-07 DIAGNOSIS — G89.4 CHRONIC PAIN SYNDROME: ICD-10-CM

## 2023-07-07 PROCEDURE — 99212 OFFICE O/P EST SF 10 MIN: CPT

## 2023-07-07 PROCEDURE — 3017F COLORECTAL CA SCREEN DOC REV: CPT

## 2023-07-07 PROCEDURE — 3074F SYST BP LT 130 MM HG: CPT

## 2023-07-07 PROCEDURE — G8417 CALC BMI ABV UP PARAM F/U: HCPCS

## 2023-07-07 PROCEDURE — 99213 OFFICE O/P EST LOW 20 MIN: CPT

## 2023-07-07 PROCEDURE — G8427 DOCREV CUR MEDS BY ELIG CLIN: HCPCS

## 2023-07-07 PROCEDURE — 3078F DIAST BP <80 MM HG: CPT

## 2023-07-07 PROCEDURE — 1111F DSCHRG MED/CURRENT MED MERGE: CPT

## 2023-07-07 PROCEDURE — 1036F TOBACCO NON-USER: CPT

## 2023-07-07 RX ORDER — SODIUM PICOSULFATE, MAGNESIUM OXIDE, AND ANHYDROUS CITRIC ACID 10; 3.5; 12 MG/160ML; G/160ML; G/160ML
LIQUID ORAL
COMMUNITY
Start: 2023-04-10 | End: 2023-08-02

## 2023-07-07 RX ORDER — PEN NEEDLE, DIABETIC 29 G X1/2"
NEEDLE, DISPOSABLE MISCELLANEOUS
COMMUNITY
Start: 2023-06-25 | End: 2023-08-02

## 2023-07-07 RX ORDER — TIZANIDINE 4 MG/1
4 TABLET ORAL EVERY 8 HOURS PRN
Qty: 30 TABLET | Refills: 0 | Status: SHIPPED | OUTPATIENT
Start: 2023-07-07

## 2023-07-07 RX ORDER — GABAPENTIN 600 MG/1
600 TABLET ORAL 3 TIMES DAILY
Qty: 270 TABLET | Refills: 0 | Status: SHIPPED | OUTPATIENT
Start: 2023-07-07 | End: 2023-09-05

## 2023-07-07 RX ORDER — OMEPRAZOLE 40 MG/1
40 CAPSULE, DELAYED RELEASE ORAL DAILY
Qty: 30 CAPSULE | Refills: 3 | Status: SHIPPED | OUTPATIENT
Start: 2023-07-07

## 2023-07-07 RX ORDER — BUSPIRONE HYDROCHLORIDE 30 MG/1
30 TABLET ORAL 2 TIMES DAILY
Qty: 60 TABLET | Refills: 2 | Status: SHIPPED | OUTPATIENT
Start: 2023-07-07

## 2023-07-07 ASSESSMENT — ENCOUNTER SYMPTOMS
CONSTIPATION: 1
SORE THROAT: 0
DIARRHEA: 0
TROUBLE SWALLOWING: 1
COUGH: 0
ABDOMINAL PAIN: 0
SHORTNESS OF BREATH: 0

## 2023-07-07 NOTE — PROGRESS NOTES
[facility]  Internal Medicine Residency Clinic    Attending Physician Statement  I have discussed the case, including pertinent history and exam findings with the resident physician. I agree with the assessment, plan and orders as documented by the resident. I have reviewed all pertinent PMHx, PSHx, FamHx, SocialHx, medications, and allergies and updated history as appropriate. Patient here for routine follow up of medical problems. COPD   -controlled; neena did not finish his course of oral steroids  -neena states that he is using his inhalers and nebulizers appropriately   -neena had 2 coughing spells recently which he needed to use his oxygen and nebulizers for   -patient started smoking again; counseled on smoking cessation and concerns for it worsening his breathing; dsicussed use of smoking cessation aids and patient is deferring at this time     Anxiety  -increasing buspar for patinet use      Remainder of medical problems as per resident note.     Brielle Barboza DO  7/7/2023 9:37 AM

## 2023-07-07 NOTE — PROGRESS NOTES
[address] [facility]  Internal Medicine Residency Program  [facility] Note      SUBJECTIVE:  CC: had concerns including Follow-Up from Hospital.    Last Visit: 4/26/23    HPI:  Kamryn Crawford is a 62 y.o.male with PMH of COPD, CAD, HLD, GERD, renal cell cancer, anxiety, tobacco use and lumbar radiculopathy presenting to [facility] for hospital follow up. Patient was admitted from 6/18 - 6/25 for worsening shortness of breath. During admission pulmonology was consulted and followed the patient. No surgical intervention was done during admission. Patient was discharged with steroids and lidocaine nebulizer to help with improving breathing. Patient states it does help at times but he does still get short of breath when outside trying to do things in his garden. Patient also states he took up smoking again due to having increased anxiety due to not being able to work out or go to the gym compared to before being hospitalized. Patient does see Pulmonology next week. Review of Systems   Constitutional:  Negative for appetite change, chills and fever. HENT:  Positive for trouble swallowing. Negative for sore throat. Does have trouble swallowing at times (does have EGD scheduled for future date when appropriate)   Respiratory:  Negative for cough and shortness of breath. Cardiovascular:  Negative for chest pain and leg swelling. Gastrointestinal:  Positive for constipation. Negative for abdominal pain and diarrhea.      Outpatient Medications Marked as Taking for the 7/7/23 encounter (Office Visit) with Clari Hurst MD   Medication Sig Dispense Refill    CLENPIQ 10-3.5-12 MG-GM -GM/160ML SOLN solution       B-D INS SYR ULTRAFINE 1CC/30G 30G X 1/2\" 1 ML MISC USE AS DIRECTED FOR LIDOCAINE AS NEBULIZER SOLUTION      tiZANidine (ZANAFLEX) 4 MG tablet Take 1 tablet by mouth every 8 hours as needed (for right chest pain) 30 tablet 0    gabapentin (NEURONTIN) 600 MG tablet Take 1 tablet by mouth 3

## 2023-07-07 NOTE — PATIENT INSTRUCTIONS
Dear Anat Mosher,    Thank you for coming to your appointment today. I hope we have addressed all of your needs. Please make sure to do the following:  - Continue your medications as listed. -increased dose of Buspar  - Get labs drawn before our next follow up. - We will see each other again in 1-2 months. (Cancel appointment for July)     Call for a sooner appointment if you develop any new or worsening symptoms. Have a great day!     Sincerely,  Hyun Wallace M.D.  7/7/2023  9:45 AM

## 2023-07-07 NOTE — PROGRESS NOTES
[facility]  Internal Medicine Residency Clinic    Attending Physician Statement  I have discussed the case, including pertinent history and exam findings with the resident physician. Evaluated by telephone during the [id number] public health emergency. I agree with the assessment, plan and orders as documented by the resident. I have reviewed all pertinent PMHx, PSHx, FamHx, SocialHx, medications, and allergies and updated history as appropriate. Patient presents for routine follow up of medical problems. Back pain - chronic with stenosis in lumbar spine. Steroid injections from [facility] which did not help. He needs to follow-up on this. Patient to call for appointment    SOB - On Advair, tiotropium and albuterol. Progressively worse. Still smoking 0.5 ppd. Chronic cough. Has not followed with Dr. Jordyn Hein, but is willing to do this. Continue inhalers    Patient to call for appointment with Dr. Cristiane Nuñez - Not taking Wellbutrin as he felt that this was worsening his anxiety   Refer to AISHA Mitchell for further counseling. Remainder of medical problems as per resident note.     Patrick Khoury MD  2/22/2021 11:11 AM No

## 2023-07-11 ENCOUNTER — OFFICE VISIT (OUTPATIENT)
Dept: PULMONOLOGY | Age: 58
End: 2023-07-11
Payer: MEDICARE

## 2023-07-11 ENCOUNTER — CARE COORDINATION (OUTPATIENT)
Dept: CARE COORDINATION | Age: 58
End: 2023-07-11

## 2023-07-11 VITALS
SYSTOLIC BLOOD PRESSURE: 109 MMHG | HEIGHT: 71 IN | DIASTOLIC BLOOD PRESSURE: 65 MMHG | HEART RATE: 83 BPM | BODY MASS INDEX: 37.1 KG/M2 | OXYGEN SATURATION: 96 % | WEIGHT: 265 LBS | TEMPERATURE: 97.1 F | RESPIRATION RATE: 18 BRPM

## 2023-07-11 DIAGNOSIS — Z87.891 HISTORY OF TOBACCO ABUSE: ICD-10-CM

## 2023-07-11 DIAGNOSIS — J45.909 UNCOMPLICATED ASTHMA, UNSPECIFIED ASTHMA SEVERITY, UNSPECIFIED WHETHER PERSISTENT: ICD-10-CM

## 2023-07-11 DIAGNOSIS — J44.9 COPD, SEVERE (HCC): Primary | ICD-10-CM

## 2023-07-11 LAB
EXPIRATORY TIME: NORMAL
FEF 25-75% %PRED-PRE: NORMAL
FEF 25-75% PRED: NORMAL
FEF 25-75%-PRE: NORMAL
FEV1 %PRED-PRE: 57 %
FEV1 PRED: 3.77 L
FEV1/FVC %PRED-PRE: 92 %
FEV1/FVC PRED: 78 %
FEV1/FVC: 72 %
FEV1: 2.15 L
FVC %PRED-PRE: 60 %
FVC PRED: 4.88 L
FVC: 2.97 L
PEF %PRED-PRE: NORMAL
PEF PRED: NORMAL
PEF-PRE: NORMAL

## 2023-07-11 PROCEDURE — 99213 OFFICE O/P EST LOW 20 MIN: CPT | Performed by: INTERNAL MEDICINE

## 2023-07-11 PROCEDURE — 94726 PLETHYSMOGRAPHY LUNG VOLUMES: CPT | Performed by: INTERNAL MEDICINE

## 2023-07-11 PROCEDURE — 3017F COLORECTAL CA SCREEN DOC REV: CPT | Performed by: INTERNAL MEDICINE

## 2023-07-11 PROCEDURE — 94010 BREATHING CAPACITY TEST: CPT | Performed by: INTERNAL MEDICINE

## 2023-07-11 PROCEDURE — G8428 CUR MEDS NOT DOCUMENT: HCPCS | Performed by: INTERNAL MEDICINE

## 2023-07-11 PROCEDURE — 1036F TOBACCO NON-USER: CPT | Performed by: INTERNAL MEDICINE

## 2023-07-11 PROCEDURE — 1111F DSCHRG MED/CURRENT MED MERGE: CPT | Performed by: INTERNAL MEDICINE

## 2023-07-11 PROCEDURE — G8417 CALC BMI ABV UP PARAM F/U: HCPCS | Performed by: INTERNAL MEDICINE

## 2023-07-11 PROCEDURE — 3078F DIAST BP <80 MM HG: CPT | Performed by: INTERNAL MEDICINE

## 2023-07-11 PROCEDURE — 3074F SYST BP LT 130 MM HG: CPT | Performed by: INTERNAL MEDICINE

## 2023-07-11 PROCEDURE — 3023F SPIROM DOC REV: CPT | Performed by: INTERNAL MEDICINE

## 2023-07-11 RX ORDER — PREDNISONE 20 MG/1
20 TABLET ORAL DAILY
Qty: 7 TABLET | Refills: 0 | Status: SHIPPED | OUTPATIENT
Start: 2023-07-11 | End: 2023-07-18

## 2023-07-11 ASSESSMENT — PULMONARY FUNCTION TESTS
FEV1/FVC_PREDICTED: 78
FEV1_PREDICTED: 3.77
FVC: 2.97
FEV1/FVC: 72
FEV1_PERCENT_PREDICTED_PRE: 57
FEV1: 2.15
FVC_PERCENT_PREDICTED_PRE: 60
FEV1/FVC_PERCENT_PREDICTED_PRE: 92
FVC_PREDICTED: 4.88

## 2023-07-11 NOTE — CARE COORDINATION
Remote Alert Monitoring Note  Rpm alert to be reviewed by the provider   red alert   pulse ox reading (91)   Additional needs to be addressed by N/A: No          Date/Time:  7/11/2023 10:33 AM  Patient Current Location: Home: [place] [address]  LPN noted  red alert received for pulse ox reading (91). Background: High Blood-Pressure, COPD  Refer to 911 immediately if:  Patient unresponsive or unable to provide history  Change in cognition or sudden confusion  Patient unable to respond in complete sentences  Intense chest pain/tightness  Any concern for any clinical emergency  Red Alert: Provider response time of 1 hr required for any red alert requiring intervention  Yellow Alert: Provider response time of 3hr required for any escalated yellow alert  Clinical Interventions: Reviewed and followed up on alerts and treatments-Upon chart review noted Pt currently at Pulmonology appt. SpO2 in office 95%. Per chart Pt wears continuous O2 @ 4L. Pt takes Breztri daily and as albuterol rescue inhaler as needed. Plan/Follow Up: Will continue to review, monitor and address alerts with follow up based on severity of symptoms and risk factors.

## 2023-07-11 NOTE — PROGRESS NOTES
Patient to follow up with physician in 6 weeks. Patient given blood draw slips to be done prior to next office visit. Patient encouraged to avoid triggers if at all possible. Surgical masks given to patient during office visit to aid in helping the patient avoid environmental triggers.

## 2023-07-11 NOTE — PATIENT INSTRUCTIONS
[address] [place] [address]  Eugenia Dickerson N [facility]  Office: [phone]      Your were seen in the office today for COPD      We  did make changes to your medications today. - Prednisone prescribed as needed for exacerbation  - Nicotine patches and gum prescribed. Follow up in 6 weeks      Testing ordered today was Labwork please complete 2 weeks from now      Vaccines recommended Influenza when available        Please do not hesitate to call the office with any questions.

## 2023-07-11 NOTE — PROGRESS NOTES
[facility]     HISTORY OF PRESENT ILLNESS:    Baron Negron is a 62y.o. year old male here for evaluation of rest of breath. Mr. Efren Francis reports that he has had increasing shortness of breath and increasing coughing. He states that sometimes he does cough up into the point of being dizzy. He states that his shortness of breath is worse at night he is currently using both Spiriva and Advair. He also uses a Proventil inhaler as a rescue inhaler he reports that on a good day he may not need to use his Proventil at all however on a bad day with his breathing he is using it 3-4 times a night. He was previously seen once in our office by my colleague on August 19 of 2019. He reports to me that he does have a history of renal cell carcinoma he states that he had a procedure done at [address] [place] [address] in 2014 in which they took a piece of his upper lobe of his right lung and lower lobe of his right lung and he was told that he had cancer however he never had any chemotherapy or any radiation therapy it appears through review of his records that Dr. Eyad BELTRE was unable to obtain those records in 2019. The patient subsequently had a CAT scan in 2020 where he was found to have a lung nodule and was supposed to follow-up however was lost to follow-up at that time. Regarding his overall history he reports that he started smoking when he was 13 he did smoke up to 2 packs/day and is now down to 1 pack/day. 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 He reports that he has 1 dog. His hobbies include walking in the woods and looking for beckford mushrooms. Updated HPI as of August 10, 2022  Patient seen and examined. Reports that he is still smoking half pack to 15 cigarettes a day. He reports that he feels that the Rashmi does help.   He is using

## 2023-07-12 ENCOUNTER — CARE COORDINATION (OUTPATIENT)
Dept: CASE MANAGEMENT | Age: 58
End: 2023-07-12

## 2023-07-12 NOTE — CARE COORDINATION
Remote Patient Monitoring Welcome Note  Date/Time:  2023 11:48 AM  Patient Current Location: [place]  Verified patients name and  as identifiers. Completed and confirmed the following:   Emergency Contact: Ladi Lee () [phone]  [x] Patient received all RPM equipment (tablet, scale, blood pressure device and cuff, and pulse oximeter)  Cuff Size: large (13.8\"-19.68\")    Weight Scale:  regular (<330lbs)                    [x] Instructed patient keep box for use when returning equipment                                                          [x] Reviewed Patient Welcome Letter with patient                         [x] Reviewed expectations for patient and care team  Monitoring hours M-F 9-4pm  Completing monitoring by 12pm on  so that alerts can be responded to in the same day  Patient weighs self at same time every day (or after urinating and waking up)  Take blood pressure 1-2 hrs after medications   RPM team may have different phone area code (including [place], [place], [address] or [address])                              [x] Instructed patient to keep scale on flat surface                                                         [x] Instructed patient to keep tablet plugged in at all times                         [x] Instructed how to contact IT support ([phone])  [x] Provided Remote Patient Monitoring care  information               All questions answered at this time.

## 2023-07-12 NOTE — CARE COORDINATION
[facility] Care Transitions Follow Up Call    Patient Current Location:  Home: [address] [address]    Care Transition Nurse contacted the patient by telephone to follow up after admission on 23. Verified name and  with patient as identifiers. Patient: Kelsea Tovar  Patient : 1965   MRN: [id number]  Reason for Admission: COPD exacerbation  Discharge Date: 23 RARS: Readmission Risk Score: 16.7      Needs to be reviewed by the provider   Additional needs identified to be addressed with provider: No  none             Method of communication with provider: none. Spoke with patient today 23 for TCM/hospital discharge follow up sub call for COPD exacerbation. Patient states he continues with shortness of breath that is no worse than baseline. Patient states he went to the gym today for the first time since hosp discharge. States wearing home oxygen at 4 lpm continuous. Patient admits he continues smoking and is smoking 7 cigarettes per day. Patient aware not to smoke around oxygen which he verbalizes \"doing outside\". Noted Pulmonology prescribed Nicotine patch/gum at recent f/u visit. CTN confirmed with patient scripts were e-scribed to [facility] in [place] and showing confirmation receipt 23 at 11:07 am.    Patient aware of the risk involved if he continues smoking. Patient declines offer to attend a smoking cessation program saying \"it would make my anxiety worse causing me to smoke more\". Noted PCP increased Buspar dose to 30 mg twice daily at [facility] appt on 23. Patient states he uses rescue inhaler before working out per pulmonology direction. States having an associated dry cough and has Robitussin DM to take if needed for relief. Denies any chest pain or chest discomfort. Confirmed patient is enrolled in RPM program. Noted oxygen saturation today is 94% and Hr 84.      CTN reiterated COPD zone tool and knowing when to call doctor or seek medical

## 2023-07-19 ENCOUNTER — CARE COORDINATION (OUTPATIENT)
Dept: CARDIOLOGY | Age: 58
End: 2023-07-19

## 2023-07-19 NOTE — CARE COORDINATION
[facility] Care Transitions Follow Up Call    Patient: Jose Terry  Patient : 1965   MRN: [id number]  Reason for Admission: COPD exacerbation  Discharge Date: 23 RARS: Readmission Risk Score: 16.7    Attempted to contact patient today 23 for TCM/hospital discharge follow up sub call. Left message on home/mobile number requesting a return call back to CTN and provided contact information.     Peggy Pang, APRN

## 2023-07-24 ENCOUNTER — CARE COORDINATION (OUTPATIENT)
Dept: CARE COORDINATION | Age: 58
End: 2023-07-24

## 2023-07-24 ENCOUNTER — CARE COORDINATION (OUTPATIENT)
Dept: CASE MANAGEMENT | Age: 58
End: 2023-07-24

## 2023-07-24 NOTE — CARE COORDINATION
follow up appointment(s): n/a    LPN Care Coordinator reviewed discharge instructions with patient and discussed any barriers to care and/or understanding of plan of care after discharge. Discussed appropriate site of care based on symptoms and resources available to patient including: PCP  Specialist  When to call Jade Horan. The patient agrees to contact the PCP office for questions related to their healthcare. Advance Care Planning:   reviewed and current. Patients top risk factors for readmission: medical condition-COPD  Interventions to address risk factors: Obtained and reviewed discharge summary and/or continuity of care documents    Offered patient enrollment in the Remote Patient Monitoring (RPM) program for in-home monitoring: Yes, patient already enrolled. Care Transitions Subsequent and Final Call    Subsequent and Final Calls  Care Transitions Interventions     Other Services:  (Comment: 7/5/23 Pt enrolled in RPM)     Senior Services: Declined    Other Interventions:             LPN Care Coordinator provided contact information for future needs. Plan for follow-up call in 1-2 days based on severity of symptoms and risk factors.   Plan for next call: symptom management-SHORTNESS OF BREATH  cough  self management-monitor vitals with RPM daily  medication management-take all medications as prescribed    JANETTE Bautista LPN Care Transitions Nurse   [phone]

## 2023-07-24 NOTE — CARE COORDINATION
Remote Patient Monitoring Note  Date/Time:  7/24/2023 3:27 PM  Patient Current Location: Home: [facility] [place] [address]  LPN attempted to contact patient by telephone regarding yellow alert received for No Blood Pressure taken for > 2 Days, No Weight taken for > 2 Days. Background: High Blood-Pressure, COPD  Clinical Interventions: Reviewed and followed up on alerts and treatments-   LPN left HIPAA compliant message advising Pt to update RPM metrics daily. If you are having a problem with the equipment please contact [address] at [phone]. Plan/Follow Up: Will continue to review, monitor and address alerts with follow up based on severity of symptoms and risk factors.

## 2023-07-26 ENCOUNTER — CARE COORDINATION (OUTPATIENT)
Dept: CASE MANAGEMENT | Age: 58
End: 2023-07-26

## 2023-07-26 NOTE — CARE COORDINATION
previously scheduled, appointment scheduling offered: Yes. Is follow up appointment scheduled within 7 days of discharge? Yes. Follow Up  Future Appointments   Date Time Provider [address]   9/6/2023 11:20 AM [facility]   9/22/2023 10:00 AM Lizet Gray MD [facility]     Care Transition Nurse reviewed discharge instructions, medical action plan, and red flags with patient and discussed any barriers to care and/or understanding of plan of care after discharge. Discussed appropriate site of care based on symptoms and resources available to patient including: PCP  Specialist  When to call 911  Condition related references. The patient agrees to contact the PCP office for questions related to their healthcare. Advance Care Planning:   reviewed and current. Patients top risk factors for readmission: level of motivation, medical condition-COPD, HTN, and polypharmacy    Offered patient enrollment in the Remote Patient Monitoring (RPM) program for in-home monitoring: Yes, patient already enrolled. Care Transitions Subsequent and Final Call    Subsequent and Final Calls  Do you have any ongoing symptoms?: Yes  Onset of Patient-reported symptoms: In the past 7 days  Patient-reported symptoms: Shortness of Breath  Have your medications changed?: No  Do you have any questions related to your medications?: No  Do you currently have any active services?: No  Do you have any needs or concerns that I can assist you with?: No  Identified Barriers: Lack of Motivation, Lack of Education, Financial, Other  Care Transitions Interventions     Other Services:  (Comment: 7/5/23 Pt enrolled in RPM)     Senior Services: Declined    Other Interventions:             Care Transition Nurse provided contact information for future needs. No further follow-up call indicated based on severity of symptoms and risk factors.   Plan for next call: referral to ambulatory care manager-CTN signing off for

## 2023-07-31 ENCOUNTER — CARE COORDINATION (OUTPATIENT)
Dept: CARE COORDINATION | Age: 58
End: 2023-07-31

## 2023-07-31 RX ORDER — TIZANIDINE 4 MG/1
4 TABLET ORAL EVERY 8 HOURS PRN
Qty: 30 TABLET | Refills: 0 | OUTPATIENT
Start: 2023-07-31

## 2023-07-31 ASSESSMENT — ENCOUNTER SYMPTOMS: DYSPNEA ASSOCIATED WITH: MINIMAL EXERTION

## 2023-07-31 NOTE — TELEPHONE ENCOUNTER
Last Appointment:  7/7/2023  Future Appointments   Date Time Provider [address]   9/6/2023 11:20 AM [facility]   9/22/2023 10:00 AM Lizet Tineo MD [facility]

## 2023-07-31 NOTE — TELEPHONE ENCOUNTER
Refill will need to be associated with evaluation- as this was already refilled 1 x by provider for prn use.

## 2023-07-31 NOTE — CARE COORDINATION
Ambulatory Care Coordination Note  2023    Patient Current Location:  Home: [address] [address]    ACM contacted the patient by telephone. Verified name and  with patient as identifiers. Provided introduction to self, and explanation of the ACM role. ACM: Catalino Li RN    Challenges to be reviewed by the provider   Additional needs identified to be addressed with provider: Yes  medications-increased use of rescue inhaler  Wheezing and low pulse ox               Method of communication with provider: chart routing. ACM contacted Yocasta to complete enrollment in care coordination. He reports he has been having increased shortness of breath and he is using his rescue inhaler and nebulizer. He states he started taking prednisone 20 mg today that pulmonology prescribed if he develops increasing symptoms to try to avoid further hospitalizations. Medications were reviewed and no nebulizer medications are listed. AC requested Yocasta provide medication name on the vial but he declined stating \"I would get too short of breath and start coughing\". Yocasta reports his SPO2 was 88% last night with O2 4 lpm and he was unable to sleep due to coughing and wheezing. He denies any swelling or chest pain. He reports he has a right lung herniation. RPM Today /80 HR 68  lbs SPO2 95% 4 lpm via nasal cannula. Karon Clutter states he is smoking 6-7 cigarettes per day. ACM reviewed oxygen safety and Yocasta states he smokes outside without his oxygen. ACM discussed smoking cessation and he declined assistance. PLAN  Notify pulmonology of decreased SPO2 and prednisone use. Complete enrollment with next interaction. Send COPD zones through [place]. Offered patient enrollment in the Remote Patient Monitoring (RPM) program for in-home monitoring: Yes, patient already enrolled.     Ambulatory Care Coordination Assessment    Care Coordination Protocol  Referral from Primary Care Provider: I personally performed the service described in the documentation recorded by the scribe in my presence, and it accurately and completely records my words and actions.

## 2023-08-01 ENCOUNTER — APPOINTMENT (OUTPATIENT)
Dept: GENERAL RADIOLOGY | Age: 58
DRG: 191 | End: 2023-08-01
Payer: MEDICARE

## 2023-08-01 ENCOUNTER — APPOINTMENT (OUTPATIENT)
Dept: CT IMAGING | Age: 58
DRG: 191 | End: 2023-08-01
Attending: EMERGENCY MEDICINE
Payer: MEDICARE

## 2023-08-01 ENCOUNTER — TELEPHONE (OUTPATIENT)
Dept: PRIMARY CARE CLINIC | Age: 58
End: 2023-08-01

## 2023-08-01 ENCOUNTER — TELEPHONE (OUTPATIENT)
Dept: PULMONOLOGY | Age: 58
End: 2023-08-01

## 2023-08-01 ENCOUNTER — CARE COORDINATION (OUTPATIENT)
Dept: CASE MANAGEMENT | Age: 58
End: 2023-08-01

## 2023-08-01 ENCOUNTER — HOSPITAL ENCOUNTER (INPATIENT)
Age: 58
LOS: 6 days | Discharge: HOME | DRG: 191 | End: 2023-08-07
Attending: INTERNAL MEDICINE | Admitting: INTERNAL MEDICINE
Payer: MEDICARE

## 2023-08-01 DIAGNOSIS — E78.9 LIPID DISORDER: ICD-10-CM

## 2023-08-01 DIAGNOSIS — I10 PRIMARY HYPERTENSION: ICD-10-CM

## 2023-08-01 DIAGNOSIS — I25.119 CORONARY ARTERY DISEASE INVOLVING NATIVE HEART WITH ANGINA PECTORIS, UNSPECIFIED VESSEL OR LESION TYPE (HCC): ICD-10-CM

## 2023-08-01 DIAGNOSIS — J44.1 COPD WITH ACUTE EXACERBATION (HCC): Primary | ICD-10-CM

## 2023-08-01 LAB
ALBUMIN SERPL-MCNC: 4.5 G/DL (ref 3.5–5.2)
ALP SERPL-CCNC: 68 U/L (ref 40–129)
ALT SERPL-CCNC: 18 U/L (ref 0–40)
ANION GAP SERPL CALCULATED.3IONS-SCNC: 11 MMOL/L (ref 7–16)
AST SERPL-CCNC: 17 U/L (ref 0–39)
B PARAP IS1001 DNA NPH QL NAA+NON-PROBE: NOT DETECTED
B PERT DNA SPEC QL NAA+PROBE: NOT DETECTED
BASOPHILS # BLD: 0.1 K/UL (ref 0–0.2)
BASOPHILS NFR BLD: 1 % (ref 0–2)
BILIRUB SERPL-MCNC: 0.3 MG/DL (ref 0–1.2)
BNP SERPL-MCNC: 97 PG/ML (ref 0–125)
BUN SERPL-MCNC: 15 MG/DL (ref 6–20)
C PNEUM DNA NPH QL NAA+NON-PROBE: NOT DETECTED
CALCIUM SERPL-MCNC: 9.7 MG/DL (ref 8.6–10.2)
CHLORIDE SERPL-SCNC: 107 MMOL/L (ref 98–107)
CO2 SERPL-SCNC: 26 MMOL/L (ref 22–29)
CREAT SERPL-MCNC: 1.1 MG/DL (ref 0.7–1.2)
EKG ATRIAL RATE: 89 BPM
EKG P AXIS: 61 DEGREES
EKG P-R INTERVAL: 148 MS
EKG Q-T INTERVAL: 356 MS
EKG QRS DURATION: 76 MS
EKG QTC CALCULATION (BAZETT): 433 MS
EKG R AXIS: -2 DEGREES
EKG T AXIS: 49 DEGREES
EKG VENTRICULAR RATE: 89 BPM
EOSINOPHIL # BLD: 0.63 K/UL (ref 0.05–0.5)
EOSINOPHILS RELATIVE PERCENT: 5 % (ref 0–6)
ERYTHROCYTE [DISTWIDTH] IN BLOOD BY AUTOMATED COUNT: 12.7 % (ref 11.5–15)
FLUAV RNA NPH QL NAA+NON-PROBE: NOT DETECTED
FLUBV RNA NPH QL NAA+NON-PROBE: NOT DETECTED
GFR SERPL CREATININE-BSD FRML MDRD: >60 ML/MIN/1.73M2
GLUCOSE SERPL-MCNC: 116 MG/DL (ref 74–99)
HADV DNA NPH QL NAA+NON-PROBE: NOT DETECTED
HCOV 229E RNA NPH QL NAA+NON-PROBE: NOT DETECTED
HCOV HKU1 RNA NPH QL NAA+NON-PROBE: NOT DETECTED
HCOV NL63 RNA NPH QL NAA+NON-PROBE: NOT DETECTED
HCOV OC43 RNA NPH QL NAA+NON-PROBE: NOT DETECTED
HCT VFR BLD AUTO: 43.2 % (ref 37–54)
HGB BLD-MCNC: 14 G/DL (ref 12.5–16.5)
HMPV RNA NPH QL NAA+NON-PROBE: NOT DETECTED
HPIV1 RNA NPH QL NAA+NON-PROBE: NOT DETECTED
HPIV2 RNA NPH QL NAA+NON-PROBE: NOT DETECTED
HPIV3 RNA NPH QL NAA+NON-PROBE: NOT DETECTED
HPIV4 RNA NPH QL NAA+NON-PROBE: NOT DETECTED
IMM GRANULOCYTES # BLD AUTO: 0.15 K/UL (ref 0–0.58)
IMM GRANULOCYTES NFR BLD: 1 % (ref 0–5)
LACTATE BLDV-SCNC: 1.3 MMOL/L (ref 0.5–1.9)
LYMPHOCYTES NFR BLD: 3.86 K/UL (ref 1.5–4)
LYMPHOCYTES RELATIVE PERCENT: 32 % (ref 20–42)
M PNEUMO DNA NPH QL NAA+NON-PROBE: NOT DETECTED
MCH RBC QN AUTO: 30.6 PG (ref 26–35)
MCHC RBC AUTO-ENTMCNC: 32.4 G/DL (ref 32–34.5)
MCV RBC AUTO: 94.3 FL (ref 80–99.9)
METER GLUCOSE: 241 MG/DL (ref 74–99)
METER GLUCOSE: 255 MG/DL (ref 74–99)
MONOCYTES NFR BLD: 0.8 K/UL (ref 0.1–0.95)
MONOCYTES NFR BLD: 7 % (ref 2–12)
NEUTROPHILS NFR BLD: 54 % (ref 43–80)
NEUTS SEG NFR BLD: 6.5 K/UL (ref 1.8–7.3)
PLATELET # BLD AUTO: 312 K/UL (ref 130–450)
PMV BLD AUTO: 10.6 FL (ref 7–12)
POTASSIUM SERPL-SCNC: 3.3 MMOL/L (ref 3.5–5)
PROT SERPL-MCNC: 6.9 G/DL (ref 6.4–8.3)
RBC # BLD AUTO: 4.58 M/UL (ref 3.8–5.8)
RSV RNA NPH QL NAA+NON-PROBE: NOT DETECTED
RV+EV RNA NPH QL NAA+NON-PROBE: NOT DETECTED
SARS-COV-2 RNA NPH QL NAA+NON-PROBE: NOT DETECTED
SODIUM SERPL-SCNC: 144 MMOL/L (ref 132–146)
SPECIMEN DESCRIPTION: NORMAL
TROPONIN I SERPL HS-MCNC: 13 NG/L (ref 0–11)
TROPONIN I SERPL HS-MCNC: 9 NG/L (ref 0–11)
WBC OTHER # BLD: 12 K/UL (ref 4.5–11.5)

## 2023-08-01 PROCEDURE — 6370000000 HC RX 637 (ALT 250 FOR IP)

## 2023-08-01 PROCEDURE — 2700000000 HC OXYGEN THERAPY PER DAY

## 2023-08-01 PROCEDURE — 83880 ASSAY OF NATRIURETIC PEPTIDE: CPT

## 2023-08-01 PROCEDURE — 2060000000 HC ICU INTERMEDIATE R&B

## 2023-08-01 PROCEDURE — 93005 ELECTROCARDIOGRAM TRACING: CPT | Performed by: PHYSICIAN ASSISTANT

## 2023-08-01 PROCEDURE — 94664 DEMO&/EVAL PT USE INHALER: CPT

## 2023-08-01 PROCEDURE — 87205 SMEAR GRAM STAIN: CPT

## 2023-08-01 PROCEDURE — 80053 COMPREHEN METABOLIC PANEL: CPT

## 2023-08-01 PROCEDURE — 6360000002 HC RX W HCPCS

## 2023-08-01 PROCEDURE — 87070 CULTURE OTHR SPECIMN AEROBIC: CPT

## 2023-08-01 PROCEDURE — 6360000002 HC RX W HCPCS: Performed by: EMERGENCY MEDICINE

## 2023-08-01 PROCEDURE — 2580000003 HC RX 258: Performed by: EMERGENCY MEDICINE

## 2023-08-01 PROCEDURE — 84484 ASSAY OF TROPONIN QUANT: CPT

## 2023-08-01 PROCEDURE — 83605 ASSAY OF LACTIC ACID: CPT

## 2023-08-01 PROCEDURE — 71275 CT ANGIOGRAPHY CHEST: CPT

## 2023-08-01 PROCEDURE — 6370000000 HC RX 637 (ALT 250 FOR IP): Performed by: EMERGENCY MEDICINE

## 2023-08-01 PROCEDURE — 6360000004 HC RX CONTRAST MEDICATION: Performed by: RADIOLOGY

## 2023-08-01 PROCEDURE — 85025 COMPLETE CBC W/AUTO DIFF WBC: CPT

## 2023-08-01 PROCEDURE — 93010 ELECTROCARDIOGRAM REPORT: CPT | Performed by: INTERNAL MEDICINE

## 2023-08-01 PROCEDURE — 2580000003 HC RX 258

## 2023-08-01 PROCEDURE — 94640 AIRWAY INHALATION TREATMENT: CPT

## 2023-08-01 PROCEDURE — 71045 X-RAY EXAM CHEST 1 VIEW: CPT

## 2023-08-01 PROCEDURE — 0202U NFCT DS 22 TRGT SARS-COV-2: CPT

## 2023-08-01 PROCEDURE — 87081 CULTURE SCREEN ONLY: CPT

## 2023-08-01 PROCEDURE — 99285 EMERGENCY DEPT VISIT HI MDM: CPT

## 2023-08-01 PROCEDURE — 82947 ASSAY GLUCOSE BLOOD QUANT: CPT

## 2023-08-01 RX ORDER — NICOTINE 21 MG/24HR
1 PATCH, TRANSDERMAL 24 HOURS TRANSDERMAL DAILY
Status: DISCONTINUED | OUTPATIENT
Start: 2023-08-01 | End: 2023-08-07 | Stop reason: HOSPADM

## 2023-08-01 RX ORDER — ENOXAPARIN SODIUM 100 MG/ML
30 INJECTION SUBCUTANEOUS 2 TIMES DAILY
Status: DISCONTINUED | OUTPATIENT
Start: 2023-08-01 | End: 2023-08-07 | Stop reason: HOSPADM

## 2023-08-01 RX ORDER — SODIUM CHLORIDE 0.9 % (FLUSH) 0.9 %
5-40 SYRINGE (ML) INJECTION PRN
Status: DISCONTINUED | OUTPATIENT
Start: 2023-08-01 | End: 2023-08-07 | Stop reason: HOSPADM

## 2023-08-01 RX ORDER — SODIUM CHLORIDE 9 MG/ML
INJECTION, SOLUTION INTRAVENOUS PRN
Status: DISCONTINUED | OUTPATIENT
Start: 2023-08-01 | End: 2023-08-07 | Stop reason: HOSPADM

## 2023-08-01 RX ORDER — ARFORMOTEROL TARTRATE 15 UG/2ML
15 SOLUTION RESPIRATORY (INHALATION)
Status: DISCONTINUED | OUTPATIENT
Start: 2023-08-01 | End: 2023-08-07 | Stop reason: HOSPADM

## 2023-08-01 RX ORDER — GUAIFENESIN 400 MG/1
400 TABLET ORAL 4 TIMES DAILY PRN
Status: DISCONTINUED | OUTPATIENT
Start: 2023-08-01 | End: 2023-08-07 | Stop reason: HOSPADM

## 2023-08-01 RX ORDER — ACETAMINOPHEN 650 MG/1
650 SUPPOSITORY RECTAL EVERY 6 HOURS PRN
Status: DISCONTINUED | OUTPATIENT
Start: 2023-08-01 | End: 2023-08-07 | Stop reason: HOSPADM

## 2023-08-01 RX ORDER — ASPIRIN 81 MG/1
81 TABLET ORAL DAILY
Status: DISCONTINUED | OUTPATIENT
Start: 2023-08-02 | End: 2023-08-07 | Stop reason: HOSPADM

## 2023-08-01 RX ORDER — POTASSIUM CHLORIDE 20 MEQ/1
40 TABLET, EXTENDED RELEASE ORAL ONCE
Status: DISCONTINUED | OUTPATIENT
Start: 2023-08-01 | End: 2023-08-01

## 2023-08-01 RX ORDER — SODIUM CHLORIDE 0.9 % (FLUSH) 0.9 %
5-40 SYRINGE (ML) INJECTION EVERY 12 HOURS SCHEDULED
Status: DISCONTINUED | OUTPATIENT
Start: 2023-08-01 | End: 2023-08-07 | Stop reason: HOSPADM

## 2023-08-01 RX ORDER — EZETIMIBE 10 MG/1
10 TABLET ORAL NIGHTLY
Status: DISCONTINUED | OUTPATIENT
Start: 2023-08-02 | End: 2023-08-07 | Stop reason: HOSPADM

## 2023-08-01 RX ORDER — DEXTROSE MONOHYDRATE 100 MG/ML
INJECTION, SOLUTION INTRAVENOUS CONTINUOUS PRN
Status: DISCONTINUED | OUTPATIENT
Start: 2023-08-01 | End: 2023-08-07 | Stop reason: HOSPADM

## 2023-08-01 RX ORDER — IPRATROPIUM BROMIDE AND ALBUTEROL SULFATE 2.5; .5 MG/3ML; MG/3ML
1 SOLUTION RESPIRATORY (INHALATION) ONCE
Status: COMPLETED | OUTPATIENT
Start: 2023-08-01 | End: 2023-08-01

## 2023-08-01 RX ORDER — BUDESONIDE 0.5 MG/2ML
0.5 INHALANT ORAL
Status: DISCONTINUED | OUTPATIENT
Start: 2023-08-01 | End: 2023-08-07 | Stop reason: HOSPADM

## 2023-08-01 RX ORDER — ENOXAPARIN SODIUM 100 MG/ML
40 INJECTION SUBCUTANEOUS DAILY
Status: DISCONTINUED | OUTPATIENT
Start: 2023-08-01 | End: 2023-08-01 | Stop reason: SDUPTHER

## 2023-08-01 RX ORDER — ONDANSETRON 2 MG/ML
4 INJECTION INTRAMUSCULAR; INTRAVENOUS EVERY 6 HOURS PRN
Status: DISCONTINUED | OUTPATIENT
Start: 2023-08-01 | End: 2023-08-07 | Stop reason: HOSPADM

## 2023-08-01 RX ORDER — ONDANSETRON 4 MG/1
4 TABLET, ORALLY DISINTEGRATING ORAL EVERY 8 HOURS PRN
Status: DISCONTINUED | OUTPATIENT
Start: 2023-08-01 | End: 2023-08-07 | Stop reason: HOSPADM

## 2023-08-01 RX ORDER — PANTOPRAZOLE SODIUM 40 MG/1
40 TABLET, DELAYED RELEASE ORAL
Status: DISCONTINUED | OUTPATIENT
Start: 2023-08-02 | End: 2023-08-06

## 2023-08-01 RX ORDER — DOXYCYCLINE HYCLATE 100 MG/1
100 CAPSULE ORAL EVERY 12 HOURS SCHEDULED
Status: DISCONTINUED | OUTPATIENT
Start: 2023-08-01 | End: 2023-08-04

## 2023-08-01 RX ORDER — ACETAMINOPHEN 325 MG/1
650 TABLET ORAL EVERY 6 HOURS PRN
Status: DISCONTINUED | OUTPATIENT
Start: 2023-08-01 | End: 2023-08-07 | Stop reason: HOSPADM

## 2023-08-01 RX ORDER — IPRATROPIUM BROMIDE AND ALBUTEROL SULFATE 2.5; .5 MG/3ML; MG/3ML
1 SOLUTION RESPIRATORY (INHALATION)
Status: DISCONTINUED | OUTPATIENT
Start: 2023-08-01 | End: 2023-08-01 | Stop reason: SDUPTHER

## 2023-08-01 RX ORDER — SODIUM CHLORIDE 0.9 % (FLUSH) 0.9 %
10 SYRINGE (ML) INJECTION
Status: ACTIVE | OUTPATIENT
Start: 2023-08-01 | End: 2023-08-02

## 2023-08-01 RX ORDER — SENNOSIDES A AND B 8.6 MG/1
2 TABLET, FILM COATED ORAL NIGHTLY
Status: DISCONTINUED | OUTPATIENT
Start: 2023-08-01 | End: 2023-08-07 | Stop reason: HOSPADM

## 2023-08-01 RX ORDER — GABAPENTIN 300 MG/1
600 CAPSULE ORAL 3 TIMES DAILY
Status: DISCONTINUED | OUTPATIENT
Start: 2023-08-01 | End: 2023-08-07 | Stop reason: HOSPADM

## 2023-08-01 RX ORDER — POTASSIUM CHLORIDE 20 MEQ/1
40 TABLET, EXTENDED RELEASE ORAL ONCE
Status: COMPLETED | OUTPATIENT
Start: 2023-08-01 | End: 2023-08-01

## 2023-08-01 RX ORDER — IPRATROPIUM BROMIDE AND ALBUTEROL SULFATE 2.5; .5 MG/3ML; MG/3ML
1 SOLUTION RESPIRATORY (INHALATION)
Status: DISCONTINUED | OUTPATIENT
Start: 2023-08-01 | End: 2023-08-07 | Stop reason: HOSPADM

## 2023-08-01 RX ADMIN — WATER 65 MG: 1 INJECTION INTRAMUSCULAR; INTRAVENOUS; SUBCUTANEOUS at 23:25

## 2023-08-01 RX ADMIN — ARFORMOTEROL TARTRATE 15 MCG: 15 SOLUTION RESPIRATORY (INHALATION) at 19:43

## 2023-08-01 RX ADMIN — ENOXAPARIN SODIUM 30 MG: 100 INJECTION SUBCUTANEOUS at 21:22

## 2023-08-01 RX ADMIN — SODIUM CHLORIDE, PRESERVATIVE FREE 10 ML: 5 INJECTION INTRAVENOUS at 21:25

## 2023-08-01 RX ADMIN — DOXYCYCLINE HYCLATE 100 MG: 100 CAPSULE ORAL at 21:23

## 2023-08-01 RX ADMIN — BUDESONIDE INHALATION 500 MCG: 0.5 SUSPENSION RESPIRATORY (INHALATION) at 19:43

## 2023-08-01 RX ADMIN — IPRATROPIUM BROMIDE AND ALBUTEROL SULFATE 1 DOSE: .5; 2.5 SOLUTION RESPIRATORY (INHALATION) at 19:43

## 2023-08-01 RX ADMIN — GABAPENTIN 600 MG: 300 CAPSULE ORAL at 21:23

## 2023-08-01 RX ADMIN — IOPAMIDOL 75 ML: 755 INJECTION, SOLUTION INTRAVENOUS at 16:36

## 2023-08-01 RX ADMIN — IPRATROPIUM BROMIDE AND ALBUTEROL SULFATE 1 DOSE: .5; 3 SOLUTION RESPIRATORY (INHALATION) at 14:00

## 2023-08-01 RX ADMIN — METHYLPREDNISOLONE SODIUM SUCCINATE 60 MG: 125 INJECTION, POWDER, LYOPHILIZED, FOR SOLUTION INTRAMUSCULAR; INTRAVENOUS at 14:11

## 2023-08-01 RX ADMIN — POTASSIUM CHLORIDE 40 MEQ: 1500 TABLET, EXTENDED RELEASE ORAL at 18:00

## 2023-08-01 RX ADMIN — IPRATROPIUM BROMIDE AND ALBUTEROL SULFATE 1 DOSE: .5; 3 SOLUTION RESPIRATORY (INHALATION) at 17:25

## 2023-08-01 RX ADMIN — SENNOSIDES 17.2 MG: 8.6 TABLET, FILM COATED ORAL at 21:23

## 2023-08-01 NOTE — ED NOTES
The following labs were labeled with appropriate pt sticker and tubed to lab:     [] Blue     [] Lavender   [] on ice  [x] Green/yellow  [] Green/black [] on ice  [] Rodrigo Leech  [] on ice  [] Yellow  [] Red  [] Type/ Screen  [] ABG  [] VBG    [] COVID-19 swab    [] Rapid  [] PCR  [] Flu swab  [] Peds Viral Panel     [] Urine Sample  [] Fecal Sample  [] Pelvic Cultures  [] Blood Cultures  [] X 2  [] STREP Cultures       Lois Morrison RN  08/01/23 8752

## 2023-08-01 NOTE — PROGRESS NOTES
This patient is on medication that requires renal, weight, and/or indication dose adjustment. Date Body Weight IBW  Adjusted BW SCr  CrCl Dialysis status   8/1/2023   Ideal body weight: 75.3 kg (166 lb 0.1 oz)  Adjusted ideal body weight: 92 kg (202 lb 12.9 oz) Serum creatinine: 1.1 mg/dL 08/01/23 1348  Estimated creatinine clearance: 95 mL/min N/a       Pharmacy has dose-adjusted the following medication(s):    Date Previous Order Adjusted Order   8/1/2023 Enoxaparin 40 mg Daily Enoxaparin 30 mg BID       These changes were made per protocol according to the [address] [facility] for [facility]. *Please note this dose may need readjusted if patient's condition changes. Please contact pharmacy with any questions regarding these changes.     Erika Matos, [facility]  8/1/2023  6:47 PM

## 2023-08-01 NOTE — CARE COORDINATION
Remote Alert Monitoring Note  Rpm alert to be reviewed by the provider   red alert   pulse ox reading (89%)   Additional needs to be addressed by Pulmonologist: No                  Date/Time:  2023 8:41 AM  Patient Current Location: Home: [address] [address]  LPN contacted patient by telephone. Verified patients name and  as identifiers. Background: ENROLLED IN RPM FOR COPD AND HTN  Refer to 911 immediately if:  Patient unresponsive or unable to provide history  Change in cognition or sudden confusion  Patient unable to respond in complete sentences  Intense chest pain/tightness  Any concern for any clinical emergency  Red Alert: Provider response time of 1 hr required for any red alert requiring intervention  Yellow Alert: Provider response time of 3hr required for any escalated yellow alert    O2 Triage  Are you having any Chest Pain? no   Are you having any Shortness of Breath? yes   Swelling in your hands or feet? no     Are you having any other health concerns or issues? no     Clinical Interventions: Reviewed and followed up on alerts and treatments-spoke to Loral regarding RED alert for low pulse ox reading 89%. Pt states \" I was up all night coughing\" cough is non productive. Complains of increased dyspnea. Uses oxygen at 4lpm. Continues to smoke. States he does not smoke near oxygen. He is not using his Nicoderm patch or gum. Using inhalers and breathing treatments as directed. Completed prednisone. Pt seen by Pulmonology Dr Katalina Obrien on 23. Requested pt take a few deep breaths and recheck pulse ox. Pt states he can not breathe deeply or he will \" pass out\"  recheck of pulse ox. 90% now. Requested his Pulmonologist be notified. . message forwarded to Pulmonologist.   Escalated alert to RN-MAEVE LOU   Escalated alert to Specialist-Dr Denney  1000 message forwarded to [facility] baker CNP  Plan/Follow Up:  Will continue to review, monitor and address alerts with follow up based on

## 2023-08-01 NOTE — TELEPHONE ENCOUNTER
Spoke with patient and informed that he would need evaluated since medication is as needed. Patient said \"forget it I'm getting ready to go to hospital\" and then disconnected call.

## 2023-08-01 NOTE — ED NOTES
Department of Emergency Medicine  FIRST PROVIDER TRIAGE NOTE             Independent MLP           8/1/23  1:16 PM EDT    Date of Encounter: 8/1/23   MRN: [id number]      HPI: Sheila Vergara is a 62 y.o. male who presents to the ED for COPD (Wears 4L NC, left at home, still smoking, SOB x 3 days, cough, states' he feels like he's drowning\" )     Patient states for the last 3 days he is having a COPD exacerbation. Patient states \"this exam was worn by my feet upside down and drowning me when I lay down. \"  Patient states he normally wears 4 L of oxygen a day. Patient states he does follow with a pulmonologist.  Patient states been getting worse lately therefore he called his doctor who told him to come in for evaluation. Patient states he still smoking a few cigarettes a day. ROS: Negative for vomiting, diarrhea, urinary complaints, or rash. PE: Gen Appearance/Constitutional: alert  HEENT: NC/NT. PERRLA,  Airway patent. Neck: supple     Initial Plan of Care: All treatment areas with department are currently occupied. Plan to order/Initiate the following while awaiting opening in ED: labs and imaging studies.   Initiate Treatment-Testing, Proceed toTreatment Area When Bed Available for ED Attending/MLP to Continue Care    Electronically signed by Paulo Sapp PA-C   DD: 8/1/23       Paulo Sapp PA-C  08/01/23 2176

## 2023-08-01 NOTE — TELEPHONE ENCOUNTER
800 MG tablet Take 1 tablet by mouth every 8 hours as needed for Pain 90 tablet 5    aspirin (SONIA ASPIRIN EC LOW DOSE) 81 MG EC tablet Take 1 tablet by mouth daily 90 tablet 1    traZODone (DESYREL) 100 MG tablet Take 1 tablet by mouth nightly 90 tablet 1    promethazine (PHENERGAN) 25 MG tablet Take 1 tablet by mouth 3 times daily as needed for Nausea 90 tablet 3       ALLERGIES    Allergies   Allergen Reactions    Latex Rash     Skin turns red     Efraín Kong, MANDEEP, FNP-C, Remote Patient Monitoring NP, (Ph) [phone]

## 2023-08-01 NOTE — H&P
[address] [place] [address]  Internal Medicine Residency Program  History and Physical    Patient:  Scott Santiago 62 y.o. male   MRN: [id number]       Date of Service: 8/1/2023          Chief complaint: had concerns including COPD (Wears 4L NC, left at home, still smoking, SOB x 3 days, cough, states' he feels like he's drowning\" ). History of Present Illness   The patient is a 62 y.o. male with a past medical history acute gastritis without hemorrhage, CAD (coronary artery disease), Cancer of kidney ([address]), COPD (chronic obstructive pulmonary disease) ([address]), COPD, severe ([address]), Difficulty sleeping, ED (erectile dysfunction), History of BPH, Hyperlipidemia, and Tobacco abuse who presented with shortness of breath for the last 3 days. He also mentions that he has been coughing more recently he denies any sputum production. He denies any sick contacts. Patient mentions that he was sprayed by a skunk couple days ago and since then has had an increase difficulty in breathing. Patient used his nebulizer with no relief. Patient also mentions pain in his right lower chest he said it is due to his hernia. ED Course: In the ED his vitals were stable. He was saturating 93% on 4 L with help of breathing treatments. Labs were significant for a potassium of 3.3 and a mild WBC elevation of 12. EKG showed normal sinus rhythm. Chest x-ray showed no acute process. CTA pulmonary with contrast pending  ED Meds: Patient was given Solumedrol,  40 meq potassium chloride, Duonebs. Patient was admitted for further work-up and evaluation.   Infectious work-up was started as well as doxycycline      Past Medical History:      Diagnosis Date    Acute gastritis without hemorrhage     CAD (coronary artery disease)     Cancer of kidney (HCC)     COPD (chronic obstructive pulmonary disease) (HCC)     COPD, severe ([address]) 6/16/2022    Difficulty sleeping     ED (erectile dysfunction)     History of BPH     Hyperlipidemia Toprol, hold for now and consider resuming in AM   Lovenox for DVT ppx  PPI 40 mg BID   Consult Dr Tanika Childers in AM     PT/OT: -  DVT ppx: lovenox  GI ppx: protonix    Jose Raul Vanessa MD, MD PGY-2  8/1/2023  8:21 PM

## 2023-08-01 NOTE — TELEPHONE ENCOUNTER
Attempted to phone patient regarding notification of complaints of increased shortness of breath, coughing and sputum production. There was no answer.   Voicemail left on phone to please call the office at [address] DO

## 2023-08-02 LAB
ANION GAP SERPL CALCULATED.3IONS-SCNC: 14 MMOL/L (ref 7–16)
BASOPHILS # BLD: 0.06 K/UL (ref 0–0.2)
BASOPHILS NFR BLD: 0 % (ref 0–2)
BUN SERPL-MCNC: 14 MG/DL (ref 6–20)
CALCIUM SERPL-MCNC: 9.9 MG/DL (ref 8.6–10.2)
CHLORIDE SERPL-SCNC: 109 MMOL/L (ref 98–107)
CO2 SERPL-SCNC: 20 MMOL/L (ref 22–29)
CREAT SERPL-MCNC: 0.9 MG/DL (ref 0.7–1.2)
EKG ATRIAL RATE: 90 BPM
EKG P AXIS: 78 DEGREES
EKG P-R INTERVAL: 162 MS
EKG Q-T INTERVAL: 362 MS
EKG QRS DURATION: 82 MS
EKG QTC CALCULATION (BAZETT): 442 MS
EKG R AXIS: 15 DEGREES
EKG T AXIS: 63 DEGREES
EKG VENTRICULAR RATE: 90 BPM
EOSINOPHIL # BLD: 0.01 K/UL (ref 0.05–0.5)
EOSINOPHILS RELATIVE PERCENT: 0 % (ref 0–6)
ERYTHROCYTE [DISTWIDTH] IN BLOOD BY AUTOMATED COUNT: 12.7 % (ref 11.5–15)
GFR SERPL CREATININE-BSD FRML MDRD: >60 ML/MIN/1.73M2
GLUCOSE SERPL-MCNC: 159 MG/DL (ref 74–99)
HCT VFR BLD AUTO: 42.5 % (ref 37–54)
HGB BLD-MCNC: 13.9 G/DL (ref 12.5–16.5)
IMM GRANULOCYTES # BLD AUTO: 0.26 K/UL (ref 0–0.58)
IMM GRANULOCYTES NFR BLD: 1 % (ref 0–5)
LYMPHOCYTES NFR BLD: 1.34 K/UL (ref 1.5–4)
LYMPHOCYTES RELATIVE PERCENT: 7 % (ref 20–42)
MAGNESIUM SERPL-MCNC: 1.7 MG/DL (ref 1.6–2.6)
MCH RBC QN AUTO: 30.7 PG (ref 26–35)
MCHC RBC AUTO-ENTMCNC: 32.7 G/DL (ref 32–34.5)
MCV RBC AUTO: 93.8 FL (ref 80–99.9)
MONOCYTES NFR BLD: 0.35 K/UL (ref 0.1–0.95)
MONOCYTES NFR BLD: 2 % (ref 2–12)
NEUTROPHILS NFR BLD: 90 % (ref 43–80)
NEUTS SEG NFR BLD: 17.78 K/UL (ref 1.8–7.3)
PHOSPHATE SERPL-MCNC: 2.7 MG/DL (ref 2.5–4.5)
PLATELET # BLD AUTO: 318 K/UL (ref 130–450)
PMV BLD AUTO: 10.5 FL (ref 7–12)
POTASSIUM SERPL-SCNC: 4.3 MMOL/L (ref 3.5–5)
PROCALCITONIN SERPL-MCNC: 0.02 NG/ML (ref 0–0.08)
RBC # BLD AUTO: 4.53 M/UL (ref 3.8–5.8)
SODIUM SERPL-SCNC: 143 MMOL/L (ref 132–146)
TROPONIN I SERPL HS-MCNC: 7 NG/L (ref 0–11)
WBC OTHER # BLD: 19.8 K/UL (ref 4.5–11.5)

## 2023-08-02 PROCEDURE — 6370000000 HC RX 637 (ALT 250 FOR IP)

## 2023-08-02 PROCEDURE — 2700000000 HC OXYGEN THERAPY PER DAY

## 2023-08-02 PROCEDURE — 84484 ASSAY OF TROPONIN QUANT: CPT

## 2023-08-02 PROCEDURE — 6360000002 HC RX W HCPCS

## 2023-08-02 PROCEDURE — 2580000003 HC RX 258

## 2023-08-02 PROCEDURE — 2060000000 HC ICU INTERMEDIATE R&B

## 2023-08-02 PROCEDURE — 87205 SMEAR GRAM STAIN: CPT

## 2023-08-02 PROCEDURE — 84100 ASSAY OF PHOSPHORUS: CPT

## 2023-08-02 PROCEDURE — 6370000000 HC RX 637 (ALT 250 FOR IP): Performed by: INTERNAL MEDICINE

## 2023-08-02 PROCEDURE — 93010 ELECTROCARDIOGRAM REPORT: CPT | Performed by: INTERNAL MEDICINE

## 2023-08-02 PROCEDURE — 85025 COMPLETE CBC W/AUTO DIFF WBC: CPT

## 2023-08-02 PROCEDURE — 99222 1ST HOSP IP/OBS MODERATE 55: CPT | Performed by: INTERNAL MEDICINE

## 2023-08-02 PROCEDURE — 84145 PROCALCITONIN (PCT): CPT

## 2023-08-02 PROCEDURE — 93005 ELECTROCARDIOGRAM TRACING: CPT

## 2023-08-02 PROCEDURE — 80048 BASIC METABOLIC PNL TOTAL CA: CPT

## 2023-08-02 PROCEDURE — 83735 ASSAY OF MAGNESIUM: CPT

## 2023-08-02 PROCEDURE — 94640 AIRWAY INHALATION TREATMENT: CPT

## 2023-08-02 PROCEDURE — 87070 CULTURE OTHR SPECIMN AEROBIC: CPT

## 2023-08-02 RX ORDER — ROFLUMILAST 500 UG/1
500 TABLET ORAL DAILY
Status: DISCONTINUED | OUTPATIENT
Start: 2023-08-02 | End: 2023-08-07 | Stop reason: HOSPADM

## 2023-08-02 RX ORDER — TRAZODONE HYDROCHLORIDE 50 MG/1
100 TABLET ORAL NIGHTLY
Status: DISCONTINUED | OUTPATIENT
Start: 2023-08-02 | End: 2023-08-07 | Stop reason: HOSPADM

## 2023-08-02 RX ORDER — BUSPIRONE HYDROCHLORIDE 10 MG/1
30 TABLET ORAL 2 TIMES DAILY
Status: DISCONTINUED | OUTPATIENT
Start: 2023-08-02 | End: 2023-08-07 | Stop reason: HOSPADM

## 2023-08-02 RX ORDER — MAGNESIUM SULFATE IN WATER 40 MG/ML
2000 INJECTION, SOLUTION INTRAVENOUS ONCE
Status: COMPLETED | OUTPATIENT
Start: 2023-08-02 | End: 2023-08-02

## 2023-08-02 RX ADMIN — IPRATROPIUM BROMIDE AND ALBUTEROL SULFATE 1 DOSE: .5; 2.5 SOLUTION RESPIRATORY (INHALATION) at 09:05

## 2023-08-02 RX ADMIN — DOXYCYCLINE HYCLATE 100 MG: 100 CAPSULE ORAL at 12:11

## 2023-08-02 RX ADMIN — GABAPENTIN 600 MG: 300 CAPSULE ORAL at 21:31

## 2023-08-02 RX ADMIN — BUDESONIDE INHALATION 500 MCG: 0.5 SUSPENSION RESPIRATORY (INHALATION) at 03:47

## 2023-08-02 RX ADMIN — IPRATROPIUM BROMIDE AND ALBUTEROL SULFATE 1 DOSE: .5; 2.5 SOLUTION RESPIRATORY (INHALATION) at 03:47

## 2023-08-02 RX ADMIN — DOXYCYCLINE HYCLATE 100 MG: 100 CAPSULE ORAL at 21:31

## 2023-08-02 RX ADMIN — ENOXAPARIN SODIUM 30 MG: 100 INJECTION SUBCUTANEOUS at 13:27

## 2023-08-02 RX ADMIN — MAGNESIUM SULFATE HEPTAHYDRATE 2000 MG: 40 INJECTION, SOLUTION INTRAVENOUS at 13:26

## 2023-08-02 RX ADMIN — GABAPENTIN 600 MG: 300 CAPSULE ORAL at 12:11

## 2023-08-02 RX ADMIN — BUDESONIDE INHALATION 500 MCG: 0.5 SUSPENSION RESPIRATORY (INHALATION) at 19:20

## 2023-08-02 RX ADMIN — PANTOPRAZOLE SODIUM 40 MG: 40 TABLET, DELAYED RELEASE ORAL at 13:21

## 2023-08-02 RX ADMIN — SENNOSIDES 17.2 MG: 8.6 TABLET, FILM COATED ORAL at 21:31

## 2023-08-02 RX ADMIN — ARFORMOTEROL TARTRATE 15 MCG: 15 SOLUTION RESPIRATORY (INHALATION) at 03:47

## 2023-08-02 RX ADMIN — IPRATROPIUM BROMIDE AND ALBUTEROL SULFATE 1 DOSE: .5; 2.5 SOLUTION RESPIRATORY (INHALATION) at 12:43

## 2023-08-02 RX ADMIN — IPRATROPIUM BROMIDE AND ALBUTEROL SULFATE 1 DOSE: .5; 2.5 SOLUTION RESPIRATORY (INHALATION) at 15:44

## 2023-08-02 RX ADMIN — SODIUM CHLORIDE, PRESERVATIVE FREE 10 ML: 5 INJECTION INTRAVENOUS at 13:22

## 2023-08-02 RX ADMIN — EZETIMIBE 10 MG: 10 TABLET ORAL at 22:47

## 2023-08-02 RX ADMIN — ENOXAPARIN SODIUM 30 MG: 100 INJECTION SUBCUTANEOUS at 21:30

## 2023-08-02 RX ADMIN — ARFORMOTEROL TARTRATE 15 MCG: 15 SOLUTION RESPIRATORY (INHALATION) at 19:19

## 2023-08-02 RX ADMIN — METHYLPREDNISOLONE SODIUM SUCCINATE 40 MG: 40 INJECTION, POWDER, LYOPHILIZED, FOR SOLUTION INTRAMUSCULAR; INTRAVENOUS at 21:30

## 2023-08-02 RX ADMIN — ASPIRIN 81 MG: 81 TABLET, COATED ORAL at 12:12

## 2023-08-02 RX ADMIN — IPRATROPIUM BROMIDE AND ALBUTEROL SULFATE 1 DOSE: .5; 2.5 SOLUTION RESPIRATORY (INHALATION) at 19:19

## 2023-08-02 RX ADMIN — BUSPIRONE HYDROCHLORIDE 30 MG: 10 TABLET ORAL at 21:30

## 2023-08-02 RX ADMIN — METHYLPREDNISOLONE SODIUM SUCCINATE 40 MG: 40 INJECTION, POWDER, LYOPHILIZED, FOR SOLUTION INTRAMUSCULAR; INTRAVENOUS at 13:22

## 2023-08-02 RX ADMIN — ROFLUMILAST 500 MCG: 500 TABLET ORAL at 17:29

## 2023-08-02 RX ADMIN — BUSPIRONE HYDROCHLORIDE 30 MG: 10 TABLET ORAL at 14:39

## 2023-08-02 RX ADMIN — TRAZODONE HYDROCHLORIDE 100 MG: 50 TABLET ORAL at 21:30

## 2023-08-02 RX ADMIN — SODIUM CHLORIDE, PRESERVATIVE FREE 10 ML: 5 INJECTION INTRAVENOUS at 21:31

## 2023-08-02 RX ADMIN — GABAPENTIN 600 MG: 300 CAPSULE ORAL at 14:45

## 2023-08-02 ASSESSMENT — PAIN DESCRIPTION - LOCATION
LOCATION: CHEST

## 2023-08-02 ASSESSMENT — PAIN DESCRIPTION - ORIENTATION
ORIENTATION: MID;UPPER
ORIENTATION: MID
ORIENTATION: MID;UPPER

## 2023-08-02 ASSESSMENT — PAIN DESCRIPTION - DESCRIPTORS
DESCRIPTORS: DISCOMFORT
DESCRIPTORS: ACHING;DISCOMFORT
DESCRIPTORS: DISCOMFORT

## 2023-08-02 ASSESSMENT — PAIN SCALES - GENERAL
PAINLEVEL_OUTOF10: 8
PAINLEVEL_OUTOF10: 8
PAINLEVEL_OUTOF10: 5

## 2023-08-02 NOTE — CONSULTS
[facility]  Department of Internal Medicine  Division of Pulmonary, Critical Care and Sleep Medicine  Consult Note    Tod Martinez DO, MPH, NORBERTO Crowe DO, Lindalee Halsted, MD, MS Darlene Messer, APRN-CNP    Patient: Jaleesa Caldera  MRN: [id number]  : 1965    Encounter Time: 3:55 PM     Date of Admission: 2023  4:03 PM    Primary Care Physician: Turner Davila MD    Reason for Consultation: COPD exacerbation     HISTORY OF PRESENT ILLNESS : Jaleesa Caldera 62 y.o. male was seen in consultation regarding the above chief compliant. Patient seen and examined. He reports that he has started to have a COPD exacerbation and has started taking his prednisone. Then his dog went outside and got sprayed by a skunk and this made everything worse. He did continue to try to take his nebulizers at home along with his prednisone. Subsequently presented to the hospital due to worsening shortness of breath and coughing. He does report that he did have some chest heaviness during these episodes. His symptoms were certainly worsened with the heat. He also reports that he is unfortunately smoking 5 cigarettes a day. PAST MEDICAL HISTORY:  has a past medical history of Acute gastritis without hemorrhage, CAD (coronary artery disease), Cancer of kidney ([address]), COPD (chronic obstructive pulmonary disease) ([address]), COPD, severe ([address]), Difficulty sleeping, ED (erectile dysfunction), History of BPH, Hyperlipidemia, Lung cancer ([address]), and Tobacco abuse. SURGICAL HISTORY:  has a past surgical history that includes Lung surgery (Right, 2014); total nephrectomy (Right); Cardiac catheterization (); Cholecystectomy; Upper gastrointestinal endoscopy (N/A, 2019); Colonoscopy (N/A, 2019); and Colonoscopy (2019). SOCIAL HISTORY:  reports that he has quit smoking. His smoking use included cigarettes.  He started PM    ALT 18 08/01/2023 01:48 PM        PRO-BNP:   Lab Results   Component Value Date    PROBNP 97 08/01/2023    PROBNP 106 04/18/2023      ABGs:   Lab Results   Component Value Date/Time    PH 7.406 06/18/2023 05:06 PM    PO2 93.1 06/18/2023 05:06 PM    PCO2 41.7 06/18/2023 05:06 PM     Hemoglobin A1C: No components found for: HGBA1C    IMAGING:  Imaging tests were completed and reviewed and discussed radiology and care team involved and reveals   XR CHEST PORTABLE    Result Date: 8/1/2023  EXAMINATION: ONE XRAY VIEW OF THE CHEST 8/1/2023 2:31 pm COMPARISON: None. HISTORY: ORDERING SYSTEM PROVIDED HISTORY: Shortness of breath TECHNOLOGIST PROVIDED HISTORY: Reason for exam:->Shortness of breath What reading provider will be dictating this exam?->CRC FINDINGS: The lungs are without acute focal process. There is no effusion or pneumothorax. The cardiomediastinal silhouette is without acute process. The osseous structures are without acute process. Minimal atelectasis, right lung base. No acute process. CTA PULMONARY W CONTRAST    Result Date: 8/1/2023  EXAMINATION: CTA OF THE CHEST 8/1/2023 4:28 pm TECHNIQUE: CTA of the chest was performed after the administration of intravenous contrast.  Multiplanar reformatted images are provided for review. MIP images are provided for review. Automated exposure control, iterative reconstruction, and/or weight based adjustment of the mA/kV was utilized to reduce the radiation dose to as low as reasonably achievable. COMPARISON: CTA of the chest, 04/18/2023. HISTORY: ORDERING SYSTEM PROVIDED HISTORY: pe TECHNOLOGIST PROVIDED HISTORY: Reason for exam:->pe Decision Support Exception - unselect if not a suspected or confirmed emergency medical condition->Emergency Medical Condition (MA) What reading provider will be dictating this exam?->CRC FINDINGS: Pulmonary Arteries: Pulmonary arteries are adequately opacified for evaluation.   No evidence of intraluminal filling defect

## 2023-08-02 NOTE — PROGRESS NOTES
Patient admitted to [facility] room 7481 with following belongings: Cell phone and , two eye glasses, shorts and t-shirt, sandals, and hat.

## 2023-08-02 NOTE — PROGRESS NOTES
[facility]  Internal Medicine Residency Program  History and Physical    Patient:  Khadar Avilez 62 y.o. male   MRN: [id number]       Date of Service: 8/2/2023          Chief complaint: had concerns including COPD (Wears 4L NC, left at home, still smoking, SOB x 3 days, cough, states' he feels like he's drowning\" ). History of Present Illness   The patient is a 62 y.o. male who was seen in the emergency department this morning for complaints of shortness of breath and cough for the past 4 days. Patient states 3 days ago in the morning, his dog got sprayed in his backyard by a skunk, which worsened the patients shortness of breath and cough. Patient states he tried using his albuterol at home to relieve the symptoms but there was no relief. Patient also complains of mid-sternal \"pressure-like\" chest pain that radiates into his back. Patient states he started smoking at 36years old and has a 45 pack year history. He currently smokes 5-6 cigarettes per day. ED Course: Placed on 4 L of oxygen; current oxygen saturation 96. Given 65 mg Solu-Medrol to complete 125 mg dose, followed up by 40 mg BID. Doxycyline 100 every 12 hours for 5 days.        Past Medical History:      Diagnosis Date    Acute gastritis without hemorrhage     CAD (coronary artery disease)     Cancer of kidney (HCC)     COPD (chronic obstructive pulmonary disease) (HCC)     COPD, severe ([address]) 6/16/2022    Difficulty sleeping     ED (erectile dysfunction)     History of BPH     Hyperlipidemia     Lung cancer ([facility])     Tobacco abuse        Past Surgical History:        Procedure Laterality Date    CARDIAC CATHETERIZATION  2015    Non-obstructive coronary disease by Dr. Brittny Moore N/A 12/20/2019    COLONOSCOPY POLYPECTOMY SNARE/COLD BIOPSY performed by Deepak Aparicio MD at [address]   12/20/2019    COLONOSCOPY SUBMUCOSAL/BOTOX INJECTION performed by

## 2023-08-02 NOTE — ED NOTES
RESPIRATORY CULTURE ALREADY COMPLETED.  RESULTED AT [address] [place] [address], RN  08/02/23 2151

## 2023-08-02 NOTE — PROGRESS NOTES
4 Eyes Skin Assessment     NAME:  Wale Medina  YOB: 1965  MEDICAL RECORD NUMBER:  [id number]    The patient is being assessed for  Admission    I agree that at least one RN has performed a thorough Head to Toe Skin Assessment on the patient. ALL assessment sites listed below have been assessed. Areas assessed by both nurses:    Head, Face, Ears, Shoulders, Back, Chest, Arms, Elbows, Hands, Sacrum. Buttock, Coccyx, Ischium, Legs. Feet and Heels, and Under Medical Devices         Does the Patient have a Wound?  No noted wound(s)       Tio Prevention initiated by RN: No  Wound Care Orders initiated by RN: No    Pressure Injury (Stage 3,4, Unstageable, DTI, NWPT, and Complex wounds) if present, place Wound referral order by RN under : No    New Ostomies, if present place, Ostomy referral order under : No     Nurse 1 eSignature: Electronically signed by Mely Ramirez RN on 8/2/23 at 5:44 PM EDT dry/flaky skin    **SHARE this note so that the co-signing nurse can place an eSignature**    Nurse 2 eSignature: Electronically signed by Owen Mauricio RN on 8/2/23 at 5:49 PM EDT

## 2023-08-02 NOTE — PROGRESS NOTES
08/02/23 0349   Treatment   Treatment Type HHN   $Treatment Type $Inhaled Therapy/Meds   Medications (S)  Budesonide  (rinsed ater use)   Pre-Tx Pulse 82   Pre-Tx Resps 18   Breath Sounds Pre-Tx DIMITRIOS Diminished; Expiratory wheezes; End expiratory wheezes   Breath Sounds Pre-Tx LLL Diminished; Expiratory wheezes; End expiratory wheezes   Breath Sounds Pre-Tx RUL Diminished; Expiratory wheezes; End Expiratory wheezes   Breath Sounds Pre-Tx RML Diminished; Expiratory wheezes; End expiratory wheezes   Breath Sounds Pre-Tx RLL Diminished; Expiratory wheezes; End expiratory wheezes   Breath Sounds Post-Tx DIMITRIOS Diminished; Expiratory wheezes; End expiratory wheezes   Breath Sounds Post-Tx LLL Diminished; Expiratory wheezes; End expiratory wheezes   Breath Sounds Post-Tx RUL Diminished; Expiratory wheezes; End expiratory wheezes   Breath Sounds Post-Tx RML Diminished;End expiratory wheezes; Expiratory wheezes   Breath Sounds Post-Tx RLL Diminished; Expiratory wheezes; End expiratory wheezes   Post-Tx Pulse 92   Post-Tx Resps 18   Delivery Source Mouthpiece   Position Sitting   Treatment Tolerance Well   Duration 10   Is patient on O2? Y   Breath Sounds   Breath Sounds Bilateral Diminished; Expiratory wheezing   Right Upper Lobe Diminished; Expiratory wheezes   Right Middle Lobe Diminished; Expiratory wheezes; End expiratory wheezes   Right Lower Lobe Diminished; Expiratory wheezes; End expiratory wheezes   Left Upper Lobe Diminished; Expiratory wheezes; End expiratory wheezes   Left Lower Lobe Diminished; Expiratory wheezes; End expiratory wheezes   Oxygen Therapy/Pulse Ox   O2 Therapy Oxygen   O2 Device Nasal cannula   O2 Flow Rate (L/min) 4 L/min   Pulse 76   Respirations 25   SpO2 95 %     Rinsed after use

## 2023-08-02 NOTE — PROGRESS NOTES
[facility]  Internal Medicine Residency / [address] [place] [address]    Attending Physician Statement  I have discussed the case, including pertinent history and exam findings with the resident and the team.  I have seen and examined the patient and the key elements of the encounter have been performed by me. I agree with the assessment, plan and orders as documented by the resident. Case Discussed During AM Rounds   Covering for Dr. Haylee Dillon and House Team 2 today    Presented for day team admission with recurrent dyspnea secondary to suspected COPD exacerbation    Worsening cough and dyspnea for past 4 days- notes concern with spray of skunk on dog and inability to tolerate smell     Initiated protocol of oxygen wean, steroids, aerosols    Oxygen currently close to baseline    CP noted this morning- pain to palpation; pain with coughing     Acute on Chronic Hypoxic Respiratory Failure    Continue Steroids, oxygen wean protocol, and aerosols    Follow respiratory culture- possible poor sample- will repeat    Doxycycline continued for now as no obvious bacterial source    Continue current management     Chest Pain- noted this am    Repeat EKG this am    Initial troponin negative- repeat today       Remainder of medical problems as per resident note. Attending note is in collaboration with resident-physician Dr. Radha Wheeler progress note on  8/2/2023 .     Teresa Taylor MD  8/2/23    Internal Medicine Residency Faculty

## 2023-08-03 LAB
ANION GAP SERPL CALCULATED.3IONS-SCNC: 13 MMOL/L (ref 7–16)
ANION GAP SERPL CALCULATED.3IONS-SCNC: 15 MMOL/L (ref 7–16)
BASOPHILS # BLD: 0 K/UL (ref 0–0.2)
BASOPHILS NFR BLD: 0 % (ref 0–2)
BUN SERPL-MCNC: 19 MG/DL (ref 6–20)
BUN SERPL-MCNC: 25 MG/DL (ref 6–20)
CALCIUM SERPL-MCNC: 10.3 MG/DL (ref 8.6–10.2)
CALCIUM SERPL-MCNC: 9.7 MG/DL (ref 8.6–10.2)
CHLORIDE SERPL-SCNC: 107 MMOL/L (ref 98–107)
CHLORIDE SERPL-SCNC: 110 MMOL/L (ref 98–107)
CO2 SERPL-SCNC: 20 MMOL/L (ref 22–29)
CO2 SERPL-SCNC: 21 MMOL/L (ref 22–29)
CREAT SERPL-MCNC: 0.8 MG/DL (ref 0.7–1.2)
CREAT SERPL-MCNC: 1 MG/DL (ref 0.7–1.2)
EOSINOPHIL # BLD: 0 K/UL (ref 0.05–0.5)
EOSINOPHILS RELATIVE PERCENT: 0 % (ref 0–6)
ERYTHROCYTE [DISTWIDTH] IN BLOOD BY AUTOMATED COUNT: 12.9 % (ref 11.5–15)
GFR SERPL CREATININE-BSD FRML MDRD: >60 ML/MIN/1.73M2
GFR SERPL CREATININE-BSD FRML MDRD: >60 ML/MIN/1.73M2
GLUCOSE SERPL-MCNC: 113 MG/DL (ref 74–99)
GLUCOSE SERPL-MCNC: 134 MG/DL (ref 74–99)
HCT VFR BLD AUTO: 42.6 % (ref 37–54)
HGB BLD-MCNC: 13.6 G/DL (ref 12.5–16.5)
LYMPHOCYTES NFR BLD: 0.71 K/UL (ref 1.5–4)
LYMPHOCYTES RELATIVE PERCENT: 3 % (ref 20–42)
MAGNESIUM SERPL-MCNC: 2.1 MG/DL (ref 1.6–2.6)
MCH RBC QN AUTO: 30.8 PG (ref 26–35)
MCHC RBC AUTO-ENTMCNC: 31.9 G/DL (ref 32–34.5)
MCV RBC AUTO: 96.4 FL (ref 80–99.9)
MICROORGANISM SPEC CULT: ABNORMAL
MICROORGANISM SPEC CULT: NORMAL
MICROORGANISM/AGENT SPEC: ABNORMAL
MONOCYTES NFR BLD: 0.71 K/UL (ref 0.1–0.95)
MONOCYTES NFR BLD: 3 % (ref 2–12)
NEUTROPHILS NFR BLD: 95 % (ref 43–80)
NEUTS SEG NFR BLD: 25.67 K/UL (ref 1.8–7.3)
PHOSPHATE SERPL-MCNC: 4 MG/DL (ref 2.5–4.5)
PLATELET # BLD AUTO: 300 K/UL (ref 130–450)
PMV BLD AUTO: 11.7 FL (ref 7–12)
POTASSIUM SERPL-SCNC: 4.3 MMOL/L (ref 3.5–5)
POTASSIUM SERPL-SCNC: 5.5 MMOL/L (ref 3.5–5)
RBC # BLD AUTO: 4.42 M/UL (ref 3.8–5.8)
RBC # BLD: ABNORMAL 10*6/UL
SODIUM SERPL-SCNC: 140 MMOL/L (ref 132–146)
SODIUM SERPL-SCNC: 146 MMOL/L (ref 132–146)
SPECIMEN DESCRIPTION: ABNORMAL
SPECIMEN DESCRIPTION: NORMAL
WBC OTHER # BLD: 27.1 K/UL (ref 4.5–11.5)

## 2023-08-03 PROCEDURE — 99232 SBSQ HOSP IP/OBS MODERATE 35: CPT | Performed by: INTERNAL MEDICINE

## 2023-08-03 PROCEDURE — 2580000003 HC RX 258

## 2023-08-03 PROCEDURE — 6370000000 HC RX 637 (ALT 250 FOR IP)

## 2023-08-03 PROCEDURE — 6360000002 HC RX W HCPCS: Performed by: INTERNAL MEDICINE

## 2023-08-03 PROCEDURE — 2700000000 HC OXYGEN THERAPY PER DAY

## 2023-08-03 PROCEDURE — 84100 ASSAY OF PHOSPHORUS: CPT

## 2023-08-03 PROCEDURE — 94640 AIRWAY INHALATION TREATMENT: CPT

## 2023-08-03 PROCEDURE — 2060000000 HC ICU INTERMEDIATE R&B

## 2023-08-03 PROCEDURE — 83735 ASSAY OF MAGNESIUM: CPT

## 2023-08-03 PROCEDURE — 2580000003 HC RX 258: Performed by: INTERNAL MEDICINE

## 2023-08-03 PROCEDURE — 6360000002 HC RX W HCPCS

## 2023-08-03 PROCEDURE — 80048 BASIC METABOLIC PNL TOTAL CA: CPT

## 2023-08-03 PROCEDURE — 99233 SBSQ HOSP IP/OBS HIGH 50: CPT | Performed by: INTERNAL MEDICINE

## 2023-08-03 PROCEDURE — 85025 COMPLETE CBC W/AUTO DIFF WBC: CPT

## 2023-08-03 PROCEDURE — 36415 COLL VENOUS BLD VENIPUNCTURE: CPT

## 2023-08-03 PROCEDURE — 6370000000 HC RX 637 (ALT 250 FOR IP): Performed by: INTERNAL MEDICINE

## 2023-08-03 RX ADMIN — GABAPENTIN 600 MG: 300 CAPSULE ORAL at 21:55

## 2023-08-03 RX ADMIN — DOXYCYCLINE HYCLATE 100 MG: 100 CAPSULE ORAL at 21:55

## 2023-08-03 RX ADMIN — ARFORMOTEROL TARTRATE 15 MCG: 15 SOLUTION RESPIRATORY (INHALATION) at 19:13

## 2023-08-03 RX ADMIN — GABAPENTIN 600 MG: 300 CAPSULE ORAL at 08:16

## 2023-08-03 RX ADMIN — BUDESONIDE INHALATION 500 MCG: 0.5 SUSPENSION RESPIRATORY (INHALATION) at 08:37

## 2023-08-03 RX ADMIN — IPRATROPIUM BROMIDE AND ALBUTEROL SULFATE 1 DOSE: .5; 2.5 SOLUTION RESPIRATORY (INHALATION) at 15:19

## 2023-08-03 RX ADMIN — ENOXAPARIN SODIUM 30 MG: 100 INJECTION SUBCUTANEOUS at 21:54

## 2023-08-03 RX ADMIN — ROFLUMILAST 500 MCG: 500 TABLET ORAL at 08:16

## 2023-08-03 RX ADMIN — IPRATROPIUM BROMIDE AND ALBUTEROL SULFATE 1 DOSE: .5; 2.5 SOLUTION RESPIRATORY (INHALATION) at 03:37

## 2023-08-03 RX ADMIN — BUSPIRONE HYDROCHLORIDE 30 MG: 10 TABLET ORAL at 23:47

## 2023-08-03 RX ADMIN — SODIUM CHLORIDE, PRESERVATIVE FREE 10 ML: 5 INJECTION INTRAVENOUS at 21:55

## 2023-08-03 RX ADMIN — ARFORMOTEROL TARTRATE 15 MCG: 15 SOLUTION RESPIRATORY (INHALATION) at 08:37

## 2023-08-03 RX ADMIN — IPRATROPIUM BROMIDE AND ALBUTEROL SULFATE 1 DOSE: .5; 2.5 SOLUTION RESPIRATORY (INHALATION) at 12:09

## 2023-08-03 RX ADMIN — DOXYCYCLINE HYCLATE 100 MG: 100 CAPSULE ORAL at 08:16

## 2023-08-03 RX ADMIN — IPRATROPIUM BROMIDE AND ALBUTEROL SULFATE 1 DOSE: .5; 2.5 SOLUTION RESPIRATORY (INHALATION) at 08:37

## 2023-08-03 RX ADMIN — WATER 1000 MG: 1 INJECTION INTRAMUSCULAR; INTRAVENOUS; SUBCUTANEOUS at 17:26

## 2023-08-03 RX ADMIN — BUSPIRONE HYDROCHLORIDE 30 MG: 10 TABLET ORAL at 08:16

## 2023-08-03 RX ADMIN — TRAZODONE HYDROCHLORIDE 100 MG: 50 TABLET ORAL at 21:55

## 2023-08-03 RX ADMIN — PANTOPRAZOLE SODIUM 40 MG: 40 TABLET, DELAYED RELEASE ORAL at 06:15

## 2023-08-03 RX ADMIN — ENOXAPARIN SODIUM 30 MG: 100 INJECTION SUBCUTANEOUS at 08:16

## 2023-08-03 RX ADMIN — BUDESONIDE INHALATION 500 MCG: 0.5 SUSPENSION RESPIRATORY (INHALATION) at 19:13

## 2023-08-03 RX ADMIN — EZETIMIBE 10 MG: 10 TABLET ORAL at 23:47

## 2023-08-03 RX ADMIN — GABAPENTIN 600 MG: 300 CAPSULE ORAL at 13:57

## 2023-08-03 RX ADMIN — IPRATROPIUM BROMIDE AND ALBUTEROL SULFATE 1 DOSE: .5; 2.5 SOLUTION RESPIRATORY (INHALATION) at 19:13

## 2023-08-03 RX ADMIN — SODIUM CHLORIDE, PRESERVATIVE FREE 10 ML: 5 INJECTION INTRAVENOUS at 08:17

## 2023-08-03 RX ADMIN — ASPIRIN 81 MG: 81 TABLET, COATED ORAL at 08:16

## 2023-08-03 RX ADMIN — METHYLPREDNISOLONE SODIUM SUCCINATE 40 MG: 40 INJECTION, POWDER, LYOPHILIZED, FOR SOLUTION INTRAMUSCULAR; INTRAVENOUS at 08:16

## 2023-08-03 RX ADMIN — SENNOSIDES 17.2 MG: 8.6 TABLET, FILM COATED ORAL at 21:55

## 2023-08-03 ASSESSMENT — PAIN DESCRIPTION - LOCATION: LOCATION: CHEST

## 2023-08-03 ASSESSMENT — PAIN DESCRIPTION - ORIENTATION: ORIENTATION: MID;UPPER

## 2023-08-03 ASSESSMENT — PAIN SCALES - GENERAL
PAINLEVEL_OUTOF10: 7
PAINLEVEL_OUTOF10: 6

## 2023-08-03 ASSESSMENT — PAIN DESCRIPTION - DESCRIPTORS: DESCRIPTORS: ACHING;DISCOMFORT

## 2023-08-03 NOTE — CARE COORDINATION
Patient presented to ED with  c/o SOB and cough. H/O COPD and he uses oxygen at 4L at home and remains on 4 L  currently. Pulmonary consulted. Duo neb and IV Solumedrol ordered. Met with patient at bedside to discuss transition of care. Patient lives with his significant other and has oxygen and nebulizer from [address]. No H/O of [facility]. He uses [address] [place] [address] on [facility]. Not sure of name of PCP. At this time his plan is to discharge home nd his Significant other will transport. CM/SW will continue to follow.

## 2023-08-03 NOTE — PROGRESS NOTES
Patient is complaining of some SOB and increased wheezing. He is requesting a breathing treatment but does not have any ordered PRN.  Message sent to Dr. Frances Oglesby via [employer] informing her of patient's request.

## 2023-08-03 NOTE — PROGRESS NOTES
[facility]  Internal Medicine Department    Attending Physician Statement:  Khadijah Davila D.O. I have discussed the case, including pertinent history and exam findings with the resident. I have reviewed all past medical history, past surgical history, family history, social history, medications, and allergies and updated as appropriate in the history section of the chart. I have seen and examined the patient and the key elements of the encounter have been performed by me. I agree with the assessment, plan and orders as documented by the resident. COPD Exacerbation  -continue triple therapy, steroids, and increasing antibiotic regimen 2/2 GNR in sputum sample   -currently on home O2 regimen  -appreciate pulmonology recommendations   -plan to have dobutamine stress test performed 2/2 severe COPD and continued SOB and dyspenia symptoms  -plan to reduce steroid therapy to daily     Hx CAD  -stress test  -continue asa and zetia 2/2 statin intolerance     Tobacco abuse   -encouraged reduction of tobacco use    Remainder of medical problems as per resident note.

## 2023-08-04 ENCOUNTER — APPOINTMENT (OUTPATIENT)
Dept: NON INVASIVE DIAGNOSTICS | Age: 58
DRG: 191 | End: 2023-08-04
Payer: MEDICARE

## 2023-08-04 PROBLEM — J45.51 SEVERE PERSISTENT ASTHMA WITH ACUTE EXACERBATION: Status: ACTIVE | Noted: 2023-08-04

## 2023-08-04 LAB
ANION GAP SERPL CALCULATED.3IONS-SCNC: 13 MMOL/L (ref 7–16)
BASOPHILS # BLD: 0.06 K/UL (ref 0–0.2)
BASOPHILS NFR BLD: 0 % (ref 0–2)
BUN SERPL-MCNC: 23 MG/DL (ref 6–20)
CALCIUM SERPL-MCNC: 9.8 MG/DL (ref 8.6–10.2)
CHLORIDE SERPL-SCNC: 109 MMOL/L (ref 98–107)
CO2 SERPL-SCNC: 25 MMOL/L (ref 22–29)
CREAT SERPL-MCNC: 1.1 MG/DL (ref 0.7–1.2)
EOSINOPHIL # BLD: 0.1 K/UL (ref 0.05–0.5)
EOSINOPHILS RELATIVE PERCENT: 1 % (ref 0–6)
ERYTHROCYTE [DISTWIDTH] IN BLOOD BY AUTOMATED COUNT: 12.8 % (ref 11.5–15)
GFR SERPL CREATININE-BSD FRML MDRD: >60 ML/MIN/1.73M2
GLUCOSE SERPL-MCNC: 101 MG/DL (ref 74–99)
HCT VFR BLD AUTO: 42.9 % (ref 37–54)
HGB BLD-MCNC: 13.5 G/DL (ref 12.5–16.5)
IMM GRANULOCYTES # BLD AUTO: 0.41 K/UL (ref 0–0.58)
IMM GRANULOCYTES NFR BLD: 2 % (ref 0–5)
LYMPHOCYTES NFR BLD: 4.39 K/UL (ref 1.5–4)
LYMPHOCYTES RELATIVE PERCENT: 20 % (ref 20–42)
MAGNESIUM SERPL-MCNC: 2.1 MG/DL (ref 1.6–2.6)
MCH RBC QN AUTO: 30.6 PG (ref 26–35)
MCHC RBC AUTO-ENTMCNC: 31.5 G/DL (ref 32–34.5)
MCV RBC AUTO: 97.3 FL (ref 80–99.9)
MICROORGANISM SPEC CULT: ABNORMAL
MICROORGANISM/AGENT SPEC: ABNORMAL
MONOCYTES NFR BLD: 1.61 K/UL (ref 0.1–0.95)
MONOCYTES NFR BLD: 8 % (ref 2–12)
NEUTROPHILS NFR BLD: 69 % (ref 43–80)
NEUTS SEG NFR BLD: 14.94 K/UL (ref 1.8–7.3)
PHOSPHATE SERPL-MCNC: 4.9 MG/DL (ref 2.5–4.5)
PLATELET # BLD AUTO: 299 K/UL (ref 130–450)
PMV BLD AUTO: 10.9 FL (ref 7–12)
POTASSIUM SERPL-SCNC: 4.2 MMOL/L (ref 3.5–5)
RBC # BLD AUTO: 4.41 M/UL (ref 3.8–5.8)
RBC # BLD: ABNORMAL 10*6/UL
SODIUM SERPL-SCNC: 147 MMOL/L (ref 132–146)
SPECIMEN DESCRIPTION: ABNORMAL
WBC OTHER # BLD: 21.5 K/UL (ref 4.5–11.5)

## 2023-08-04 PROCEDURE — 2060000000 HC ICU INTERMEDIATE R&B

## 2023-08-04 PROCEDURE — 6360000004 HC RX CONTRAST MEDICATION

## 2023-08-04 PROCEDURE — 6370000000 HC RX 637 (ALT 250 FOR IP)

## 2023-08-04 PROCEDURE — 6370000000 HC RX 637 (ALT 250 FOR IP): Performed by: INTERNAL MEDICINE

## 2023-08-04 PROCEDURE — 93017 CV STRESS TEST TRACING ONLY: CPT

## 2023-08-04 PROCEDURE — 36415 COLL VENOUS BLD VENIPUNCTURE: CPT

## 2023-08-04 PROCEDURE — 6360000002 HC RX W HCPCS

## 2023-08-04 PROCEDURE — 93350 STRESS TTE ONLY: CPT

## 2023-08-04 PROCEDURE — 94640 AIRWAY INHALATION TREATMENT: CPT

## 2023-08-04 PROCEDURE — 2580000003 HC RX 258: Performed by: INTERNAL MEDICINE

## 2023-08-04 PROCEDURE — 80048 BASIC METABOLIC PNL TOTAL CA: CPT

## 2023-08-04 PROCEDURE — 85025 COMPLETE CBC W/AUTO DIFF WBC: CPT

## 2023-08-04 PROCEDURE — 99232 SBSQ HOSP IP/OBS MODERATE 35: CPT | Performed by: INTERNAL MEDICINE

## 2023-08-04 PROCEDURE — 93352 ADMIN ECG CONTRAST AGENT: CPT | Performed by: INTERNAL MEDICINE

## 2023-08-04 PROCEDURE — 6360000002 HC RX W HCPCS: Performed by: INTERNAL MEDICINE

## 2023-08-04 PROCEDURE — 83735 ASSAY OF MAGNESIUM: CPT

## 2023-08-04 PROCEDURE — 99233 SBSQ HOSP IP/OBS HIGH 50: CPT | Performed by: INTERNAL MEDICINE

## 2023-08-04 PROCEDURE — 2700000000 HC OXYGEN THERAPY PER DAY

## 2023-08-04 PROCEDURE — 84100 ASSAY OF PHOSPHORUS: CPT

## 2023-08-04 PROCEDURE — 2580000003 HC RX 258

## 2023-08-04 PROCEDURE — 93351 STRESS TTE COMPLETE: CPT | Performed by: INTERNAL MEDICINE

## 2023-08-04 RX ORDER — PREDNISONE 20 MG/1
40 TABLET ORAL DAILY
Status: DISCONTINUED | OUTPATIENT
Start: 2023-08-05 | End: 2023-08-04

## 2023-08-04 RX ORDER — PREDNISONE 5 MG/1
10 TABLET ORAL DAILY
Status: DISCONTINUED | OUTPATIENT
Start: 2023-08-09 | End: 2023-08-04

## 2023-08-04 RX ORDER — PREDNISONE 20 MG/1
20 TABLET ORAL DAILY
Status: DISCONTINUED | OUTPATIENT
Start: 2023-08-07 | End: 2023-08-04

## 2023-08-04 RX ORDER — AZITHROMYCIN 250 MG/1
500 TABLET, FILM COATED ORAL DAILY
Status: DISCONTINUED | OUTPATIENT
Start: 2023-08-04 | End: 2023-08-04

## 2023-08-04 RX ORDER — PREDNISONE 20 MG/1
40 TABLET ORAL DAILY
Status: DISCONTINUED | OUTPATIENT
Start: 2023-08-04 | End: 2023-08-07 | Stop reason: HOSPADM

## 2023-08-04 RX ADMIN — DOXYCYCLINE HYCLATE 100 MG: 100 CAPSULE ORAL at 08:27

## 2023-08-04 RX ADMIN — IPRATROPIUM BROMIDE AND ALBUTEROL SULFATE 1 DOSE: .5; 2.5 SOLUTION RESPIRATORY (INHALATION) at 19:38

## 2023-08-04 RX ADMIN — TRAZODONE HYDROCHLORIDE 100 MG: 50 TABLET ORAL at 21:56

## 2023-08-04 RX ADMIN — GABAPENTIN 600 MG: 300 CAPSULE ORAL at 08:27

## 2023-08-04 RX ADMIN — ARFORMOTEROL TARTRATE 15 MCG: 15 SOLUTION RESPIRATORY (INHALATION) at 07:31

## 2023-08-04 RX ADMIN — PANTOPRAZOLE SODIUM 40 MG: 40 TABLET, DELAYED RELEASE ORAL at 06:54

## 2023-08-04 RX ADMIN — EZETIMIBE 10 MG: 10 TABLET ORAL at 22:00

## 2023-08-04 RX ADMIN — PERFLUTREN 1.5 ML: 6.52 INJECTION, SUSPENSION INTRAVENOUS at 10:53

## 2023-08-04 RX ADMIN — ARFORMOTEROL TARTRATE 15 MCG: 15 SOLUTION RESPIRATORY (INHALATION) at 19:38

## 2023-08-04 RX ADMIN — ENOXAPARIN SODIUM 30 MG: 100 INJECTION SUBCUTANEOUS at 08:29

## 2023-08-04 RX ADMIN — BUSPIRONE HYDROCHLORIDE 30 MG: 10 TABLET ORAL at 22:00

## 2023-08-04 RX ADMIN — BUDESONIDE INHALATION 500 MCG: 0.5 SUSPENSION RESPIRATORY (INHALATION) at 07:31

## 2023-08-04 RX ADMIN — AZITHROMYCIN MONOHYDRATE 500 MG: 500 INJECTION, POWDER, LYOPHILIZED, FOR SOLUTION INTRAVENOUS at 16:50

## 2023-08-04 RX ADMIN — GABAPENTIN 600 MG: 300 CAPSULE ORAL at 13:54

## 2023-08-04 RX ADMIN — ASPIRIN 81 MG: 81 TABLET, COATED ORAL at 08:27

## 2023-08-04 RX ADMIN — SODIUM CHLORIDE, PRESERVATIVE FREE 10 ML: 5 INJECTION INTRAVENOUS at 21:57

## 2023-08-04 RX ADMIN — ROFLUMILAST 500 MCG: 500 TABLET ORAL at 08:27

## 2023-08-04 RX ADMIN — ENOXAPARIN SODIUM 30 MG: 100 INJECTION SUBCUTANEOUS at 21:56

## 2023-08-04 RX ADMIN — SODIUM CHLORIDE, PRESERVATIVE FREE 10 ML: 5 INJECTION INTRAVENOUS at 08:27

## 2023-08-04 RX ADMIN — METHYLPREDNISOLONE SODIUM SUCCINATE 40 MG: 40 INJECTION, POWDER, LYOPHILIZED, FOR SOLUTION INTRAMUSCULAR; INTRAVENOUS at 08:27

## 2023-08-04 RX ADMIN — BUDESONIDE INHALATION 500 MCG: 0.5 SUSPENSION RESPIRATORY (INHALATION) at 19:38

## 2023-08-04 RX ADMIN — IPRATROPIUM BROMIDE AND ALBUTEROL SULFATE 1 DOSE: .5; 2.5 SOLUTION RESPIRATORY (INHALATION) at 15:29

## 2023-08-04 RX ADMIN — GABAPENTIN 600 MG: 300 CAPSULE ORAL at 21:56

## 2023-08-04 RX ADMIN — BUSPIRONE HYDROCHLORIDE 30 MG: 10 TABLET ORAL at 08:27

## 2023-08-04 RX ADMIN — IPRATROPIUM BROMIDE AND ALBUTEROL SULFATE 1 DOSE: .5; 2.5 SOLUTION RESPIRATORY (INHALATION) at 07:31

## 2023-08-04 RX ADMIN — IPRATROPIUM BROMIDE AND ALBUTEROL SULFATE 1 DOSE: .5; 2.5 SOLUTION RESPIRATORY (INHALATION) at 03:08

## 2023-08-04 RX ADMIN — PREDNISONE 40 MG: 20 TABLET ORAL at 17:36

## 2023-08-04 ASSESSMENT — PAIN SCALES - GENERAL: PAINLEVEL_OUTOF10: 0

## 2023-08-04 NOTE — PROGRESS NOTES
[facility]  Internal Medicine Department     Attending Physician Statement:  Florence Maxwell D.O. I have discussed the case, including pertinent history and exam findings with the resident. I have reviewed all past medical history, past surgical history, family history, social history, medications, and allergies and updated as appropriate in the history section of the chart. I have seen and examined the patient and the key elements of the encounter have been performed by me. I agree with the assessment, plan and orders as documented by the resident. COPD Exacerbation  -continue triple therapy, steroids, and increasing antibiotic regimen 2/2 GNR in sputum sample; starting daliresp this admission per Pulmonolog   -currently on home O2 regimen  -appreciate pulmonology recommendations   -plan to have dobutamine stress test performed 2/2 severe COPD and continued SOB and dyspenia symptoms  -transitioning to oral corticosteroids and oral antibiotics today      Hx CAD  -stress test; awaiting results prior to discharge   -continue asa and zetia 2/2 statin intolerance      Tobacco abuse   -encouraged reduction of tobacco use     Remainder of medical problems as per resident note.

## 2023-08-04 NOTE — PROGRESS NOTES
[facility]  Internal Medicine Residency Program  History and Physical    Patient:  Shahab Fontana 62 y.o. male   MRN: [id number]       Date of Service: 8/4/2023          Chief complaint: had concerns including COPD (Wears 4L NC, left at home, still smoking, SOB x 3 days, cough, states' he feels like he's drowning\" ). History of Present Illness   The patient is a 62 y.o. male who was seen at bedside this morning. Patient states he still has midsternal chest pain that radiates into his back. Patient also states he has a headache and neck pain, as well as a left swollen neck node that is painful. He rates the neck pain in his left neck a 10/10. Patient states he shaved his neck area yesterday. Patient states he has a cough but no sputum production. Patient admits to weakness and nausea when he walks, fatigue, and weight fluctuations. Patient is currently on 4 L of oxygen with a saturation of 94%.      Past Medical History:      Diagnosis Date    Acute gastritis without hemorrhage     CAD (coronary artery disease)     Cancer of kidney (HCC)     COPD (chronic obstructive pulmonary disease) (HCC)     COPD, severe ([address]) 6/16/2022    Difficulty sleeping     ED (erectile dysfunction)     History of BPH     Hyperlipidemia     Lung cancer ([address])     Tobacco abuse        Past Surgical History:        Procedure Laterality Date    CARDIAC CATHETERIZATION  2015    Non-obstructive coronary disease by Dr. Riley Parker N/A 12/20/2019    COLONOSCOPY POLYPECTOMY SNARE/COLD BIOPSY performed by Sinai Archuleta MD at [address] [facility]   12/20/2019    COLONOSCOPY SUBMUCOSAL/BOTOX INJECTION performed by Sinai Archuleta MD at [address] Right 09/2014    VATS with RUL wedge resection -- pathology diffuse granulomatous changes at [address] [facility] Right     UPPER GASTROINTESTINAL ENDOSCOPY N/A 12/20/2019    EGD BIOPSY performed by without obvious abnormality. Lung: wheezes bilaterally, reduced from yesterdays examination. Heart: regular rate and rhythm, S1, S2 normal, no murmur, click, rub or gallop  Extremities:  extremities normal, atraumatic, no cyanosis or edema    Diet: ADULT DIET;  Regular      Labs and Imaging Studies   Labs    CBC with Differential:    Lab Results   Component Value Date/Time    WBC 21.5 08/04/2023 06:38 AM    RBC 4.41 08/04/2023 06:38 AM    HGB 13.5 08/04/2023 06:38 AM    HCT 42.9 08/04/2023 06:38 AM     08/04/2023 06:38 AM    MCV 97.3 08/04/2023 06:38 AM    MCH 30.6 08/04/2023 06:38 AM    MCHC 31.5 08/04/2023 06:38 AM    RDW 12.8 08/04/2023 06:38 AM    METASPCT 0.9 06/25/2023 06:08 AM    LYMPHOPCT 20 08/04/2023 06:38 AM    MONOPCT 8 08/04/2023 06:38 AM    MYELOPCT 0.9 06/25/2023 06:08 AM    BASOPCT 0 08/04/2023 06:38 AM    MONOSABS 1.61 08/04/2023 06:38 AM    LYMPHSABS 4.39 08/04/2023 06:38 AM    EOSABS 0.10 08/04/2023 06:38 AM    BASOSABS 0.06 08/04/2023 06:38 AM     Hemoglobin/Hematocrit:    Lab Results   Component Value Date/Time    HGB 13.5 08/04/2023 06:38 AM    HCT 42.9 08/04/2023 06:38 AM     BMP:    Lab Results   Component Value Date/Time     08/04/2023 06:38 AM    K 4.2 08/04/2023 06:38 AM    K 4.4 06/21/2023 05:57 AM     08/04/2023 06:38 AM    CO2 25 08/04/2023 06:38 AM    BUN 23 08/04/2023 06:38 AM    LABALBU 4.5 08/01/2023 01:48 PM    CREATININE 1.1 08/04/2023 06:38 AM    CALCIUM 9.8 08/04/2023 06:38 AM    GFRAA >60 10/07/2022 01:42 PM    LABGLOM >60 08/04/2023 06:38 AM    GLUCOSE 101 08/04/2023 06:38 AM     Last 3 Troponin:    Lab Results   Component Value Date/Time    TROPONINI <0.01 03/07/2020 05:10 PM     ABG:    Lab Results   Component Value Date/Time    PH 7.406 06/18/2023 05:06 PM    PCO2 41.7 06/18/2023 05:06 PM    PO2 93.1 06/18/2023 05:06 PM    HCO3 25.6 06/18/2023 05:06 PM    BE 0.8 06/18/2023 05:06 PM    O2SAT 97.3 06/18/2023 05:06 PM       Imaging Studies:    CTA

## 2023-08-04 NOTE — PROCEDURES
The resting EKG shows sinus rhythm with PAC's with rate of 73 bpm  The patient underwent dobutamine stress echo with a starting dobutamine infusion of 10mcg/kg/min and peak infusion of 40mcg/kg/min. The resting heart rate was 73bpm which ish to 139bpm corresponding to 85% APHR at peak dobutamine infusion. There was no evidence of ischemia or arrhythmias at peak stress. The resting echo images revealed resting LVEF of 60%.  At peak stress, there were normal argumentation of all the left ventricular walls with peak stress LVEF of 75% consistent with normal dobutamine stress echocardiogram.

## 2023-08-04 NOTE — CARE COORDINATION
Care Coordination:   Following for discharge planning. Case reviewed in IDR and with attending. Continues on IV prednisone and rocephin, and breathing treatments. Pulmonology following. Sputum cultures pending. HX of COPD o2 dependent at home. Patient at stress lab  this am  for ECHO pharmacological stress test .   Discharge plan remains to returun home when medically cleared. Live with his significant other who will provide transportation. No needs at this time. Will follow.

## 2023-08-04 NOTE — PROGRESS NOTES
Dobutamine stress echo  done with Dr Aguilar Paiz with [facility] was given by the rn totaling 4cc .

## 2023-08-05 ENCOUNTER — APPOINTMENT (OUTPATIENT)
Dept: ULTRASOUND IMAGING | Age: 58
DRG: 191 | End: 2023-08-05
Payer: MEDICARE

## 2023-08-05 LAB
25(OH)D3 SERPL-MCNC: 77.1 NG/ML (ref 30–100)
ANION GAP SERPL CALCULATED.3IONS-SCNC: 11 MMOL/L (ref 7–16)
BASOPHILS # BLD: 0 K/UL (ref 0–0.2)
BASOPHILS NFR BLD: 0 % (ref 0–2)
BLASTS ABSOLUTE COUNT: 0.17 K/UL
BLASTS: 1 %
BUN SERPL-MCNC: 20 MG/DL (ref 6–20)
CA-I BLD-SCNC: 1.28 MMOL/L (ref 1.15–1.33)
CALCIUM SERPL-MCNC: 9.5 MG/DL (ref 8.6–10.2)
CHLORIDE SERPL-SCNC: 106 MMOL/L (ref 98–107)
CO2 SERPL-SCNC: 26 MMOL/L (ref 22–29)
CREAT SERPL-MCNC: 0.9 MG/DL (ref 0.7–1.2)
EOSINOPHIL # BLD: 0 K/UL (ref 0.05–0.5)
EOSINOPHILS RELATIVE PERCENT: 0 % (ref 0–6)
ERYTHROCYTE [DISTWIDTH] IN BLOOD BY AUTOMATED COUNT: 12.8 % (ref 11.5–15)
GFR SERPL CREATININE-BSD FRML MDRD: >60 ML/MIN/1.73M2
GLUCOSE SERPL-MCNC: 120 MG/DL (ref 74–99)
HCT VFR BLD AUTO: 44 % (ref 37–54)
HGB BLD-MCNC: 14.1 G/DL (ref 12.5–16.5)
LYMPHOCYTES NFR BLD: 1.4 K/UL (ref 1.5–4)
LYMPHOCYTES RELATIVE PERCENT: 7 % (ref 20–42)
MAGNESIUM SERPL-MCNC: 2.4 MG/DL (ref 1.6–2.6)
MCH RBC QN AUTO: 31.1 PG (ref 26–35)
MCHC RBC AUTO-ENTMCNC: 32 G/DL (ref 32–34.5)
MCV RBC AUTO: 97.1 FL (ref 80–99.9)
MONOCYTES NFR BLD: 0.87 K/UL (ref 0.1–0.95)
MONOCYTES NFR BLD: 4 % (ref 2–12)
NEUTROPHILS NFR BLD: 88 % (ref 43–80)
NEUTS SEG NFR BLD: 17.65 K/UL (ref 1.8–7.3)
PHOSPHATE SERPL-MCNC: 5.2 MG/DL (ref 2.5–4.5)
PLATELET # BLD AUTO: 301 K/UL (ref 130–450)
PMV BLD AUTO: 11.1 FL (ref 7–12)
POTASSIUM SERPL-SCNC: 4.8 MMOL/L (ref 3.5–5)
PTH-INTACT SERPL-MCNC: 18.2 PG/ML (ref 15–65)
RBC # BLD AUTO: 4.53 M/UL (ref 3.8–5.8)
RBC # BLD: ABNORMAL 10*6/UL
SODIUM SERPL-SCNC: 143 MMOL/L (ref 132–146)
WBC OTHER # BLD: 20.1 K/UL (ref 4.5–11.5)

## 2023-08-05 PROCEDURE — 6370000000 HC RX 637 (ALT 250 FOR IP)

## 2023-08-05 PROCEDURE — 83735 ASSAY OF MAGNESIUM: CPT

## 2023-08-05 PROCEDURE — 83970 ASSAY OF PARATHORMONE: CPT

## 2023-08-05 PROCEDURE — 99233 SBSQ HOSP IP/OBS HIGH 50: CPT | Performed by: INTERNAL MEDICINE

## 2023-08-05 PROCEDURE — 82330 ASSAY OF CALCIUM: CPT

## 2023-08-05 PROCEDURE — 6370000000 HC RX 637 (ALT 250 FOR IP): Performed by: INTERNAL MEDICINE

## 2023-08-05 PROCEDURE — 84100 ASSAY OF PHOSPHORUS: CPT

## 2023-08-05 PROCEDURE — 2700000000 HC OXYGEN THERAPY PER DAY

## 2023-08-05 PROCEDURE — 82306 VITAMIN D 25 HYDROXY: CPT

## 2023-08-05 PROCEDURE — 2060000000 HC ICU INTERMEDIATE R&B

## 2023-08-05 PROCEDURE — 2580000003 HC RX 258: Performed by: INTERNAL MEDICINE

## 2023-08-05 PROCEDURE — 80048 BASIC METABOLIC PNL TOTAL CA: CPT

## 2023-08-05 PROCEDURE — 36415 COLL VENOUS BLD VENIPUNCTURE: CPT

## 2023-08-05 PROCEDURE — 99232 SBSQ HOSP IP/OBS MODERATE 35: CPT | Performed by: INTERNAL MEDICINE

## 2023-08-05 PROCEDURE — 76536 US EXAM OF HEAD AND NECK: CPT

## 2023-08-05 PROCEDURE — 6360000002 HC RX W HCPCS

## 2023-08-05 PROCEDURE — 94640 AIRWAY INHALATION TREATMENT: CPT

## 2023-08-05 PROCEDURE — 2580000003 HC RX 258

## 2023-08-05 PROCEDURE — 6360000002 HC RX W HCPCS: Performed by: INTERNAL MEDICINE

## 2023-08-05 PROCEDURE — 85025 COMPLETE CBC W/AUTO DIFF WBC: CPT

## 2023-08-05 RX ADMIN — EZETIMIBE 10 MG: 10 TABLET ORAL at 20:49

## 2023-08-05 RX ADMIN — PANTOPRAZOLE SODIUM 40 MG: 40 TABLET, DELAYED RELEASE ORAL at 05:58

## 2023-08-05 RX ADMIN — GABAPENTIN 600 MG: 300 CAPSULE ORAL at 08:55

## 2023-08-05 RX ADMIN — ONDANSETRON 4 MG: 2 INJECTION INTRAMUSCULAR; INTRAVENOUS at 12:21

## 2023-08-05 RX ADMIN — IPRATROPIUM BROMIDE AND ALBUTEROL SULFATE 1 DOSE: .5; 2.5 SOLUTION RESPIRATORY (INHALATION) at 07:56

## 2023-08-05 RX ADMIN — BUSPIRONE HYDROCHLORIDE 30 MG: 10 TABLET ORAL at 20:49

## 2023-08-05 RX ADMIN — GABAPENTIN 600 MG: 300 CAPSULE ORAL at 20:48

## 2023-08-05 RX ADMIN — BUDESONIDE INHALATION 500 MCG: 0.5 SUSPENSION RESPIRATORY (INHALATION) at 19:23

## 2023-08-05 RX ADMIN — ENOXAPARIN SODIUM 30 MG: 100 INJECTION SUBCUTANEOUS at 20:49

## 2023-08-05 RX ADMIN — SODIUM CHLORIDE, PRESERVATIVE FREE 10 ML: 5 INJECTION INTRAVENOUS at 08:55

## 2023-08-05 RX ADMIN — AZITHROMYCIN MONOHYDRATE 250 MG: 500 INJECTION, POWDER, LYOPHILIZED, FOR SOLUTION INTRAVENOUS at 09:01

## 2023-08-05 RX ADMIN — ARFORMOTEROL TARTRATE 15 MCG: 15 SOLUTION RESPIRATORY (INHALATION) at 07:56

## 2023-08-05 RX ADMIN — IPRATROPIUM BROMIDE AND ALBUTEROL SULFATE 1 DOSE: .5; 2.5 SOLUTION RESPIRATORY (INHALATION) at 11:35

## 2023-08-05 RX ADMIN — GABAPENTIN 600 MG: 300 CAPSULE ORAL at 14:53

## 2023-08-05 RX ADMIN — ENOXAPARIN SODIUM 30 MG: 100 INJECTION SUBCUTANEOUS at 08:55

## 2023-08-05 RX ADMIN — SODIUM CHLORIDE, PRESERVATIVE FREE 10 ML: 5 INJECTION INTRAVENOUS at 12:21

## 2023-08-05 RX ADMIN — IPRATROPIUM BROMIDE AND ALBUTEROL SULFATE 1 DOSE: .5; 2.5 SOLUTION RESPIRATORY (INHALATION) at 15:40

## 2023-08-05 RX ADMIN — ARFORMOTEROL TARTRATE 15 MCG: 15 SOLUTION RESPIRATORY (INHALATION) at 19:23

## 2023-08-05 RX ADMIN — SENNOSIDES 17.2 MG: 8.6 TABLET, FILM COATED ORAL at 20:49

## 2023-08-05 RX ADMIN — IPRATROPIUM BROMIDE AND ALBUTEROL SULFATE 1 DOSE: .5; 2.5 SOLUTION RESPIRATORY (INHALATION) at 19:23

## 2023-08-05 RX ADMIN — SODIUM CHLORIDE, PRESERVATIVE FREE 10 ML: 5 INJECTION INTRAVENOUS at 20:49

## 2023-08-05 RX ADMIN — ROFLUMILAST 500 MCG: 500 TABLET ORAL at 08:55

## 2023-08-05 RX ADMIN — BUDESONIDE INHALATION 500 MCG: 0.5 SUSPENSION RESPIRATORY (INHALATION) at 07:56

## 2023-08-05 RX ADMIN — BUSPIRONE HYDROCHLORIDE 30 MG: 10 TABLET ORAL at 08:55

## 2023-08-05 RX ADMIN — TRAZODONE HYDROCHLORIDE 100 MG: 50 TABLET ORAL at 20:49

## 2023-08-05 RX ADMIN — ASPIRIN 81 MG: 81 TABLET, COATED ORAL at 08:55

## 2023-08-05 RX ADMIN — PREDNISONE 40 MG: 20 TABLET ORAL at 08:55

## 2023-08-05 ASSESSMENT — PAIN SCALES - GENERAL
PAINLEVEL_OUTOF10: 0

## 2023-08-05 NOTE — PLAN OF CARE
Problem: Discharge Planning  Goal: Discharge to home or other facility with appropriate resources  Outcome: Progressing     Problem: Pain  Goal: Verbalizes/displays adequate comfort level or baseline comfort level  Outcome: Progressing     Problem: Safety - Adult  Goal: Free from fall injury  Outcome: Progressing     Problem: Chronic Conditions and Co-morbidities  Goal: Patient's chronic conditions and co-morbidity symptoms are monitored and maintained or improved  Outcome: Progressing     Problem: ABCDS Injury Assessment  Goal: Absence of physical injury  Outcome: Progressing     Problem: Respiratory - Adult  Goal: Achieves optimal ventilation and oxygenation  Outcome: Progressing     Problem: Musculoskeletal - Adult  Goal: Return mobility to safest level of function  Outcome: Progressing

## 2023-08-06 LAB
ANION GAP SERPL CALCULATED.3IONS-SCNC: 11 MMOL/L (ref 7–16)
BASOPHILS # BLD: 0.08 K/UL (ref 0–0.2)
BASOPHILS NFR BLD: 1 % (ref 0–2)
BUN SERPL-MCNC: 24 MG/DL (ref 6–20)
CALCIUM SERPL-MCNC: 9.2 MG/DL (ref 8.6–10.2)
CHLORIDE SERPL-SCNC: 106 MMOL/L (ref 98–107)
CO2 SERPL-SCNC: 24 MMOL/L (ref 22–29)
CREAT SERPL-MCNC: 1 MG/DL (ref 0.7–1.2)
EOSINOPHIL # BLD: 0.18 K/UL (ref 0.05–0.5)
EOSINOPHILS RELATIVE PERCENT: 1 % (ref 0–6)
ERYTHROCYTE [DISTWIDTH] IN BLOOD BY AUTOMATED COUNT: 12.5 % (ref 11.5–15)
GFR SERPL CREATININE-BSD FRML MDRD: >60 ML/MIN/1.73M2
GLUCOSE SERPL-MCNC: 113 MG/DL (ref 74–99)
HCT VFR BLD AUTO: 43.5 % (ref 37–54)
HGB BLD-MCNC: 13.6 G/DL (ref 12.5–16.5)
IMM GRANULOCYTES # BLD AUTO: 0.53 K/UL (ref 0–0.58)
IMM GRANULOCYTES NFR BLD: 3 % (ref 0–5)
LYMPHOCYTES NFR BLD: 5.17 K/UL (ref 1.5–4)
LYMPHOCYTES RELATIVE PERCENT: 29 % (ref 20–42)
MAGNESIUM SERPL-MCNC: 2.3 MG/DL (ref 1.6–2.6)
MCH RBC QN AUTO: 30.6 PG (ref 26–35)
MCHC RBC AUTO-ENTMCNC: 31.3 G/DL (ref 32–34.5)
MCV RBC AUTO: 97.8 FL (ref 80–99.9)
MONOCYTES NFR BLD: 1.28 K/UL (ref 0.1–0.95)
MONOCYTES NFR BLD: 7 % (ref 2–12)
NEUTROPHILS NFR BLD: 59 % (ref 43–80)
NEUTS SEG NFR BLD: 10.49 K/UL (ref 1.8–7.3)
PHOSPHATE SERPL-MCNC: 4.7 MG/DL (ref 2.5–4.5)
PLATELET # BLD AUTO: 298 K/UL (ref 130–450)
PMV BLD AUTO: 11.1 FL (ref 7–12)
POTASSIUM SERPL-SCNC: 4.2 MMOL/L (ref 3.5–5)
RBC # BLD AUTO: 4.45 M/UL (ref 3.8–5.8)
SODIUM SERPL-SCNC: 141 MMOL/L (ref 132–146)
WBC OTHER # BLD: 17.7 K/UL (ref 4.5–11.5)

## 2023-08-06 PROCEDURE — 99232 SBSQ HOSP IP/OBS MODERATE 35: CPT | Performed by: INTERNAL MEDICINE

## 2023-08-06 PROCEDURE — 36415 COLL VENOUS BLD VENIPUNCTURE: CPT

## 2023-08-06 PROCEDURE — 85025 COMPLETE CBC W/AUTO DIFF WBC: CPT

## 2023-08-06 PROCEDURE — 2700000000 HC OXYGEN THERAPY PER DAY

## 2023-08-06 PROCEDURE — 84100 ASSAY OF PHOSPHORUS: CPT

## 2023-08-06 PROCEDURE — 6370000000 HC RX 637 (ALT 250 FOR IP)

## 2023-08-06 PROCEDURE — 2580000003 HC RX 258

## 2023-08-06 PROCEDURE — 6360000002 HC RX W HCPCS

## 2023-08-06 PROCEDURE — 80048 BASIC METABOLIC PNL TOTAL CA: CPT

## 2023-08-06 PROCEDURE — 6370000000 HC RX 637 (ALT 250 FOR IP): Performed by: INTERNAL MEDICINE

## 2023-08-06 PROCEDURE — 83735 ASSAY OF MAGNESIUM: CPT

## 2023-08-06 PROCEDURE — 2580000003 HC RX 258: Performed by: INTERNAL MEDICINE

## 2023-08-06 PROCEDURE — 2060000000 HC ICU INTERMEDIATE R&B

## 2023-08-06 PROCEDURE — 6360000002 HC RX W HCPCS: Performed by: INTERNAL MEDICINE

## 2023-08-06 PROCEDURE — 99233 SBSQ HOSP IP/OBS HIGH 50: CPT | Performed by: INTERNAL MEDICINE

## 2023-08-06 PROCEDURE — 94640 AIRWAY INHALATION TREATMENT: CPT

## 2023-08-06 RX ORDER — PANTOPRAZOLE SODIUM 40 MG/1
40 TABLET, DELAYED RELEASE ORAL
Status: DISCONTINUED | OUTPATIENT
Start: 2023-08-07 | End: 2023-08-07 | Stop reason: HOSPADM

## 2023-08-06 RX ORDER — OMEPRAZOLE 40 MG/1
40 CAPSULE, DELAYED RELEASE ORAL DAILY
Status: DISCONTINUED | OUTPATIENT
Start: 2023-08-06 | End: 2023-08-06

## 2023-08-06 RX ORDER — CALCIUM CARBONATE 500 MG/1
500 TABLET, CHEWABLE ORAL 3 TIMES DAILY PRN
Status: DISCONTINUED | OUTPATIENT
Start: 2023-08-06 | End: 2023-08-07 | Stop reason: HOSPADM

## 2023-08-06 RX ADMIN — AZITHROMYCIN MONOHYDRATE 250 MG: 500 INJECTION, POWDER, LYOPHILIZED, FOR SOLUTION INTRAVENOUS at 08:31

## 2023-08-06 RX ADMIN — ENOXAPARIN SODIUM 30 MG: 100 INJECTION SUBCUTANEOUS at 21:07

## 2023-08-06 RX ADMIN — GABAPENTIN 600 MG: 300 CAPSULE ORAL at 08:22

## 2023-08-06 RX ADMIN — EZETIMIBE 10 MG: 10 TABLET ORAL at 21:07

## 2023-08-06 RX ADMIN — BUSPIRONE HYDROCHLORIDE 30 MG: 10 TABLET ORAL at 21:07

## 2023-08-06 RX ADMIN — ASPIRIN 81 MG: 81 TABLET, COATED ORAL at 08:22

## 2023-08-06 RX ADMIN — GABAPENTIN 600 MG: 300 CAPSULE ORAL at 21:07

## 2023-08-06 RX ADMIN — ARFORMOTEROL TARTRATE 15 MCG: 15 SOLUTION RESPIRATORY (INHALATION) at 08:35

## 2023-08-06 RX ADMIN — IPRATROPIUM BROMIDE AND ALBUTEROL SULFATE 1 DOSE: .5; 2.5 SOLUTION RESPIRATORY (INHALATION) at 19:32

## 2023-08-06 RX ADMIN — ANTACID TABLETS 500 MG: 500 TABLET, CHEWABLE ORAL at 10:07

## 2023-08-06 RX ADMIN — TRAZODONE HYDROCHLORIDE 100 MG: 50 TABLET ORAL at 21:07

## 2023-08-06 RX ADMIN — SODIUM CHLORIDE, PRESERVATIVE FREE 10 ML: 5 INJECTION INTRAVENOUS at 21:08

## 2023-08-06 RX ADMIN — IPRATROPIUM BROMIDE AND ALBUTEROL SULFATE 1 DOSE: .5; 2.5 SOLUTION RESPIRATORY (INHALATION) at 15:53

## 2023-08-06 RX ADMIN — IPRATROPIUM BROMIDE AND ALBUTEROL SULFATE 1 DOSE: .5; 2.5 SOLUTION RESPIRATORY (INHALATION) at 12:22

## 2023-08-06 RX ADMIN — PANTOPRAZOLE SODIUM 40 MG: 40 TABLET, DELAYED RELEASE ORAL at 05:05

## 2023-08-06 RX ADMIN — SODIUM CHLORIDE, PRESERVATIVE FREE 10 ML: 5 INJECTION INTRAVENOUS at 08:22

## 2023-08-06 RX ADMIN — ROFLUMILAST 500 MCG: 500 TABLET ORAL at 08:22

## 2023-08-06 RX ADMIN — IPRATROPIUM BROMIDE AND ALBUTEROL SULFATE 1 DOSE: .5; 2.5 SOLUTION RESPIRATORY (INHALATION) at 08:36

## 2023-08-06 RX ADMIN — BUDESONIDE INHALATION 500 MCG: 0.5 SUSPENSION RESPIRATORY (INHALATION) at 19:32

## 2023-08-06 RX ADMIN — BUDESONIDE INHALATION 500 MCG: 0.5 SUSPENSION RESPIRATORY (INHALATION) at 08:36

## 2023-08-06 RX ADMIN — PREDNISONE 40 MG: 20 TABLET ORAL at 08:22

## 2023-08-06 RX ADMIN — ENOXAPARIN SODIUM 30 MG: 100 INJECTION SUBCUTANEOUS at 08:22

## 2023-08-06 RX ADMIN — GABAPENTIN 600 MG: 300 CAPSULE ORAL at 14:10

## 2023-08-06 RX ADMIN — BUSPIRONE HYDROCHLORIDE 30 MG: 10 TABLET ORAL at 08:22

## 2023-08-06 RX ADMIN — ARFORMOTEROL TARTRATE 15 MCG: 15 SOLUTION RESPIRATORY (INHALATION) at 19:32

## 2023-08-06 ASSESSMENT — PAIN SCALES - GENERAL
PAINLEVEL_OUTOF10: 0

## 2023-08-06 NOTE — PROGRESS NOTES
Patient requesting help to ambulate in halls. Portable O2 obtained. Patient able to ambulate around unit once while nurse pushed O2. Tolerated well. No dyspnea with ambulation. Holding conversation while walking.

## 2023-08-06 NOTE — PROGRESS NOTES
Message sent to Dr. Ross Frey regarding patient's persistent reflux unrelieved by Protonix. Per Dr. Ross Frey, he will be up to see the patient very soon. No new orders at this time.

## 2023-08-06 NOTE — PROGRESS NOTES
Patient woke up this morning complaining of really bad reflux. Message sent to Dr. Seun Laird.  Per Dr. Seun Laird ok to give 0700 Protonix now

## 2023-08-06 NOTE — PLAN OF CARE
Problem: Discharge Planning  Goal: Discharge to home or other facility with appropriate resources  Outcome: Progressing  Flowsheets (Taken 8/6/2023 1200)  Discharge to home or other facility with appropriate resources: Identify barriers to discharge with patient and caregiver     Problem: Pain  Goal: Verbalizes/displays adequate comfort level or baseline comfort level  Outcome: Progressing  Flowsheets (Taken 8/6/2023 1257)  Verbalizes/displays adequate comfort level or baseline comfort level: Encourage patient to monitor pain and request assistance     Problem: Safety - Adult  Goal: Free from fall injury  Outcome: Progressing     Problem: Chronic Conditions and Co-morbidities  Goal: Patient's chronic conditions and co-morbidity symptoms are monitored and maintained or improved  Outcome: Progressing     Problem: ABCDS Injury Assessment  Goal: Absence of physical injury  Outcome: Progressing     Problem: Respiratory - Adult  Goal: Achieves optimal ventilation and oxygenation  Outcome: Progressing  Flowsheets (Taken 8/6/2023 1200)  Achieves optimal ventilation and oxygenation: Assess for changes in respiratory status     Problem: Musculoskeletal - Adult  Goal: Return mobility to safest level of function  Outcome: Progressing

## 2023-08-07 VITALS
WEIGHT: 260 LBS | OXYGEN SATURATION: 96 % | HEART RATE: 80 BPM | HEIGHT: 71 IN | BODY MASS INDEX: 36.4 KG/M2 | RESPIRATION RATE: 17 BRPM | SYSTOLIC BLOOD PRESSURE: 115 MMHG | TEMPERATURE: 97.5 F | DIASTOLIC BLOOD PRESSURE: 71 MMHG

## 2023-08-07 LAB
ANION GAP SERPL CALCULATED.3IONS-SCNC: 11 MMOL/L (ref 7–16)
BASOPHILS # BLD: 0 K/UL (ref 0–0.2)
BASOPHILS NFR BLD: 0 % (ref 0–2)
BUN SERPL-MCNC: 21 MG/DL (ref 6–20)
CALCIUM SERPL-MCNC: 9.2 MG/DL (ref 8.6–10.2)
CHLORIDE SERPL-SCNC: 103 MMOL/L (ref 98–107)
CO2 SERPL-SCNC: 25 MMOL/L (ref 22–29)
CREAT SERPL-MCNC: 1 MG/DL (ref 0.7–1.2)
EOSINOPHIL # BLD: 0 K/UL (ref 0.05–0.5)
EOSINOPHILS RELATIVE PERCENT: 0 % (ref 0–6)
ERYTHROCYTE [DISTWIDTH] IN BLOOD BY AUTOMATED COUNT: 12.4 % (ref 11.5–15)
GFR SERPL CREATININE-BSD FRML MDRD: >60 ML/MIN/1.73M2
GLUCOSE SERPL-MCNC: 166 MG/DL (ref 74–99)
HCT VFR BLD AUTO: 44.9 % (ref 37–54)
HGB BLD-MCNC: 14 G/DL (ref 12.5–16.5)
LYMPHOCYTES NFR BLD: 4.48 K/UL (ref 1.5–4)
LYMPHOCYTES RELATIVE PERCENT: 24 % (ref 20–42)
MCH RBC QN AUTO: 30.6 PG (ref 26–35)
MCHC RBC AUTO-ENTMCNC: 31.2 G/DL (ref 32–34.5)
MCV RBC AUTO: 98 FL (ref 80–99.9)
MONOCYTES NFR BLD: 1.12 K/UL (ref 0.1–0.95)
MONOCYTES NFR BLD: 6 % (ref 2–12)
NEUTROPHILS NFR BLD: 70 % (ref 43–80)
NEUTS SEG NFR BLD: 12.89 K/UL (ref 1.8–7.3)
PLATELET # BLD AUTO: 287 K/UL (ref 130–450)
PMV BLD AUTO: 10.8 FL (ref 7–12)
POTASSIUM SERPL-SCNC: 3.6 MMOL/L (ref 3.5–5)
RBC # BLD AUTO: 4.58 M/UL (ref 3.8–5.8)
SODIUM SERPL-SCNC: 139 MMOL/L (ref 132–146)
WBC OTHER # BLD: 18.5 K/UL (ref 4.5–11.5)

## 2023-08-07 PROCEDURE — 6360000002 HC RX W HCPCS: Performed by: INTERNAL MEDICINE

## 2023-08-07 PROCEDURE — 6370000000 HC RX 637 (ALT 250 FOR IP)

## 2023-08-07 PROCEDURE — 99239 HOSP IP/OBS DSCHRG MGMT >30: CPT | Performed by: INTERNAL MEDICINE

## 2023-08-07 PROCEDURE — 97165 OT EVAL LOW COMPLEX 30 MIN: CPT

## 2023-08-07 PROCEDURE — 97161 PT EVAL LOW COMPLEX 20 MIN: CPT

## 2023-08-07 PROCEDURE — 97530 THERAPEUTIC ACTIVITIES: CPT

## 2023-08-07 PROCEDURE — 80048 BASIC METABOLIC PNL TOTAL CA: CPT

## 2023-08-07 PROCEDURE — 6360000002 HC RX W HCPCS

## 2023-08-07 PROCEDURE — 94640 AIRWAY INHALATION TREATMENT: CPT

## 2023-08-07 PROCEDURE — 85025 COMPLETE CBC W/AUTO DIFF WBC: CPT

## 2023-08-07 PROCEDURE — 2580000003 HC RX 258: Performed by: INTERNAL MEDICINE

## 2023-08-07 PROCEDURE — 36415 COLL VENOUS BLD VENIPUNCTURE: CPT

## 2023-08-07 PROCEDURE — 2700000000 HC OXYGEN THERAPY PER DAY

## 2023-08-07 PROCEDURE — 2580000003 HC RX 258

## 2023-08-07 PROCEDURE — 6370000000 HC RX 637 (ALT 250 FOR IP): Performed by: INTERNAL MEDICINE

## 2023-08-07 RX ORDER — ARFORMOTEROL TARTRATE 15 UG/2ML
15 SOLUTION RESPIRATORY (INHALATION)
Qty: 120 ML | Refills: 3 | Status: SHIPPED | OUTPATIENT
Start: 2023-08-07 | End: 2023-08-11 | Stop reason: SDUPTHER

## 2023-08-07 RX ORDER — BUDESONIDE 0.5 MG/2ML
0.5 INHALANT ORAL
Qty: 60 EACH | Refills: 3 | Status: SHIPPED | OUTPATIENT
Start: 2023-08-07 | End: 2023-08-11 | Stop reason: SDUPTHER

## 2023-08-07 RX ORDER — ROFLUMILAST 500 UG/1
500 TABLET ORAL DAILY
Qty: 30 TABLET | Refills: 0 | Status: SHIPPED | OUTPATIENT
Start: 2023-08-08 | End: 2023-08-31

## 2023-08-07 RX ORDER — IPRATROPIUM BROMIDE AND ALBUTEROL SULFATE 2.5; .5 MG/3ML; MG/3ML
3 SOLUTION RESPIRATORY (INHALATION)
Qty: 360 ML | Refills: 1 | Status: SHIPPED | OUTPATIENT
Start: 2023-08-07 | End: 2023-08-11 | Stop reason: SDUPTHER

## 2023-08-07 RX ORDER — ASPIRIN 81 MG/1
81 TABLET ORAL DAILY
Qty: 30 TABLET | Refills: 1 | Status: SHIPPED | OUTPATIENT
Start: 2023-08-07

## 2023-08-07 RX ORDER — AZITHROMYCIN 500 MG/1
500 TABLET, FILM COATED ORAL DAILY
Qty: 3 TABLET | Refills: 0 | Status: SHIPPED | OUTPATIENT
Start: 2023-08-07 | End: 2023-08-10

## 2023-08-07 RX ORDER — EZETIMIBE 10 MG/1
10 TABLET ORAL DAILY
Qty: 90 TABLET | Refills: 1 | Status: SHIPPED | OUTPATIENT
Start: 2023-08-07

## 2023-08-07 RX ORDER — PREDNISONE 10 MG/1
TABLET ORAL
Qty: 53 TABLET | Refills: 0 | Status: SHIPPED | OUTPATIENT
Start: 2023-08-08 | End: 2023-09-05

## 2023-08-07 RX ORDER — METOPROLOL SUCCINATE 25 MG/1
12.5 TABLET, EXTENDED RELEASE ORAL DAILY
Qty: 90 TABLET | Refills: 1 | Status: SHIPPED | OUTPATIENT
Start: 2023-08-07

## 2023-08-07 RX ADMIN — ARFORMOTEROL TARTRATE 15 MCG: 15 SOLUTION RESPIRATORY (INHALATION) at 07:36

## 2023-08-07 RX ADMIN — SODIUM CHLORIDE, PRESERVATIVE FREE 10 ML: 5 INJECTION INTRAVENOUS at 08:40

## 2023-08-07 RX ADMIN — BUSPIRONE HYDROCHLORIDE 30 MG: 10 TABLET ORAL at 08:39

## 2023-08-07 RX ADMIN — IPRATROPIUM BROMIDE AND ALBUTEROL SULFATE 1 DOSE: .5; 2.5 SOLUTION RESPIRATORY (INHALATION) at 07:35

## 2023-08-07 RX ADMIN — GABAPENTIN 600 MG: 300 CAPSULE ORAL at 08:38

## 2023-08-07 RX ADMIN — BUDESONIDE INHALATION 500 MCG: 0.5 SUSPENSION RESPIRATORY (INHALATION) at 07:36

## 2023-08-07 RX ADMIN — ROFLUMILAST 500 MCG: 500 TABLET ORAL at 08:39

## 2023-08-07 RX ADMIN — ASPIRIN 81 MG: 81 TABLET, COATED ORAL at 09:32

## 2023-08-07 RX ADMIN — AZITHROMYCIN MONOHYDRATE 250 MG: 500 INJECTION, POWDER, LYOPHILIZED, FOR SOLUTION INTRAVENOUS at 09:35

## 2023-08-07 RX ADMIN — PREDNISONE 40 MG: 20 TABLET ORAL at 08:38

## 2023-08-07 RX ADMIN — GABAPENTIN 600 MG: 300 CAPSULE ORAL at 14:38

## 2023-08-07 RX ADMIN — IPRATROPIUM BROMIDE AND ALBUTEROL SULFATE 1 DOSE: .5; 2.5 SOLUTION RESPIRATORY (INHALATION) at 11:21

## 2023-08-07 RX ADMIN — PANTOPRAZOLE SODIUM 40 MG: 40 TABLET, DELAYED RELEASE ORAL at 05:28

## 2023-08-07 RX ADMIN — ENOXAPARIN SODIUM 30 MG: 100 INJECTION SUBCUTANEOUS at 08:38

## 2023-08-07 ASSESSMENT — PAIN SCALES - GENERAL: PAINLEVEL_OUTOF10: 0

## 2023-08-07 NOTE — DISCHARGE INSTRUCTIONS
Internal medicine    Follow ups  Please follow up with the internal medicine clinic at [facility] appointment has been scheduled for 08/14/2023 at 3 pm  Please keep all other follow up appointments: With Pulmonology    Changes in healthcare   Please take all medications as indicated  Diet: regular diet   Activity: activity as tolerated  New Medications started during this hospital stay  Sallye Padma twice a day  Zithromax once a day for next 3 days  Pulmicort twice a day  Duoneb, 6 times a day  Prednisone 4 tablet x 7 days, 2 tablet next 7 days, 1 tablet next 7 days, and half tablet next 7 days  Roflumilast 500 MCG once a day  Changes to your medications  none  Medications you should stop taking   none  Even if you are feeling better and not having symptoms do not stop taking antibiotic earlier then prescribed 08/10/2023  Please contact us if you have any concerns, wish to change or make an appointment:  Internal medicine clinic   Phone: [phone]  Fax: [phone]  [address]  Or please call the nurse line at [phone]. Should you have further questions in regards to this visit, you can review your clinical note and after visit summary document on your "[employer] account. Other questions can be directed to our nurse line at [phone]. Other than any new prescriptions given to you today, the list of home medications on this After Visit Summary are based on information provided to us from you and your healthcare providers. This information, including the list, dose, and frequency of medications is only as accurate as the information you provided. If you have any questions or concerns about your home medications, please contact your Primary Care Physician for further clarification.

## 2023-08-07 NOTE — PROGRESS NOTES
Discharge instructions reviewed and given to patient. Peripheral IV removed, and remote telemetry discontinued for discharge.

## 2023-08-07 NOTE — CARE COORDINATION
Transition of care update. Per internal medicine note today, ok for discharge. Patient is currently on IV Zithromax. Awaiting pulmonology input. Discharge plan is to return home when medically stable, with his S.O transporting home at discharge. O2 is at 4 L n/c, as he is at home. Will need an ambulatory pulse ox at discharge. Template in sticky notes. CM/SW will follow. Electronically signed by Iban Dao RN on 8/7/2023 at 1:34 PM    [id number]- Message to Dr Sadiq Gatica team for discharge. Dr Juanito Guillen here and was ok with discharge. Ambulatory pulse ox completed, and ok with home O2 at 4 L. Girlfriend Zeke Ocasio to transport home. Discharge instructions in AVS. Iv Zithromax changed to po.  MB

## 2023-08-07 NOTE — PROGRESS NOTES
Physical Therapy Initial Assessment     Name: Júnior Thornton  : 1965  MRN: [id number]      Date of Service: 2023    Evaluating PT:  Sherri Obrien PT, DPT [id number]    Room #:  0575/8578-A  Diagnosis:  COPD with acute exacerbation ([facility]) [J44.1]  PMHx/PSHx:    Past Medical History:   Diagnosis Date    Acute gastritis without hemorrhage     CAD (coronary artery disease)     Cancer of kidney (HCC)     COPD (chronic obstructive pulmonary disease) (HCC)     COPD, severe ([facility]) 2022    Difficulty sleeping     ED (erectile dysfunction)     History of BPH     Hyperlipidemia     Lung cancer ([facility])     Tobacco abuse      Procedure/Surgery:  none this admission   Reason for admission: COPD with acute exacerbation ([facility]) [J44.1]  Precautions:  fall risk, O2   Equipment Needs: none at this time     SUBJECTIVE:  Pt lives with significant other in a 2 story home with 2 steps to enter and 1 handrail. Bed/bath is on 1st floor. Pt ambulated with no AD PTA. OBJECTIVE:   Initial Evaluation  Date: 23 Treatment Short Term/ Long Term   Goals   AM-PAC 6 Clicks 83/15     Was pt agreeable to Eval/treatment? Yes     Does pt have pain?  None noted      Bed Mobility  Rolling: ind  Supine to sit: ind  Sit to supine: ind  Scooting: ind  Rolling: ind  Supine to sit: ind  Sit to supine: ind  Scooting: ind   Transfers Sit to stand: sup  Stand to sit: sup  Stand pivot: sup no ad  Sit to stand: ind  Stand to sit: ind  Stand pivot: ind   Ambulation    200 feet with no AD sup  250 feet with no AD ind   Stair negotiation: ascended and descended  Nt  10 steps with one rail ind   ROM BUE:  Refer to OT eval   BLE:  WNL     Strength BUE:  Refer to OT eval   BLE:  WNL     Balance Sitting EOB:  sup  Dynamic Standing:  sup  Sitting EOB:  ind  Dynamic Standing:  ind     Pt is A & O x 4  Sensation:  Pt denies numbness and tingling to extremities  Edema:  none noted     Vitals:  Blood Pressure at rest -- Blood Pressure post session -- for next treatment:  increase activity as able     Current Treatment Recommendations:     [] Strengthening to improve independence with functional mobility   [] ROM to improve independence with functional mobility   [] Balance Training to improve static/dynamic balance and to reduce fall risk  [x] Endurance Training to improve activity tolerance during functional mobility   [] Transfer Training to improve safety and independence with all functional transfers   [] Gait Training to improve gait mechanics, endurance and assess need for appropriate assistive device  [x] Stair Training in preparation for safe discharge home and/or into the community   [] Positioning to prevent skin breakdown and contractures  [x] Safety and Education Training   [x] Patient/Caregiver Education   [] HEP  [] Other     PT long term treatment goals are located in above grid    Frequency of treatments: 2-5x/week x 1-2 weeks. Time in  0837  Time out  0900    Total Treatment Time  10 minutes     Evaluation Time includes thorough review of current medical information, gathering information on past medical history/social history and prior level of function, completion of standardized testing/informal observation of tasks, assessment of data and education on plan of care and goals.     CPT codes:  [x] Low Complexity PT evaluation 51247  [] Moderate Complexity PT evaluation 59331  [] High Complexity PT evaluation 73333  [] PT Re-evaluation 58469  [] Gait training 00843 -- minutes  [] Manual therapy [id number] -- minutes  [x] Therapeutic activities 61411 10 minutes  [] Therapeutic exercises 70680 -- minutes  [] Neuromuscular reeducation 77498 -- minutes     Brenda Lazo, PT, DPT  [id number]

## 2023-08-07 NOTE — PROGRESS NOTES
[facility]  Internal Medicine Residency Program  History and Physical    Patient:  Candelario Thomas 62 y.o. male   MRN: [id number]       Date of Service: 8/7/2023          Chief complaint: had concerns including COPD (Wears 4L NC, left at home, still smoking, SOB x 3 days, cough, states' he feels like he's drowning\" ). History of Present Illness   The patient is a 62 y.o. male who was seen at bedside this morning. Patient states his symptoms are much improved this morning: his chest pain, shortness of breath, and headache have all improved. Patient states he is able to get up and ambulate without feeling short of breath. Patient also states he would like to quit smoking, as he was counseled about being nicotine free for 6 months prior to being able to qualify for lung transplant. Patient is currently on 4L of oxygen saturating at 96%.        Past Medical History:      Diagnosis Date    Acute gastritis without hemorrhage     CAD (coronary artery disease)     Cancer of kidney (HCC)     COPD (chronic obstructive pulmonary disease) (HCC)     COPD, severe ([facility]) 6/16/2022    Difficulty sleeping     ED (erectile dysfunction)     History of BPH     Hyperlipidemia     Lung cancer ([facility])     Tobacco abuse        Past Surgical History:        Procedure Laterality Date    CARDIAC CATHETERIZATION  2015    Non-obstructive coronary disease by Dr. Mary Castanon N/A 12/20/2019    COLONOSCOPY POLYPECTOMY SNARE/COLD BIOPSY performed by Kemi Coombs MD at [address] [facility]  12/20/2019    COLONOSCOPY SUBMUCOSAL/BOTOX INJECTION performed by Kemi Coombs MD at [address] Right 09/2014    VATS with RUL wedge resection -- pathology diffuse granulomatous changes at [facility] Right     UPPER GASTROINTESTINAL ENDOSCOPY N/A 12/20/2019    EGD BIOPSY performed by Kemi Coombs MD at [address] [facility] [place], [place] Medications Prior to Admission:    Prior to Admission medications    Medication Sig Start Date End Date Taking? Authorizing Provider   nicotine (NICODERM CQ) 7 MG/24HR Place 1 patch onto the skin every 24 hours   Yes Historical Provider, MD   tiZANidine (ZANAFLEX) 4 MG tablet Take 1 tablet by mouth every 8 hours as needed (for right chest pain) 7/7/23   Tommy Cantu., DO   gabapentin (NEURONTIN) 600 MG tablet Take 1 tablet by mouth 3 times daily for 60 days. 7/7/23 9/5/23  Tommy Cantu., DO   omeprazole (PRILOSEC) 40 MG delayed release capsule Take 1 capsule by mouth daily 7/7/23   Tommy Cantu., DO   busPIRone (BUSPAR) 30 MG tablet Take 30 mg by mouth 2 times daily 7/7/23   Tommy Cantu., DO   albuterol sulfate HFA (PROVENTIL;VENTOLIN;PROAIR) 108 (90 Base) MCG/ACT inhaler Inhale 2 puffs into the lungs every 6 hours as needed for Wheezing    Historical Provider, MD   vitamin C (ASCORBIC ACID) 500 MG tablet Take 1 tablet by mouth daily    Historical Provider, MD   Budeson-Glycopyrrol-Formoterol (BREZTRI AEROSPHERE) 160-9-4.8 MCG/ACT AERO Inhale 2 puffs into the lungs in the morning and at bedtime    Historical Provider, MD   OXYGEN Inhale 4 L into the lungs continuous    Historical Provider, MD   Vitamin D, Cholecalciferol, 25 MCG (1000 UT) TABS Take 1,000 Units by mouth daily    Historical Provider, MD   ibuprofen (ADVIL;MOTRIN) 800 MG tablet Take 1 tablet by mouth every 8 hours as needed for Pain 3/6/23   Irma File, MD   aspirin (SONIA ASPIRIN EC LOW DOSE) 81 MG EC tablet Take 1 tablet by mouth daily 3/6/23   Irma File, MD   traZODone (DESYREL) 100 MG tablet Take 1 tablet by mouth nightly 3/6/23   Irma File, MD   promethazine (PHENERGAN) 25 MG tablet Take 1 tablet by mouth 3 times daily as needed for Nausea 3/6/23   Irma File, MD       Allergies:  Latex    Social History:   TOBACCO:   reports that he has quit smoking.  His smoking use included

## 2023-08-07 NOTE — PROGRESS NOTES
OCCUPATIONAL THERAPY INITIAL EVALUATION    [facility]  [facility]   [address] [facility],[address]        Date:2023                                                  Patient Name: Gabbi Stewart    MRN: [id number]    : 1965    Room: [address] [place] [address]          Evaluating OT: Milana Abbott OTR/L; [id number]       Referring Provider: Mikey Gaitan MD    Specific Provider Orders/Date: OT Eval and Treat 23       Diagnosis: COPD with acute exacerbation;  Severe persistent asthma with acute exacerbation    Surgery: None this admission     Pertinent Medical History:  has a past medical history of Acute gastritis without hemorrhage, CAD (coronary artery disease), Cancer of kidney ([address]), COPD (chronic obstructive pulmonary disease) ([address]), COPD, severe ([address]), Difficulty sleeping, ED (erectile dysfunction), History of BPH, Hyperlipidemia, Lung cancer ([address]), and Tobacco abuse.      Recommended Adaptive Equipment: TBD     Precautions:  Fall Risk, O2     Assessment of current deficits    [x] Functional mobility  [x]ADLs  [] Strength               []Cognition    [x] Functional transfers   [x] IADLs         [x] Safety Awareness   [x]Endurance    [] Fine Coordination              [x] Balance      [] Vision/perception   []Sensation     []Gross Motor Coordination  [] ROM  [] Delirium                   [] Motor Control     OT PLAN OF CARE   OT POC based on physician orders, patient diagnosis and results of clinical assessment    Frequency/Duration 1-3 days/wk for 2 weeks PRN   Specific OT Treatment Interventions to include:   * Instruction/training on adapted ADL techniques and AE recommendations to increase functional independence within precautions       * Training on energy conservation strategies, correct breathing pattern and techniques to improve independence/tolerance for self-care routine  * Functional transfer/mobility training/DME recommendations for

## 2023-08-07 NOTE — DISCHARGE SUMMARY
[address]  Discharge Summary    PCP: Turner Davila MD    Admit Date:8/1/2023  Discharge Date: 8/7/2023    Admission Diagnosis:   COPD exacerbation    Discharge Diagnosis:  COPD with acute exacerbation    Hospital Course:   Patient is a 55-year-old male with past medical history of acute gastritis without hemorrhage, CAD, cancer of kidney, severe COPD, difficulty sleeping, erectile dysfunction, history of BPH, hyperlipidemia, and tobacco abuse who presented with shortness of breath and increased cough of 3 days duration. Patient's dog was sprayed by a skunk and the smell exacerbated his COPD. In the ED vitals are stable he was saturating 93% on 4 L with help of breathing treatments. Labs are significant for potassium of 3.3 and mild WBC elevation of 12. EKG showed normal sinus rhythm, CXR showed no acute process, CTA pulmonary with contrast was negative for PE, but shows scarring in the lungs, small laterally projecting hernia in the intercostal space of the right sixth and seventh ribs with focal outward protrusion of the right lower lobe. Did not appear to be cardiac chest pain through work-up with EKG and troponins cardiac stress test and echo. Patient was admitted to internal medicine for further evaluation and work-up. Patient was started on Solu-Medrol, Brovana, Pulmicort, DuoNebs, doxycycline. As well as DuoNebs every 4 hours. Patient was restarted on his home meds aspirin, Neurotone and, and Zetia. infectious work-up was initiated, pulmonology was consulted. Doxycycline was changed to azithromycin sputum cultures showed gram-negative and gram-positive rods on Gram stain. Solu-Medrol was changed to long prednisone taper (40 MGs daily for 7 days then 20 daily for 7 days then 10 daily for 7 days then 5 daily for 7 days]. He began appropriate prednisone 40 on 8/4. patient was also started on Daliresp.     Will follow pulmonology outpatient for

## 2023-08-08 ENCOUNTER — CARE COORDINATION (OUTPATIENT)
Dept: CASE MANAGEMENT | Age: 58
End: 2023-08-08

## 2023-08-08 ENCOUNTER — CARE COORDINATION (OUTPATIENT)
Dept: CARE COORDINATION | Age: 58
End: 2023-08-08

## 2023-08-08 DIAGNOSIS — J44.1 COPD WITH ACUTE EXACERBATION (HCC): Primary | ICD-10-CM

## 2023-08-08 PROCEDURE — 1111F DSCHRG MED/CURRENT MED MERGE: CPT

## 2023-08-08 NOTE — CARE COORDINATION
[facility] Initial Follow Up Call    Call within 2 business days of discharge: Yes    Patient Current Location:  Home: [address] [facility] Transition Nurse contacted the patient by telephone to perform post hospital discharge assessment. Provided introduction to self, and explanation of the Care Transition Nurse role. Patient: Kamryn Carwford Patient : 1965   MRN: [id number]  Reason for Admission: COPD with acute exacerbation  Discharge Date: 23 RARS: Readmission Risk Score: 20.1      Last Discharge [address] [facility] [facility]       Date Complaint Diagnosis Description Type Department Provider    23 COPD COPD with acute exacerbation ([address]) . .. ED to Hosp-Admission (Discharged) (ADMITTED) SEYZ [address]WE [address] [facility] [address] DO; [place]ia Gaster ... Was this an external facility discharge? No     Challenges to be reviewed by the provider   Additional needs identified to be addressed with provider: Yes  medications-Pt reports Brovana and Pulmicort nebs too expensive. Unable to fill. Pharmacy attempting to run rx under pt's MCR Part B to see if it helps. Pharmacy sent form to pulmonary office for doctor to sign yesterday. Daliresp needs prior [facility]. Method of communication with provider: phone. CTN called and spoke with the pt for an initial care transition call. Pt admitted on 23 with COPD exacerbation. Pt presented to the ED with cough and increased sob. Pt chronically on O2 @4L per NC. Pt states he is doing about the same as he did when he discharged. Pt states that he feels much better since when he did initially come to the hospital. Pt completing full sentences without any apparent sob during this call. Pt states that he still has some mild wheezing and an intermittent cough but states it is nowhere near like it was when he first came to the hospital. Pt denies any chest tightness/chest discomfort, congestion, or fever/chills.     Pt states he is

## 2023-08-08 NOTE — CARE COORDINATION
-CTN attempted to reach the clinical staff of Dr. Rocio Holland via phone, however, no answer. VM to clinical staff states not to leave any messages on their VM. -CTN attempted to reach the front office staff at Dr. Rocio Holland office, however, no answer. CTN left detailed VM regarding this CTN's concerns about pt's inability to fill Brovana and Pulmicort d/t cost and that pharmacy had faxed a form to the office for pt's provider to sign. CTN also requested that office try to schedule a sooner f/u appt within 1 wk as mentioned in Dr. Dr. Rocio Holland progress note from pt's IP stay. -Also, CTN updated them that pt stated that Daliresp needs a prior auth and that office needed to address. -CTN left contact information for self and for pt with any questions.

## 2023-08-09 ENCOUNTER — CARE COORDINATION (OUTPATIENT)
Dept: CASE MANAGEMENT | Age: 58
End: 2023-08-09

## 2023-08-09 ENCOUNTER — TELEPHONE (OUTPATIENT)
Dept: PULMONOLOGY | Age: 58
End: 2023-08-09

## 2023-08-09 NOTE — TELEPHONE ENCOUNTER
Per Jadiel Manrique Case Mgmt at [facility] will not refill the Brovana, Pulmicort or Ipratropium with Doctor without sending another script over for each with an ICD10 diagnosis code in order to get through Medicare Part B.

## 2023-08-09 NOTE — CARE COORDINATION
-CTN called and spoke with Marlene Vazquezke from [address] [place] [address] who stated that [facility] Part D will not cover the Brovana, Pulmicort, or Duoneb and states that they attempted to send rx through [address] states that the pulm office will need to resend/e-scribe the new rxs for these medications to them with the ICD-10 diagnosis codes included on the rxs. Marlene Bermudez states that the Nuris Rogel is available and ready to be picked up. She states the issue why the pt couldn't pick this up was because they had to order it and it is now ready to be picked up. -CTN did not receive return call from pulmonary office regarding medication issues with Brovana and Pulmicort. -CTN called and spoke with Gerald Montes at [facility] and updated her that [address] [place] [address] is requesting that new rxs for Brovana, Pulmicort, and Duoneb be resent and include the ICD-10 dx codes on them.  -Gerald Montes states that she will route a message to [facility], Dr. Dean Hannah nurse, requesting this be done. -CTN did not get to address moving appt up with Dr. Charley De Souza for a 1wk f/u. -[facility] to [facility] pt's appt up. -CTN called and spoke with the pt to update on above.  -Updated pt that Nuris Rogel is ready to be picked up from the pharmacy. Pt voiced understanding and will p/u. -CTN explained above regarding pharmacy requesting new rxs from Dr. Charley De Souza with updated required information on the rx. Pt voiced understanding.  -During the conversation with the pt, Dr. Dean Hannah office called and was able to schedule the pt a f/u with Dr. Charley De Souza for 8/14/23 @ 1:20 PM.  -CTN updated pt that a warm handoff will be provided to his active ACM today. Pt voiced understanding and did not have any other questions/concerns/needs.

## 2023-08-10 DIAGNOSIS — J44.9 COPD, SEVERE (HCC): Primary | ICD-10-CM

## 2023-08-10 NOTE — TELEPHONE ENCOUNTER
Per [facility] pharmacy new scripts for nebulizer meds need sent with a dx code to see it part b will pay for them

## 2023-08-11 RX ORDER — IPRATROPIUM BROMIDE AND ALBUTEROL SULFATE 2.5; .5 MG/3ML; MG/3ML
3 SOLUTION RESPIRATORY (INHALATION)
Qty: 360 ML | Refills: 1 | Status: SHIPPED | OUTPATIENT
Start: 2023-08-11

## 2023-08-11 RX ORDER — BUDESONIDE 0.5 MG/2ML
0.5 INHALANT ORAL
Qty: 60 EACH | Refills: 3 | Status: SHIPPED | OUTPATIENT
Start: 2023-08-11

## 2023-08-11 RX ORDER — ARFORMOTEROL TARTRATE 15 UG/2ML
15 SOLUTION RESPIRATORY (INHALATION)
Qty: 120 ML | Refills: 3 | Status: SHIPPED | OUTPATIENT
Start: 2023-08-11

## 2023-08-14 ENCOUNTER — OFFICE VISIT (OUTPATIENT)
Dept: PULMONOLOGY | Age: 58
End: 2023-08-14
Payer: MEDICARE

## 2023-08-14 ENCOUNTER — OFFICE VISIT (OUTPATIENT)
Dept: INTERNAL MEDICINE | Age: 58
End: 2023-08-14

## 2023-08-14 VITALS
WEIGHT: 270 LBS | BODY MASS INDEX: 37.8 KG/M2 | HEIGHT: 71 IN | TEMPERATURE: 98 F | RESPIRATION RATE: 16 BRPM | OXYGEN SATURATION: 95 % | SYSTOLIC BLOOD PRESSURE: 137 MMHG | DIASTOLIC BLOOD PRESSURE: 78 MMHG | HEART RATE: 114 BPM

## 2023-08-14 VITALS
BODY MASS INDEX: 36.4 KG/M2 | OXYGEN SATURATION: 95 % | HEART RATE: 96 BPM | DIASTOLIC BLOOD PRESSURE: 76 MMHG | TEMPERATURE: 97.2 F | RESPIRATION RATE: 18 BRPM | SYSTOLIC BLOOD PRESSURE: 116 MMHG | WEIGHT: 260 LBS | HEIGHT: 71 IN

## 2023-08-14 DIAGNOSIS — J44.9 COPD, SEVERE (HCC): Primary | ICD-10-CM

## 2023-08-14 DIAGNOSIS — R07.81 PLEURITIC PAIN: ICD-10-CM

## 2023-08-14 DIAGNOSIS — J44.9 CHRONIC OBSTRUCTIVE PULMONARY DISEASE, UNSPECIFIED COPD TYPE (HCC): Primary | ICD-10-CM

## 2023-08-14 DIAGNOSIS — J45.50 SEVERE PERSISTENT ASTHMA, UNSPECIFIED WHETHER COMPLICATED: ICD-10-CM

## 2023-08-14 DIAGNOSIS — Z09 HOSPITAL DISCHARGE FOLLOW-UP: ICD-10-CM

## 2023-08-14 PROCEDURE — 3017F COLORECTAL CA SCREEN DOC REV: CPT | Performed by: INTERNAL MEDICINE

## 2023-08-14 PROCEDURE — G8417 CALC BMI ABV UP PARAM F/U: HCPCS | Performed by: INTERNAL MEDICINE

## 2023-08-14 PROCEDURE — 3078F DIAST BP <80 MM HG: CPT | Performed by: INTERNAL MEDICINE

## 2023-08-14 PROCEDURE — G8428 CUR MEDS NOT DOCUMENT: HCPCS | Performed by: INTERNAL MEDICINE

## 2023-08-14 PROCEDURE — 99213 OFFICE O/P EST LOW 20 MIN: CPT | Performed by: INTERNAL MEDICINE

## 2023-08-14 PROCEDURE — 1036F TOBACCO NON-USER: CPT | Performed by: INTERNAL MEDICINE

## 2023-08-14 PROCEDURE — 3023F SPIROM DOC REV: CPT | Performed by: INTERNAL MEDICINE

## 2023-08-14 PROCEDURE — 3074F SYST BP LT 130 MM HG: CPT | Performed by: INTERNAL MEDICINE

## 2023-08-14 PROCEDURE — 1111F DSCHRG MED/CURRENT MED MERGE: CPT | Performed by: INTERNAL MEDICINE

## 2023-08-14 RX ORDER — TIZANIDINE 4 MG/1
4 TABLET ORAL EVERY 8 HOURS PRN
Qty: 30 TABLET | Refills: 0 | Status: SHIPPED | OUTPATIENT
Start: 2023-08-14

## 2023-08-14 ASSESSMENT — ENCOUNTER SYMPTOMS
CHEST TIGHTNESS: 0
COUGH: 0
CONSTIPATION: 0
PHOTOPHOBIA: 0
SHORTNESS OF BREATH: 1
DIARRHEA: 0
VOMITING: 0
NAUSEA: 0
ABDOMINAL PAIN: 0

## 2023-08-14 NOTE — PATIENT INSTRUCTIONS
Dear Delfino Brower,        Thank you for coming to your appointment today. I hope we have addressed all of your needs. Please make sure to do the following:  - Continue your medications as listed. - We will see each other again in September for a PCP visit    Call for a sooner appointment if you develop new symptoms or complaints    Have a great day!         Sincerely,  Skye Bell M.D  8/14/2023  3:16 PM

## 2023-08-14 NOTE — PATIENT INSTRUCTIONS
[address] [place] [address]  Roberto Carlos Hernández, [address] [facility]  Office: [phone]      Your were seen in the office today for Asthma/COPD      We  did make changes to your medications today. Eva Fuelling was started in the office today. Testing ordered today was none      Vaccines recommended none              Please do not hesitate to call the office with any questions.

## 2023-08-14 NOTE — PROGRESS NOTES
Patient was given his first dose of Fasenra 30mg/ml in his right upper arm. Injection was without difficulty and a band aid was placed over the site. Patient waited 15  minutes after the shot and had no adverse effects from the medication. Patient was educated on the use of the pen, what sites he could use at home and what to watch out for. Patient was given a sample of the medication to use at home for September's dose. The process of authorization has begun with the patient's insurance company. Lot number:  [id number]. Expiraton date: 10/2025  [facility] number:  [id number].

## 2023-08-15 ENCOUNTER — CARE COORDINATION (OUTPATIENT)
Dept: CARE COORDINATION | Age: 58
End: 2023-08-15

## 2023-08-15 SDOH — ECONOMIC STABILITY: TRANSPORTATION INSECURITY
IN THE PAST 12 MONTHS, HAS THE LACK OF TRANSPORTATION KEPT YOU FROM MEDICAL APPOINTMENTS OR FROM GETTING MEDICATIONS?: YES

## 2023-08-15 SDOH — ECONOMIC STABILITY: TRANSPORTATION INSECURITY
IN THE PAST 12 MONTHS, HAS LACK OF TRANSPORTATION KEPT YOU FROM MEETINGS, WORK, OR FROM GETTING THINGS NEEDED FOR DAILY LIVING?: YES

## 2023-08-15 SDOH — HEALTH STABILITY: PHYSICAL HEALTH: ON AVERAGE, HOW MANY DAYS PER WEEK DO YOU ENGAGE IN MODERATE TO STRENUOUS EXERCISE (LIKE A BRISK WALK)?: 0 DAYS

## 2023-08-15 SDOH — HEALTH STABILITY: PHYSICAL HEALTH: ON AVERAGE, HOW MANY MINUTES DO YOU ENGAGE IN EXERCISE AT THIS LEVEL?: 0 MIN

## 2023-08-15 ASSESSMENT — SOCIAL DETERMINANTS OF HEALTH (SDOH)
HOW OFTEN DO YOU GET TOGETHER WITH FRIENDS OR RELATIVES?: NEVER
DO YOU BELONG TO ANY CLUBS OR ORGANIZATIONS SUCH AS CHURCH GROUPS UNIONS, FRATERNAL OR ATHLETIC GROUPS, OR SCHOOL GROUPS?: NO
HOW OFTEN DO YOU ATTEND CHURCH OR RELIGIOUS SERVICES?: NEVER
HOW OFTEN DO YOU ATTENT MEETINGS OF THE CLUB OR ORGANIZATION YOU BELONG TO?: NEVER
IN A TYPICAL WEEK, HOW MANY TIMES DO YOU TALK ON THE PHONE WITH FAMILY, FRIENDS, OR NEIGHBORS?: NEVER

## 2023-08-15 ASSESSMENT — ENCOUNTER SYMPTOMS: DYSPNEA ASSOCIATED WITH: MINIMAL EXERTION

## 2023-08-15 NOTE — CARE COORDINATION
cough?: No/At Baseline  Change in sputum?: No/At Baseline  Self Monitoring - SaO2: Yes  Baseline SaO2 Readin (Comment: 4 lpm)     , and   General Assessment    Do you have any symptoms that are causing concern?: Yes  Progression since Onset: Gradually Improving  Reported Symptoms: Fatigue

## 2023-08-15 NOTE — PROGRESS NOTES
[facility]     HISTORY OF PRESENT ILLNESS:    Jose Terry is a 62y.o. year old male here for evaluation of rest of breath. Mr. Abelardo Aceves reports that he has had increasing shortness of breath and increasing coughing. He states that sometimes he does cough up into the point of being dizzy. He states that his shortness of breath is worse at night he is currently using both Spiriva and Advair. He also uses a Proventil inhaler as a rescue inhaler he reports that on a good day he may not need to use his Proventil at all however on a bad day with his breathing he is using it 3-4 times a night. He was previously seen once in our office by my colleague on August 19 of 2019. He reports to me that he does have a history of renal cell carcinoma he states that he had a procedure done at [address] [place] [address] in 2014 in which they took a piece of his upper lobe of his right lung and lower lobe of his right lung and he was told that he had cancer however he never had any chemotherapy or any radiation therapy it appears through review of his records that Dr. Larry BELTRE was unable to obtain those records in 2019. The patient subsequently had a CAT scan in 2020 where he was found to have a lung nodule and was supposed to follow-up however was lost to follow-up at that time. Regarding his overall history he reports that he started smoking when he was 13 he did smoke up to 2 packs/day and is now down to 1 pack/day. 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 He reports that he has 1 dog. His hobbies include walking in the woods and looking for beckford mushrooms. Updated HPI as of August 10, 2022  Patient seen and examined. Reports that he is still smoking half pack to 15 cigarettes a day. He reports that he feels that the Rashmi does help.   He is using

## 2023-08-17 DIAGNOSIS — J45.909 SEVERE ASTHMA, UNSPECIFIED WHETHER COMPLICATED, UNSPECIFIED WHETHER PERSISTENT: ICD-10-CM

## 2023-08-17 RX ORDER — ONDANSETRON 2 MG/ML
8 INJECTION INTRAMUSCULAR; INTRAVENOUS
OUTPATIENT
Start: 2023-08-17

## 2023-08-17 RX ORDER — SODIUM CHLORIDE 9 MG/ML
INJECTION, SOLUTION INTRAVENOUS CONTINUOUS
OUTPATIENT
Start: 2023-08-17

## 2023-08-17 RX ORDER — ACETAMINOPHEN 325 MG/1
650 TABLET ORAL
OUTPATIENT
Start: 2023-08-17

## 2023-08-17 RX ORDER — MEPERIDINE HYDROCHLORIDE 25 MG/ML
25 INJECTION INTRAMUSCULAR; INTRAVENOUS; SUBCUTANEOUS ONCE
Start: 2023-08-17 | End: 2023-08-17

## 2023-08-17 RX ORDER — DIPHENHYDRAMINE HYDROCHLORIDE 50 MG/ML
50 INJECTION INTRAMUSCULAR; INTRAVENOUS
OUTPATIENT
Start: 2023-08-17

## 2023-08-17 RX ORDER — EPINEPHRINE 1 MG/ML
0.3 INJECTION, SOLUTION, CONCENTRATE INTRAVENOUS PRN
OUTPATIENT
Start: 2023-08-17

## 2023-08-17 RX ORDER — ALBUTEROL SULFATE 90 UG/1
4 AEROSOL, METERED RESPIRATORY (INHALATION) PRN
OUTPATIENT
Start: 2023-08-17

## 2023-08-17 NOTE — PROGRESS NOTES
[facility]  Internal Medicine Residency Clinic    Attending Physician Statement  I have discussed the case, including pertinent history and exam findings with the resident physician. I have seen and examined the patient and the key elements of the encounter have been performed by me. I agree with the assessment, plan and orders as documented by the resident. I have reviewed the relevant PMHx, PSHx, FamHx, SocialHx, medications, and allergies and updated history as appropriate. Patient presents for hospital follow up appointment after admission for COPD exacerbation, managed with IV steroids and antibiotics. He reports feeling much better but still with CORTEZ. He was discahrged on long pred taper and daliresp per pulmonary recommendations. He came to pulm and our office without ambulatory O2, resting pulse ox 94% room air. Continue to avoid smoking (so far quit after this hospital stay), continues on Breztri, prn albuterol and today started Charlotte Manner in pulm clinic. Remainder of medical problems as per resident note.     Fred Hook DO  8/17/2023 3:16 PM
Musculoskeletal:         General: Normal range of motion. Cervical back: Normal range of motion. Skin:     General: Skin is warm and dry. Capillary Refill: Capillary refill takes less than 2 seconds. Neurological:      General: No focal deficit present. Mental Status: He is alert and oriented to person, place, and time. Psychiatric:         Mood and Affect: Mood normal.         Behavior: Behavior normal.       An electronic signature was used to authenticate this note.   --Brayden Syed MD

## 2023-08-21 ENCOUNTER — CARE COORDINATION (OUTPATIENT)
Dept: CASE MANAGEMENT | Age: 58
End: 2023-08-21

## 2023-08-21 NOTE — CARE COORDINATION
Date/Time:  8/21/2023 8:49 AM  LPN attempted to reach patient by telephone regarding red alert in remote patient monitoring program. Left HIPPA compliant message requesting a return call. Will attempt to reach patient again. RPM ALERT.  No metrics x 4 days  Enrolled in RPM for HTN and COPD

## 2023-08-29 ENCOUNTER — CARE COORDINATION (OUTPATIENT)
Dept: CARE COORDINATION | Age: 58
End: 2023-08-29

## 2023-08-30 ENCOUNTER — CARE COORDINATION (OUTPATIENT)
Dept: CARE COORDINATION | Age: 58
End: 2023-08-30

## 2023-08-30 ASSESSMENT — ENCOUNTER SYMPTOMS: DYSPNEA ASSOCIATED WITH: MINIMAL EXERTION

## 2023-08-30 NOTE — CARE COORDINATION
Ambulatory Care Coordination Note  2023    Patient Current Location:  Home: [address] [address]     ACM contacted the patient by telephone. Verified name and  with patient as identifiers. Provided introduction to self, and explanation of the ACM role. Challenges to be reviewed by the provider   Additional needs identified to be addressed with provider: Yes  Increased fatigue and shortness of breath               Method of communication with provider: chart routing. ACM: Anisa Briceno RN      ACM contacted Yocasta for COPD follow up. He states he is \"feeling more tired\". He reports he is taking his medications and PRN medications as prescribed. He states he is more short of breath and his \"chest feels tight\". ACM discussed contacting his pulmonologist with his symptoms and he states \"I am not going in\". He states that he is more short of breath since completing his prednisone taper that was started on 23. He reports he is not smoking. He denies any swelling or chest pain. He states he is wearing continuous oxygen 4 lpm.  He reports he forgot to take his RPM readings today. ACM requested Yocasta to take an oxygen reading and he reports SPO2 95%. Future appointments were reviewed. PLAN  Notify pulmonology of increased shortness of breath and fatigue. Review ACP, EC, medications, and appointments with next interaction. Continue to follow for care coordination. Offered patient enrollment in the Remote Patient Monitoring (RPM) program for in-home monitoring: Yes, patient already enrolled. Lab Results       None                 Goals Addressed                   This Visit's Progress     Conditions and Symptoms   No change     I will schedule office visits, as directed by my provider. I will keep my appointment or reschedule if I have to cancel. I will notify my provider of any barriers to my plan of care.   I will follow my Zone Management tool to seek urgent or

## 2023-08-31 RX ORDER — ROFLUMILAST 500 UG/1
TABLET ORAL
Qty: 30 TABLET | Refills: 0 | Status: SHIPPED | OUTPATIENT
Start: 2023-08-31

## 2023-09-01 RX ORDER — PREDNISONE 10 MG/1
10 TABLET ORAL DAILY
Qty: 10 TABLET | Refills: 0 | Status: SHIPPED | OUTPATIENT
Start: 2023-09-01 | End: 2023-09-11

## 2023-09-01 NOTE — TELEPHONE ENCOUNTER
Called patient and reviewed symptoms -- reports pearson worse since last couple tapers on prednisone. Discussed with his pulmonary team as well and they will discuss possible extension pred taper and they will contact patient to discuss.       Electronically signed by Ashlyn Maurice DO on 9/1/2023 at 12:54 PM        Electronically signed by Ashlyn Maurice DO on 9/1/2023 at 12:52 PM

## 2023-09-01 NOTE — PROGRESS NOTES
Spoke to Lucy Holt today who is reporting worsening shortness of breath since he has begun the end of his steroid taper. He is using supplemental O2 4L, reports he checks his spo2 and it ranges from 86-95%. He received Fasenra injection on August 15, 2023 and does not feel that it has made a difference in his breathing as of yet and he is due for his next injection on September 19. I will increase his prednisone to 10 mg po daily for 10 days and he is being scheduled to see Dr. Anna Gonsales Friday September 8. He understands and is agreeable to this plan. Prednisone prescription sent to [facility] on [address].

## 2023-09-08 VITALS
HEART RATE: 93 BPM | WEIGHT: 262 LBS | BODY MASS INDEX: 36.54 KG/M2 | DIASTOLIC BLOOD PRESSURE: 84 MMHG | OXYGEN SATURATION: 92 % | SYSTOLIC BLOOD PRESSURE: 101 MMHG

## 2023-09-11 ENCOUNTER — CARE COORDINATION (OUTPATIENT)
Dept: CARE COORDINATION | Age: 58
End: 2023-09-11

## 2023-09-11 NOTE — CARE COORDINATION
Attempted to reach patient by telephone. Left HIPAA compliant message requesting a return call. Will attempt to reach patient again.    RPM Today /76 HR 94  lbs SPO2 93%

## 2023-09-12 ENCOUNTER — CARE COORDINATION (OUTPATIENT)
Dept: CARE COORDINATION | Age: 58
End: 2023-09-12

## 2023-09-12 ENCOUNTER — CARE COORDINATION (OUTPATIENT)
Dept: CASE MANAGEMENT | Age: 58
End: 2023-09-12

## 2023-09-12 ASSESSMENT — ENCOUNTER SYMPTOMS: DYSPNEA ASSOCIATED WITH: MINIMAL EXERTION

## 2023-09-12 NOTE — CARE COORDINATION
Date/Time:  9/12/2023 11:20 AM  LPN attempted to reach patient by telephone regarding red alert in remote patient monitoring program. Left HIPPA compliant message requesting a return call. Will attempt to reach patient again. Second call to pt regarding RPM red alert for low pulse ox. No answer.

## 2023-09-12 NOTE — CARE COORDINATION
Ambulatory Care Coordination Note  2023    Patient Current Location:  Home: [address] [address]     ACM contacted the patient by telephone. Verified name and  with patient as identifiers. Provided introduction to self, and explanation of the ACM role. Challenges to be reviewed by the provider   Additional needs identified to be addressed with provider: No  none               Method of communication with provider: none. ACM: Hernan Camarena RN      ACM contacted Yocasta for COPD follow up. He reports his SPO2 this morning was 90%. He reports that after his medications and breathing treatments he is 93% on 4 lpm O2 continuous. He admits he missed his pulmonary appointment due to no transportation. He continues with his Fesenra therapy. He states he is not smoking. He reports he is in the yellow zone. ACM discussed pulmonary rehabilitation and he states \"it would be too much for me\". He states it is difficult to take his oxygen tank when traveling and he sometimes leaves his oxygen tank in the car. He states he may have discussed a smaller oxygen tank with pulmonary, but he has not heard anything. RPM Today /76 HR 79  lbs SPO2 93% O2 4 lpm.  ACM reviewed Advanced Care Planning and he is agreeable to assistance completing documentation. Emergency contact was reviewed. He denies any changes to his medications and states he is taking them as prescribed. Future appointments were reviewed. PLAN  ACP referral  SW referral for transportation and portable oxygen. Review RPM, medications and appointments with next interaction. Continue to follow for care coordination. Offered patient enrollment in the Remote Patient Monitoring (RPM) program for in-home monitoring: Yes, patient already enrolled.     Lab Results       None                 Goals Addressed                   This Visit's Progress     Conditions and Symptoms   Worsening     I will schedule office visits, as

## 2023-09-12 NOTE — CARE COORDINATION
Remote Alert Monitoring Note  Rpm alert to be reviewed by the provider   red alert   pulse ox reading (90%)   Additional needs to be addressed by PCP: No                    Date/Time:  2023 12:13 PM  Patient Current Location: Home: [address] [address]  LPN contacted patient by telephone. Verified patients name and  as identifiers. Background: ENROLLED IN RPM FOR HTN AND COPD  Refer to 911 immediately if:  Patient unresponsive or unable to provide history  Change in cognition or sudden confusion  Patient unable to respond in complete sentences  Intense chest pain/tightness  Any concern for any clinical emergency  Red Alert: Provider response time of 1 hr required for any red alert requiring intervention  Yellow Alert: Provider response time of 3hr required for any escalated yellow alert    O2 Triage  Are you having any Chest Pain? no   Are you having any Shortness of Breath? yes   Swelling in your hands or feet? no     Are you having any other health concerns or issues? YES  air quality       Clinical Interventions: Reviewed and followed up on alerts and treatments-spoke to Yocasta regarding RPM red alert for low pulse ox. Pt reports \" it's been a bad day\" reports the air quality near his home \"smells like burning plastic' . Coughing up thick phlegm. Pt is not on oxygen. Uses inhaler and nebulizer as directed. Requested pt take some deep breaths and recheck his pulse ox. New reading is   94%. Plan/Follow Up: Will continue to review, monitor and address alerts with follow up based on severity of symptoms and risk factors.

## 2023-09-12 NOTE — CARE COORDINATION
Date/Time:  9/12/2023 10:38 AM  LPN attempted to reach patient by telephone regarding red alert in remote patient monitoring program. Left HIPPA compliant message requesting a return call. Will attempt to reach patient again.     RPM RED ALERT FOR LOW PULSE OX READING  No weights entered x 2 days  Enrolled in RPM for HTN AND COPD

## 2023-09-13 ENCOUNTER — TELEPHONE (OUTPATIENT)
Dept: CARE COORDINATION | Age: 58
End: 2023-09-13

## 2023-09-13 NOTE — TELEPHONE ENCOUNTER
ACPS received a referral from the [facility] stating that Yosvany Sol was agreeable to talking to the [address] [place] [address] and receiving information mailed to his home. [facility] verified the home mailing address. ACPS placed call to Yosvany Sol today and reached the voicemail. VM left with ACPS information and call back number,  stated that AD packet would be placed in the mail as requested.     ACPS will follow up with Yocasta griffiths

## 2023-09-18 ENCOUNTER — CARE COORDINATION (OUTPATIENT)
Dept: CASE MANAGEMENT | Age: 58
End: 2023-09-18

## 2023-09-18 NOTE — CARE COORDINATION
Remote Patient Monitoring Note  Date/Time:  2023 2:10 PM  Patient Current Location: Home: [facility] [address] [address]  LPN contacted patient by telephone regarding yellow alert received for NO METRICS X 2 DAYS. Verified patients name and  as identifiers. Background: ENROLLED IN [facility] FOR HTN AND COPD  Clinical Interventions: Reviewed and followed up on alerts and treatments-spoke to Loral regarding no metrics x 2 days. Pt states he will update metrics today    Plan/Follow Up: Will continue to review, monitor and address alerts with follow up based on severity of symptoms and risk factors.

## 2023-09-19 ENCOUNTER — HOSPITAL ENCOUNTER (OUTPATIENT)
Dept: INFUSION THERAPY | Age: 58
Setting detail: INFUSION SERIES
Discharge: HOME | End: 2023-09-19
Payer: MEDICARE

## 2023-09-19 VITALS
RESPIRATION RATE: 16 BRPM | DIASTOLIC BLOOD PRESSURE: 78 MMHG | SYSTOLIC BLOOD PRESSURE: 120 MMHG | TEMPERATURE: 97.1 F | HEART RATE: 86 BPM | OXYGEN SATURATION: 98 %

## 2023-09-19 DIAGNOSIS — J45.909 SEVERE ASTHMA, UNSPECIFIED WHETHER COMPLICATED, UNSPECIFIED WHETHER PERSISTENT: Primary | ICD-10-CM

## 2023-09-19 DIAGNOSIS — I25.10 CORONARY ARTERY DISEASE INVOLVING NATIVE CORONARY ARTERY OF NATIVE HEART WITHOUT ANGINA PECTORIS: ICD-10-CM

## 2023-09-19 PROCEDURE — 6360000002 HC RX W HCPCS: Performed by: INTERNAL MEDICINE

## 2023-09-19 PROCEDURE — 96372 THER/PROPH/DIAG INJ SC/IM: CPT

## 2023-09-19 RX ORDER — ACETAMINOPHEN 325 MG/1
650 TABLET ORAL
OUTPATIENT
Start: 2023-10-17

## 2023-09-19 RX ORDER — MEPERIDINE HYDROCHLORIDE 25 MG/ML
25 INJECTION INTRAMUSCULAR; INTRAVENOUS; SUBCUTANEOUS ONCE
Start: 2023-10-17 | End: 2023-10-17

## 2023-09-19 RX ORDER — EPINEPHRINE 1 MG/ML
0.3 INJECTION, SOLUTION, CONCENTRATE INTRAVENOUS PRN
OUTPATIENT
Start: 2023-10-17

## 2023-09-19 RX ORDER — ONDANSETRON 2 MG/ML
8 INJECTION INTRAMUSCULAR; INTRAVENOUS
OUTPATIENT
Start: 2023-10-17

## 2023-09-19 RX ORDER — ALBUTEROL SULFATE 90 UG/1
4 AEROSOL, METERED RESPIRATORY (INHALATION) PRN
OUTPATIENT
Start: 2023-10-17

## 2023-09-19 RX ORDER — DIPHENHYDRAMINE HYDROCHLORIDE 50 MG/ML
50 INJECTION INTRAMUSCULAR; INTRAVENOUS
OUTPATIENT
Start: 2023-10-17

## 2023-09-19 RX ORDER — SODIUM CHLORIDE 9 MG/ML
INJECTION, SOLUTION INTRAVENOUS CONTINUOUS
OUTPATIENT
Start: 2023-10-17

## 2023-09-19 RX ADMIN — BENRALIZUMAB 30 MG: 30 INJECTION, SOLUTION SUBCUTANEOUS at 11:10

## 2023-09-19 NOTE — FLOWSHEET NOTE
Discharged to home in stable condition after 30 minutes of observation, pt refused to stay the whole 2 hours after the injection as ordered by the physician, pt states received first injection in the office and they let him go right away, pt given DC instructions and verbal insructions also given on SS of any reaction to the injection, VS stable, all questions answered.

## 2023-09-20 ENCOUNTER — CARE COORDINATION (OUTPATIENT)
Dept: CASE MANAGEMENT | Age: 58
End: 2023-09-20

## 2023-09-20 NOTE — CARE COORDINATION
Remote Alert Monitoring Note  Rpm alert to be reviewed by the provider   red alert   blood pressure reading (89/67)   Additional needs to be addressed by N/A: No                    Date/Time:  2023 10:06 AM  Patient Current Location: Home: [address] [address]  LPN contacted patient by telephone. Verified patients name and  as identifiers. Background: enrolled in RPM for COPD and HTN   Refer to 911 immediately if:  Patient unresponsive or unable to provide history  Change in cognition or sudden confusion  Patient unable to respond in complete sentences  Intense chest pain/tightness  Any concern for any clinical emergency  Red Alert: Provider response time of 1 hr required for any red alert requiring intervention  Yellow Alert: Provider response time of 3hr required for any escalated yellow alert    BP Triage  Are you having any Chest Pain? no   Are you having any Shortness of Breath? Yes  CHRONIC COPD   Do you have a headache or have any vision changes? no   Are you having any numbness or tingling? no   Are you having any other health concerns or issues? Yes  dizziness        Clinical Interventions: Reviewed and followed up on alerts and treatments-spoke to Yocasta regarding RPM red alert for low blood pressure reading. Pt states low blood pressure is a side effect of   an injection for his breathing. Medication is benralizumab (FASENRA) injection 30 mgHe gets the injection once a month. Pt has taken all of his medications as directed. Requested pt recheck his blood pressure. New reading is 104/71 HR 91. Entered metrics manually in HRS,. Pt reports his blood pressure readings do not transfer over on his tablet. Plan/Follow Up: Will continue to review, monitor and address alerts with follow up based on severity of symptoms and risk factors.

## 2023-09-21 ENCOUNTER — TELEPHONE (OUTPATIENT)
Dept: INTERNAL MEDICINE | Age: 58
End: 2023-09-21

## 2023-09-21 DIAGNOSIS — G89.4 CHRONIC PAIN SYNDROME: ICD-10-CM

## 2023-09-21 RX ORDER — IBUPROFEN 800 MG/1
800 TABLET ORAL EVERY 8 HOURS PRN
Qty: 90 TABLET | Refills: 0 | OUTPATIENT
Start: 2023-09-21

## 2023-09-21 RX ORDER — PREDNISONE 10 MG/1
10 TABLET ORAL DAILY
Qty: 10 TABLET | Refills: 0 | Status: SHIPPED
Start: 2023-09-21 | End: 2023-09-22 | Stop reason: SDUPTHER

## 2023-09-21 NOTE — TELEPHONE ENCOUNTER
Phone contact made with pt per request of ACM re: follow up of transportation and portable oxygen needs. Spoke with pt and advised of reason for call; noted pt has IM appt on 9.22.23; states his girlfriend usually transports to appts as he no longer drives; he is unsure if available to transport tomorrow. Pt described chest pain on activity and Dr Rashida Santacruz provided further direction. Offered to assist with transport for 9.22 appt but would need to be ready at [address]; pt states he will have a ride; noted 10.4 pulmonary appt and [facility] made to [facility] to identify if pt is within the mileage requirement.   Will follow up w pt 9.22.23 in office

## 2023-09-21 NOTE — TELEPHONE ENCOUNTER
Last Appointment:  3/6/2023  Future Appointments   Date Time Provider [address]   9/22/2023 10:00 AM Lizet Amato MD [facility]   10/4/2023 11:20 AM Romero Denney DO [facility] Pulm [facility]   10/17/2023 10:00 AM [facility] 1 [facility]      Ibuprofen ordered  Next appt 9/22/23  Denied will address tomorrow

## 2023-09-21 NOTE — TELEPHONE ENCOUNTER
Patient reported increased shortness of breath since stopping prednisone 2 days ago, reports pain right side chest wall and some increased cough. Using nebs with some relief. Recommend in office evaluation tomorrow as scheduled, sent prednisone 10 mg daily to pharmacy.     Electronically signed by Fred Hook DO on 9/21/2023 at 2:37 PM

## 2023-09-22 ENCOUNTER — CARE COORDINATION (OUTPATIENT)
Dept: CARE COORDINATION | Age: 58
End: 2023-09-22

## 2023-09-22 ENCOUNTER — OFFICE VISIT (OUTPATIENT)
Dept: INTERNAL MEDICINE | Age: 58
End: 2023-09-22
Payer: MEDICARE

## 2023-09-22 VITALS
DIASTOLIC BLOOD PRESSURE: 77 MMHG | RESPIRATION RATE: 20 BRPM | HEIGHT: 71 IN | WEIGHT: 273 LBS | BODY MASS INDEX: 38.22 KG/M2 | HEART RATE: 90 BPM | SYSTOLIC BLOOD PRESSURE: 112 MMHG | OXYGEN SATURATION: 95 % | TEMPERATURE: 97.9 F

## 2023-09-22 DIAGNOSIS — I25.119 CORONARY ARTERY DISEASE INVOLVING NATIVE HEART WITH ANGINA PECTORIS, UNSPECIFIED VESSEL OR LESION TYPE (HCC): ICD-10-CM

## 2023-09-22 DIAGNOSIS — M48.061 SPINAL STENOSIS OF LUMBAR REGION WITHOUT NEUROGENIC CLAUDICATION: ICD-10-CM

## 2023-09-22 DIAGNOSIS — J45.51 SEVERE PERSISTENT ASTHMA WITH ACUTE EXACERBATION: ICD-10-CM

## 2023-09-22 DIAGNOSIS — G47.9 SLEEP DISORDER: ICD-10-CM

## 2023-09-22 DIAGNOSIS — G89.4 CHRONIC PAIN SYNDROME: ICD-10-CM

## 2023-09-22 DIAGNOSIS — J44.9 COPD, SEVERE (HCC): Primary | ICD-10-CM

## 2023-09-22 DIAGNOSIS — F41.9 ANXIETY: ICD-10-CM

## 2023-09-22 DIAGNOSIS — R07.81 PLEURITIC PAIN: ICD-10-CM

## 2023-09-22 DIAGNOSIS — J96.01 ACUTE RESPIRATORY FAILURE WITH HYPOXIA (HCC): ICD-10-CM

## 2023-09-22 PROCEDURE — 1036F TOBACCO NON-USER: CPT | Performed by: INTERNAL MEDICINE

## 2023-09-22 PROCEDURE — 3078F DIAST BP <80 MM HG: CPT | Performed by: INTERNAL MEDICINE

## 2023-09-22 PROCEDURE — 99214 OFFICE O/P EST MOD 30 MIN: CPT | Performed by: INTERNAL MEDICINE

## 2023-09-22 PROCEDURE — 3023F SPIROM DOC REV: CPT | Performed by: INTERNAL MEDICINE

## 2023-09-22 PROCEDURE — 3017F COLORECTAL CA SCREEN DOC REV: CPT | Performed by: INTERNAL MEDICINE

## 2023-09-22 PROCEDURE — G8417 CALC BMI ABV UP PARAM F/U: HCPCS | Performed by: INTERNAL MEDICINE

## 2023-09-22 PROCEDURE — G8427 DOCREV CUR MEDS BY ELIG CLIN: HCPCS | Performed by: INTERNAL MEDICINE

## 2023-09-22 PROCEDURE — 99212 OFFICE O/P EST SF 10 MIN: CPT | Performed by: INTERNAL MEDICINE

## 2023-09-22 PROCEDURE — 3074F SYST BP LT 130 MM HG: CPT | Performed by: INTERNAL MEDICINE

## 2023-09-22 RX ORDER — BUSPIRONE HYDROCHLORIDE 30 MG/1
30 TABLET ORAL 2 TIMES DAILY
Qty: 180 TABLET | Refills: 0 | Status: SHIPPED | OUTPATIENT
Start: 2023-09-22

## 2023-09-22 RX ORDER — PROMETHAZINE HYDROCHLORIDE 25 MG/1
25 TABLET ORAL 3 TIMES DAILY PRN
Qty: 90 TABLET | Refills: 1 | Status: SHIPPED | OUTPATIENT
Start: 2023-09-22

## 2023-09-22 RX ORDER — PREDNISONE 10 MG/1
TABLET ORAL
Qty: 10 TABLET | Refills: 0 | Status: SHIPPED | OUTPATIENT
Start: 2023-09-29

## 2023-09-22 RX ORDER — TIZANIDINE 4 MG/1
4 TABLET ORAL EVERY 8 HOURS PRN
Qty: 270 TABLET | Refills: 0 | Status: SHIPPED | OUTPATIENT
Start: 2023-09-22 | End: 2023-12-21

## 2023-09-22 RX ORDER — OMEPRAZOLE 40 MG/1
40 CAPSULE, DELAYED RELEASE ORAL DAILY
Qty: 90 CAPSULE | Refills: 1 | Status: SHIPPED | OUTPATIENT
Start: 2023-09-22

## 2023-09-22 RX ORDER — GABAPENTIN 600 MG/1
600 TABLET ORAL 3 TIMES DAILY
Qty: 270 TABLET | Refills: 0 | Status: SHIPPED | OUTPATIENT
Start: 2023-09-22 | End: 2023-12-21

## 2023-09-22 RX ORDER — TRAZODONE HYDROCHLORIDE 100 MG/1
100 TABLET ORAL NIGHTLY
Qty: 90 TABLET | Refills: 0 | Status: SHIPPED | OUTPATIENT
Start: 2023-09-22

## 2023-09-22 RX ORDER — ASPIRIN 81 MG/1
81 TABLET ORAL DAILY
Qty: 90 TABLET | Refills: 1 | Status: SHIPPED | OUTPATIENT
Start: 2023-09-22

## 2023-09-22 RX ORDER — ROFLUMILAST 500 UG/1
500 TABLET ORAL DAILY
Qty: 30 TABLET | Refills: 0 | Status: CANCELLED | OUTPATIENT
Start: 2023-09-22

## 2023-09-22 NOTE — CARE COORDINATION
Remote Alert Monitoring Note  Rpm alert to be reviewed by the provider   red alert   blood pressure reading (79/64)   Additional needs to be addressed by PCP: No      Date/Time:  2023 8:49 AM  Patient Current Location: Home: [address] [address]  RN  contacted patient by telephone. Verified patients name and  as identifiers. Background: Pt   Refer to 911 immediately if:  Patient unresponsive or unable to provide history  Change in cognition or sudden confusion  Patient unable to respond in complete sentences  Intense chest pain/tightness  Any concern for any clinical emergency  Red Alert: Provider response time of 1 hr required for any red alert requiring intervention  Yellow Alert: Provider response time of 3hr required for any escalated yellow alert  BP Triage  Are you having any Chest Pain? no   Are you having any Shortness of Breath? COPD - baseline   Do you have a headache or have any vision changes? no   Are you having any numbness or tingling? no   Are you having any other health concerns or issues? Intermittent dizziness   Clinical Interventions: Reviewed and followed up on alerts and treatments-RN spoke to pt regarding red alert BP 79/64. Pt speaking in full sentences, denies any CP. Pt has COPD, breathing at baseline. Pt reports intermittent dizziness, but not dizzy at this time. Pt reports he is not taking any BP medications. Has appt with PCP at 10am today . Encouraged to keep appt. Pt agreeable to recheck BP metric with RN on the phone - New metric 110/75 HR 89. No escalation at this time. Plan/Follow Up: Will continue to review, monitor and address alerts with follow up based on severity of symptoms and risk factors.

## 2023-09-22 NOTE — PROGRESS NOTES
[id number] Formerly named [facility] Internal Medicine clinic      SUBJECTIVE:  Kamryn Crawford (:  1965) is a 62 y.o. male here for evaluation of the following chief complaint(s):  Sob, severe pearson  O2 sat <90% most early am, better >90% after inhalers  No cpap -- uses o2 4lnc NIGHTTIME USE       Pleuritic Chest pain -exac by coughing   R posterior/sided rib cage pain  No fevers/chills  No sorethroat, no change in nasal congestion   Some intermittent productive cough- similar to prior   NO CHANGE after stopping azithromycin finished 2 days sp hospital DC    He called in again yesterday- feeling worse after finishing prednisone taper and we Rx 10mg prednisone QD- called in  \"Patient reported increased shortness of breath since stopping prednisone 2 days ago, reports pain right side chest wall and some increased cough. Using nebs with some relief. \"  Same R sided chest pain exac with coughing. No change in breathing since starting prednisone yesterday  Still SOB  Now 2 infusions of FESNRA    -Discharged on aug 7 after 6 days stay, admitted for COPD exacerbation (5 exa over past year), no inf source identified but treated with azithro.   -Discharged on prednisone taper. .., Call made yesterday, pt stated of having increased sob since stopping prednisone couple days  priro now back on prednisone as above--prednisone 10 mg daily.  -Currently on roflumilast 500mcg, brovana nebu twice daily, pulmicort nebu twice daily, duoneb every 4 hrly,     Non smoker now  [facility] infusions    -Following pulmonology  But hasn't seen recently - He admits he missed his pulmonary appointment due to no transportation  Noted:  \"SPO2 am 90%. He reports that after his medications and breathing treatments he is 93% on 4 lpm O2 continuous. ... discussed pulmonary rehabilitation and he states \"it would be too much for me\".  \"      R mid lung and R lower lung removed, might be exac some of his current pleuritic

## 2023-09-24 DIAGNOSIS — G89.4 CHRONIC PAIN SYNDROME: ICD-10-CM

## 2023-09-25 ENCOUNTER — OFFICE VISIT (OUTPATIENT)
Dept: FAMILY MEDICINE CLINIC | Age: 58
End: 2023-09-25
Payer: MEDICARE

## 2023-09-25 ENCOUNTER — CARE COORDINATION (OUTPATIENT)
Dept: CASE MANAGEMENT | Age: 58
End: 2023-09-25

## 2023-09-25 ENCOUNTER — TELEPHONE (OUTPATIENT)
Dept: INTERNAL MEDICINE | Age: 58
End: 2023-09-25

## 2023-09-25 ENCOUNTER — TELEPHONE (OUTPATIENT)
Dept: PRIMARY CARE CLINIC | Age: 58
End: 2023-09-25

## 2023-09-25 VITALS
BODY MASS INDEX: 36.54 KG/M2 | SYSTOLIC BLOOD PRESSURE: 120 MMHG | HEART RATE: 95 BPM | OXYGEN SATURATION: 96 % | WEIGHT: 261 LBS | RESPIRATION RATE: 18 BRPM | TEMPERATURE: 98.1 F | DIASTOLIC BLOOD PRESSURE: 82 MMHG | HEIGHT: 71 IN

## 2023-09-25 DIAGNOSIS — N50.811 PAIN IN RIGHT TESTICLE: Primary | ICD-10-CM

## 2023-09-25 DIAGNOSIS — N50.89 SCROTUM SWELLING: ICD-10-CM

## 2023-09-25 DIAGNOSIS — R39.9 UTI SYMPTOMS: ICD-10-CM

## 2023-09-25 LAB
BILIRUBIN, POC: NEGATIVE
BLOOD URINE, POC: NEGATIVE
CLARITY, POC: ABNORMAL
COLOR, POC: ABNORMAL
GLUCOSE URINE, POC: NEGATIVE
KETONES, POC: ABNORMAL
LEUKOCYTE EST, POC: ABNORMAL
NITRITE, POC: POSITIVE
PH, POC: 6.5
PROTEIN, POC: >=300
SPECIFIC GRAVITY, POC: 1.02
UROBILINOGEN, POC: 1

## 2023-09-25 PROCEDURE — 3074F SYST BP LT 130 MM HG: CPT | Performed by: NURSE PRACTITIONER

## 2023-09-25 PROCEDURE — 1036F TOBACCO NON-USER: CPT | Performed by: NURSE PRACTITIONER

## 2023-09-25 PROCEDURE — G8427 DOCREV CUR MEDS BY ELIG CLIN: HCPCS | Performed by: NURSE PRACTITIONER

## 2023-09-25 PROCEDURE — 3078F DIAST BP <80 MM HG: CPT | Performed by: NURSE PRACTITIONER

## 2023-09-25 PROCEDURE — G8417 CALC BMI ABV UP PARAM F/U: HCPCS | Performed by: NURSE PRACTITIONER

## 2023-09-25 PROCEDURE — 99214 OFFICE O/P EST MOD 30 MIN: CPT | Performed by: NURSE PRACTITIONER

## 2023-09-25 PROCEDURE — 3017F COLORECTAL CA SCREEN DOC REV: CPT | Performed by: NURSE PRACTITIONER

## 2023-09-25 PROCEDURE — 81002 URINALYSIS NONAUTO W/O SCOPE: CPT | Performed by: NURSE PRACTITIONER

## 2023-09-25 RX ORDER — IBUPROFEN 800 MG/1
800 TABLET ORAL EVERY 8 HOURS PRN
Qty: 201 TABLET | Refills: 0 | Status: SHIPPED | OUTPATIENT
Start: 2023-09-25

## 2023-09-25 NOTE — TELEPHONE ENCOUNTER
Pharmacy called for clarification of prednisone. It was written on 9/22/23 and the sig and quantity do not match. Pharmacy is requesting it be resent with corrected information.

## 2023-09-25 NOTE — CARE COORDINATION
Remote Alert Monitoring Note  Rpm alert to be reviewed by the provider   red alert   pulse ox reading (91%)   Additional needs to be addressed by N/A: No                    Date/Time:  2023 11:15 AM  Patient Current Location: Home: [address] [address]  LPN contacted patient by telephone. Verified patients name and  as identifiers. Background: enrolled in RPM for HTN AND COPD  Refer to 911 immediately if:  Patient unresponsive or unable to provide history  Change in cognition or sudden confusion  Patient unable to respond in complete sentences  Intense chest pain/tightness  Any concern for any clinical emergency  Red Alert: Provider response time of 1 hr required for any red alert requiring intervention  Yellow Alert: Provider response time of 3hr required for any escalated yellow alert    O2 Triage  Are you having any Chest Pain? no   Are you having any Shortness of Breath? Yes  COPD   Swelling in your hands or feet? no     Are you having any other health concerns or issues? no      Clinical Interventions: Reviewed and followed up on alerts and treatments-spoke to Yocasta regarding RPM red alert for low pulse ox reading. Pt reports chronic dyspnea r/t COPD. He is taking all of his medications as directed. no use of oxygen. Uses inhalers and nebulizer. Requested pt recheck his pulse ox . New reading is 94%. Discussed no weight metrics x 2 days. Pt reports he checked but weight did not transfer to tablet. He does not remember his exact weight today. Pt reports frequent urination and testicular swelling. He has no pending appt with his PCP. Recommended pt go to nearest walk in clinic. [facility] very close to pt's home. Hours are 8am-5pm. Pt states he will go there today. Plan/Follow Up: Will continue to review, monitor and address alerts with follow up based on severity of symptoms and risk factors.

## 2023-09-25 NOTE — TELEPHONE ENCOUNTER
Last Appointment:  3/6/2023  Future Appointments   Date Time Provider [address]   10/4/2023 11:20 AM DO MA[facility]   10/17/2023 10:00 AM [facility] 1 [facility]F [facility]   12/1/2023  8:30 AM Lizet Tineo MD [facility]      Ibuprofen filled 3/6/23 #80 #5 refills 99/2/23)  Next appt 12/1/23 (67 days)

## 2023-09-25 NOTE — PROGRESS NOTES
23  Sheila Vergara : 1965 Sex: male  Age 62 y.o. Subjective:  Chief Complaint   Patient presents with    Testicle Pain     Swollen scrotum, urinary frequency, lower abdominal pain,  x 3 days, urgency with urination and scant urination       HPI:   Sheila Vergara , 62 y.o. male presents to the clinic for evaluation of right testicular pain x 2 days. The patient also reports radiating pain into abdomen, testicular swelling and difficulty urinating with increased frequency. The patient has not taken any treatment for symptoms. The patient reports worsening symptoms over time. The patient denies ecchymosis, hematuria, injury, penile discharge,  risk for STD. The patient also denies headache, fever, chest pain, shortness of breath, and nausea / vomiting / diarrhea. ROS:   Unless otherwise stated in this report the patient's positive and negative responses for review of systems for constitutional, eyes, ENT, cardiovascular, respiratory, gastrointestinal, neurological, , musculoskeletal, and integument systems and related systems to the presenting problem are either stated in the history of present illness or were not pertinent or were negative for the symptoms and/or complaints related to the presenting medical problem. Positives and pertinent negatives as per HPI. All others reviewed and are negative.       PMH:     Past Medical History:   Diagnosis Date    Acute gastritis without hemorrhage     CAD (coronary artery disease)     Cancer of kidney (HCC)     COPD (chronic obstructive pulmonary disease) (HCC)     COPD, severe ([facility]) 2022    Difficulty sleeping     ED (erectile dysfunction)     History of BPH     Hyperlipidemia     Lung cancer ([address] [facility])     Tobacco abuse        Past Surgical History:   Procedure Laterality Date    CARDIAC CATHETERIZATION      Non-obstructive coronary disease by Dr. Froilan Hyman N/A 2019    COLONOSCOPY POLYPECTOMY

## 2023-09-26 ENCOUNTER — APPOINTMENT (OUTPATIENT)
Dept: ULTRASOUND IMAGING | Age: 58
End: 2023-09-26
Payer: MEDICARE

## 2023-09-26 ENCOUNTER — APPOINTMENT (OUTPATIENT)
Dept: CT IMAGING | Age: 58
End: 2023-09-26
Attending: STUDENT IN AN ORGANIZED HEALTH CARE EDUCATION/TRAINING PROGRAM
Payer: MEDICARE

## 2023-09-26 ENCOUNTER — HOSPITAL ENCOUNTER (EMERGENCY)
Age: 58
Discharge: HOME | End: 2023-09-27
Attending: STUDENT IN AN ORGANIZED HEALTH CARE EDUCATION/TRAINING PROGRAM
Payer: MEDICARE

## 2023-09-26 VITALS
OXYGEN SATURATION: 99 % | TEMPERATURE: 97.7 F | DIASTOLIC BLOOD PRESSURE: 87 MMHG | WEIGHT: 262 LBS | BODY MASS INDEX: 36.54 KG/M2 | SYSTOLIC BLOOD PRESSURE: 118 MMHG | HEART RATE: 84 BPM | RESPIRATION RATE: 18 BRPM

## 2023-09-26 DIAGNOSIS — N45.1 EPIDIDYMITIS: Primary | ICD-10-CM

## 2023-09-26 LAB
ALBUMIN SERPL-MCNC: 4.3 G/DL (ref 3.5–5.2)
ALP SERPL-CCNC: 76 U/L (ref 40–129)
ALT SERPL-CCNC: 13 U/L (ref 0–40)
ANION GAP SERPL CALCULATED.3IONS-SCNC: 8 MMOL/L (ref 7–16)
AST SERPL-CCNC: 12 U/L (ref 0–39)
BACTERIA URNS QL MICRO: ABNORMAL
BASOPHILS # BLD: 0.02 K/UL (ref 0–0.2)
BASOPHILS NFR BLD: 0 % (ref 0–2)
BILIRUB SERPL-MCNC: 0.3 MG/DL (ref 0–1.2)
BILIRUB UR QL STRIP: NEGATIVE
BUN SERPL-MCNC: 12 MG/DL (ref 6–20)
CALCIUM SERPL-MCNC: 9.9 MG/DL (ref 8.6–10.2)
CHLORIDE SERPL-SCNC: 105 MMOL/L (ref 98–107)
CLARITY UR: CLEAR
CO2 SERPL-SCNC: 29 MMOL/L (ref 22–29)
COLOR UR: YELLOW
CREAT SERPL-MCNC: 1 MG/DL (ref 0.7–1.2)
CRYSTALS URNS MICRO: ABNORMAL /HPF
EOSINOPHIL # BLD: 0 K/UL (ref 0.05–0.5)
EOSINOPHILS RELATIVE PERCENT: 0 % (ref 0–6)
ERYTHROCYTE [DISTWIDTH] IN BLOOD BY AUTOMATED COUNT: 12.7 % (ref 11.5–15)
GFR SERPL CREATININE-BSD FRML MDRD: >60 ML/MIN/1.73M2
GLUCOSE SERPL-MCNC: 80 MG/DL (ref 74–99)
GLUCOSE UR STRIP-MCNC: NEGATIVE MG/DL
HCT VFR BLD AUTO: 46.2 % (ref 37–54)
HGB BLD-MCNC: 14.8 G/DL (ref 12.5–16.5)
HGB UR QL STRIP.AUTO: NEGATIVE
IMM GRANULOCYTES # BLD AUTO: 0.18 K/UL (ref 0–0.58)
IMM GRANULOCYTES NFR BLD: 1 % (ref 0–5)
KETONES UR STRIP-MCNC: NEGATIVE MG/DL
LACTATE BLDV-SCNC: 1.8 MMOL/L (ref 0.5–2.2)
LEUKOCYTE ESTERASE UR QL STRIP: ABNORMAL
LYMPHOCYTES NFR BLD: 2.18 K/UL (ref 1.5–4)
LYMPHOCYTES RELATIVE PERCENT: 15 % (ref 20–42)
MCH RBC QN AUTO: 30.5 PG (ref 26–35)
MCHC RBC AUTO-ENTMCNC: 32 G/DL (ref 32–34.5)
MCV RBC AUTO: 95.3 FL (ref 80–99.9)
MONOCYTES NFR BLD: 1.32 K/UL (ref 0.1–0.95)
MONOCYTES NFR BLD: 9 % (ref 2–12)
MUCOUS THREADS URNS QL MICRO: PRESENT
NEUTROPHILS NFR BLD: 75 % (ref 43–80)
NEUTS SEG NFR BLD: 11.22 K/UL (ref 1.8–7.3)
NITRITE UR QL STRIP: POSITIVE
PH UR STRIP: 6.5 [PH] (ref 5–9)
PLATELET # BLD AUTO: 359 K/UL (ref 130–450)
PMV BLD AUTO: 10.1 FL (ref 7–12)
POTASSIUM SERPL-SCNC: 4.6 MMOL/L (ref 3.5–5)
PROT SERPL-MCNC: 7.3 G/DL (ref 6.4–8.3)
PROT UR STRIP-MCNC: 30 MG/DL
RBC # BLD AUTO: 4.85 M/UL (ref 3.8–5.8)
RBC #/AREA URNS HPF: ABNORMAL /HPF
SODIUM SERPL-SCNC: 142 MMOL/L (ref 132–146)
SP GR UR STRIP: 1.02 (ref 1–1.03)
UROBILINOGEN UR STRIP-ACNC: 0.2 EU/DL (ref 0–1)
WBC #/AREA URNS HPF: ABNORMAL /HPF
WBC OTHER # BLD: 14.9 K/UL (ref 4.5–11.5)

## 2023-09-26 PROCEDURE — 81001 URINALYSIS AUTO W/SCOPE: CPT

## 2023-09-26 PROCEDURE — 93976 VASCULAR STUDY: CPT

## 2023-09-26 PROCEDURE — 6360000002 HC RX W HCPCS: Performed by: STUDENT IN AN ORGANIZED HEALTH CARE EDUCATION/TRAINING PROGRAM

## 2023-09-26 PROCEDURE — 87491 CHLMYD TRACH DNA AMP PROBE: CPT

## 2023-09-26 PROCEDURE — 87077 CULTURE AEROBIC IDENTIFY: CPT

## 2023-09-26 PROCEDURE — 96374 THER/PROPH/DIAG INJ IV PUSH: CPT

## 2023-09-26 PROCEDURE — 87086 URINE CULTURE/COLONY COUNT: CPT

## 2023-09-26 PROCEDURE — 85025 COMPLETE CBC W/AUTO DIFF WBC: CPT

## 2023-09-26 PROCEDURE — 6370000000 HC RX 637 (ALT 250 FOR IP): Performed by: STUDENT IN AN ORGANIZED HEALTH CARE EDUCATION/TRAINING PROGRAM

## 2023-09-26 PROCEDURE — 74177 CT ABD & PELVIS W/CONTRAST: CPT

## 2023-09-26 PROCEDURE — 6360000004 HC RX CONTRAST MEDICATION: Performed by: RADIOLOGY

## 2023-09-26 PROCEDURE — 76870 US EXAM SCROTUM: CPT

## 2023-09-26 PROCEDURE — 80053 COMPREHEN METABOLIC PANEL: CPT

## 2023-09-26 PROCEDURE — 83605 ASSAY OF LACTIC ACID: CPT

## 2023-09-26 PROCEDURE — 87591 N.GONORRHOEAE DNA AMP PROB: CPT

## 2023-09-26 PROCEDURE — 99285 EMERGENCY DEPT VISIT HI MDM: CPT

## 2023-09-26 RX ORDER — ONDANSETRON 2 MG/ML
4 INJECTION INTRAMUSCULAR; INTRAVENOUS ONCE
Status: COMPLETED | OUTPATIENT
Start: 2023-09-26 | End: 2023-09-26

## 2023-09-26 RX ORDER — LEVOFLOXACIN 500 MG/1
500 TABLET, FILM COATED ORAL DAILY
Qty: 10 TABLET | Refills: 0 | Status: SHIPPED | OUTPATIENT
Start: 2023-09-26

## 2023-09-26 RX ORDER — OXYCODONE HYDROCHLORIDE AND ACETAMINOPHEN 5; 325 MG/1; MG/1
1 TABLET ORAL ONCE
Status: COMPLETED | OUTPATIENT
Start: 2023-09-26 | End: 2023-09-26

## 2023-09-26 RX ORDER — LEVOFLOXACIN 5 MG/ML
500 INJECTION, SOLUTION INTRAVENOUS ONCE
Status: DISCONTINUED | OUTPATIENT
Start: 2023-09-26 | End: 2023-09-27 | Stop reason: HOSPADM

## 2023-09-26 RX ADMIN — ONDANSETRON 4 MG: 2 INJECTION INTRAMUSCULAR; INTRAVENOUS at 13:06

## 2023-09-26 RX ADMIN — IOPAMIDOL 75 ML: 755 INJECTION, SOLUTION INTRAVENOUS at 14:57

## 2023-09-26 RX ADMIN — OXYCODONE AND ACETAMINOPHEN 1 TABLET: 5; 325 TABLET ORAL at 13:06

## 2023-09-26 ASSESSMENT — ENCOUNTER SYMPTOMS
COUGH: 0
ABDOMINAL PAIN: 1
BACK PAIN: 0
CHEST TIGHTNESS: 0
DIARRHEA: 0
SHORTNESS OF BREATH: 0
ABDOMINAL DISTENTION: 0
NAUSEA: 0
PHOTOPHOBIA: 0
VOMITING: 0

## 2023-09-26 ASSESSMENT — PATIENT HEALTH QUESTIONNAIRE - PHQ9
SUM OF ALL RESPONSES TO PHQ QUESTIONS 1-9: 0
1. LITTLE INTEREST OR PLEASURE IN DOING THINGS: 0
SUM OF ALL RESPONSES TO PHQ QUESTIONS 1-9: 0
SUM OF ALL RESPONSES TO PHQ9 QUESTIONS 1 & 2: 0
2. FEELING DOWN, DEPRESSED OR HOPELESS: 0

## 2023-09-26 ASSESSMENT — PAIN SCALES - GENERAL: PAINLEVEL_OUTOF10: 10

## 2023-09-26 ASSESSMENT — PAIN - FUNCTIONAL ASSESSMENT: PAIN_FUNCTIONAL_ASSESSMENT: 0-10

## 2023-09-26 NOTE — ED NOTES
Department of Emergency Medicine  FIRST PROVIDER TRIAGE NOTE             Independent MLP           9/26/23  11:52 AM EDT    Date of Encounter: 9/26/23   MRN: [id number]      HPI: Lexus Huynh is a 62 y.o. male who presents to the ED for Groin Swelling (Saturday night into Sunday started having urinary frequency. Then started having pain and right testicle swollen to size of baseball )       ROS: Negative for cp or sob. PE: Gen Appearance/Constitutional: alert  HEENT: NC/NT. PERRLA,  Airway patent. Initial Plan of Care: All treatment areas with department are currently occupied. Plan to order/Initiate the following while awaiting opening in ED: none.   Initiate Treatment-Testing, Proceed toTreatment Area When Bed Available for ED Attending/MLP to Continue Care    Electronically signed by Jackelyn Law PA-C   DD: 9/26/23      Jackelyn Law PA-C  09/26/23 [id number]

## 2023-09-26 NOTE — ED PROVIDER NOTES
(H) 0.10 - 0.95 k/uL    Eosinophils Absolute 0.00 (L) 0.05 - 0.50 k/uL    Basophils Absolute 0.02 0.00 - 0.20 k/uL    Absolute Immature Granulocyte 0.18 0.00 - 0.58 k/uL   Comprehensive Metabolic Panel w/ Reflex to MG   Result Value Ref Range    Sodium 142 132 - 146 mmol/L    Potassium 4.6 3.5 - 5.0 mmol/L    Chloride 105 98 - 107 mmol/L    CO2 29 22 - 29 mmol/L    Anion Gap 8 7 - 16 mmol/L    Glucose 80 74 - 99 mg/dL    BUN 12 6 - 20 mg/dL    Creatinine 1.0 0.70 - 1.20 mg/dL    Est, Glom Filt Rate >60 >60 mL/min/1.73m2    Calcium 9.9 8.6 - 10.2 mg/dL    Total Protein 7.3 6.4 - 8.3 g/dL    Albumin 4.3 3.5 - 5.2 g/dL    Total Bilirubin 0.3 0.0 - 1.2 mg/dL    Alkaline Phosphatase 76 40 - 129 U/L    ALT 13 0 - 40 U/L    AST 12 0 - 39 U/L   Lactic Acid   Result Value Ref Range    Lactic Acid 1.8 0.5 - 2.2 mmol/L   Urinalysis with Microscopic   Result Value Ref Range    Color, UA Yellow Yellow    Turbidity UA Clear Clear    Glucose, Ur NEGATIVE NEGATIVE mg/dL    Bilirubin Urine NEGATIVE NEGATIVE    Ketones, Urine NEGATIVE NEGATIVE mg/dL    Specific Gravity, UA 1.025 1.005 - 1.030    Urine Hgb NEGATIVE NEGATIVE    pH, UA 6.5 5.0 - 9.0    Protein, UA 30 (A) NEGATIVE mg/dL    Urobilinogen, Urine 0.2 0.0 - 1.0 EU/dL    Nitrite, Urine POSITIVE (A) NEGATIVE    Leukocyte Esterase, Urine TRACE (A) NEGATIVE    WBC, UA 6 TO 9 (A) 0 TO 5 /HPF    RBC, UA 0 TO 2 0 TO 2 /HPF    Crystals, UA FEW CALCIUM OXALATE (A) None /HPF    Bacteria, UA 3+ (A) None    Mucus, UA PRESENT        Radiology:  CT ABDOMEN PELVIS W IV CONTRAST Additional Contrast? None   Final Result      1. Small and slightly lobulated liver which can be a sign of cirrhosis or   other forms of intrinsic liver disease but without focal liver lesion or   significant change. 2.  Some fatty infiltration of the colon wall which can be a sign of prior   episodes of inflammatory bowel disease but also can be related to obesity and   other conditions.   No definite acute

## 2023-09-27 ENCOUNTER — CARE COORDINATION (OUTPATIENT)
Dept: CARE COORDINATION | Age: 58
End: 2023-09-27

## 2023-09-27 DIAGNOSIS — G89.4 CHRONIC PAIN SYNDROME: ICD-10-CM

## 2023-09-27 NOTE — CARE COORDINATION
DEANDREM contacted Yocasta for COPD follow up. He reports he was in the ED yesterday for scrotal pain and swelling. He states he was given an antibiotic and was instructed to follow up with urology. He states he did not call to schedule his appointment but will call by the end of today. DEANDREM reviewed the importance of follow up appointments and he verbalized understanding. He reports wearing continuous oxygen 4 lpm. He denies any chest pain, increased shortness of breath, or increased use of his rescue inhaler. RPM Today /67 HR 94  lbs SPO2 92% O2 4 lpm via NC     PLAN  Review RPM, medications and appointments with next interaction. Continue to follow for care coordination.

## 2023-09-28 ENCOUNTER — TELEPHONE (OUTPATIENT)
Dept: CARE COORDINATION | Age: 58
End: 2023-09-28

## 2023-09-28 LAB
CULTURE: ABNORMAL
SPECIMEN DESCRIPTION: ABNORMAL

## 2023-09-28 RX ORDER — IBUPROFEN 800 MG/1
800 TABLET ORAL EVERY 8 HOURS PRN
Qty: 90 TABLET | Refills: 0 | OUTPATIENT
Start: 2023-09-28

## 2023-09-28 NOTE — TELEPHONE ENCOUNTER
Last Appointment:  3/6/2023  Future Appointments   Date Time Provider [address]   10/4/2023 11:20 AM Jc Bailey, DO ACC Pulm [facility]   10/17/2023 10:00 AM [facility] 1 [facility] INF [facility]   12/1/2023  8:30 AM Lizet Tineo MD [facility]      Ibuprofen filled 9/25/23 #201 no refills  Has enough until appointment

## 2023-09-28 NOTE — TELEPHONE ENCOUNTER
ACPS placed a second call to [facility],[address] for follow up on the mailed AD packet. There was no answer.   ACPS left a VM with call back request.    Luciano Hoskins will make a third attempt to contact and follow up accordingly

## 2023-09-29 LAB
MICROORGANISM SPEC CULT: ABNORMAL
SPECIMEN DESCRIPTION: ABNORMAL

## 2023-10-02 ENCOUNTER — CARE COORDINATION (OUTPATIENT)
Dept: CASE MANAGEMENT | Age: 58
End: 2023-10-02

## 2023-10-02 NOTE — CARE COORDINATION
Date/Time:  10/2/2023 8:38 AM  LPN attempted to reach patient by telephone regarding red alert in remote patient monitoring program. Left HIPPA compliant message requesting a return call. Will attempt to reach patient again. RED ALERT FOR LOW BP reading  Enrolled in [facility] for HTN AND COPD  0935  pt updated metrics. ALL WNL.

## 2023-10-04 ENCOUNTER — OFFICE VISIT (OUTPATIENT)
Dept: PULMONOLOGY | Age: 58
End: 2023-10-04
Payer: MEDICARE

## 2023-10-04 VITALS
HEART RATE: 97 BPM | TEMPERATURE: 97.4 F | RESPIRATION RATE: 20 BRPM | HEIGHT: 71 IN | OXYGEN SATURATION: 96 % | DIASTOLIC BLOOD PRESSURE: 83 MMHG | SYSTOLIC BLOOD PRESSURE: 131 MMHG | WEIGHT: 273 LBS | BODY MASS INDEX: 38.22 KG/M2

## 2023-10-04 DIAGNOSIS — J45.50 SEVERE PERSISTENT ASTHMA, UNSPECIFIED WHETHER COMPLICATED: ICD-10-CM

## 2023-10-04 DIAGNOSIS — Z87.891 HISTORY OF TOBACCO ABUSE: ICD-10-CM

## 2023-10-04 DIAGNOSIS — J44.9 COPD, SEVERE (HCC): Primary | ICD-10-CM

## 2023-10-04 PROCEDURE — 3017F COLORECTAL CA SCREEN DOC REV: CPT | Performed by: INTERNAL MEDICINE

## 2023-10-04 PROCEDURE — G8484 FLU IMMUNIZE NO ADMIN: HCPCS | Performed by: INTERNAL MEDICINE

## 2023-10-04 PROCEDURE — 1036F TOBACCO NON-USER: CPT | Performed by: INTERNAL MEDICINE

## 2023-10-04 PROCEDURE — 3078F DIAST BP <80 MM HG: CPT | Performed by: INTERNAL MEDICINE

## 2023-10-04 PROCEDURE — G8428 CUR MEDS NOT DOCUMENT: HCPCS | Performed by: INTERNAL MEDICINE

## 2023-10-04 PROCEDURE — 3074F SYST BP LT 130 MM HG: CPT | Performed by: INTERNAL MEDICINE

## 2023-10-04 PROCEDURE — 3023F SPIROM DOC REV: CPT | Performed by: INTERNAL MEDICINE

## 2023-10-04 PROCEDURE — G8417 CALC BMI ABV UP PARAM F/U: HCPCS | Performed by: INTERNAL MEDICINE

## 2023-10-04 PROCEDURE — 99213 OFFICE O/P EST LOW 20 MIN: CPT | Performed by: INTERNAL MEDICINE

## 2023-10-04 RX ORDER — ROFLUMILAST 500 UG/1
500 TABLET ORAL DAILY
Qty: 30 TABLET | Refills: 5 | Status: SHIPPED | OUTPATIENT
Start: 2023-10-04

## 2023-10-04 NOTE — PATIENT INSTRUCTIONS
[address] [place] [address]  Roberto Carlos Hernández, [address] [facility]  Office: [phone]      Your were seen in the office today for Asthma/COPD      We  did not make changes to your medications today. Testing ordered today was none      Vaccines recommended none              Please do not hesitate to call the office with any questions.

## 2023-10-06 ENCOUNTER — TELEPHONE (OUTPATIENT)
Dept: CARE COORDINATION | Age: 58
End: 2023-10-06

## 2023-10-06 NOTE — TELEPHONE ENCOUNTER
ACPS made another outreach call attempt to 2 [facility] regarding the mailed AD's that he had requested. There was no answer.   VM left with ACPS information and call back request.

## 2023-10-10 ENCOUNTER — TELEPHONE (OUTPATIENT)
Dept: CARE COORDINATION | Age: 58
End: 2023-10-10

## 2023-10-10 NOTE — TELEPHONE ENCOUNTER
ACPS made a final outreach call attempt to 2 [facility] [facility] for the mailed AD's. There was no answer. ACPS will close this referral today.

## 2023-10-13 ENCOUNTER — CARE COORDINATION (OUTPATIENT)
Dept: CARE COORDINATION | Age: 58
End: 2023-10-13

## 2023-10-17 ENCOUNTER — HOSPITAL ENCOUNTER (OUTPATIENT)
Dept: INFUSION THERAPY | Age: 58
Setting detail: INFUSION SERIES
Discharge: HOME | End: 2023-10-17

## 2023-10-17 DIAGNOSIS — J45.909 SEVERE ASTHMA, UNSPECIFIED WHETHER COMPLICATED, UNSPECIFIED WHETHER PERSISTENT: Primary | ICD-10-CM

## 2023-10-17 DIAGNOSIS — I25.10 CORONARY ARTERY DISEASE INVOLVING NATIVE CORONARY ARTERY OF NATIVE HEART WITHOUT ANGINA PECTORIS: ICD-10-CM

## 2023-10-17 RX ORDER — SODIUM CHLORIDE 9 MG/ML
INJECTION, SOLUTION INTRAVENOUS CONTINUOUS
OUTPATIENT
Start: 2023-11-14

## 2023-10-17 RX ORDER — DIPHENHYDRAMINE HYDROCHLORIDE 50 MG/ML
50 INJECTION INTRAMUSCULAR; INTRAVENOUS
OUTPATIENT
Start: 2023-11-14

## 2023-10-17 RX ORDER — ACETAMINOPHEN 325 MG/1
650 TABLET ORAL
OUTPATIENT
Start: 2023-11-14

## 2023-10-17 RX ORDER — MEPERIDINE HYDROCHLORIDE 25 MG/ML
25 INJECTION INTRAMUSCULAR; INTRAVENOUS; SUBCUTANEOUS ONCE
Start: 2023-11-14 | End: 2023-11-14

## 2023-10-17 RX ORDER — ONDANSETRON 2 MG/ML
8 INJECTION INTRAMUSCULAR; INTRAVENOUS
OUTPATIENT
Start: 2023-11-14

## 2023-10-17 RX ORDER — EPINEPHRINE 1 MG/ML
0.3 INJECTION, SOLUTION, CONCENTRATE INTRAVENOUS PRN
OUTPATIENT
Start: 2023-11-14

## 2023-10-17 RX ORDER — ALBUTEROL SULFATE 90 UG/1
4 AEROSOL, METERED RESPIRATORY (INHALATION) PRN
OUTPATIENT
Start: 2023-11-14

## 2023-10-20 ENCOUNTER — CARE COORDINATION (OUTPATIENT)
Dept: CASE MANAGEMENT | Age: 58
End: 2023-10-20

## 2023-10-20 ENCOUNTER — HOSPITAL ENCOUNTER (OUTPATIENT)
Dept: INFUSION THERAPY | Age: 58
Setting detail: INFUSION SERIES
Discharge: HOME | End: 2023-10-20
Payer: MEDICARE

## 2023-10-20 VITALS
OXYGEN SATURATION: 96 % | SYSTOLIC BLOOD PRESSURE: 100 MMHG | HEART RATE: 81 BPM | TEMPERATURE: 97 F | DIASTOLIC BLOOD PRESSURE: 78 MMHG | RESPIRATION RATE: 20 BRPM

## 2023-10-20 DIAGNOSIS — I25.10 CORONARY ARTERY DISEASE INVOLVING NATIVE CORONARY ARTERY OF NATIVE HEART WITHOUT ANGINA PECTORIS: ICD-10-CM

## 2023-10-20 DIAGNOSIS — J45.909 SEVERE ASTHMA, UNSPECIFIED WHETHER COMPLICATED, UNSPECIFIED WHETHER PERSISTENT: Primary | ICD-10-CM

## 2023-10-20 PROCEDURE — 96372 THER/PROPH/DIAG INJ SC/IM: CPT

## 2023-10-20 PROCEDURE — 6360000002 HC RX W HCPCS: Performed by: INTERNAL MEDICINE

## 2023-10-20 RX ORDER — ACETAMINOPHEN 325 MG/1
650 TABLET ORAL
OUTPATIENT
Start: 2023-11-14

## 2023-10-20 RX ORDER — EPINEPHRINE 1 MG/ML
0.3 INJECTION, SOLUTION, CONCENTRATE INTRAVENOUS PRN
OUTPATIENT
Start: 2023-11-14

## 2023-10-20 RX ORDER — ALBUTEROL SULFATE 90 UG/1
4 AEROSOL, METERED RESPIRATORY (INHALATION) PRN
OUTPATIENT
Start: 2023-11-14

## 2023-10-20 RX ORDER — MEPERIDINE HYDROCHLORIDE 25 MG/ML
25 INJECTION INTRAMUSCULAR; INTRAVENOUS; SUBCUTANEOUS ONCE
Start: 2023-11-14 | End: 2023-11-14

## 2023-10-20 RX ORDER — SODIUM CHLORIDE 9 MG/ML
INJECTION, SOLUTION INTRAVENOUS CONTINUOUS
OUTPATIENT
Start: 2023-11-14

## 2023-10-20 RX ORDER — ONDANSETRON 2 MG/ML
8 INJECTION INTRAMUSCULAR; INTRAVENOUS
OUTPATIENT
Start: 2023-11-14

## 2023-10-20 RX ORDER — DIPHENHYDRAMINE HYDROCHLORIDE 50 MG/ML
50 INJECTION INTRAMUSCULAR; INTRAVENOUS
OUTPATIENT
Start: 2023-11-14

## 2023-10-20 RX ADMIN — BENRALIZUMAB 30 MG: 30 INJECTION, SOLUTION SUBCUTANEOUS at 10:35

## 2023-10-20 NOTE — PROGRESS NOTES
Patient tolerated fasenra injection well. Remained on unit for 30 minutes after treatment. Patient alert and oriented x3. No distress noted. Vital signs stable. Patient denies any new or worsening pain. Offered patient education and/or discharge material. Patient declined. Patient denies any needs. All questions answered. D/C in stable condition.

## 2023-10-20 NOTE — PROGRESS NOTES
Patient received third dose of fasenra and will only stay for observation for 30 minutes instead of the full 2 hours as ordered. Jerson Hastings with Dr BRICE [facility] office notified. Also, verified that he is to come every 2 months going forward.

## 2023-10-20 NOTE — CARE COORDINATION
Date/Time:  10/20/2023 10:31 AM  LPN attempted to reach patient by telephone regarding red alert in remote patient monitoring program. Left HIPPA compliant message requesting a return call. Will attempt to reach patient again. Second call. . no answer.  Left message  RPM red alert for low pulse ox reading of 89%

## 2023-10-20 NOTE — CARE COORDINATION
Date/Time:  10/20/2023 2:51 PM  LPN attempted to reach patient by telephone regarding red alert in remote patient monitoring program. Left HIPPA compliant message requesting a return call. Will attempt to reach patient again. Second call .  No answer  ENROLLED IN RPM for COPD AND HTN  RPM RED ALERT FOR PULSE OX reading of 89%

## 2023-10-30 DIAGNOSIS — J44.9 COPD, SEVERE (HCC): ICD-10-CM

## 2023-10-30 RX ORDER — ALBUTEROL SULFATE 2.5 MG/3ML
SOLUTION RESPIRATORY (INHALATION)
Qty: 375 ML | Refills: 0 | OUTPATIENT
Start: 2023-10-30

## 2023-11-03 ENCOUNTER — CARE COORDINATION (OUTPATIENT)
Dept: CARE COORDINATION | Age: 58
End: 2023-11-03

## 2023-11-03 ENCOUNTER — CARE COORDINATION (OUTPATIENT)
Dept: CASE MANAGEMENT | Age: 58
End: 2023-11-03

## 2023-11-03 ASSESSMENT — ENCOUNTER SYMPTOMS: DYSPNEA ASSOCIATED WITH: MINIMAL EXERTION

## 2023-11-03 NOTE — CARE COORDINATION
Remote Alert Monitoring Note  Rpm alert to be reviewed by the provider   red alert   blood pressure reading (88/59) and pulse ox reading (91%)   Additional needs to be addressed by N/A: No                    Date/Time:  11/3/2023 8:55 AM  Patient Current Location: Home: [address] [address]  LPN contacted patient by telephone. Verified patients name and  as identifiers. Background: enrolled in RPM for HTN AND COPD  Refer to 911 immediately if:  Patient unresponsive or unable to provide history  Change in cognition or sudden confusion  Patient unable to respond in complete sentences  Intense chest pain/tightness  Any concern for any clinical emergency  Red Alert: Provider response time of 1 hr required for any red alert requiring intervention  Yellow Alert: Provider response time of 3hr required for any escalated yellow alert    BP Triage  Are you having any Chest Pain? no   Are you having any Shortness of Breath? no   Do you have a headache or have any vision changes? no   Are you having any numbness or tingling? no   Are you having any other health concerns or issues? no        O2 Triage  Are you having any Chest Pain? no   Are you having any Shortness of Breath? no   Swelling in your hands or feet? no     Are you having any other health concerns or issues? no      Clinical Interventions: Reviewed and followed up on alerts and treatments-spoke to Yocasta regarding RPM red alert for low blood pressure and low pulse ox reading. Pt states he has no chest pain. No abnormal dyspnea. No dizziness or fatigue. Pt states he is taking all of his medications as directed. Using all inhalers and breathing treatments as directed. Requested pt recheck his metrics. Pt states he will recheck metrics within 1 hour. Plan/Follow Up: Will continue to review, monitor and address alerts with follow up based on severity of symptoms and risk factors.

## 2023-11-03 NOTE — CARE COORDINATION
2023 11:00 AM [facility] IN[facility]   2024 11:20 AM Clayton Denney, DO [facility] Pul[facility]   ,   COPD Assessment    Does the patient understand envrionmental exposure?: Yes  Is the patient able to verbalize Rescue vs. Long Acting medications?: Yes  Does the patient have a nebulizer?: Yes  Does the patient use a space with inhaled medications?: No            Symptoms:     Symptom course: stable  Breathlessness: minimal exertion  Increase use of rapid acting/rescue inhaled medications?: No  Change in chronic cough?: No/At Baseline  Change in sputum?: No/At Baseline  Self Monitoring - SaO2: Yes  Baseline SaO2 Readin (Comment: 4 lpm)     , and   General Assessment    Do you have any symptoms that are causing concern?: Yes  Progression since Onset: Gradually Improving  Reported Symptoms: Fatigue

## 2023-11-03 NOTE — CARE COORDINATION
Date/Time:  11/3/2023 8:49 AM  LPN attempted to reach patient by telephone regarding red alert in remote patient monitoring program. Left HIPPA compliant message requesting a return call. Will attempt to reach patient again.     RPM RED ALERT FOR LOW Blood pressure reading  and low pulse ox reading  Enrolled in RPM for HTN AND COPD

## 2023-11-07 NOTE — TELEPHONE ENCOUNTER
Pt scheduled for an appt next wed, Pt was informed if any redness , warmth, or extreme pain to seek immediate medical attention.

## 2023-11-10 ENCOUNTER — CARE COORDINATION (OUTPATIENT)
Dept: CASE MANAGEMENT | Age: 58
End: 2023-11-10

## 2023-11-10 NOTE — CARE COORDINATION
Remote Alert Monitoring Note  Rpm alert to be reviewed by the provider   red alert   pulse ox reading (91%)   Additional needs to be addressed by PCP: No                   Patient rechecked SpO2 Level for 95%. No further action needed at this time.     Roselia Kent LPN    [phone]  [facility] / [address] [place] [address] / Karli Centeno

## 2023-11-10 NOTE — CARE COORDINATION
Remote Alert Monitoring Note  Rpm alert to be reviewed by the provider   red alert   pulse ox reading (91%)   Additional needs to be addressed by PCP: No                    Date/Time:  11/10/2023 10:08 AM  [facility] Verified patients name and  as identifiers. Background: COPD, HTN  Refer to 911 immediately if:  Patient unresponsive or unable to provide history  Change in cognition or sudden confusion  Patient unable to respond in complete sentences  Intense chest pain/tightness  Any concern for any clinical emergency  Red Alert: Provider response time of 1 hr required for any red alert requiring intervention  Yellow Alert: Provider response time of 3hr required for any escalated yellow alert     Clinical Interventions: Reviewed and followed up on alerts and treatments-Patient has low SpO2 level of 91%. Attempted to reach patient for RPM Red Alert Call. Unable to reach patient. Left HIPAA Compliant message on Voice Mail to call. Phone number left on Voice Mail to call back. Will continue to follow. Vilma Snider LPN    [phone]  [facility] / [facility] Coordinator      Plan/Follow Up: Will continue to review, monitor and address alerts with follow up based on severity of symptoms and risk factors.

## 2023-11-17 ENCOUNTER — CARE COORDINATION (OUTPATIENT)
Dept: CASE MANAGEMENT | Age: 58
End: 2023-11-17

## 2023-11-17 NOTE — CARE COORDINATION
Remote Alert Monitoring Note  Rpm alert to be reviewed by the provider   red alert   pulse ox reading (90%)   Additional needs to be addressed by PCP: No                 Patient rechecked SpO2 level at 93%.       Jesús Alcala LPN    [phone]  [facility] / [address] [place] [address] / Luci Ballard

## 2023-11-17 NOTE — CARE COORDINATION
Remote Alert Monitoring Note  Rpm alert to be reviewed by the provider   red alert   pulse ox reading (90%)   Additional needs to be addressed by PCP: No                    Date/Time:  2023 8:52 AM  [facility] Verified patients name and  as identifiers. Background: HTN, COPD  Refer to 911 immediately if:  Patient unresponsive or unable to provide history  Change in cognition or sudden confusion  Patient unable to respond in complete sentences  Intense chest pain/tightness  Any concern for any clinical emergency  Red Alert: Provider response time of 1 hr required for any red alert requiring intervention  Yellow Alert: Provider response time of 3hr required for any escalated yellow alert       Clinical Interventions: Reviewed and followed up on alerts and treatments-Patient has low SpO2 level of 90%. Attempted to reach patient for RPM Red Alert Call. Unable to reach patient. Left HIPAA Compliant message on Voice Mail to call. Phone number left on Voice Mail to call back. Will continue to follow. Cesar South LPN    [phone]  [facility] / [facility] Coordinator      Plan/Follow Up: Will continue to review, monitor and address alerts with follow up based on severity of symptoms and risk factors.

## 2023-11-21 ENCOUNTER — CARE COORDINATION (OUTPATIENT)
Dept: CASE MANAGEMENT | Age: 58
End: 2023-11-21

## 2023-11-21 NOTE — CARE COORDINATION
Remote Patient Monitoring Note      Date/Time:  11/21/2023 1:29 PM  Patient Current Location: [place]       Background: HTN, COPD  Clinical Interventions: Reviewed and followed up on alerts and treatments-       Patient has BP of 116/101. Then rechecked it at 121/88. Plan/Follow Up: Will continue to review, monitor and address alerts with follow up based on severity of symptoms and risk factors.

## 2023-11-25 DIAGNOSIS — G89.4 CHRONIC PAIN SYNDROME: ICD-10-CM

## 2023-11-27 ENCOUNTER — CARE COORDINATION (OUTPATIENT)
Dept: CASE MANAGEMENT | Age: 58
End: 2023-11-27

## 2023-11-27 DIAGNOSIS — G89.4 CHRONIC PAIN SYNDROME: ICD-10-CM

## 2023-11-27 RX ORDER — IBUPROFEN 800 MG/1
800 TABLET ORAL EVERY 8 HOURS PRN
Qty: 201 TABLET | Refills: 0 | OUTPATIENT
Start: 2023-11-27

## 2023-11-27 NOTE — CARE COORDINATION
Remote Alert Monitoring Note  Rpm alert to be reviewed by the provider   red alert   pulse ox reading (91%)   Additional needs to be addressed by PCP: No                   Patient rechecked SpO2 Level at 94%. No further action needed at this time.     Roselia Kent LPN    [phone]  [facility] / [address] [place] [address] / Karli Centeno

## 2023-11-27 NOTE — CARE COORDINATION
Remote Alert Monitoring Note  Rpm alert to be reviewed by the provider   red alert   pulse ox reading (91%)   Additional needs to be addressed by PCP: No                    Date/Time:  2023 9:30 AM  Patient Current Location: [facility] contacted patient by telephone. Verified patients name and  as identifiers. Background: HTN, COPD  Refer to 911 immediately if:  Patient unresponsive or unable to provide history  Change in cognition or sudden confusion  Patient unable to respond in complete sentences  Intense chest pain/tightness  Any concern for any clinical emergency  Red Alert: Provider response time of 1 hr required for any red alert requiring intervention  Yellow Alert: Provider response time of 3hr required for any escalated yellow alert    O2 Triage  Are you having any Chest Pain? no   Are you having any Shortness of Breath? yes   Swelling in your hands or feet? no     Are you having any other health concerns or issues? no      Clinical Interventions: Reviewed and followed up on alerts and treatments-Patient has low SpO2 level of 91%. Patient denies CP, Swelling hands and feet. Patient is SOB with exertion. Patient uses oxygen 4/L continuously. Patient has used Nebulizer this morning and inhalers as directed. Patient will recheck SpO2  level. Education of patient/family/caregiver/guardian to support self-management-Recheck SpO2 level   Have warm hands, hold hands and fingers very still while testing SpO2 level. Take a few deep breaths before testing. Advised Patient to contact PCP  regarding any health concerns for early outpatient intervention in an effort to avoid hospitalization. Report any worsening symptoms to PCP and/or Call 911 and/or GO TO  EMERGENCY ROOM if symptoms are severe or worsening. Expresses understanding. Plan/Follow Up: Will continue to review, monitor and address alerts with follow up based on severity of symptoms and risk factors.

## 2023-11-30 ENCOUNTER — CARE COORDINATION (OUTPATIENT)
Dept: CARE COORDINATION | Age: 58
End: 2023-11-30

## 2023-11-30 NOTE — CARE COORDINATION
Ambulatory Care Coordination Note  2023    Patient Current Location:  Home: [address] [address]     ACM contacted the patient by telephone. Verified name and  with patient as identifiers. Provided introduction to self, and explanation of the ACM role. Challenges to be reviewed by the provider   Additional needs identified to be addressed with provider: Yes  medications-unable to afford Brovana  Left leg numbness               Method of communication with provider: chart routing. ACM: Bean Howard RN    80  ACM contacted Yocasta for COPD follow up. RPM Today /84 HR 87  lbs SPO2 94% O2 4 lpm continuous. He states he continues to not smoke. He denies any increased shortness of breath or chest pain. He states he has refilled his respiratory medications except for his Beau Och, he can't afford the co-pay and continues to \"ration\" his doses. He denies any swelling in his leg but states he has been having some pain and numbness in his left leg. ACM encouraged him to discuss this with his PCP tomorrow at his appointment. He denies any changes to his medications and states he is taking them as prescribed. Future appointments were reviewed. PLAN  Notify PCP Yocasta is having left leg numbness and he can't afford his Brovana. Consult pharmacy for possible alternative therapy to Beau Och. Review RPM, medications and appointments with next interaction. Continue to follow for care coordination. Offered patient enrollment in the Remote Patient Monitoring (RPM) program for in-home monitoring: Yes, patient already enrolled.     Lab Results       None            Care Coordination Interventions    Referral from Primary Care Provider: No  Suggested Interventions and Community Resources  Fall Risk Prevention: Completed  Medication Assistance Program: Not Started  Physical Therapy: Not Started  Pulmonary Rehab: Not Started  Smoking Cessation: Declined (Comment: states \"I'm

## 2023-12-01 ENCOUNTER — OFFICE VISIT (OUTPATIENT)
Dept: INTERNAL MEDICINE | Age: 58
End: 2023-12-01

## 2023-12-01 VITALS
OXYGEN SATURATION: 93 % | TEMPERATURE: 97.2 F | BODY MASS INDEX: 38.32 KG/M2 | SYSTOLIC BLOOD PRESSURE: 105 MMHG | WEIGHT: 273.7 LBS | DIASTOLIC BLOOD PRESSURE: 64 MMHG | HEIGHT: 71 IN | HEART RATE: 82 BPM | RESPIRATION RATE: 18 BRPM

## 2023-12-01 DIAGNOSIS — G47.9 SLEEP DISORDER: ICD-10-CM

## 2023-12-01 DIAGNOSIS — K21.9 GASTROESOPHAGEAL REFLUX DISEASE WITHOUT ESOPHAGITIS: ICD-10-CM

## 2023-12-01 DIAGNOSIS — M48.062 SPINAL STENOSIS OF LUMBAR REGION WITH NEUROGENIC CLAUDICATION: ICD-10-CM

## 2023-12-01 DIAGNOSIS — I25.119 CORONARY ARTERY DISEASE INVOLVING NATIVE HEART WITH ANGINA PECTORIS, UNSPECIFIED VESSEL OR LESION TYPE (HCC): ICD-10-CM

## 2023-12-01 DIAGNOSIS — Z00.00 INITIAL MEDICARE ANNUAL WELLNESS VISIT: Primary | ICD-10-CM

## 2023-12-01 DIAGNOSIS — G89.4 CHRONIC PAIN SYNDROME: ICD-10-CM

## 2023-12-01 DIAGNOSIS — J44.9 COPD, SEVERE (HCC): ICD-10-CM

## 2023-12-01 DIAGNOSIS — F41.9 ANXIETY: ICD-10-CM

## 2023-12-01 PROCEDURE — 99212 OFFICE O/P EST SF 10 MIN: CPT

## 2023-12-01 RX ORDER — TRAZODONE HYDROCHLORIDE 100 MG/1
100 TABLET ORAL NIGHTLY
Qty: 90 TABLET | Refills: 1 | Status: SHIPPED | OUTPATIENT
Start: 2023-12-01

## 2023-12-01 RX ORDER — BUDESONIDE 0.5 MG/2ML
0.5 INHALANT ORAL
Qty: 60 EACH | Refills: 3 | Status: SHIPPED | OUTPATIENT
Start: 2023-12-01

## 2023-12-01 RX ORDER — METOPROLOL SUCCINATE 25 MG/1
TABLET, EXTENDED RELEASE ORAL
COMMUNITY
Start: 2023-11-25 | End: 2023-12-01

## 2023-12-01 RX ORDER — IPRATROPIUM BROMIDE AND ALBUTEROL SULFATE 2.5; .5 MG/3ML; MG/3ML
3 SOLUTION RESPIRATORY (INHALATION)
Qty: 360 ML | Refills: 2 | Status: SHIPPED | OUTPATIENT
Start: 2023-12-01

## 2023-12-01 RX ORDER — ARFORMOTEROL TARTRATE 15 UG/2ML
15 SOLUTION RESPIRATORY (INHALATION)
Qty: 120 ML | Refills: 3 | Status: SHIPPED | OUTPATIENT
Start: 2023-12-01

## 2023-12-01 RX ORDER — GABAPENTIN 600 MG/1
600 TABLET ORAL 3 TIMES DAILY
Qty: 270 TABLET | Refills: 1 | Status: SHIPPED | OUTPATIENT
Start: 2023-12-01 | End: 2024-02-29

## 2023-12-01 RX ORDER — MULTIVIT-MIN/IRON/FOLIC ACID/K 18-600-40
1000 CAPSULE ORAL DAILY
Qty: 90 TABLET | Refills: 1 | Status: SHIPPED | OUTPATIENT
Start: 2023-12-01

## 2023-12-01 RX ORDER — ASPIRIN 81 MG/1
81 TABLET ORAL DAILY
Qty: 90 TABLET | Refills: 1 | Status: SHIPPED | OUTPATIENT
Start: 2023-12-01

## 2023-12-01 RX ORDER — ALBUTEROL SULFATE 90 UG/1
2 AEROSOL, METERED RESPIRATORY (INHALATION) EVERY 6 HOURS PRN
Qty: 18 G | Refills: 2 | Status: SHIPPED | OUTPATIENT
Start: 2023-12-01

## 2023-12-01 RX ORDER — BUSPIRONE HYDROCHLORIDE 30 MG/1
30 TABLET ORAL 2 TIMES DAILY
Qty: 180 TABLET | Refills: 1 | Status: SHIPPED | OUTPATIENT
Start: 2023-12-01

## 2023-12-01 RX ORDER — OMEPRAZOLE 40 MG/1
40 CAPSULE, DELAYED RELEASE ORAL DAILY
Qty: 90 CAPSULE | Refills: 1 | Status: SHIPPED | OUTPATIENT
Start: 2023-12-01

## 2023-12-01 RX ORDER — IBUPROFEN 800 MG/1
800 TABLET ORAL EVERY 8 HOURS PRN
Qty: 90 TABLET | Refills: 1 | Status: SHIPPED | OUTPATIENT
Start: 2023-12-01

## 2023-12-01 RX ORDER — ASCORBIC ACID 500 MG
1000 TABLET ORAL DAILY
Qty: 90 TABLET | Refills: 1 | Status: SHIPPED | OUTPATIENT
Start: 2023-12-01

## 2023-12-01 RX ORDER — BUDESONIDE, GLYCOPYRROLATE, AND FORMOTEROL FUMARATE 160; 9; 4.8 UG/1; UG/1; UG/1
2 AEROSOL, METERED RESPIRATORY (INHALATION) 2 TIMES DAILY
Qty: 5.9 G | Refills: 2 | Status: SHIPPED | OUTPATIENT
Start: 2023-12-01

## 2023-12-01 ASSESSMENT — PATIENT HEALTH QUESTIONNAIRE - PHQ9
SUM OF ALL RESPONSES TO PHQ QUESTIONS 1-9: 1
7. TROUBLE CONCENTRATING ON THINGS, SUCH AS READING THE NEWSPAPER OR WATCHING TELEVISION: 0
1. LITTLE INTEREST OR PLEASURE IN DOING THINGS: 0
9. THOUGHTS THAT YOU WOULD BE BETTER OFF DEAD, OR OF HURTING YOURSELF: 0
1. LITTLE INTEREST OR PLEASURE IN DOING THINGS: 0
SUM OF ALL RESPONSES TO PHQ QUESTIONS 1-9: 1
8. MOVING OR SPEAKING SO SLOWLY THAT OTHER PEOPLE COULD HAVE NOTICED. OR THE OPPOSITE, BEING SO FIGETY OR RESTLESS THAT YOU HAVE BEEN MOVING AROUND A LOT MORE THAN USUAL: 0
2. FEELING DOWN, DEPRESSED OR HOPELESS: 0
SUM OF ALL RESPONSES TO PHQ QUESTIONS 1-9: 5
7. TROUBLE CONCENTRATING ON THINGS, SUCH AS READING THE NEWSPAPER OR WATCHING TELEVISION: 2
SUM OF ALL RESPONSES TO PHQ QUESTIONS 1-9: 5
9. THOUGHTS THAT YOU WOULD BE BETTER OFF DEAD, OR OF HURTING YOURSELF: 0
6. FEELING BAD ABOUT YOURSELF - OR THAT YOU ARE A FAILURE OR HAVE LET YOURSELF OR YOUR FAMILY DOWN: 1
SUM OF ALL RESPONSES TO PHQ QUESTIONS 1-9: 1
4. FEELING TIRED OR HAVING LITTLE ENERGY: 3
SUM OF ALL RESPONSES TO PHQ QUESTIONS 1-9: 1
10. IF YOU CHECKED OFF ANY PROBLEMS, HOW DIFFICULT HAVE THESE PROBLEMS MADE IT FOR YOU TO DO YOUR WORK, TAKE CARE OF THINGS AT HOME, OR GET ALONG WITH OTHER PEOPLE: 0
SUM OF ALL RESPONSES TO PHQ9 QUESTIONS 1 & 2: 0
5. POOR APPETITE OR OVEREATING: 0
2. FEELING DOWN, DEPRESSED OR HOPELESS: 0
SUM OF ALL RESPONSES TO PHQ QUESTIONS 1-9: 5
6. FEELING BAD ABOUT YOURSELF - OR THAT YOU ARE A FAILURE OR HAVE LET YOURSELF OR YOUR FAMILY DOWN: 0
SUM OF ALL RESPONSES TO PHQ9 QUESTIONS 1 & 2: 0
5. POOR APPETITE OR OVEREATING: 0
4. FEELING TIRED OR HAVING LITTLE ENERGY: 0
3. TROUBLE FALLING OR STAYING ASLEEP: 0
3. TROUBLE FALLING OR STAYING ASLEEP: 0
8. MOVING OR SPEAKING SO SLOWLY THAT OTHER PEOPLE COULD HAVE NOTICED. OR THE OPPOSITE, BEING SO FIGETY OR RESTLESS THAT YOU HAVE BEEN MOVING AROUND A LOT MORE THAN USUAL: 0
SUM OF ALL RESPONSES TO PHQ QUESTIONS 1-9: 5

## 2023-12-01 ASSESSMENT — LIFESTYLE VARIABLES
HOW OFTEN DO YOU HAVE A DRINK CONTAINING ALCOHOL: MONTHLY OR LESS
HOW OFTEN DURING THE LAST YEAR HAVE YOU NEEDED AN ALCOHOLIC DRINK FIRST THING IN THE MORNING TO GET YOURSELF GOING AFTER A NIGHT OF HEAVY DRINKING: 0
HOW OFTEN DURING THE LAST YEAR HAVE YOU FAILED TO DO WHAT WAS NORMALLY EXPECTED FROM YOU BECAUSE OF DRINKING: 0
HAVE YOU OR SOMEONE ELSE BEEN INJURED AS A RESULT OF YOUR DRINKING: 0
HOW OFTEN DURING THE LAST YEAR HAVE YOU BEEN UNABLE TO REMEMBER WHAT HAPPENED THE NIGHT BEFORE BECAUSE YOU HAD BEEN DRINKING: 0
HOW MANY STANDARD DRINKS CONTAINING ALCOHOL DO YOU HAVE ON A TYPICAL DAY: 5 OR 6
HAS A RELATIVE, FRIEND, DOCTOR, OR ANOTHER HEALTH PROFESSIONAL EXPRESSED CONCERN ABOUT YOUR DRINKING OR SUGGESTED YOU CUT DOWN: 0
HOW OFTEN DURING THE LAST YEAR HAVE YOU FOUND THAT YOU WERE NOT ABLE TO STOP DRINKING ONCE YOU HAD STARTED: 0
HOW OFTEN DURING THE LAST YEAR HAVE YOU HAD A FEELING OF GUILT OR REMORSE AFTER DRINKING: 0

## 2023-12-01 ASSESSMENT — COLUMBIA-SUICIDE SEVERITY RATING SCALE - C-SSRS
5. HAVE YOU STARTED TO WORK OUT OR WORKED OUT THE DETAILS OF HOW TO KILL YOURSELF? DO YOU INTEND TO CARRY OUT THIS PLAN?: NO
4. HAVE YOU HAD THESE THOUGHTS AND HAD SOME INTENTION OF ACTING ON THEM?: NO
7. DID THIS OCCUR IN THE LAST THREE MONTHS: NO
3. HAVE YOU BEEN THINKING ABOUT HOW YOU MIGHT KILL YOURSELF?: NO

## 2023-12-01 NOTE — PROGRESS NOTES
[facility]  Internal Medicine Residency Clinic    Attending Physician Statement  I have seen/discussed the case, including pertinent history and exam findings with the resident physician. I agree with the assessment, plan and orders as documented by the resident. I have reviewed all pertinent PMHx, PSHx, FamHx, SocialHx, medications, and allergies and updated history as appropriate. Patient here for Annual Wellness Visit. Mini cog 4/5  PHQ 9 = 5. Mood affected by limitations to exercises because of his medical condition (severe COPD). Patient has stopped smoking but is exposed to second hand smoke. Deferring dental check up  Unable to see opthalmologist (insurance coverage)  Deferring vaccination  Patient would like to schedule colonoscopy after his pulm appt in Jan  ACP referral deferred    Also noted 2nd \"visit\"/note to address pain issue:  16min  Mood issues +  Chronic back pain - we have tried to refer to PMR in the past but patient prefers not to see additional doctors. Controlled with gabapentin at this time. Reviewed previous imaging and visits with Dr. Ramon Gentile (last seen 2019) and Dr. Nacho Gonzalez (last seen 2021). We will order MRI as recommended by Dr. Nacho Gonzalez. New referral for PMR placed. Remainder of medical problems as per resident note. Gaby Dos Santos MD  12/1/2023 10:05 AM

## 2023-12-01 NOTE — PROGRESS NOTES
Medicare Annual Wellness Visit    Delfino Brower is here for Medicare AWV, Hypertension, COPD, and Numbness (Pt complains of \"legs falling asleep\" when standing too long for about 1-2 months. Feels numb down to feet. )    Assessment & Plan     Recommendations for Preventive Services Due: see orders and patient instructions/AVS.  Recommended screening schedule for the next 5-10 years is provided to the patient in written form: see Patient Instructions/AVS.          Subjective     Mr ASHIA 49 yo/M, came for AWV and also had c/o pain in lower back with numbness and pain in the left   His other chronic issues include;     COPD   -LTOT at 4L every night, occasionally during day. He is trying to wean off  -Is Seeing Dr Joan Haas, he is on:  Roflumilast 500mcg  Brovana  Pulmicort  Jultova Clamp  Has stopped smoking but is exposed to second hand smoke (Girlfriend smokes)    Lumbar radiculopathy, with low back pain, and numbness radiating to left leg. Instructed to get Lumbar MRI WO contrast and referral to physical medicine and rehabilitation medicine. Currently on gabapentin and Ibuprofen PRN  H/O RCC  Polyp in transverse and ascending colon: Wants to get colorectal cancer screening after his pulmonology appointment, advised to call general surgery after the visit. Patient's complete Health Risk Assessment and screening values have been reviewed and are found in Flowsheets. The following problems were reviewed today and where indicated follow up appointments were made and/or referrals ordered. Positive Risk Factor Screenings with Interventions:              Self-assessment of health:   In general, how would you say your health is?: (!) Poor    Interventions:  Patient advised to follow-up in this office for further evaluation and treatment    General HRA Questions:  Select all that apply: (!) Social Isolation, Loneliness      Social and Emotional Support:  Do you get the social and emotional support that

## 2023-12-07 ENCOUNTER — CARE COORDINATION (OUTPATIENT)
Dept: CARE COORDINATION | Age: 58
End: 2023-12-07

## 2023-12-07 ENCOUNTER — CARE COORDINATION (OUTPATIENT)
Dept: CASE MANAGEMENT | Age: 58
End: 2023-12-07

## 2023-12-07 ASSESSMENT — ENCOUNTER SYMPTOMS: DYSPNEA ASSOCIATED WITH: MINIMAL EXERTION

## 2023-12-07 NOTE — CARE COORDINATION
Ambulatory Care Coordination Note  2023    Patient Current Location:  Home: [address] [address]     ACM contacted the patient by telephone. Verified name and  with patient as identifiers. Provided introduction to self, and explanation of the ACM role. Challenges to be reviewed by the provider   Additional needs identified to be addressed with provider: Yes  medications-alternative to Baileyella Najjar               Method of communication with provider: chart routing. ACM: Monisha Murguia RN      ACM contacted [facility] for COPD follow up. RPM Today /69 HR 80  lbs SPO2 94% O2 4 lpm continuous. He states he continues to not smoke. He reports he is fatigued. He denies any increased shortness of breath or chest pain. He expressed concern that he has been having low blood pressures. He denies any dizziness. ACM reviewed that he is not taking any BP medications and encouraged him to increase his fluid intake. He denies any swelling in his leg but states he has been having some pain and numbness in his left leg. He reports he has not been contacted to schedule his MRI. He reports he was referred to Physical Medicine and Rehab. He denies any changes to his medications. ACM discussed [facility] and the [facility] may qualify for services. He requested that [facility] send him application information through [address] [place] [address]. Future appointments were reviewed. PLAN  Contact pharmacy regarding Suella Najjar co-pay. Send [facility] information through [address] [place] [address]. Review RPM, medications and appointments with next interaction. Continue to follow for care coordination. Offered patient enrollment in the Remote Patient Monitoring (RPM) program for in-home monitoring: Yes, patient already enrolled.     Lab Results       None            Care Coordination Interventions    Referral from Primary Care Provider: No  Suggested Interventions and Community Resources  Fall Risk Prevention: Completed  Physical

## 2023-12-07 NOTE — CARE COORDINATION
CARMINE contacted [facility] DR ONESIMO CAMARILLO [phone] and spoke with Cory Gaming. She states the co pay for Jh Broad will be $31.78, budesonide will be $20.38 and duoneb will be $54.02. She states a resident was listed as the ordering physician and the request was rejected. The order was changed to the attending and the request was approved. DEANDRE contacted G[facility] and updated him the co pays and that his prescriptions were ready.

## 2023-12-07 NOTE — CARE COORDINATION
Remote Alert Monitoring Note  Rpm alert to be reviewed by the provider   red alert   pulse ox reading (91%)   Additional needs to be addressed by PCP: No                    Date/Time:  2023 11:26 AM  Patient Current Location: [place]  LPN contacted patient by telephone. Verified patients name and  as identifiers. Background: HTN, COPD  Refer to 911 immediately if:  Patient unresponsive or unable to provide history  Change in cognition or sudden confusion  Patient unable to respond in complete sentences  Intense chest pain/tightness  Any concern for any clinical emergency  Red Alert: Provider response time of 1 hr required for any red alert requiring intervention  Yellow Alert: Provider response time of 3hr required for any escalated yellow alert       Clinical Interventions: Reviewed and followed up on alerts and treatments-Patient has low SpO2 level of 91%. Attempted to reach patient for RPM Red Alert Call. Unable to reach patient. Left HIPAA Compliant message on Voice Mail to call. Phone number left on Voice Mail to call back. Will continue to follow. Roselia Kent LPN    [phone]  [facility] / [facility] Coordinator      Plan/Follow Up: Will continue to review, monitor and address alerts with follow up based on severity of symptoms and risk factors.

## 2023-12-07 NOTE — CARE COORDINATION
Remote Alert Monitoring Note  Rpm alert to be reviewed by the provider   red alert   pulse ox reading (91%)   Additional needs to be addressed by PCP: No                 Patient rechecked SpO2 level at 94%. No further action needed at this time.     Vilma Snider LPN    [phone]  [facility] / [address] [place] [address] / Brennen Hoyt

## 2023-12-27 ENCOUNTER — HOSPITAL ENCOUNTER (OUTPATIENT)
Dept: INFUSION THERAPY | Age: 58
Setting detail: INFUSION SERIES
Discharge: HOME | End: 2023-12-27
Payer: MEDICARE

## 2023-12-27 VITALS
OXYGEN SATURATION: 96 % | RESPIRATION RATE: 18 BRPM | HEART RATE: 86 BPM | DIASTOLIC BLOOD PRESSURE: 78 MMHG | SYSTOLIC BLOOD PRESSURE: 140 MMHG | TEMPERATURE: 97.8 F

## 2023-12-27 DIAGNOSIS — J45.909 SEVERE ASTHMA, UNSPECIFIED WHETHER COMPLICATED, UNSPECIFIED WHETHER PERSISTENT: Primary | ICD-10-CM

## 2023-12-27 DIAGNOSIS — I25.10 CORONARY ARTERY DISEASE INVOLVING NATIVE CORONARY ARTERY OF NATIVE HEART WITHOUT ANGINA PECTORIS: ICD-10-CM

## 2023-12-27 PROCEDURE — 96372 THER/PROPH/DIAG INJ SC/IM: CPT

## 2023-12-27 PROCEDURE — 6360000002 HC RX W HCPCS: Performed by: INTERNAL MEDICINE

## 2023-12-27 RX ORDER — SODIUM CHLORIDE 9 MG/ML
INJECTION, SOLUTION INTRAVENOUS CONTINUOUS
OUTPATIENT
Start: 2024-01-09

## 2023-12-27 RX ORDER — MEPERIDINE HYDROCHLORIDE 25 MG/ML
25 INJECTION INTRAMUSCULAR; INTRAVENOUS; SUBCUTANEOUS ONCE
Start: 2024-01-09 | End: 2024-01-09

## 2023-12-27 RX ORDER — EPINEPHRINE 1 MG/ML
0.3 INJECTION, SOLUTION, CONCENTRATE INTRAVENOUS PRN
OUTPATIENT
Start: 2024-01-09

## 2023-12-27 RX ORDER — ACETAMINOPHEN 325 MG/1
650 TABLET ORAL
OUTPATIENT
Start: 2024-01-09

## 2023-12-27 RX ORDER — DIPHENHYDRAMINE HYDROCHLORIDE 50 MG/ML
50 INJECTION INTRAMUSCULAR; INTRAVENOUS
OUTPATIENT
Start: 2024-01-09

## 2023-12-27 RX ORDER — ALBUTEROL SULFATE 90 UG/1
4 AEROSOL, METERED RESPIRATORY (INHALATION) PRN
OUTPATIENT
Start: 2024-01-09

## 2023-12-27 RX ORDER — ONDANSETRON 2 MG/ML
8 INJECTION INTRAMUSCULAR; INTRAVENOUS
OUTPATIENT
Start: 2024-01-09

## 2023-12-27 RX ADMIN — BENRALIZUMAB 30 MG: 30 INJECTION, SOLUTION SUBCUTANEOUS at 11:06

## 2023-12-27 ASSESSMENT — PAIN DESCRIPTION - ONSET: ONSET: ON-GOING

## 2023-12-27 ASSESSMENT — PAIN - FUNCTIONAL ASSESSMENT: PAIN_FUNCTIONAL_ASSESSMENT: PREVENTS OR INTERFERES SOME ACTIVE ACTIVITIES AND ADLS

## 2023-12-27 ASSESSMENT — PAIN DESCRIPTION - FREQUENCY: FREQUENCY: CONTINUOUS

## 2023-12-27 ASSESSMENT — PAIN DESCRIPTION - ORIENTATION: ORIENTATION: LOWER

## 2023-12-27 ASSESSMENT — PAIN DESCRIPTION - DESCRIPTORS: DESCRIPTORS: ACHING

## 2023-12-27 ASSESSMENT — PAIN SCALES - GENERAL: PAINLEVEL_OUTOF10: 8

## 2023-12-27 ASSESSMENT — PAIN DESCRIPTION - LOCATION: LOCATION: BACK

## 2023-12-29 ENCOUNTER — CARE COORDINATION (OUTPATIENT)
Dept: CARE COORDINATION | Age: 58
End: 2023-12-29

## 2024-01-02 ENCOUNTER — CLINICAL DOCUMENTATION (OUTPATIENT)
Facility: HOSPITAL | Age: 59
End: 2024-01-02

## 2024-01-02 NOTE — PROGRESS NOTES
CALLED PT RE PAST ASST NEEDING FAA AND ALSO FUTURE ASST FOR HIS FASENRA HAD TO  FOR HIM TO CALL ME.    NOTIFIED THE SW OF THIS ALSO

## 2024-01-02 NOTE — PROGRESS NOTES
PT CALLD ME BACK, WE DISCUSSED THE FAA THAT I AM MAILING HIM, WE ALSO DISCUSSED HIS INCOME AND EVER FOR FUTURE TREATMENTS SO THAT WE CAN APPLY.

## 2024-01-03 RX ORDER — TIZANIDINE 4 MG/1
TABLET ORAL
Qty: 30 TABLET | Refills: 0 | OUTPATIENT
Start: 2024-01-03

## 2024-01-04 ENCOUNTER — TELEPHONE (OUTPATIENT)
Dept: INTERNAL MEDICINE | Age: 59
End: 2024-01-04

## 2024-01-04 NOTE — TELEPHONE ENCOUNTER
Pt phoned in requesting refill on Tizanidine 4mg every 8hrs. States was last seen  in December and  .  had originally ordered it and he is requesting another prescription for this medication due to pain he is having

## 2024-01-05 NOTE — TELEPHONE ENCOUNTER
Called pt  back informed medication he requested was not given refill on Zanaflex due to Dr. Moore notes December  that  pain was controlled with Gabapentin

## 2024-01-08 ENCOUNTER — CARE COORDINATION (OUTPATIENT)
Dept: CASE MANAGEMENT | Age: 59
End: 2024-01-08

## 2024-01-08 NOTE — CARE COORDINATION
Date/Time:  1/8/2024 10:53 AM  LPN attempted to reach patient by telephone regarding  no metrics x 3 days  in remote patient monitoring program. Left HIPPA compliant message requesting a return call. Will attempt to reach patient again.    Enrolled in RPM for HTN AND COPD

## 2024-01-11 ENCOUNTER — CARE COORDINATION (OUTPATIENT)
Dept: CASE MANAGEMENT | Age: 59
End: 2024-01-11

## 2024-01-11 NOTE — CARE COORDINATION
Remote Alert Monitoring Note  Rpm alert to be reviewed by the provider   red alert   pulse ox reading (91%)   Additional needs to be addressed by PCP: No                    Date/Time:  2024 9:26 AM  Patient Current Location: [facility] contacted patient by telephone. Verified patients name and  as identifiers.  Background: HTN, COPD  Refer to 911 immediately if:  Patient unresponsive or unable to provide history  Change in cognition or sudden confusion  Patient unable to respond in complete sentences  Intense chest pain/tightness  Any concern for any clinical emergency  Red Alert: Provider response time of 1 hr required for any red alert requiring intervention  Yellow Alert: Provider response time of 3hr required for any escalated yellow alert     Clinical Interventions: Reviewed and followed up on alerts and treatments-Patient has low SpO2 level of 91%.       Attempted to reach patient for RPM Red Alert Call. Unable to reach patient.  Left HIPAA Compliant message on Voice Mail to call.  Phone number left on Voice Mail to call back.    Will continue to follow.     Jackelyn Trevizo LPN    [phone]  [facility] / [facility] Coordinator      Plan/Follow Up: Will continue to review, monitor and address alerts with follow up based on severity of symptoms and risk factors.

## 2024-01-11 NOTE — CARE COORDINATION
Remote Alert Monitoring Note  Rpm alert to be reviewed by the provider   red alert   pulse ox reading (91%)   Additional needs to be addressed by PCP: No                 Patient rechecked SpO2 level at 92% at this time.    Jackelyn Trevizo LPN    [phone]  [facility] / [facility] Coordinator / RPM

## 2024-01-16 ENCOUNTER — CARE COORDINATION (OUTPATIENT)
Dept: CARE COORDINATION | Age: 59
End: 2024-01-16

## 2024-01-16 ASSESSMENT — ENCOUNTER SYMPTOMS: DYSPNEA ASSOCIATED WITH: MINIMAL EXERTION

## 2024-01-16 NOTE — CARE COORDINATION
done\")  Social Work: Completed (Comment: insurance & med costs)  Specialty Services Referral: In Process (Comment: [facility])  Other Therapy Services: Not Started (Comment: pain management)  Other Services: Completed (Comment: RPM)  Transportation Support: Completed (Comment: SW referral)  Zone Management Tools: Completed (Comment: COPD)          Goals Addressed                   This Visit's Progress     Medication Management   On track     I will take my medication as directed.  I will notify my provider of any problems with medications, like adverse effects or side effects.  I will notify my provider/Care Coordinator if I am unable to afford my medications.  I will notify my provider for advice before I stop taking any of my medication.    Barriers: financial, overwhelmed by complexity of regimen, and stress  Plan for overcoming my barriers: care coordination  Confidence: 7/10  Anticipated Goal Completion Date: 24              Future Appointments   Date Time Provider Department Center   2024 11:20 AM Selina Denney, DO [facility] Pul[facility]   2024 11:00 AM BRAVO INFUSION BAY 1 SEYZ INF SER [facility]   ,   COPD Assessment    Does the patient understand envrionmental exposure?: Yes  Is the patient able to verbalize Rescue vs. Long Acting medications?: Yes  Does the patient have a nebulizer?: Yes  Does the patient use a space with inhaled medications?: No            Symptoms:     Symptom course: stable  Breathlessness: minimal exertion  Increase use of rapid acting/rescue inhaled medications?: No  Change in chronic cough?: No/At Baseline  Change in sputum?: No/At Baseline  Self Monitoring - SaO2: Yes  Baseline SaO2 Readin (Comment: room air)     ,   Hypertension - Encounter Level    Symptom course: stable     , and   General Assessment    Do you have any symptoms that are causing concern?: Yes  Progression since Onset: Intermittent - Waxing/Waning  Reported Symptoms: Other, Pain (Comment: numbness

## 2024-01-17 ENCOUNTER — OFFICE VISIT (OUTPATIENT)
Dept: PULMONOLOGY | Age: 59
End: 2024-01-17
Payer: MEDICARE

## 2024-01-17 VITALS
SYSTOLIC BLOOD PRESSURE: 132 MMHG | BODY MASS INDEX: 38.36 KG/M2 | HEART RATE: 84 BPM | DIASTOLIC BLOOD PRESSURE: 82 MMHG | HEIGHT: 71 IN | RESPIRATION RATE: 18 BRPM | TEMPERATURE: 97 F | OXYGEN SATURATION: 96 % | WEIGHT: 274 LBS

## 2024-01-17 DIAGNOSIS — G47.33 OSA (OBSTRUCTIVE SLEEP APNEA): ICD-10-CM

## 2024-01-17 DIAGNOSIS — J44.9 COPD, SEVERE (HCC): Primary | ICD-10-CM

## 2024-01-17 DIAGNOSIS — J45.51 SEVERE PERSISTENT ASTHMA WITH ACUTE EXACERBATION: ICD-10-CM

## 2024-01-17 LAB
EXPIRATORY TIME: NORMAL
FEF 25-75% %PRED-PRE: NORMAL
FEF 25-75% PRED: NORMAL
FEF 25-75-PRE: NORMAL
FEV1 %PRED-PRE: 37 %
FEV1 PRED: 3.76 L
FEV1/FVC %PRED-PRE: 91 %
FEV1/FVC PRED: 78 %
FEV1/FVC: 72 %
FEV1: 1.42 L
FVC %PRED-PRE: 40 %
FVC PRED: 4.86 L
FVC: 1.98 L
PEF %PRED-PRE: NORMAL
PEF PRED: NORMAL
PEF-PRE: NORMAL

## 2024-01-17 PROCEDURE — 3023F SPIROM DOC REV: CPT | Performed by: INTERNAL MEDICINE

## 2024-01-17 PROCEDURE — G8428 CUR MEDS NOT DOCUMENT: HCPCS | Performed by: INTERNAL MEDICINE

## 2024-01-17 PROCEDURE — 94010 BREATHING CAPACITY TEST: CPT | Performed by: INTERNAL MEDICINE

## 2024-01-17 PROCEDURE — G8484 FLU IMMUNIZE NO ADMIN: HCPCS | Performed by: INTERNAL MEDICINE

## 2024-01-17 PROCEDURE — 3017F COLORECTAL CA SCREEN DOC REV: CPT | Performed by: INTERNAL MEDICINE

## 2024-01-17 PROCEDURE — 1036F TOBACCO NON-USER: CPT | Performed by: INTERNAL MEDICINE

## 2024-01-17 PROCEDURE — 3079F DIAST BP 80-89 MM HG: CPT | Performed by: INTERNAL MEDICINE

## 2024-01-17 PROCEDURE — 3075F SYST BP GE 130 - 139MM HG: CPT | Performed by: INTERNAL MEDICINE

## 2024-01-17 PROCEDURE — 99213 OFFICE O/P EST LOW 20 MIN: CPT | Performed by: INTERNAL MEDICINE

## 2024-01-17 PROCEDURE — G8417 CALC BMI ABV UP PARAM F/U: HCPCS | Performed by: INTERNAL MEDICINE

## 2024-01-17 ASSESSMENT — PULMONARY FUNCTION TESTS
FVC_PERCENT_PREDICTED_PRE: 40
FEV1_PREDICTED: 3.76
FEV1/FVC_PREDICTED: 78
FVC: 1.98
FVC_PREDICTED: 4.86
FEV1/FVC_PERCENT_PREDICTED_PRE: 91
FEV1_PERCENT_PREDICTED_PRE: 37
FEV1/FVC: 72
FEV1: 1.42

## 2024-01-17 NOTE — PATIENT INSTRUCTIONS
Selina Denney    [facility]  [address] [place] [id number]  Office: [phone]      Your were seen in the office today for COPD/ashtma      We  did not make changes to your medications today.       Testing ordered today was none      Vaccines recommended none      Follow up in 3 months        Please do not hesitate to call the office with any questions.

## 2024-01-25 DIAGNOSIS — J44.9 COPD, SEVERE (HCC): ICD-10-CM

## 2024-01-25 RX ORDER — ARFORMOTEROL TARTRATE 15 UG/2ML
15 SOLUTION RESPIRATORY (INHALATION)
Qty: 120 ML | Refills: 3 | Status: SHIPPED | OUTPATIENT
Start: 2024-01-25

## 2024-01-25 NOTE — TELEPHONE ENCOUNTER
I called pharmacy regarding a prior auth for Brovana, per pharmacy they need a new script for brovana with the dx code to be able to bill, Please resend script to joe

## 2024-02-04 DIAGNOSIS — G89.4 CHRONIC PAIN SYNDROME: ICD-10-CM

## 2024-02-05 RX ORDER — IBUPROFEN 800 MG/1
800 TABLET ORAL EVERY 8 HOURS PRN
Qty: 90 TABLET | Refills: 0 | OUTPATIENT
Start: 2024-02-05

## 2024-02-06 ENCOUNTER — CARE COORDINATION (OUTPATIENT)
Dept: CARE COORDINATION | Age: 59
End: 2024-02-06

## 2024-02-06 NOTE — CARE COORDINATION
Ambulatory Care Coordination Note  2024    Patient Current Location:  Home: [address] [place], [place] [id number]     ACM contacted the patient by telephone. Verified name and  with patient as identifiers. Provided introduction to self, and explanation of the ACM role.     Challenges to be reviewed by the provider   Additional needs identified to be addressed with provider: No  none               Method of communication with provider:  no .    ACM: Jessica Linares RN        ACM contacted Yocasta for COPD follow up.    RPM No vitals today.  SPO2 93% room air.States he wears his O2  4L at night and that his pulse ox has been 92-93 %. He states he wants to wean off of wearing his oxygen at night. States he does not wear any oxygen during most days.   He states he continues to not smoke. He reports he has good and bad days but right now he is planning to work on his bucket list. (Stevie diving and playing his guitar with lessons). He states he is wanting to have a lung transplant and is losing weight and has stopped smoking for the surgery. States he lost 18 pounds and has 12 more to go.  States he plans on going to [place] for his transplant and will discuss it on his next appointment with pulmonary on 24. He denies any increased shortness of breath or chest pain.  He states the pharmacist said that his insurance will not pay for his nebulizer medications. He states he applied for prescription assistance.  He also states that the insurance will only pay for 20% of his  Fasenra infusions and that the cost is $5400. He states he can not afford that.   .  PLAN  Review RPM, medications and appointments with next interaction.  Follow up regarding prescription assistance enrollment with Dr. Denney office. ([phone])  Follow up with his [facility] pharmacy regarding nebulizer not covered by his insurance. ([phone])  Continue to follow for care coordination.        Offered patient enrollment in the Remote

## 2024-02-07 NOTE — CARE COORDINATION
CARMINE called [facility] [phone]) regarding patient stating his insurance would not pay for his \"Liquid\" medications for his nebulizer. CARMINE spoke with Brisa and she said that most likely it is because at the beginning of the new year he has to meet his deductible. This is why the cost is higher. She stated in December 2023 he paid $20.38 and now with the new year it is $76.60 because he has not met his deductible. She advised that he call his insurance company if he has any other questions.    CARMINE contacted Dr. Denney office ([phone]) regarding prescription assistance forms. CARMINE spoke with Mary OLIVEIRA. She stated the patient has never returned the papers ands it has been a year. She stated she will resend the forms for him to fill out and send back as soon as possible to get qualified for his next infusions. She stated the forms are not difficult for him to fill out. He just has to fill in the number of people living in his household and his annual income. She stated it could take a week for processing.     CARMINE received a call back from Yocasta. CARMINE updated patient on his insurance with his pharmacy regarding his deductible and also with his prescription assistance forms that he will be receiving in the mail. A 3 way call was made to his PCP Dr. Tineo [phone] for his follow up. Appointment was made.

## 2024-02-12 ENCOUNTER — CARE COORDINATION (OUTPATIENT)
Dept: CASE MANAGEMENT | Age: 59
End: 2024-02-12

## 2024-02-12 DIAGNOSIS — G89.4 CHRONIC PAIN SYNDROME: ICD-10-CM

## 2024-02-12 RX ORDER — IBUPROFEN 800 MG/1
800 TABLET ORAL EVERY 8 HOURS PRN
Qty: 270 TABLET | Refills: 0 | Status: SHIPPED | OUTPATIENT
Start: 2024-02-12

## 2024-02-12 NOTE — TELEPHONE ENCOUNTER
----- Message from Alissa Hernandez sent at 2/12/2024  2:38 PM EST -----  Subject: Refill Request    QUESTIONS  Name of Medication? ibuprofen (ADVIL;MOTRIN) 800 MG tablet  Patient-reported dosage and instructions? 800mg  How many days do you have left? 0  Preferred Pharmacy? [id number]  Pharmacy phone number (if available)? [phone]  ---------------------------------------------------------------------------  --------------  CALL BACK INFO  What is the best way for the office to contact you? OK to leave message on   voicemail  Preferred Call Back Phone Number? [phone]  ---------------------------------------------------------------------------  --------------  SCRIPT ANSWERS  Relationship to Patient? Self

## 2024-02-12 NOTE — TELEPHONE ENCOUNTER
Pt called stating he takes medication every 8 hrs. He is currently out of medication. Please advise.

## 2024-02-12 NOTE — CARE COORDINATION
Remote Alert Monitoring Note  Rpm alert to be reviewed by the provider   red alert   pulse ox reading (90%)   Additional needs to be addressed by PCP: No                    Date/Time:  2024 10:57 AM  Patient Current Location: [facility] contacted patient by telephone. Verified patients name and  as identifiers.  Background: COPD, HTN  Refer to 911 immediately if:  Patient unresponsive or unable to provide history  Change in cognition or sudden confusion  Patient unable to respond in complete sentences  Intense chest pain/tightness  Any concern for any clinical emergency  Red Alert: Provider response time of 1 hr required for any red alert requiring intervention  Yellow Alert: Provider response time of 3hr required for any escalated yellow alert    O2 Triage  Are you having any Chest Pain? no   Are you having any Shortness of Breath? yes   Swelling in your hands or feet? no     Are you having any other health concerns or issues? no      Clinical Interventions: Reviewed and followed up on alerts and treatments-Patient has low SpO2 level at 90%.  Patient denies CP, Swelling hands and feet. Patient has some SOB at this time. Has oxygen on at 4/L. Patient states, \"I was up coughing all night\".  Has non productive cough.  Patient has used Nebulizer and inhalers this morning as directed.  Patient will recheck SpO2 level.    Education of patient/family/caregiver/guardian to support self-management-Recheck SpO2 level.    Have warm hands, hold hands and fingers very still while testing SpO2 level. Take a few deep breaths before testing.    Advised Patient to contact PCP  regarding CP, Worsening SOB, Dizziness and any health concerns for early outpatient intervention in an effort to avoid hospitalization.  Report any worsening symptoms to PCP and/or Call 911 and/or GO TO  EMERGENCY ROOM if symptoms are severe or worsening.   Expresses understanding.       Plan/Follow Up: Will continue to review, monitor and address

## 2024-02-12 NOTE — CARE COORDINATION
Remote Alert Monitoring Note  Rpm alert to be reviewed by the provider   red alert   pulse ox reading (90%)   Additional needs to be addressed by PCP: No                 Patient rechecked SpO2 level at 94%.  No further action needed at this time.     Jackelyn Trevizo LPN    [phone]  [facility] Coordinator / RPM

## 2024-02-13 NOTE — PROGRESS NOTES
monitoring through [facility].  He is continuing on the Fasenra.  He remains on the Daliresp, Brovana, Pulmicort, and Breztri.  Most recently he was giving a prednisone taper on September 23 by his PCP.  Currently he is not requiring oxygen while walking in the clinic.  He reports he has not been using it much at home.  However does put it on in the mornings when his sats are in the 80s.    Updated HPI as of January 17, 2024.  Patient seen and examined.  He continues on the Breztri, Daliresp, DuoNebs, Pulmicort, and the Fasenra.  His next dose of the Fasenra will be in February.  He states his last cigarette was 3 to 4 months ago he does report that his symptoms of his breathing do wax and wane he continues to use oxygen at night.  He has gained a significant amount of weight.  He is trying to decrease this with diet and exercise.    ALLERGIES:    Allergies   Allergen Reactions    Latex Rash     Skin turns red       PAST MEDICAL HISTORY:       Diagnosis Date    Acute gastritis without hemorrhage     CAD (coronary artery disease)     Cancer of kidney (HCC)     COPD (chronic obstructive pulmonary disease) ([facility])     COPD, severe ([facility]) 6/16/2022    Difficulty sleeping     ED (erectile dysfunction)     History of BPH     Hyperlipidemia     Lung cancer ([facility])     Tobacco abuse        MEDICATIONS:   Current Outpatient Medications   Medication Sig Dispense Refill    ibuprofen (ADVIL;MOTRIN) 800 MG tablet Take 1 tablet by mouth every 8 hours as needed for Pain 270 tablet 0    arformoterol tartrate (BROVANA) 15 MCG/2ML NEBU Take 2 mLs by nebulization in the morning and 2 mLs in the evening. 120 mL 3    aspirin (SONIA ASPIRIN EC LOW DOSE) 81 MG EC tablet Take 1 tablet by mouth daily 90 tablet 1    budesonide (PULMICORT) 0.5 MG/2ML nebulizer suspension Take 2 mLs by nebulization in the morning and 2 mLs in the evening. 60 each 3    gabapentin (NEURONTIN) 600 MG tablet Take 1 tablet by mouth 3 times daily for 90 days. 270

## 2024-02-14 ENCOUNTER — CARE COORDINATION (OUTPATIENT)
Dept: CARE COORDINATION | Age: 59
End: 2024-02-14

## 2024-02-14 NOTE — CARE COORDINATION
Attempted to reach patient by telephone. Left HIPAA compliant message requesting a return call. Will attempt to reach patient again.     
his prescription assistance was completed.     Medication reviewed. No changes. Appointments reviewed. Patient verbally stated aware     PLAN  RPM   Meds  Appointments  Prescription assistance forms  Diet  Oxygen use  Continue to follow care coordination      Offered patient enrollment in the Remote Patient Monitoring (RPM) program for in-home monitoring: Yes, patient already enrolled.    Lab Results       None            Care Coordination Interventions    Referral from Primary Care Provider: No  Suggested Interventions and Community Resources  Fall Risk Prevention: Completed  Medication Assistance Program: In Process (Comment: Fasenra)  Physical Therapy: Not Started  Pulmonary Rehab: Not Started  Smoking Cessation: Declined (Comment: states \"I'm done\")  Social Work: Completed (Comment: insurance & med costs)  Specialty Services Referral: In Process (Comment: [facility])  Other Therapy Services: Not Started (Comment: pain management)  Other Services: Completed (Comment: RPM)  Transportation Support: Completed (Comment: SW referral)  Zone Management Tools: Completed (Comment: COPD)          Goals Addressed                   This Visit's Progress     Medication Management   On track     I will take my medication as directed.  I will notify my provider of any problems with medications, like adverse effects or side effects.  I will notify my provider/Care Coordinator if I am unable to afford my medications.  I will notify my provider for advice before I stop taking any of my medication.    Barriers: financial, overwhelmed by complexity of regimen, and stress  Plan for overcoming my barriers: care coordination  Confidence: 7/10  Anticipated Goal Completion Date: 2/28/24              Future Appointments   Date Time Provider Department Center   2/21/2024 11:00 AM SEY INFUSION BAY 1 SEYZ INF SER [facility]   4/17/2024 10:00 AM Selina Denney DO [facility]   4/17/2024  1:00 PM Lizet Tineo MD [facility]   ,

## 2024-02-21 ENCOUNTER — HOSPITAL ENCOUNTER (OUTPATIENT)
Dept: INFUSION THERAPY | Age: 59
Setting detail: INFUSION SERIES
Discharge: HOME | End: 2024-02-21

## 2024-02-29 ENCOUNTER — CARE COORDINATION (OUTPATIENT)
Dept: CARE COORDINATION | Age: 59
End: 2024-02-29

## 2024-02-29 DIAGNOSIS — J44.1 COPD WITH ACUTE EXACERBATION (HCC): Primary | ICD-10-CM

## 2024-02-29 DIAGNOSIS — I10 ESSENTIAL HYPERTENSION: ICD-10-CM

## 2024-02-29 ASSESSMENT — ENCOUNTER SYMPTOMS: DYSPNEA ASSOCIATED WITH: MINIMAL EXERTION

## 2024-03-01 ENCOUNTER — CARE COORDINATION (OUTPATIENT)
Dept: PRIMARY CARE CLINIC | Age: 59
End: 2024-03-01

## 2024-03-01 NOTE — CARE COORDINATION
[facility]SS placed call to patient to arrange RPM kit  through [employer]. Left detailed message.    Reviewed with patient how to pack equipment in original packing.      Anticipated  date range 2 to 4 business days.

## 2024-03-01 NOTE — PROGRESS NOTES
Remote Patient Order Discontinued    Received request from Marietta Linares RN  to discontinue order for remote patient monitoring of COPD and HTN and order completed.

## 2024-03-01 NOTE — CARE COORDINATION
Attempted to reach patient by telephone. Left HIPAA compliant message requesting a return call. Will attempt to reach patient again.     
[facility] received a return call from Loraine (Dr. Denney nurse). She states they received the prescription assistance forms from Yocasta and that patient is compliant with his Fasenra injections. Last was 2/21. She also states patient requested information regarding a lung transplant and patient was referred to another Dr. as Dr. Denney doesn't perform lung transplant surgeries. She states he was educated to be smoke free, loose weight (BMI is 36), in 6 months. Loraine states \"He is not a candidate\" for a lung transplant.   
Remote Patient Monitoring Graduation      Date/Time:  2/29/2024 4:08 PM  Patient Current Location: Home: [address] [place] [address]  Patient has graduated from the Remote Patient Monitoring program on 2/29/2024.   RPM goals have been met at this time.      Patient has been provided instruction on process to return RPM equipment and RPM has been deactivated.     Patient has ACM's contact information for any further questions, concerns, or needs.   
overcoming my barriers: care coordination  Confidence: 7/10  Anticipated Goal Completion Date: 24              Future Appointments   Date Time Provider Department Center   2024 10:00 AM [facility]   2024  1:00 PM Lizet Tineo MD [facility]   ,   COPD Assessment    Does the patient understand envrionmental exposure?: Yes  Is the patient able to verbalize Rescue vs. Long Acting medications?: Yes  Does the patient have a nebulizer?: Yes  Does the patient use a space with inhaled medications?: No            Symptoms:     Symptom course: no change  Breathlessness: minimal exertion (Comment: exercises in gym gets some SOB)  Increase use of rapid acting/rescue inhaled medications?: No  Change in chronic cough?: Increased (Comment: states a few weeks dry cough)  Change in sputum?: No/At Baseline  Self Monitoring - SaO2: Yes  Baseline SaO2 Readin     ,   Asthma - Patient Level    Asthma severity: mild     ,   Hypertension - Encounter Level    Symptom course: stable     , and   General Assessment    Do you have any symptoms that are causing concern?: Yes  Reported Symptoms: Cough (Comment: cough for a few weeks and feels gurgling in chest and lower right side of lung sore x 2 weeks)

## 2024-04-10 ENCOUNTER — CARE COORDINATION (OUTPATIENT)
Dept: CARE COORDINATION | Age: 59
End: 2024-04-10

## 2024-04-10 ASSESSMENT — ENCOUNTER SYMPTOMS: DYSPNEA ASSOCIATED WITH: EXERTION

## 2024-04-10 NOTE — CARE COORDINATION
Ambulatory Care Coordination Note     4/10/2024 10:22 AM     Patient Current Location:  Home: [address] [place] [address]  This patient was received as a referral from Care Transition Nurse.     Patient has graduated from the Ambulatory Care Management program on 4/10/2024.  Patient/family verbalizes confidence in the ability to self-manage at this time. progressing towards self management.  reinforced resources provided during this care transition period..  Care management goals have been completed. No further Ambulatory Care Manager follow up scheduled.        ACM: Mariella Gómez RN     Challenges to be reviewed by the provider   Additional needs identified to be addressed with provider Yes  Notify of graduation from care coordination               Method of communication with provider: chart routing.    Care Summary Note:   ACM contacted Yocasta for COPD follow up. He reports he has not been wearing oxygen. He does not have a pulse oximeter. He denies any increased shortness of breath or chest pain.  He reports he continues to work out and has lost 28 pounds. He reports his blood pressure has been good but he did not provide any readings.  Future appointments were reviewed.    Offered patient enrollment in the Remote Patient Monitoring (RPM) program for in-home monitoring: Yes, patient already enrolled and completed.     Assessments Completed:   Care Coordination Interventions    Referral from Primary Care Provider: No  Suggested Interventions and Community Resources  Fall Risk Prevention: Completed  Medication Assistance Program: Completed (Comment: Fasenra)  Physical Therapy: Declined  Pulmonary Rehab: Declined  Registered Dietician: Declined  Smoking Cessation: Declined (Comment: states \"I'm done\")  Social Work: Completed (Comment: insurance & med costs)  Specialty Services Referral: In Process (Comment: [facility])  Other Therapy Services: Declined (Comment: pain management)  Other Services:

## 2024-04-14 SDOH — ECONOMIC STABILITY: INCOME INSECURITY: HOW HARD IS IT FOR YOU TO PAY FOR THE VERY BASICS LIKE FOOD, HOUSING, MEDICAL CARE, AND HEATING?: HARD

## 2024-04-14 SDOH — ECONOMIC STABILITY: FOOD INSECURITY: WITHIN THE PAST 12 MONTHS, THE FOOD YOU BOUGHT JUST DIDN'T LAST AND YOU DIDN'T HAVE MONEY TO GET MORE.: PATIENT DECLINED

## 2024-04-14 SDOH — ECONOMIC STABILITY: FOOD INSECURITY: WITHIN THE PAST 12 MONTHS, YOU WORRIED THAT YOUR FOOD WOULD RUN OUT BEFORE YOU GOT MONEY TO BUY MORE.: PATIENT DECLINED

## 2024-04-14 ASSESSMENT — PATIENT HEALTH QUESTIONNAIRE - PHQ9
10. IF YOU CHECKED OFF ANY PROBLEMS, HOW DIFFICULT HAVE THESE PROBLEMS MADE IT FOR YOU TO DO YOUR WORK, TAKE CARE OF THINGS AT HOME, OR GET ALONG WITH OTHER PEOPLE: EXTREMELY DIFFICULT
1. LITTLE INTEREST OR PLEASURE IN DOING THINGS: NOT AT ALL
SUM OF ALL RESPONSES TO PHQ9 QUESTIONS 1 & 2: 3
2. FEELING DOWN, DEPRESSED OR HOPELESS: NOT AT ALL
6. FEELING BAD ABOUT YOURSELF - OR THAT YOU ARE A FAILURE OR HAVE LET YOURSELF OR YOUR FAMILY DOWN: NOT AT ALL
1. LITTLE INTEREST OR PLEASURE IN DOING THINGS: NEARLY EVERY DAY
2. FEELING DOWN, DEPRESSED OR HOPELESS: NOT AT ALL
SUM OF ALL RESPONSES TO PHQ QUESTIONS 1-9: 14
SUM OF ALL RESPONSES TO PHQ QUESTIONS 1-9: 17
4. FEELING TIRED OR HAVING LITTLE ENERGY: NEARLY EVERY DAY
8. MOVING OR SPEAKING SO SLOWLY THAT OTHER PEOPLE COULD HAVE NOTICED. OR THE OPPOSITE, BEING SO FIGETY OR RESTLESS THAT YOU HAVE BEEN MOVING AROUND A LOT MORE THAN USUAL: NEARLY EVERY DAY
3. TROUBLE FALLING OR STAYING ASLEEP: NEARLY EVERY DAY
SUM OF ALL RESPONSES TO PHQ9 QUESTIONS 1 & 2: 0
7. TROUBLE CONCENTRATING ON THINGS, SUCH AS READING THE NEWSPAPER OR WATCHING TELEVISION: NEARLY EVERY DAY
SUM OF ALL RESPONSES TO PHQ QUESTIONS 1-9: 14
10. IF YOU CHECKED OFF ANY PROBLEMS, HOW DIFFICULT HAVE THESE PROBLEMS MADE IT FOR YOU TO DO YOUR WORK, TAKE CARE OF THINGS AT HOME, OR GET ALONG WITH OTHER PEOPLE: EXTREMELY DIFFICULT
5. POOR APPETITE OR OVEREATING: MORE THAN HALF THE DAYS
6. FEELING BAD ABOUT YOURSELF - OR THAT YOU ARE A FAILURE OR HAVE LET YOURSELF OR YOUR FAMILY DOWN: NOT AT ALL
9. THOUGHTS THAT YOU WOULD BE BETTER OFF DEAD, OR OF HURTING YOURSELF: NOT AT ALL
4. FEELING TIRED OR HAVING LITTLE ENERGY: NEARLY EVERY DAY
8. MOVING OR SPEAKING SO SLOWLY THAT OTHER PEOPLE COULD HAVE NOTICED. OR THE OPPOSITE - BEING SO FIDGETY OR RESTLESS THAT YOU HAVE BEEN MOVING AROUND A LOT MORE THAN USUAL: NEARLY EVERY DAY
5. POOR APPETITE OR OVEREATING: MORE THAN HALF THE DAYS
3. TROUBLE FALLING OR STAYING ASLEEP: NEARLY EVERY DAY
SUM OF ALL RESPONSES TO PHQ QUESTIONS 1-9: 14
7. TROUBLE CONCENTRATING ON THINGS, SUCH AS READING THE NEWSPAPER OR WATCHING TELEVISION: NEARLY EVERY DAY
9. THOUGHTS THAT YOU WOULD BE BETTER OFF DEAD, OR OF HURTING YOURSELF: NOT AT ALL
SUM OF ALL RESPONSES TO PHQ QUESTIONS 1-9: 14

## 2024-04-16 PROBLEM — Z86.0100 PERSONAL HISTORY OF COLONIC POLYPS: Status: RESOLVED | Noted: 2023-04-10 | Resolved: 2024-04-16

## 2024-04-16 PROBLEM — J45.51 SEVERE PERSISTENT ASTHMA WITH ACUTE EXACERBATION: Status: RESOLVED | Noted: 2023-08-04 | Resolved: 2024-04-16

## 2024-04-16 PROBLEM — E66.811 CLASS 1 DRUG-INDUCED OBESITY IN ADULT: Status: RESOLVED | Noted: 2019-09-23 | Resolved: 2024-04-16

## 2024-04-16 PROBLEM — J44.1 COPD WITH ACUTE EXACERBATION (HCC): Status: RESOLVED | Noted: 2023-08-01 | Resolved: 2024-04-16

## 2024-04-16 PROBLEM — J96.01 ACUTE RESPIRATORY FAILURE WITH HYPOXIA (HCC): Status: RESOLVED | Noted: 2023-04-18 | Resolved: 2024-04-16

## 2024-04-16 PROBLEM — E66.1 CLASS 1 DRUG-INDUCED OBESITY IN ADULT: Status: RESOLVED | Noted: 2019-09-23 | Resolved: 2024-04-16

## 2024-04-16 PROBLEM — Z86.010 PERSONAL HISTORY OF COLONIC POLYPS: Status: RESOLVED | Noted: 2023-04-10 | Resolved: 2024-04-16

## 2024-04-16 NOTE — PROGRESS NOTES
[facility]      SUBJECTIVE:  Yocasta Medina (:  1965) is a 58 y.o. male here for evaluation of the following chief complaint(s):  Anxiety, Hypertension, and Medication Refill    HPI:     Pt is a 57y/o male with severe COPD, follows with pulmonology. Is on nebulizer and inhaler (proventil, brovana, breztri, pulmicort). Has been on roflumilast.    Benrazilumab from pulmonology clinic.    Tobacco free for 3 months, would like to be referred for transplant when he has been tobacco free for 6 months.    Was counseled about weight loss as well, lost 24lb since last visit.  Following with pulmonology, PFT with bronchodilator due and possible referral for transplant after full PFT, weight loss and smoke cessation.     -Trazodone, usually takes 100 mg daily, says, \"has to take another half dose by splitting the tab\" total dose of 150 mg occasionally      The 10-year ASCVD risk score (Matt DK, et al., 2019) is: 6.2%    Values used to calculate the score:      Age: 58 years      Sex: Male      Is Non- : No      Diabetic: No      Tobacco smoker: No      Systolic Blood Pressure: 109 mmHg      Is BP treated: No      HDL Cholesterol: 44 mg/dL      Total Cholesterol: 201 mg/dL      -HTN, not been taking medication.   -BP today 109/70    -Lumbar radiculopathy:     MRI  \"Multilevel mild posterior bridging hard disc and spur. This is  somewhat greater centrally and on the left at L4-5. In combination  with facet arthrosis and narrowed interpedicular distance, there is  moderate stenosis at this level\"  Has been taking Ibuprofen 800mg TID, discussed to lower down the dose to 400mg TID    H/O RCC; Right kidney RCC, s/p nephrectomy       Review of Systems   Constitutional:  Negative for chills, fatigue, fever and unexpected weight change.   HENT:  Negative for congestion and rhinorrhea.    Eyes:  Negative for discharge and itching.

## 2024-04-17 ENCOUNTER — OFFICE VISIT (OUTPATIENT)
Dept: PULMONOLOGY | Age: 59
End: 2024-04-17
Payer: MEDICARE

## 2024-04-17 ENCOUNTER — OFFICE VISIT (OUTPATIENT)
Dept: INTERNAL MEDICINE | Age: 59
End: 2024-04-17
Payer: MEDICARE

## 2024-04-17 VITALS
HEIGHT: 71 IN | WEIGHT: 245 LBS | RESPIRATION RATE: 18 BRPM | TEMPERATURE: 97.2 F | HEART RATE: 86 BPM | SYSTOLIC BLOOD PRESSURE: 109 MMHG | DIASTOLIC BLOOD PRESSURE: 70 MMHG | BODY MASS INDEX: 34.3 KG/M2 | OXYGEN SATURATION: 95 %

## 2024-04-17 VITALS
BODY MASS INDEX: 33.6 KG/M2 | RESPIRATION RATE: 18 BRPM | WEIGHT: 240 LBS | HEART RATE: 80 BPM | DIASTOLIC BLOOD PRESSURE: 65 MMHG | SYSTOLIC BLOOD PRESSURE: 116 MMHG | OXYGEN SATURATION: 97 % | HEIGHT: 71 IN | TEMPERATURE: 97 F

## 2024-04-17 DIAGNOSIS — G47.9 SLEEP DISORDER: Primary | ICD-10-CM

## 2024-04-17 DIAGNOSIS — E55.9 VITAMIN D DEFICIENCY: ICD-10-CM

## 2024-04-17 DIAGNOSIS — G89.4 CHRONIC PAIN SYNDROME: ICD-10-CM

## 2024-04-17 DIAGNOSIS — I25.119 CORONARY ARTERY DISEASE INVOLVING NATIVE HEART WITH ANGINA PECTORIS, UNSPECIFIED VESSEL OR LESION TYPE (HCC): ICD-10-CM

## 2024-04-17 DIAGNOSIS — J44.9 COPD, SEVERE (HCC): ICD-10-CM

## 2024-04-17 DIAGNOSIS — Z87.891 HISTORY OF TOBACCO ABUSE: ICD-10-CM

## 2024-04-17 DIAGNOSIS — F41.9 ANXIETY: ICD-10-CM

## 2024-04-17 DIAGNOSIS — J44.9 CHRONIC OBSTRUCTIVE PULMONARY DISEASE, UNSPECIFIED COPD TYPE (HCC): ICD-10-CM

## 2024-04-17 DIAGNOSIS — J45.51 SEVERE PERSISTENT ASTHMA WITH ACUTE EXACERBATION: Primary | ICD-10-CM

## 2024-04-17 DIAGNOSIS — K21.9 GASTROESOPHAGEAL REFLUX DISEASE WITHOUT ESOPHAGITIS: ICD-10-CM

## 2024-04-17 DIAGNOSIS — E78.49 OTHER HYPERLIPIDEMIA: ICD-10-CM

## 2024-04-17 PROCEDURE — 3017F COLORECTAL CA SCREEN DOC REV: CPT | Performed by: INTERNAL MEDICINE

## 2024-04-17 PROCEDURE — 99213 OFFICE O/P EST LOW 20 MIN: CPT

## 2024-04-17 PROCEDURE — 3023F SPIROM DOC REV: CPT

## 2024-04-17 PROCEDURE — 99213 OFFICE O/P EST LOW 20 MIN: CPT | Performed by: INTERNAL MEDICINE

## 2024-04-17 PROCEDURE — G8417 CALC BMI ABV UP PARAM F/U: HCPCS

## 2024-04-17 PROCEDURE — 3074F SYST BP LT 130 MM HG: CPT | Performed by: INTERNAL MEDICINE

## 2024-04-17 PROCEDURE — 3074F SYST BP LT 130 MM HG: CPT

## 2024-04-17 PROCEDURE — 1036F TOBACCO NON-USER: CPT | Performed by: INTERNAL MEDICINE

## 2024-04-17 PROCEDURE — 1036F TOBACCO NON-USER: CPT

## 2024-04-17 PROCEDURE — 3078F DIAST BP <80 MM HG: CPT | Performed by: INTERNAL MEDICINE

## 2024-04-17 PROCEDURE — 99212 OFFICE O/P EST SF 10 MIN: CPT

## 2024-04-17 PROCEDURE — 3023F SPIROM DOC REV: CPT | Performed by: INTERNAL MEDICINE

## 2024-04-17 PROCEDURE — G8427 DOCREV CUR MEDS BY ELIG CLIN: HCPCS

## 2024-04-17 PROCEDURE — 3017F COLORECTAL CA SCREEN DOC REV: CPT

## 2024-04-17 PROCEDURE — G8417 CALC BMI ABV UP PARAM F/U: HCPCS | Performed by: INTERNAL MEDICINE

## 2024-04-17 PROCEDURE — 3078F DIAST BP <80 MM HG: CPT

## 2024-04-17 PROCEDURE — G8428 CUR MEDS NOT DOCUMENT: HCPCS | Performed by: INTERNAL MEDICINE

## 2024-04-17 RX ORDER — ALBUTEROL SULFATE 90 UG/1
2 AEROSOL, METERED RESPIRATORY (INHALATION) EVERY 6 HOURS PRN
Qty: 18 G | Refills: 5 | Status: SHIPPED | OUTPATIENT
Start: 2024-04-17

## 2024-04-17 RX ORDER — ROFLUMILAST 500 UG/1
500 TABLET ORAL DAILY
Qty: 30 TABLET | Refills: 5 | Status: SHIPPED | OUTPATIENT
Start: 2024-04-17

## 2024-04-17 RX ORDER — TRAZODONE HYDROCHLORIDE 100 MG/1
100 TABLET ORAL NIGHTLY
Qty: 90 TABLET | Refills: 1 | Status: SHIPPED | OUTPATIENT
Start: 2024-04-17

## 2024-04-17 RX ORDER — MULTIVIT-MIN/IRON/FOLIC ACID/K 18-600-40
1000 CAPSULE ORAL DAILY
Qty: 90 TABLET | Refills: 1 | Status: SHIPPED | OUTPATIENT
Start: 2024-04-17

## 2024-04-17 RX ORDER — ASCORBIC ACID 500 MG
1000 TABLET ORAL DAILY
Qty: 90 TABLET | Refills: 1 | Status: SHIPPED | OUTPATIENT
Start: 2024-04-17

## 2024-04-17 RX ORDER — GABAPENTIN 600 MG/1
600 TABLET ORAL 3 TIMES DAILY
Qty: 180 TABLET | Refills: 1 | Status: SHIPPED | OUTPATIENT
Start: 2024-04-17 | End: 2024-07-16

## 2024-04-17 RX ORDER — ASPIRIN 81 MG/1
81 TABLET ORAL DAILY
Qty: 90 TABLET | Refills: 1 | Status: SHIPPED | OUTPATIENT
Start: 2024-04-17

## 2024-04-17 RX ORDER — PROMETHAZINE HYDROCHLORIDE 25 MG/1
25 TABLET ORAL 3 TIMES DAILY PRN
Qty: 90 TABLET | Refills: 1 | Status: SHIPPED | OUTPATIENT
Start: 2024-04-17

## 2024-04-17 RX ORDER — BUSPIRONE HYDROCHLORIDE 30 MG/1
30 TABLET ORAL 2 TIMES DAILY
Qty: 180 TABLET | Refills: 1 | Status: SHIPPED | OUTPATIENT
Start: 2024-04-17

## 2024-04-17 RX ORDER — BUDESONIDE, GLYCOPYRROLATE, AND FORMOTEROL FUMARATE 160; 9; 4.8 UG/1; UG/1; UG/1
2 AEROSOL, METERED RESPIRATORY (INHALATION) 2 TIMES DAILY
Qty: 5.9 G | Refills: 5 | Status: SHIPPED | OUTPATIENT
Start: 2024-04-17

## 2024-04-17 RX ORDER — IPRATROPIUM BROMIDE AND ALBUTEROL SULFATE 2.5; .5 MG/3ML; MG/3ML
3 SOLUTION RESPIRATORY (INHALATION)
Qty: 360 ML | Refills: 5 | Status: SHIPPED | OUTPATIENT
Start: 2024-04-17

## 2024-04-17 RX ORDER — IBUPROFEN 800 MG/1
400 TABLET ORAL 2 TIMES DAILY PRN
Qty: 60 TABLET | Refills: 1 | Status: SHIPPED | OUTPATIENT
Start: 2024-04-17

## 2024-04-17 RX ORDER — BENRALIZUMAB 30 MG/ML
30 INJECTION, SOLUTION SUBCUTANEOUS ONCE
COMMUNITY

## 2024-04-17 RX ORDER — OMEPRAZOLE 40 MG/1
40 CAPSULE, DELAYED RELEASE ORAL DAILY
Qty: 90 CAPSULE | Refills: 1 | Status: SHIPPED | OUTPATIENT
Start: 2024-04-17

## 2024-04-17 NOTE — PATIENT INSTRUCTIONS
Dear Yocasta Medina,    Thank you for coming to your appointment today. I hope we have addressed all of your needs.     Please make sure to do the following:  - Continue your medications as listed.  - Get labs drawn before our next follow up.  - We will see each other again in 3 months  - Get the lung function test done before your next appointment with pulmonology      Call for a sooner appointment if you develop any new or worsening symptoms.    Have a great day!    Sincerely,  Lizet Tineo MD  4/17/2024  1:42 PM

## 2024-04-17 NOTE — PROGRESS NOTES
[facility]  Internal Medicine Residency Clinic  Attending Physician Statement:  Jude Moore M.D., F.A.C.P.  Patient is seen for fu visit today.  -- acute and chronic problems addressed  Addressed when applicable- Health maintenance issues of vaccinations, social determinants/depression/CA screening, tobacco cessation etc...  I have discussed the case, including pertinent history and exam findings with the resident, review of last visit medical records/labs-   I agree with the assessment, plan and orders as documented by the resident.    -Billiing assessed by medical complexity of case  My assessment of high points of todays visit as follows:       Severe copd  Possible candidate for lung transplant referral    If loses weight - did lose currenly now 24llbs  For repeat PFTs, and possible referral  Still on o2 at night  +prn activity  On infusion - IL5 blocker  Stable bronchospasm, dec BS chronic    Takes 100mg (prn 150mg QHS)- insomnia    Hx of \"HTN\"- but not on meds, BP stable    Unchanged chronic local back pain, without major neurologic leg symptoms  Mri  \"Multilevel mild posterior bridging hard disc and spur. This is  somewhat greater centrally and on the left at L4-5. In combination  with facet arthrosis and narrowed interpedicular distance, there is  moderate stenosis at this level\"    Currently on gabapentin     Chronic low back pain- spinal stenosis  He claims \"Needs 800mg tid\" ibuprofin, but only on kidney  Cr 1.0 stable  We only comfortable 400mg tid ibprofin +/- topicals/tylenol etc..    Ldl 110- not on statin  Didn't tolerate pravachol, zetia  Based \"NEED statin or ASA- based on 6.2% ascvd risk score\"  Takes asa for primary prophylaxis  The 10-year ASCVD risk score (Matt WALSH, et al., 2019) is: 6.2%    Values used to calculate the score:      Age: 58 years      Sex: Male      Is Non- : No      Diabetic: No      Tobacco smoker: No      Systolic Blood

## 2024-04-17 NOTE — PROGRESS NOTES
Patient to follow up with physician in July with full PFTs prior to that visit.  Patient had medications refilled during office visit.  Patient given samples of Fasenra during office visit under the direction of Dr. Denney.

## 2024-04-17 NOTE — PATIENT INSTRUCTIONS
Selina Denney    [facility]  [address] [place] [id number]  Office: [phone]      Your were seen in the office today for COPD/ashtma      We  did not make changes to your medications today.       Testing ordered today was full pulmonary function tests      Vaccines recommended none      Follow up in 3 months        Please do not hesitate to call the office with any questions.

## 2024-04-29 ENCOUNTER — TELEPHONE (OUTPATIENT)
Dept: INTERNAL MEDICINE | Age: 59
End: 2024-04-29

## 2024-04-29 NOTE — TELEPHONE ENCOUNTER
Pt called in about TRAZODONE 100 MG tablets. Pt states speaking to doctor about increasing dose to 150.    After looking at encounter note, doctor stated pt notified him of taking 150 mg of TRAZODONE a night, but did not increase dosage.    Pt would like a call from doctor about increasing the dosage or if medication could be called into pharmacy.     Last Appointment:  4/17/2024  Future Appointments   Date Time Provider Department Center   7/1/2024  9:30 AM [facility] STR[facility] ROOM [facility]   7/17/2024 10:00 AM Selina Denney, DO [facility]

## 2024-04-30 ENCOUNTER — TELEPHONE (OUTPATIENT)
Dept: INTERNAL MEDICINE | Age: 59
End: 2024-04-30

## 2024-04-30 DIAGNOSIS — G47.00 INSOMNIA, UNSPECIFIED TYPE: Primary | ICD-10-CM

## 2024-04-30 RX ORDER — TRAZODONE HYDROCHLORIDE 150 MG/1
150 TABLET ORAL NIGHTLY
Qty: 30 TABLET | Refills: 0 | Status: SHIPPED | OUTPATIENT
Start: 2024-04-30 | End: 2024-05-30

## 2024-04-30 NOTE — TELEPHONE ENCOUNTER
Call placed to Mr. Medina to discuss trazodone dosing and use.     Plan   Referral to sleep medicine specialist   Okay with Trazodone 150 mg nightly for 30 days   No more refill until clinic visit     Discussed with attending physician Dr. Girard.     Chris Noland MD   Internal Medicine   Resident, PGY-2

## 2024-04-30 NOTE — TELEPHONE ENCOUNTER
Call placed to patient in order to discuss Trazodone dosing. No answer x 2. Patient says that he is now on 150 mg, however, I reviewed last clinic note there is no document of medication uptitration. Patient did not get 150 mg during his last refill.     Chris Noland MD   Internal Medicine   Resident, PGY-2

## 2024-04-30 NOTE — TELEPHONE ENCOUNTER
Patient states he is now on 150mg of Trazodone. In physician notes on 4/22/24 it states patient is taking 150mg. Please Advise. Patient can be reached at [phone]

## 2024-05-30 ENCOUNTER — APPOINTMENT (OUTPATIENT)
Dept: CT IMAGING | Age: 59
End: 2024-05-30
Payer: MEDICARE

## 2024-05-30 ENCOUNTER — HOSPITAL ENCOUNTER (OUTPATIENT)
Age: 59
Setting detail: OBSERVATION
LOS: 1 days | Discharge: HOME | End: 2024-05-31
Attending: STUDENT IN AN ORGANIZED HEALTH CARE EDUCATION/TRAINING PROGRAM | Admitting: INTERNAL MEDICINE
Payer: MEDICARE

## 2024-05-30 DIAGNOSIS — Z85.528 HX OF RENAL CELL CARCINOMA: ICD-10-CM

## 2024-05-30 DIAGNOSIS — M54.16 LUMBAR RADICULOPATHY: ICD-10-CM

## 2024-05-30 DIAGNOSIS — K63.5 POLYP OF TRANSVERSE COLON, UNSPECIFIED TYPE: ICD-10-CM

## 2024-05-30 DIAGNOSIS — J98.4 HERNIATION OF RIGHT LUNG: ICD-10-CM

## 2024-05-30 DIAGNOSIS — M94.0 COSTOCHONDRITIS: ICD-10-CM

## 2024-05-30 DIAGNOSIS — R07.82 INTERCOSTAL PAIN: ICD-10-CM

## 2024-05-30 DIAGNOSIS — M48.061 SPINAL STENOSIS OF LUMBAR REGION WITHOUT NEUROGENIC CLAUDICATION: ICD-10-CM

## 2024-05-30 DIAGNOSIS — Z87.891 HISTORY OF TOBACCO ABUSE: ICD-10-CM

## 2024-05-30 DIAGNOSIS — G47.33 OSA (OBSTRUCTIVE SLEEP APNEA): ICD-10-CM

## 2024-05-30 DIAGNOSIS — I10 ESSENTIAL HYPERTENSION: ICD-10-CM

## 2024-05-30 DIAGNOSIS — I25.10 CORONARY ARTERY DISEASE INVOLVING NATIVE CORONARY ARTERY OF NATIVE HEART WITHOUT ANGINA PECTORIS: ICD-10-CM

## 2024-05-30 DIAGNOSIS — R07.9 ACUTE CHEST PAIN: Primary | ICD-10-CM

## 2024-05-30 DIAGNOSIS — F41.9 ANXIETY: ICD-10-CM

## 2024-05-30 DIAGNOSIS — K63.5 POLYP OF ASCENDING COLON, UNSPECIFIED TYPE: ICD-10-CM

## 2024-05-30 DIAGNOSIS — K21.9 GASTROESOPHAGEAL REFLUX DISEASE, UNSPECIFIED WHETHER ESOPHAGITIS PRESENT: ICD-10-CM

## 2024-05-30 DIAGNOSIS — J45.909 SEVERE ASTHMA, UNSPECIFIED WHETHER COMPLICATED, UNSPECIFIED WHETHER PERSISTENT: ICD-10-CM

## 2024-05-30 DIAGNOSIS — J44.9 COPD, SEVERE (HCC): ICD-10-CM

## 2024-05-30 LAB
ALBUMIN SERPL-MCNC: 4.3 G/DL (ref 3.5–5.2)
ALP SERPL-CCNC: 72 U/L (ref 40–129)
ALT SERPL-CCNC: 15 U/L (ref 0–40)
ANION GAP SERPL CALCULATED.3IONS-SCNC: 13 MMOL/L (ref 7–16)
AST SERPL-CCNC: 24 U/L (ref 0–39)
BASOPHILS # BLD: 0.01 K/UL (ref 0–0.2)
BASOPHILS NFR BLD: 0 % (ref 0–2)
BILIRUB SERPL-MCNC: 0.3 MG/DL (ref 0–1.2)
BUN SERPL-MCNC: 12 MG/DL (ref 6–20)
CALCIUM SERPL-MCNC: 9.3 MG/DL (ref 8.6–10.2)
CHLORIDE SERPL-SCNC: 103 MMOL/L (ref 98–107)
CO2 SERPL-SCNC: 22 MMOL/L (ref 22–29)
CREAT SERPL-MCNC: 1 MG/DL (ref 0.7–1.2)
EOSINOPHIL # BLD: 0 K/UL (ref 0.05–0.5)
EOSINOPHILS RELATIVE PERCENT: 0 % (ref 0–6)
ERYTHROCYTE [DISTWIDTH] IN BLOOD BY AUTOMATED COUNT: 13.1 % (ref 11.5–15)
GFR, ESTIMATED: 83 ML/MIN/1.73M2
GLUCOSE SERPL-MCNC: 87 MG/DL (ref 74–99)
HCT VFR BLD AUTO: 46.3 % (ref 37–54)
HGB BLD-MCNC: 15 G/DL (ref 12.5–16.5)
IMM GRANULOCYTES # BLD AUTO: 0.06 K/UL (ref 0–0.58)
IMM GRANULOCYTES NFR BLD: 1 % (ref 0–5)
LYMPHOCYTES NFR BLD: 3.62 K/UL (ref 1.5–4)
LYMPHOCYTES RELATIVE PERCENT: 35 % (ref 20–42)
MCH RBC QN AUTO: 30.1 PG (ref 26–35)
MCHC RBC AUTO-ENTMCNC: 32.4 G/DL (ref 32–34.5)
MCV RBC AUTO: 93 FL (ref 80–99.9)
MONOCYTES NFR BLD: 0.88 K/UL (ref 0.1–0.95)
MONOCYTES NFR BLD: 8 % (ref 2–12)
NEUTROPHILS NFR BLD: 56 % (ref 43–80)
NEUTS SEG NFR BLD: 5.92 K/UL (ref 1.8–7.3)
PLATELET # BLD AUTO: 283 K/UL (ref 130–450)
PMV BLD AUTO: 11.1 FL (ref 7–12)
POTASSIUM SERPL-SCNC: 4.3 MMOL/L (ref 3.5–5)
POTASSIUM SERPL-SCNC: 5.2 MMOL/L (ref 3.5–5)
PROT SERPL-MCNC: 6.8 G/DL (ref 6.4–8.3)
RBC # BLD AUTO: 4.98 M/UL (ref 3.8–5.8)
SODIUM SERPL-SCNC: 138 MMOL/L (ref 132–146)
TROPONIN I SERPL HS-MCNC: 10 NG/L (ref 0–11)
TROPONIN I SERPL HS-MCNC: 11 NG/L (ref 0–11)
WBC OTHER # BLD: 10.5 K/UL (ref 4.5–11.5)

## 2024-05-30 PROCEDURE — 93005 ELECTROCARDIOGRAM TRACING: CPT

## 2024-05-30 PROCEDURE — 96375 TX/PRO/DX INJ NEW DRUG ADDON: CPT

## 2024-05-30 PROCEDURE — 85025 COMPLETE CBC W/AUTO DIFF WBC: CPT

## 2024-05-30 PROCEDURE — 99285 EMERGENCY DEPT VISIT HI MDM: CPT

## 2024-05-30 PROCEDURE — 80053 COMPREHEN METABOLIC PANEL: CPT

## 2024-05-30 PROCEDURE — 6360000004 HC RX CONTRAST MEDICATION: Performed by: RADIOLOGY

## 2024-05-30 PROCEDURE — 6360000002 HC RX W HCPCS

## 2024-05-30 PROCEDURE — 96374 THER/PROPH/DIAG INJ IV PUSH: CPT

## 2024-05-30 PROCEDURE — 84132 ASSAY OF SERUM POTASSIUM: CPT

## 2024-05-30 PROCEDURE — 2140000000 HC CCU INTERMEDIATE R&B

## 2024-05-30 PROCEDURE — 84484 ASSAY OF TROPONIN QUANT: CPT

## 2024-05-30 PROCEDURE — 71275 CT ANGIOGRAPHY CHEST: CPT

## 2024-05-30 RX ORDER — ENOXAPARIN SODIUM 100 MG/ML
30 INJECTION SUBCUTANEOUS 2 TIMES DAILY
Status: DISCONTINUED | OUTPATIENT
Start: 2024-05-31 | End: 2024-05-31 | Stop reason: HOSPADM

## 2024-05-30 RX ORDER — IPRATROPIUM BROMIDE AND ALBUTEROL SULFATE 2.5; .5 MG/3ML; MG/3ML
1 SOLUTION RESPIRATORY (INHALATION)
Status: DISCONTINUED | OUTPATIENT
Start: 2024-05-31 | End: 2024-05-31 | Stop reason: HOSPADM

## 2024-05-30 RX ORDER — EZETIMIBE 10 MG/1
10 TABLET ORAL NIGHTLY
Status: DISCONTINUED | OUTPATIENT
Start: 2024-05-31 | End: 2024-05-31 | Stop reason: HOSPADM

## 2024-05-30 RX ORDER — SODIUM CHLORIDE 0.9 % (FLUSH) 0.9 %
5-40 SYRINGE (ML) INJECTION PRN
Status: DISCONTINUED | OUTPATIENT
Start: 2024-05-30 | End: 2024-05-31 | Stop reason: HOSPADM

## 2024-05-30 RX ORDER — FENTANYL CITRATE 50 UG/ML
50 INJECTION, SOLUTION INTRAMUSCULAR; INTRAVENOUS ONCE
Status: COMPLETED | OUTPATIENT
Start: 2024-05-30 | End: 2024-05-30

## 2024-05-30 RX ORDER — ROFLUMILAST 500 UG/1
500 TABLET ORAL DAILY
Status: DISCONTINUED | OUTPATIENT
Start: 2024-05-31 | End: 2024-05-31 | Stop reason: HOSPADM

## 2024-05-30 RX ORDER — ASPIRIN 81 MG/1
81 TABLET ORAL DAILY
Status: DISCONTINUED | OUTPATIENT
Start: 2024-05-31 | End: 2024-05-31 | Stop reason: HOSPADM

## 2024-05-30 RX ORDER — SODIUM CHLORIDE 9 MG/ML
INJECTION, SOLUTION INTRAVENOUS PRN
Status: DISCONTINUED | OUTPATIENT
Start: 2024-05-30 | End: 2024-05-31 | Stop reason: HOSPADM

## 2024-05-30 RX ORDER — GABAPENTIN 600 MG/1
600 TABLET ORAL 3 TIMES DAILY
Status: DISCONTINUED | OUTPATIENT
Start: 2024-05-31 | End: 2024-05-31 | Stop reason: CLARIF

## 2024-05-30 RX ORDER — MORPHINE SULFATE 4 MG/ML
4 INJECTION, SOLUTION INTRAMUSCULAR; INTRAVENOUS ONCE
Status: COMPLETED | OUTPATIENT
Start: 2024-05-30 | End: 2024-05-30

## 2024-05-30 RX ORDER — SODIUM CHLORIDE 0.9 % (FLUSH) 0.9 %
5-40 SYRINGE (ML) INJECTION EVERY 12 HOURS SCHEDULED
Status: DISCONTINUED | OUTPATIENT
Start: 2024-05-31 | End: 2024-05-31 | Stop reason: HOSPADM

## 2024-05-30 RX ORDER — ACETAMINOPHEN 650 MG/1
650 SUPPOSITORY RECTAL EVERY 6 HOURS PRN
Status: DISCONTINUED | OUTPATIENT
Start: 2024-05-30 | End: 2024-05-31 | Stop reason: HOSPADM

## 2024-05-30 RX ORDER — PANTOPRAZOLE SODIUM 40 MG/1
40 TABLET, DELAYED RELEASE ORAL
Status: DISCONTINUED | OUTPATIENT
Start: 2024-05-31 | End: 2024-05-31 | Stop reason: HOSPADM

## 2024-05-30 RX ORDER — ONDANSETRON 4 MG/1
4 TABLET, ORALLY DISINTEGRATING ORAL EVERY 8 HOURS PRN
Status: DISCONTINUED | OUTPATIENT
Start: 2024-05-30 | End: 2024-05-31 | Stop reason: HOSPADM

## 2024-05-30 RX ORDER — POLYETHYLENE GLYCOL 3350 17 G/17G
17 POWDER, FOR SOLUTION ORAL DAILY PRN
Status: DISCONTINUED | OUTPATIENT
Start: 2024-05-30 | End: 2024-05-31 | Stop reason: HOSPADM

## 2024-05-30 RX ORDER — ACETAMINOPHEN 325 MG/1
650 TABLET ORAL EVERY 6 HOURS PRN
Status: DISCONTINUED | OUTPATIENT
Start: 2024-05-30 | End: 2024-05-31 | Stop reason: HOSPADM

## 2024-05-30 RX ORDER — BUSPIRONE HYDROCHLORIDE 10 MG/1
30 TABLET ORAL 2 TIMES DAILY
Status: DISCONTINUED | OUTPATIENT
Start: 2024-05-31 | End: 2024-05-31 | Stop reason: HOSPADM

## 2024-05-30 RX ORDER — OXYCODONE HYDROCHLORIDE AND ACETAMINOPHEN 5; 325 MG/1; MG/1
1 TABLET ORAL EVERY 8 HOURS PRN
Status: DISCONTINUED | OUTPATIENT
Start: 2024-05-30 | End: 2024-05-31

## 2024-05-30 RX ORDER — ARFORMOTEROL TARTRATE 15 UG/2ML
15 SOLUTION RESPIRATORY (INHALATION)
Status: DISCONTINUED | OUTPATIENT
Start: 2024-05-31 | End: 2024-05-31 | Stop reason: HOSPADM

## 2024-05-30 RX ORDER — BUDESONIDE 0.5 MG/2ML
0.5 INHALANT ORAL
Status: DISCONTINUED | OUTPATIENT
Start: 2024-05-31 | End: 2024-05-31 | Stop reason: HOSPADM

## 2024-05-30 RX ORDER — VITAMIN B COMPLEX
1000 TABLET ORAL DAILY
Status: DISCONTINUED | OUTPATIENT
Start: 2024-05-31 | End: 2024-05-31 | Stop reason: HOSPADM

## 2024-05-30 RX ORDER — ONDANSETRON 2 MG/ML
4 INJECTION INTRAMUSCULAR; INTRAVENOUS EVERY 6 HOURS PRN
Status: DISCONTINUED | OUTPATIENT
Start: 2024-05-30 | End: 2024-05-31 | Stop reason: HOSPADM

## 2024-05-30 RX ORDER — ENOXAPARIN SODIUM 100 MG/ML
40 INJECTION SUBCUTANEOUS DAILY
Status: DISCONTINUED | OUTPATIENT
Start: 2024-05-31 | End: 2024-05-30 | Stop reason: CLARIF

## 2024-05-30 RX ADMIN — FENTANYL CITRATE 50 MCG: 50 INJECTION, SOLUTION INTRAMUSCULAR; INTRAVENOUS at 17:33

## 2024-05-30 RX ADMIN — IOPAMIDOL 75 ML: 755 INJECTION, SOLUTION INTRAVENOUS at 19:37

## 2024-05-30 RX ADMIN — MORPHINE SULFATE 4 MG: 4 INJECTION, SOLUTION INTRAMUSCULAR; INTRAVENOUS at 21:07

## 2024-05-30 ASSESSMENT — PAIN SCALES - GENERAL
PAINLEVEL_OUTOF10: 10

## 2024-05-30 ASSESSMENT — PAIN DESCRIPTION - PAIN TYPE: TYPE: ACUTE PAIN

## 2024-05-30 ASSESSMENT — LIFESTYLE VARIABLES: HOW OFTEN DO YOU HAVE A DRINK CONTAINING ALCOHOL: MONTHLY OR LESS

## 2024-05-30 ASSESSMENT — PAIN DESCRIPTION - DESCRIPTORS: DESCRIPTORS: SHARP

## 2024-05-30 ASSESSMENT — PAIN DESCRIPTION - LOCATION: LOCATION: BACK

## 2024-05-30 ASSESSMENT — PAIN DESCRIPTION - ONSET: ONSET: ON-GOING

## 2024-05-30 ASSESSMENT — PAIN - FUNCTIONAL ASSESSMENT: PAIN_FUNCTIONAL_ASSESSMENT: PREVENTS OR INTERFERES SOME ACTIVE ACTIVITIES AND ADLS

## 2024-05-30 ASSESSMENT — PAIN DESCRIPTION - ORIENTATION: ORIENTATION: MID

## 2024-05-30 ASSESSMENT — PAIN DESCRIPTION - FREQUENCY: FREQUENCY: CONTINUOUS

## 2024-05-30 NOTE — ED PROVIDER NOTES
[facility] EMERGENCY DEPARTMENT  EMERGENCY DEPARTMENT ENCOUNTER        Pt Name: Yocasta Medina  MRN: [id number]  Birthdate 1965  Date of evaluation: 5/30/2024  Provider: David Leon DO  PCP: Lizet Tineo MD  Note Started: 4:07 PM EDT 5/30/24    CHIEF COMPLAINT       Chief Complaint   Patient presents with    Chest Pain     Hx of lung removal pain to right side 10/10 onset 1 week ago        HISTORY OF PRESENT ILLNESS: 1 or more Elements   History From: PATIENT     Limitations to history : None    Yocasta Medina is a 59 y.o. male with prior history of partial lobectomy, nephrectomy secondary to lung cancer done in [place] arriving with a complaint of right-sided chest pain worsening over the past week acutely worse in the past 12 hours.  He took aspirin this morning.  He is not on blood thinners.  No prior history of DVT or PE.  He states that his stabbing pain on his right side that is not reproducible.  No recent procedures.  No recent travel.  He reports shortness of breath as well.  Nothing seems to exacerbate his symptoms.      Nursing Notes were all reviewed and agreed with or any disagreements were addressed in the HPI.    REVIEW OF SYSTEMS :      Review of Systems    POSITIVE (+): Chest pain, shortness of breath  NEGATIVE (-): Fevers, chills, nausea, diarrhea, constipation, abdominal pain, hemoptysis    SURGICAL HISTORY     Past Surgical History:   Procedure Laterality Date    CARDIAC CATHETERIZATION  2015    Non-obstructive coronary disease by Dr. Torres    CHOLECYSTECTOMY      COLONOSCOPY N/A 12/20/2019    COLONOSCOPY POLYPECTOMY SNARE/COLD BIOPSY performed [place] Lolita Mc [place] [facility] [place] [facility]    COLONOSCOPY  12/20/2019    COLONOSCOPY SUBMUCOSAL/BOTOX INJECTION performed by Lolita Mc MD [place] [facility] [place] [facility]    LUNG SURGERY Right 09/2014    VATS with RUL wedge resection -- pathology diffuse granulomatous changes at  There is no abdominal tenderness. There is no guarding.      Comments: No abdominal tenderness  No guarding  No distention  No rigidity   Musculoskeletal:      Cervical back: Normal range of motion and neck supple.   Skin:     Findings: No erythema.           HEART Score For Major Cardiac Events  (Max Score 10 Points)  HISTORY       []   Slightly Suspicious  0       [x]   Moderately Suspicious  +1       []   Highly Suspicious  +2    EK point: No ST deviation but LBBB, LVH repolarization changes (ex:digoxin);  2 points: ST deviation not due to LBBB, LVH or digoxin         [x]   Normal  0       []   Nonspecific Repolarization Disturbance  +1       []   Significant ST Depression  +2    AGE       []   <45  0       [x]   45-64  +1       []    >65  +2    RISK FACTORS:  1. HTN    2. Hypercholesterolemia    3. DM     4. Cigarette smoking (current or cessation < 3 mos)    5.  Positive family history  (parent or sibling with CVD before age 65).   6. Obesity (BMI >30kg/m2)         []   No Risk factors Known  0       []   1-2 Risk Factors  +1       [x]   >3 Risk Factors or History of Atherosclerotic Disease  +2    INITIAL TROPONIN       []   < Normal Limit   0       [x]   1-3 x Normal Limit   +1       []   >3 x Normal Limit   +2     -----------------------------------------------------------------------------------------------------------------  SCORE TOTAL:  5 POINTS     Low Score:         (0-3 Points), risk of MACE of 0.9-1.7% (discuss d/c home with f/u)  Mod Score:         (4-6 Points), risk of MACE of 12-16.6% (discuss admission for further testing)  High Score:        (7-10 Points), risk of MACE of 50-65% (Admit ALL as they are candidates for early invasive measures)      DIAGNOSTIC RESULTS   LABS:    Labs Reviewed   COMPREHENSIVE METABOLIC PANEL W/ REFLEX TO MG FOR LOW K - Abnormal; Notable for the following components:       Result Value    Potassium 5.2 (*)     All other components within normal limits   CBC WITH

## 2024-05-30 NOTE — ED PROVIDER NOTES
submitting note)  ***    CONSULTS: (Who and What was discussed)  None  ***    {Social Determinants (Optional):60924}      Records Reviewed (source and summary): ***    Disposition Considerations: ***      I am the Primary Clinician of Record.    FINAL IMPRESSION    No diagnosis found.      DISPOSITION/PLAN     DISPOSITION        PATIENT REFERRED TO:  No follow-up provider specified.    DISCHARGE MEDICATIONS:  New Prescriptions    No medications on file       DISCONTINUED MEDICATIONS:  Discontinued Medications    No medications on file              (Please note that portions of this note were completed with a voice recognition program.  Efforts were made to edit the dictations but occasionally words are mis-transcribed.)    Kristina Hoyt MD PGY-2

## 2024-05-31 VITALS
RESPIRATION RATE: 18 BRPM | OXYGEN SATURATION: 97 % | HEIGHT: 71 IN | DIASTOLIC BLOOD PRESSURE: 54 MMHG | HEART RATE: 62 BPM | SYSTOLIC BLOOD PRESSURE: 117 MMHG | TEMPERATURE: 97.7 F | BODY MASS INDEX: 33.46 KG/M2 | WEIGHT: 239 LBS

## 2024-05-31 PROBLEM — M94.0 COSTOCHONDRITIS: Status: ACTIVE | Noted: 2024-05-31

## 2024-05-31 LAB
ANION GAP SERPL CALCULATED.3IONS-SCNC: 12 MMOL/L (ref 7–16)
APAP SERPL-MCNC: <5 UG/ML (ref 10–30)
B PARAP IS1001 DNA NPH QL NAA+NON-PROBE: NOT DETECTED
B PERT DNA SPEC QL NAA+PROBE: NOT DETECTED
BASOPHILS # BLD: 0.02 K/UL (ref 0–0.2)
BASOPHILS NFR BLD: 0 % (ref 0–2)
BUN SERPL-MCNC: 12 MG/DL (ref 6–20)
C PNEUM DNA NPH QL NAA+NON-PROBE: NOT DETECTED
CALCIUM SERPL-MCNC: 9.3 MG/DL (ref 8.6–10.2)
CHLORIDE SERPL-SCNC: 106 MMOL/L (ref 98–107)
CHOLEST SERPL-MCNC: 184 MG/DL
CO2 SERPL-SCNC: 23 MMOL/L (ref 22–29)
CREAT SERPL-MCNC: 0.9 MG/DL (ref 0.7–1.2)
EKG ATRIAL RATE: 73 BPM
EKG P AXIS: 65 DEGREES
EKG P-R INTERVAL: 180 MS
EKG Q-T INTERVAL: 394 MS
EKG QRS DURATION: 80 MS
EKG QTC CALCULATION (BAZETT): 434 MS
EKG R AXIS: 8 DEGREES
EKG T AXIS: 50 DEGREES
EKG VENTRICULAR RATE: 73 BPM
EOSINOPHIL # BLD: 0 K/UL (ref 0.05–0.5)
EOSINOPHILS RELATIVE PERCENT: 0 % (ref 0–6)
ERYTHROCYTE [DISTWIDTH] IN BLOOD BY AUTOMATED COUNT: 12.9 % (ref 11.5–15)
ETHANOLAMINE SERPL-MCNC: <10 MG/DL (ref 0–0.08)
FLUAV RNA NPH QL NAA+NON-PROBE: NOT DETECTED
FLUBV RNA NPH QL NAA+NON-PROBE: NOT DETECTED
GFR, ESTIMATED: >90 ML/MIN/1.73M2
GLUCOSE SERPL-MCNC: 80 MG/DL (ref 74–99)
HADV DNA NPH QL NAA+NON-PROBE: NOT DETECTED
HBA1C MFR BLD: 5.3 % (ref 4–5.6)
HCOV 229E RNA NPH QL NAA+NON-PROBE: NOT DETECTED
HCOV HKU1 RNA NPH QL NAA+NON-PROBE: NOT DETECTED
HCOV NL63 RNA NPH QL NAA+NON-PROBE: NOT DETECTED
HCOV OC43 RNA NPH QL NAA+NON-PROBE: NOT DETECTED
HCT VFR BLD AUTO: 45.7 % (ref 37–54)
HDLC SERPL-MCNC: 34 MG/DL
HGB BLD-MCNC: 15.1 G/DL (ref 12.5–16.5)
HMPV RNA NPH QL NAA+NON-PROBE: NOT DETECTED
HPIV1 RNA NPH QL NAA+NON-PROBE: NOT DETECTED
HPIV2 RNA NPH QL NAA+NON-PROBE: NOT DETECTED
HPIV3 RNA NPH QL NAA+NON-PROBE: NOT DETECTED
HPIV4 RNA NPH QL NAA+NON-PROBE: NOT DETECTED
IMM GRANULOCYTES # BLD AUTO: 0.04 K/UL (ref 0–0.58)
IMM GRANULOCYTES NFR BLD: 1 % (ref 0–5)
LDLC SERPL CALC-MCNC: 112 MG/DL
LYMPHOCYTES NFR BLD: 3.65 K/UL (ref 1.5–4)
LYMPHOCYTES RELATIVE PERCENT: 43 % (ref 20–42)
M PNEUMO DNA NPH QL NAA+NON-PROBE: NOT DETECTED
MCH RBC QN AUTO: 31.3 PG (ref 26–35)
MCHC RBC AUTO-ENTMCNC: 33 G/DL (ref 32–34.5)
MCV RBC AUTO: 94.8 FL (ref 80–99.9)
MONOCYTES NFR BLD: 0.88 K/UL (ref 0.1–0.95)
MONOCYTES NFR BLD: 10 % (ref 2–12)
NEUTROPHILS NFR BLD: 46 % (ref 43–80)
NEUTS SEG NFR BLD: 3.87 K/UL (ref 1.8–7.3)
PLATELET # BLD AUTO: 242 K/UL (ref 130–450)
PMV BLD AUTO: 10.8 FL (ref 7–12)
POTASSIUM SERPL-SCNC: 4.1 MMOL/L (ref 3.5–5)
RBC # BLD AUTO: 4.82 M/UL (ref 3.8–5.8)
RSV RNA NPH QL NAA+NON-PROBE: NOT DETECTED
RV+EV RNA NPH QL NAA+NON-PROBE: NOT DETECTED
SALICYLATES SERPL-MCNC: <0.3 MG/DL (ref 0–30)
SARS-COV-2 RNA NPH QL NAA+NON-PROBE: NOT DETECTED
SODIUM SERPL-SCNC: 141 MMOL/L (ref 132–146)
SPECIMEN DESCRIPTION: NORMAL
TOXIC TRICYCLIC SC,BLOOD: NEGATIVE
TRIGL SERPL-MCNC: 189 MG/DL
VLDLC SERPL CALC-MCNC: 38 MG/DL
WBC OTHER # BLD: 8.5 K/UL (ref 4.5–11.5)

## 2024-05-31 PROCEDURE — 80143 DRUG ASSAY ACETAMINOPHEN: CPT

## 2024-05-31 PROCEDURE — 6370000000 HC RX 637 (ALT 250 FOR IP): Performed by: INTERNAL MEDICINE

## 2024-05-31 PROCEDURE — 6370000000 HC RX 637 (ALT 250 FOR IP)

## 2024-05-31 PROCEDURE — 80061 LIPID PANEL: CPT

## 2024-05-31 PROCEDURE — 94640 AIRWAY INHALATION TREATMENT: CPT

## 2024-05-31 PROCEDURE — G0378 HOSPITAL OBSERVATION PER HR: HCPCS

## 2024-05-31 PROCEDURE — 83036 HEMOGLOBIN GLYCOSYLATED A1C: CPT

## 2024-05-31 PROCEDURE — 80048 BASIC METABOLIC PNL TOTAL CA: CPT

## 2024-05-31 PROCEDURE — 0202U NFCT DS 22 TRGT SARS-COV-2: CPT

## 2024-05-31 PROCEDURE — 6360000002 HC RX W HCPCS

## 2024-05-31 PROCEDURE — 2700000000 HC OXYGEN THERAPY PER DAY

## 2024-05-31 PROCEDURE — 99238 HOSP IP/OBS DSCHRG MGMT 30/<: CPT | Performed by: INTERNAL MEDICINE

## 2024-05-31 PROCEDURE — 96372 THER/PROPH/DIAG INJ SC/IM: CPT

## 2024-05-31 PROCEDURE — 93010 ELECTROCARDIOGRAM REPORT: CPT | Performed by: INTERNAL MEDICINE

## 2024-05-31 PROCEDURE — 36415 COLL VENOUS BLD VENIPUNCTURE: CPT

## 2024-05-31 PROCEDURE — 85025 COMPLETE CBC W/AUTO DIFF WBC: CPT

## 2024-05-31 PROCEDURE — 80179 DRUG ASSAY SALICYLATE: CPT

## 2024-05-31 PROCEDURE — 80307 DRUG TEST PRSMV CHEM ANLYZR: CPT

## 2024-05-31 PROCEDURE — 6360000002 HC RX W HCPCS: Performed by: INTERNAL MEDICINE

## 2024-05-31 PROCEDURE — G0480 DRUG TEST DEF 1-7 CLASSES: HCPCS

## 2024-05-31 RX ORDER — OXYCODONE HYDROCHLORIDE AND ACETAMINOPHEN 5; 325 MG/1; MG/1
1 TABLET ORAL EVERY 12 HOURS PRN
Status: DISCONTINUED | OUTPATIENT
Start: 2024-05-31 | End: 2024-05-31 | Stop reason: HOSPADM

## 2024-05-31 RX ORDER — LIDOCAINE 4 G/G
1 PATCH TOPICAL DAILY
Status: DISCONTINUED | OUTPATIENT
Start: 2024-05-31 | End: 2024-05-31 | Stop reason: HOSPADM

## 2024-05-31 RX ORDER — GABAPENTIN 300 MG/1
600 CAPSULE ORAL 3 TIMES DAILY
Status: DISCONTINUED | OUTPATIENT
Start: 2024-05-31 | End: 2024-05-31 | Stop reason: HOSPADM

## 2024-05-31 RX ORDER — EZETIMIBE 10 MG/1
10 TABLET ORAL NIGHTLY
Qty: 30 TABLET | Refills: 3 | Status: SHIPPED | OUTPATIENT
Start: 2024-05-31

## 2024-05-31 RX ORDER — BENZONATATE 100 MG/1
100 CAPSULE ORAL 3 TIMES DAILY
Status: DISCONTINUED | OUTPATIENT
Start: 2024-05-31 | End: 2024-05-31 | Stop reason: HOSPADM

## 2024-05-31 RX ORDER — PREDNISONE 20 MG/1
20 TABLET ORAL DAILY
Qty: 5 TABLET | Refills: 0 | Status: SHIPPED | OUTPATIENT
Start: 2024-05-31 | End: 2024-06-05

## 2024-05-31 RX ORDER — BENZONATATE 100 MG/1
100 CAPSULE ORAL 3 TIMES DAILY
Qty: 8 CAPSULE | Refills: 0 | Status: SHIPPED | OUTPATIENT
Start: 2024-05-31 | End: 2024-06-03

## 2024-05-31 RX ORDER — LIDOCAINE 4 G/G
1 PATCH TOPICAL DAILY
Qty: 30 EACH | Refills: 2 | Status: SHIPPED | OUTPATIENT
Start: 2024-06-01

## 2024-05-31 RX ORDER — PREDNISONE 20 MG/1
20 TABLET ORAL DAILY
Status: DISCONTINUED | OUTPATIENT
Start: 2024-05-31 | End: 2024-05-31 | Stop reason: HOSPADM

## 2024-05-31 RX ADMIN — PREDNISONE 20 MG: 20 TABLET ORAL at 12:06

## 2024-05-31 RX ADMIN — ASPIRIN 81 MG: 81 TABLET, COATED ORAL at 09:10

## 2024-05-31 RX ADMIN — ARFORMOTEROL TARTRATE 15 MCG: 15 SOLUTION RESPIRATORY (INHALATION) at 03:28

## 2024-05-31 RX ADMIN — GABAPENTIN 600 MG: 300 CAPSULE ORAL at 09:06

## 2024-05-31 RX ADMIN — PANTOPRAZOLE SODIUM 40 MG: 40 TABLET, DELAYED RELEASE ORAL at 06:46

## 2024-05-31 RX ADMIN — IPRATROPIUM BROMIDE AND ALBUTEROL SULFATE 1 DOSE: 2.5; .5 SOLUTION RESPIRATORY (INHALATION) at 06:14

## 2024-05-31 RX ADMIN — EZETIMIBE 10 MG: 10 TABLET ORAL at 01:01

## 2024-05-31 RX ADMIN — ENOXAPARIN SODIUM 30 MG: 100 INJECTION SUBCUTANEOUS at 09:05

## 2024-05-31 RX ADMIN — Medication 1000 UNITS: at 09:06

## 2024-05-31 RX ADMIN — OXYCODONE HYDROCHLORIDE AND ACETAMINOPHEN 1 TABLET: 5; 325 TABLET ORAL at 01:01

## 2024-05-31 RX ADMIN — BENZONATATE 100 MG: 100 CAPSULE ORAL at 09:06

## 2024-05-31 RX ADMIN — BUSPIRONE HYDROCHLORIDE 30 MG: 10 TABLET ORAL at 09:04

## 2024-05-31 RX ADMIN — OXYCODONE HYDROCHLORIDE AND ACETAMINOPHEN 1 TABLET: 5; 325 TABLET ORAL at 09:06

## 2024-05-31 RX ADMIN — BUDESONIDE 500 MCG: 0.5 SUSPENSION RESPIRATORY (INHALATION) at 03:28

## 2024-05-31 RX ADMIN — BUSPIRONE HYDROCHLORIDE 30 MG: 10 TABLET ORAL at 01:01

## 2024-05-31 RX ADMIN — ROFLUMILAST 500 MCG: 500 TABLET ORAL at 09:00

## 2024-05-31 ASSESSMENT — PAIN SCALES - GENERAL
PAINLEVEL_OUTOF10: 10
PAINLEVEL_OUTOF10: 10
PAINLEVEL_OUTOF10: 0

## 2024-05-31 ASSESSMENT — PAIN DESCRIPTION - DESCRIPTORS
DESCRIPTORS: SHARP
DESCRIPTORS: SHARP

## 2024-05-31 ASSESSMENT — ENCOUNTER SYMPTOMS
GASTROINTESTINAL NEGATIVE: 1
RESPIRATORY NEGATIVE: 1
EYES NEGATIVE: 1
ALLERGIC/IMMUNOLOGIC NEGATIVE: 1

## 2024-05-31 ASSESSMENT — PAIN DESCRIPTION - LOCATION
LOCATION: CHEST
LOCATION: CHEST

## 2024-05-31 ASSESSMENT — PAIN DESCRIPTION - ORIENTATION: ORIENTATION: MID

## 2024-05-31 NOTE — FLOWSHEET NOTE
05/30/24 7975   Belongings   Dental Appliances None   Vision - Corrective Lenses Other (Comment)  (cheaters)   Hearing Aid None   Clothing Footwear;Hat;Shirt;Shorts;Undergarments;Socks   Jewelry Earrings  (1 black skull earring)   Body Piercings Removed N/A   Electronic Devices Cell Phone;   Weapons (Notify Protective Services/Security) Gun;Knife   Other Valuables Other (Comment)  (none)   Home Medications None   Valuables Given To Patient   Provide Name(s) of Who Valuable(s) Were Given To Yocasta Medina     Pt admitted with these belongings:

## 2024-05-31 NOTE — PROGRESS NOTES
Discharged to home Goals met. Monitor and IV's removed. Monitor placed in nurses station. Pt left without discharge instructions.

## 2024-05-31 NOTE — DISCHARGE SUMMARY
[facility]  Discharge Summary    PCP: Lizet Tineo MD    Admit Date:5/30/2024  Discharge Date: 5/31/2024    Admission Diagnosis:   Chest Pain   Gold Stage IV COPD   S.p lobectomy of right middle lobe  Eosinophilic Asthma   Morbid Obesity   GERD  HLD  Vitamin D Deficiency   Chronic Pain     Discharge Diagnosis:  Chest Pain 2/2 Costochondritis, chronic cough, post-surgical neuropathy, and herniated right lung   GOLD Stage IV COPD  S/p lobectomy of right lung   Eosinophilic Asthma  Morbid Obesity   GERD  HLD  Vitamin D Deficiency     Hospital Course: Patient came to the emergency department complaining of right-sided chest pain which was unresponsive to opiates as well as his home medications which included ibuprofen, gabapentin, and Tylenol.  He was admitted due to recurrent chest pain as well as an elevated heart score.  The chest pain was on the right-hand side and radiated around to his back.  It was not worsening with exercise or mobility and did not improve with rest or relaxation or other medications.  It did not radiate to his left side chest wall, did not radiate to his neck or arm, he did not have any nausea or vomiting associated with it.  Patient states that it did worsen when he coughed or when he was moving/twisting.  Patient's troponins were stable at 10 and 11 on repeat, patient's EKG did not show any signs of ischemic changes, and patient had a dobutamine stress echo in 8/2023 which did not show any signs of ischemia or wall motion abnormalities.  At this point in time it was noted that patient has had a history of this pain and it is related to his herniated right lung as well as likely neuropathy from his previous surgeries.  Patient was prescribed prednisone for the nerve pain 20 mg for the next 5 days as well as lidocaine patches.  Patient was provided symptomatic relief for his cough.  Patient was also referred to pain management physician for  COPD, severe ([facility])      ipratropium 0.5 mg-albuterol 2.5 mg (DUONEB) 0.5-2.5 (3) MG/3ML SOLN nebulizer solution Inhale 3 mLs into the lungs every 4 hours (while awake)  Qty: 360 mL, Refills: 5    Associated Diagnoses: COPD, severe ([facility])      Roflumilast (DALIRESP) 500 MCG tablet Take 1 tablet by mouth daily  Qty: 30 tablet, Refills: 5      benralizumab (FASENRA) 30 MG/ML SOSY injection Inject 1 mL into the skin once Every 60 days- pulmonary    Associated Diagnoses: Chronic obstructive pulmonary disease, unspecified COPD type ([facility])      Vitamin D, Cholecalciferol, 25 MCG (1000 UT) TABS Take 1,000 Units by mouth daily  Qty: 90 tablet, Refills: 1    Associated Diagnoses: Vitamin D deficiency      vitamin C (ASCORBIC ACID) 500 MG tablet Take 2 tablets by mouth daily  Qty: 90 tablet, Refills: 1      promethazine (PHENERGAN) 25 MG tablet Take 1 tablet by mouth 3 times daily as needed for Nausea  Qty: 90 tablet, Refills: 1      omeprazole (PRILOSEC) 40 MG delayed release capsule Take 1 capsule by mouth daily  Qty: 90 capsule, Refills: 1    Associated Diagnoses: Gastroesophageal reflux disease without esophagitis      gabapentin (NEURONTIN) 600 MG tablet Take 1 tablet by mouth 3 times daily for 90 days.  Qty: 180 tablet, Refills: 1    Associated Diagnoses: Chronic pain syndrome      busPIRone (BUSPAR) 30 MG tablet Take 30 mg by mouth 2 times daily  Qty: 180 tablet, Refills: 1    Associated Diagnoses: Anxiety      aspirin (SONIA ASPIRIN EC LOW DOSE) 81 MG EC tablet Take 1 tablet by mouth daily  Qty: 90 tablet, Refills: 1    Associated Diagnoses: Coronary artery disease involving native heart with angina pectoris, unspecified vessel or lesion type ([facility])      ibuprofen (ADVIL;MOTRIN) 800 MG tablet Take 0.5 tablets by mouth 2 times daily as needed for Pain  Qty: 60 tablet, Refills: 1    Associated Diagnoses: Chronic pain syndrome      arformoterol tartrate (BROVANA) 15 MCG/2ML NEBU Take 2 mLs by nebulization in the morning and  are based on information provided to us from you and your healthcare providers. This information, including the list, dose, and frequency of medications is only as accurate as the information you provided. If you have any questions or concerns about your home medications, please contact your Primary Care Physician for further clarification.    Oseas Wilkinson Jr, DO     10:36 AM 5/31/2024

## 2024-05-31 NOTE — DISCHARGE INSTRUCTIONS
Internal medicine    Follow ups  Please follow up with the internal medicine clinic at [facility].Your appointment has been scheduled for 06/10/2024 at 0900  Please keep all other follow up appointments:  Pulmonology in July    Changes in healthcare   Please take all medications as indicated above  Diet: regular diet   Activity: activity as tolerated  Additional labs, testing or imaging needed after discharge   None   New medication prescribed after this hospitalization are lidocaine patch for pain (apply it once daily to ribs or back) and prednisone for nerve inflammation., Please refer to your Med list for details   You have been referred to pain management as outpatient for consideration of nerve blocks for pain control   Please contact us if you have any concerns, wish to change or make an appointment:  Internal medicine clinic   Phone: [phone]  Fax: [phone]  [facility] [address]  Or please call the nurse line at [phone].  Should you have further questions in regards to this visit, you can review your clinical note and after visit summary document on your [employer] account.  Other questions can be directed to our nurse line at [phone].     Other than any new prescriptions given to you today, the list of home medications on this After Visit Summary are based on information provided to us from you and your healthcare providers. This information, including the list, dose, and frequency of medications is only as accurate as the information you provided. If you have any questions or concerns about your home medications, please contact your Primary Care Physician for further clarification.

## 2024-05-31 NOTE — PROGRESS NOTES
4 Eyes Skin Assessment     NAME:  Yocasta Medina  YOB: 1965  MEDICAL RECORD NUMBER:  [id number]    The patient is being assessed for  Admission    I agree that at least one RN has performed a thorough Head to Toe Skin Assessment on the patient. ALL assessment sites listed below have been assessed.      Areas assessed by both nurses:    Head, Face, Ears, Shoulders, Back, Chest, Arms, Elbows, Hands, Sacrum. Buttock, Coccyx, Ischium, and Legs. Feet and Heels        Does the Patient have a Wound? No noted wound(s)       Tio Prevention initiated by RN: No  Wound Care Orders initiated by RN: No    Pressure Injury (Stage 3,4, Unstageable, DTI, NWPT, and Complex wounds) if present, place Wound referral order by RN under : No    New Ostomies, if present place, Ostomy referral order under : No     Nurse 1 eSignature: Electronically signed by Vandana Fox RN on 5/31/24 at 1:42 AM EDT    **SHARE this note so that the co-signing nurse can place an eSignature**    Nurse 2 eSignature: Electronically signed by Misty Wilson RN on 5/31/24 at 2:21 AM EDT

## 2024-05-31 NOTE — PROGRESS NOTES
[facility]  Internal Medicine Residency Program  Progress Note - House Team 1    Patient:  Yocasta Medina 59 y.o. male MRN: [id number]     Date of Service: 5/31/2024     CC:   Chief Complaint   Patient presents with    Chest Pain     Hx of lung removal pain to right side 10/10 onset 1 week ago      Overnight events: None    Subjective     Patient seen and examined at bedside.  Patient states that he still has the chest discomfort along the right rib cage posteriorly.    Objective     Physical Exam:  Vitals: BP (!) 113/58   Pulse 57   Temp 97.2 °F (36.2 °C) (Temporal)   Resp 16   Ht 1.803 m (5' 11\")   Wt 108.4 kg (239 lb)   SpO2 98%   BMI 33.33 kg/m²     I & O - 24hr: I/O this shift:  In: -   Out: 300 [Urine:300]   General appearance: No acute distress.  Awake and conversant.  Neuro: Round symmetric pupils that reactive to light bilaterally  Cardiovascular: Regular rate and rhythm, S1 and S2  heard, no murmurs appreciated.  Pain reproducible to touch.  No rashes.  Respiratory: Clear to auscultation bilaterally.  No wheezing or crackles appreciated.  Abdomen: Soft, non tender, bowel sounds normal; masses, no organomegaly, rebound or guarding  Extremities: Extremities normal, no cyanosis, distal pulses intact, no edema      Pertinent Labs & Imaging Studies      Complete Blood Count:   Recent Labs     05/30/24  1626   WBC 10.5   HGB 15.0   HCT 46.3           Last 3 Blood Glucose:   Recent Labs     05/30/24  1626   GLUCOSE 87        PT/INR:  No results found for: \"PROTIME\", \"INR\"  PTT:  No results found for: \"APTT\"    Comprehensive Metabolic Profile:   Recent Labs     05/30/24  1626 05/30/24  1745     --    K 5.2* 4.3     --    CO2 22  --    BUN 12  --    CREATININE 1.0  --    GLUCOSE 87  --    CALCIUM 9.3  --    BILITOT 0.3  --    ALKPHOS 72  --    AST 24  --    ALT 15  --       Magnesium:   Lab Results   Component Value Date/Time    MG 2.3 08/06/2023 07:14 AM      Phosphorus:   Lab Results   Component Value Date/Time    PHOS 4.7 08/06/2023 07:14 AM     Ionized Calcium:   Lab Results   Component Value Date/Time    CAION 1.28 08/05/2023 11:50 AM      Troponin: No results for input(s): \"TROPONINI\" in the last 72 hours.    Lab Results   Component Value Date    LVEF 60 06/20/2023         Resident's Assessment and Plan     Assessment:   Chest pain likely 2/2 chest wall pain related to excessive coughing -   8/23 negative stress test on, normal trops and EKG, no diffuse ST elevations that would suggest pericarditis   CTA chest negative  Severe COPD  on 4L at baseline qHs  S/p lobectomy of the right and middle lobe - in 2014   Eosinophilic asthma  Obesity  Possible DEJON  GERD  HLD  Vitamin D deficiency  Chronic insomnia     Plan:   Continue zetia and aspirin  Follow lipid panel, A1c  Lidocaine patch.  Percocet every 12 for pain  Continue gabapentin  Continue Brovana, Pulmicort and DuoNebs as well as Tessalon and Daliresp  Follow UDS and SDS    PT/OT evaluation: on board   DVT prophylaxis: enoxaparin 40 mg SC  GI prophylaxis: pantoprazole 40 mg PO daily  Diet:   ADULT DIET; Regular   Bowel regimen: polyethylene glycol  Pain management: as needed  Code status: Full Code   Disposition:  Continue Current Care  Family: updated as available    Meghna Brandt MD, PGY-1  Attending physician: Dr. Wilkinson

## 2024-05-31 NOTE — H&P
[facility]  Internal Medicine Residency Program  History and Physical    Patient:  Yocasta Medina 59 y.o. male MRN: [id number]     Date of Service: 5/30/2024    Hospital Day: 1      Chief complaint: had concerns including Chest Pain (Hx of lung removal pain to right side 10/10 onset 1 week ago ).    History Obtained From:  Patient    Date of Admission:   History of Present Illness   Yocasta Medina is a 59 y.o. male with PMH of eosinophilic asthma, obesity, GERD, HLD, HTN, vitamin D deficiency, oxygen dependent COPD (4L) qhs, sometimes daily, s/p lobectomy of right and middle lobe of the lung who presented to the ED with chief complaint of right-sided chest pain with radiation to the anterior chest.  Patient states that his chest pain started approximately 1 week ago.  He reports that the pain lasted approximately 10 minutes when it initially started he took Tylenol and ibuprofen with no relief.  Patient rates his pain as a 10/10, describes it as somebody is punching in his lungs.  Patient states that he never had a chest pain like this before.  He denies radiation to hands, jaw.  He denies nausea, vomiting, but reports mild diaphoresis not related to the chest pain. Patient also endorses shortness of breath that he states is chronic for him in the setting of his COPD. He denies fevers, chills, or increased sputum production.  He reports that he continues to take his 4L nightly and sometimes during the day when he feels like he is short of breath.  Patient reports that he does not know much about his family history.  He quit smoking approximately 8 months ago but has smoked for 50 years. He denies alcoholic drinks or any illicit drugs. Patient reports that his chest pain gets worse with cough and gets better when he turns to the left side given the pain is on his right side.    ED Course: The patient was hemodynamically stable.  Initial vitals were unremarkable.  Initial lab work  performed after the administration of intravenous contrast.  Multiplanar reformatted images are provided for review.  MIP images are provided for review. Automated exposure control, iterative reconstruction, and/or weight based adjustment of the mA/kV was utilized to reduce the radiation dose to as low as reasonably achievable. COMPARISON: None. HISTORY: ORDERING SYSTEM PROVIDED HISTORY: hx of cancer, right partial lobectomy with sharp chest pain TECHNOLOGIST PROVIDED HISTORY: Reason for exam:->hx of cancer, right partial lobectomy with sharp chest pain Additional Contrast?->1 What reading provider will be dictating this exam?->CRC FINDINGS: There is no acute pulmonary embolus in the main PA, right and left main PAs in the lobar segmental and subsegmental branches to the 3/4 order approximately. There is no aneurysm formation or dissection of the thoracic aorta. Heart is normal size.  There is no pericardial effusion. Some lymph nodes are seen the mediastinum the to stable number size since the CT scan August 1, 2023, presently they are nonspecific.  There is no mediastinal masses. Lungs normally expanded.  No infiltrates, consolidations pleural effusions are seen Chronic areas scarring atelectasis in the region of the right upper lobe and in the lateral aspect of the right lung where there is a chronic focal area of herniation across the intercostal space between the lateral aspect of the right 6th and 7th ribs to diastasis of muscle attachment sites of the corresponding intercostal muscle. There also areas subsegmental atelectasis which chronic findings in the left lower lobe and in the lingular segment. There is no pleural effusions. No acute infiltrates or consolidations superimposed to chronic findings in both lungs are observed. Visualized upper abdominal structures are not fully covered on this study. Patient had previous cholecystectomy.  What is visualized appear.  The adrenals not enlarged. Visualized bone  structures demonstrate moderate the severe spondylosis with bridging and fused osteophytes at levels.     No evidence of pulmonary embolism or acute pulmonary abnormality.     Resident's Assessment and Plan   Assessment:  Chest pain likely 2/2 chest wall pain related to excessive coughing - unlikely CAD given recent negative stress test on 08/23, normal trops and EKG, no diffuse ST elevations that would suggest pericarditis - CTA chest negative, improved with position changes (when turns to left side).   S/p lobectomy of the right and middle lobe - in 2014 (waiting for lung transplant he said)   Severe COPD  on 4L at baseline qHs   Eosinophilic asthma - follows with Dr. Denney   Obesity  Possible DEJON  GERD  HLD  Vitamin D deficiency  Chronic insomnia       Plan:   Lipid panel, A1c for risk stratification   Breathing treatment and Tessalon Perles for cough   PRN Percocet for chest pain   Labs every other day - look pristine so far   Resume home medications   ASA and Zetia for now     PT/OT: Not indicated at this time   DVT ppx: Lovenox   GI ppx: None     Code Status: Full code     Chris Noland MD, PGY-2   Attending physician: Dr. Hilliard     NOTE: This report was transcribed using voice recognition software. Every effort was made to ensure accuracy; however, inadvertent computerized transcription errors may be present.

## 2024-06-03 ENCOUNTER — CARE COORDINATION (OUTPATIENT)
Dept: CARE COORDINATION | Age: 59
End: 2024-06-03

## 2024-06-03 NOTE — CARE COORDINATION
Care Transitions Note    Initial Call - Call within 2 business days of discharge: Yes     Outreach Attempts:   First attempt to reach the patient for initial Care Transition call post hospital discharge. HIPAA compliant message left with CTN's contact information requesting return phone call.     Will attempt outreach again.    HFU appt scheduled with pcp for 6/10/24    Addendum 6/3/24 @ 1535  Pt attempted to call CTN back and left a VM  CTN attempted to call pt back before leaving today and no answer. LVM.   Will make a 3rd attempt to reach the pt tomorrow.    Patient: Yocasta Medina    Patient : 1965   MRN: [id number]    Reason for Admission: Ac CP  Discharge Date: 24  RURS: Readmission Risk Score: 12    Last Discharge Facility       Date Complaint Diagnosis Description Type Department Provider    24 Chest Pain Acute chest pain ... ED to Hosp-Admission (Discharged) (ADMITTED) [facility] Ose[facility] Wilkinson Jr., ; [facility],...            Was this an external facility discharge? No    Follow Up Appointment:   Patient has hospital follow up appointment scheduled within 7 days of discharge.    Future Appointments         Provider Specialty Dept Phone    6/10/2024 9:00 AM WALK IN Internal Medicine [phone]    2024 9:30 AM [facility] PFT STRESS LAB ROOM Pulmonary Function Testing [phone]    2024 10:00 AM Selina Dneney DO Pulmonology [phone]            Plan for follow-up on next business day.      Nessa Monique RN

## 2024-06-04 ENCOUNTER — TELEPHONE (OUTPATIENT)
Dept: INTERNAL MEDICINE | Age: 59
End: 2024-06-04

## 2024-06-04 ENCOUNTER — CARE COORDINATION (OUTPATIENT)
Dept: CARE COORDINATION | Age: 59
End: 2024-06-04

## 2024-06-04 DIAGNOSIS — R07.9 CHEST PAIN, UNSPECIFIED TYPE: Primary | ICD-10-CM

## 2024-06-04 DIAGNOSIS — M54.50 CHRONIC MIDLINE LOW BACK PAIN WITHOUT SCIATICA: ICD-10-CM

## 2024-06-04 DIAGNOSIS — G89.29 CHRONIC MIDLINE LOW BACK PAIN WITHOUT SCIATICA: ICD-10-CM

## 2024-06-04 PROCEDURE — 1111F DSCHRG MED/CURRENT MED MERGE: CPT

## 2024-06-04 NOTE — TELEPHONE ENCOUNTER
Patient recently discharged from the hospital and needs pain medication for right flank pain.10/10. Patient states he is unable to lay down in bed and is sleeping in a recliner. Please advise.

## 2024-06-04 NOTE — CARE COORDINATION
Care Transitions Note    Initial Call - Call within 2 business days of discharge: Yes     Patient Current Location:  Home: [address] [place] [address]    Care Transition Nurse contacted the patient by telephone to perform post hospital discharge assessment. Provided introduction to self, and explanation of the Care Transition Nurse role.     Patient: Yocasta Medina    Patient : 1965   MRN: [id number]    Reason for Admission: Ac CP  Discharge Date: 24  RURS: Readmission Risk Score: 12      Last Discharge Facility       Date Complaint Diagnosis Description Type Department Provider    24 Chest Pain Acute chest pain ... ED to Hosp-Admission (Discharged) (ADMITTED) [facility] Wilkinson Jr., DO; [facility],...            Was this an external facility discharge? No    Additional needs identified to be addressed with provider   No needs identified             Method of communication with provider: none.    Patients top risk factors for readmission: medical condition-spinal stenosis, asthma, Htn renal cell carcinoma, severe COPD, DEJON, hx R lobectom, multiple health system providers, polypharmacy, support system, and utilization of services    Interventions to address risk factors:   Attend HFU with pcp on 6/10  Attend previously scheduled appt with pulm on   O2 2-4L cont   Continue adherence with nebuilzer use (Duoneb Q 4hrs while awake, Brovana, and Pulmicort BID)  F/u with pain management as advised (CTN notes per pain management documentation they are reviewing the chart)    Care Summary Note: CTN called and spoke with the pt for an initial care transition call post hospital discharge. Pt admitted on 24 with dx ac CP. CP secondary to costochondritis. Per chart review, pain likely r/t herniated R lung and neuropathy from previous lung surgery. Stress test completed and per review, no ischemia or wall motion abnormalities. Pt was referred to  pain management at discharge.

## 2024-06-04 NOTE — TELEPHONE ENCOUNTER
Call placed to patient to discuss back pain/lower back.  Patient reports that his back pain started approximately a few weeks ago.  Patient reports that his back pain gets worse when he tries to lay on his back.  Denies lower extremity weakness, urinary or bladder incontinence. However, patient reports nocturia and some issues with urinary stream.  He reports that in the past he was told that he has enlarged prostate.  He denies recent trauma to the back. No numbness or tingling sensation reported.  He denies any recent heavy lifting. The patient has an appointment within 6 days for hospital follow-up visit. Previous back imaging reviewed with concerns for bone spurs and multi level degenerative disease. Repeat MRI of the lumbar spine pending, the bone was ordered back in December for the same comment of back pain. Case discussed with Dr. Moore.     Plan  Repeat lumbar x-ray  Exercise  Physical therapy  As needed OTC Tylenol/Voltaren gel  Internal Spine Referral

## 2024-06-04 NOTE — TELEPHONE ENCOUNTER
Called patient in response to cancellation of Hospital follow-up after phone triage by Dr. Noland. Patient reports that his pain is excruciating.  I have offered to have patient seen further tomorrow morning at 9 AM.  I stated that I was not sure what treatment would be offered but we can assess him further and then decide the best course of care. Patient agreeable to this follow-up visit.     Veronica Feliciano MD  6/4/2024

## 2024-06-05 ENCOUNTER — TELEPHONE (OUTPATIENT)
Dept: INTERNAL MEDICINE | Age: 59
End: 2024-06-05

## 2024-06-05 ENCOUNTER — OFFICE VISIT (OUTPATIENT)
Dept: INTERNAL MEDICINE | Age: 59
End: 2024-06-05
Payer: MEDICARE

## 2024-06-05 VITALS
SYSTOLIC BLOOD PRESSURE: 136 MMHG | BODY MASS INDEX: 33.33 KG/M2 | HEIGHT: 71 IN | HEART RATE: 79 BPM | TEMPERATURE: 98 F | OXYGEN SATURATION: 97 % | DIASTOLIC BLOOD PRESSURE: 68 MMHG | RESPIRATION RATE: 16 BRPM | WEIGHT: 238.1 LBS

## 2024-06-05 DIAGNOSIS — I25.10 CORONARY ARTERY DISEASE INVOLVING NATIVE CORONARY ARTERY OF NATIVE HEART WITHOUT ANGINA PECTORIS: ICD-10-CM

## 2024-06-05 DIAGNOSIS — J44.9 COPD, SEVERE (HCC): Primary | ICD-10-CM

## 2024-06-05 DIAGNOSIS — Z85.528 HX OF RENAL CELL CARCINOMA: ICD-10-CM

## 2024-06-05 DIAGNOSIS — Z09 HOSPITAL DISCHARGE FOLLOW-UP: ICD-10-CM

## 2024-06-05 DIAGNOSIS — R07.9 CHEST PAIN, UNSPECIFIED TYPE: ICD-10-CM

## 2024-06-05 DIAGNOSIS — M48.061 SPINAL STENOSIS OF LUMBAR REGION WITHOUT NEUROGENIC CLAUDICATION: ICD-10-CM

## 2024-06-05 DIAGNOSIS — G47.33 OSA (OBSTRUCTIVE SLEEP APNEA): ICD-10-CM

## 2024-06-05 DIAGNOSIS — I10 ESSENTIAL HYPERTENSION: ICD-10-CM

## 2024-06-05 DIAGNOSIS — G89.4 CHRONIC PAIN SYNDROME: ICD-10-CM

## 2024-06-05 PROCEDURE — G8417 CALC BMI ABV UP PARAM F/U: HCPCS

## 2024-06-05 PROCEDURE — 99213 OFFICE O/P EST LOW 20 MIN: CPT

## 2024-06-05 PROCEDURE — 1036F TOBACCO NON-USER: CPT

## 2024-06-05 PROCEDURE — 3017F COLORECTAL CA SCREEN DOC REV: CPT

## 2024-06-05 PROCEDURE — 3075F SYST BP GE 130 - 139MM HG: CPT

## 2024-06-05 PROCEDURE — 3078F DIAST BP <80 MM HG: CPT

## 2024-06-05 PROCEDURE — G8427 DOCREV CUR MEDS BY ELIG CLIN: HCPCS

## 2024-06-05 PROCEDURE — 1111F DSCHRG MED/CURRENT MED MERGE: CPT

## 2024-06-05 PROCEDURE — 99212 OFFICE O/P EST SF 10 MIN: CPT

## 2024-06-05 PROCEDURE — 3023F SPIROM DOC REV: CPT

## 2024-06-05 RX ORDER — METOPROLOL SUCCINATE 25 MG/1
TABLET, EXTENDED RELEASE ORAL
COMMUNITY
Start: 2024-05-18

## 2024-06-05 RX ORDER — TRAZODONE HYDROCHLORIDE 100 MG/1
100 TABLET ORAL NIGHTLY
COMMUNITY
Start: 2024-05-28

## 2024-06-05 RX ORDER — GABAPENTIN 600 MG/1
600 TABLET ORAL 4 TIMES DAILY PRN
Qty: 30 TABLET | Refills: 1 | Status: SHIPPED
Start: 2024-06-05 | End: 2024-06-05

## 2024-06-05 ASSESSMENT — ENCOUNTER SYMPTOMS
BACK PAIN: 1
BLOOD IN STOOL: 0
CONSTIPATION: 0
COUGH: 0
SHORTNESS OF BREATH: 0
DIARRHEA: 0
ABDOMINAL PAIN: 0
CHEST TIGHTNESS: 1

## 2024-06-05 NOTE — TELEPHONE ENCOUNTER
Left message for patient with appointment date & time for MRI Lumbar Spine & MRI Chest both on 6-27-24.Instructions and directions given to patient via voicemail.

## 2024-06-05 NOTE — PATIENT INSTRUCTIONS
Dear Yocasta Medina,        Thank you for coming to your appointment today. I hope we have addressed all of your needs.       Please make sure to do the following:  - Continue your medications as listed.  - Get tests done before our next follow up. We will call you with any abnormal results.   - We will see each other again in 3 months      Have a great day!        Sincerely,  Matilda Aguirre MD  6/5/2024  11:30 AM

## 2024-06-05 NOTE — PROGRESS NOTES
[facility]  Internal Medicine Residency Clinic    Attending Physician Statement  I have discussed the case, including pertinent history and exam findings with the resident physician.  I agree with the assessment, plan and orders as documented by the resident. I have reviewed all pertinent PMHx, PSHx, FamHx, SocialHx, medications, and allergies and updated history as appropriate.    Patient here for emergency room follow up of medical problems.  Patient's main c/o is right sided intercostal pain.  Had CTA in ER which noted no PE but patient has scarring atelectasis of RUL where there is a chronic herniation between right 6th and 7th ribs. Patient taking max dose ibuprofen and gabapentin 600 mg tid as well as ES acetaminophen greater than max daily dose. States topical lidocaine ineffective. Patient has upcoming pain management appointment. Ok to increase gabapentin to 600 mg qid and decrease dose ES acetaminophen to not more than 3 gm/d or 4 gm/d for regular strength.     Remainder of medical problems as per resident note.    Virgilio Girard, DO  6/5/24    
Printed AVS with drs instructions and fu appointment mailed to patient  
pulses.      Heart sounds: Normal heart sounds. No murmur heard.  Pulmonary:      Effort: Pulmonary effort is normal. No respiratory distress.      Breath sounds: Normal breath sounds. No stridor. No wheezing, rhonchi or rales.   Abdominal:      General: Bowel sounds are normal. There is no distension.      Palpations: Abdomen is soft. There is no mass.      Tenderness: There is no abdominal tenderness. There is no guarding.   Musculoskeletal:      Cervical back: Neck supple.      Right lower leg: No edema.      Left lower leg: No edema.      Comments: Tenderness on lumbar paraspinal area  Tenderness on R sided chest   Lymphadenopathy:      Cervical: No cervical adenopathy.   Skin:     General: Skin is warm.      Capillary Refill: Capillary refill takes less than 2 seconds.      Coloration: Skin is not jaundiced or pale.      Findings: No bruising or rash.   Neurological:      Mental Status: He is alert and oriented to person, place, and time.   Psychiatric:         Mood and Affect: Mood normal.            ASSESSMENT/PLAN:  1. COPD, severe (HCC)  2. Coronary artery disease involving native coronary artery of native heart without angina pectoris  3. Essential hypertension  4. Hx of renal cell carcinoma  5. DEJON (obstructive sleep apnea)  6. Spinal stenosis of lumbar region without neurogenic claudication  7. Chest pain, unspecified type  -     MRI CHEST W WO CONTRAST; Future  8. Chronic pain syndrome  -     gabapentin (NEURONTIN) 600 MG tablet; Take 1 tablet by mouth 4 times daily as needed (R sided chest pain and lower back pain) for up to 60 days., Disp-30 tablet, R-1Normal         RTC:  Return in about 3 months (around 9/5/2024) for pcp follow up.      I have reviewed my findings and recommendations with Yocasta Medina and Dr. Girard.    Matilda Aguirre MD   6/5/2024 1:51 PM

## 2024-06-19 ENCOUNTER — CARE COORDINATION (OUTPATIENT)
Dept: CARE COORDINATION | Age: 59
End: 2024-06-19

## 2024-06-20 ENCOUNTER — OFFICE VISIT (OUTPATIENT)
Dept: PAIN MANAGEMENT | Age: 59
End: 2024-06-20
Payer: MEDICARE

## 2024-06-20 ENCOUNTER — TELEPHONE (OUTPATIENT)
Dept: PAIN MANAGEMENT | Age: 59
End: 2024-06-20

## 2024-06-20 VITALS
SYSTOLIC BLOOD PRESSURE: 132 MMHG | TEMPERATURE: 97.2 F | WEIGHT: 238 LBS | HEART RATE: 73 BPM | DIASTOLIC BLOOD PRESSURE: 75 MMHG | HEIGHT: 71 IN | BODY MASS INDEX: 33.32 KG/M2 | RESPIRATION RATE: 16 BRPM | OXYGEN SATURATION: 97 %

## 2024-06-20 DIAGNOSIS — J98.4 HERNIATION OF RIGHT LUNG: ICD-10-CM

## 2024-06-20 DIAGNOSIS — G89.4 CHRONIC PAIN SYNDROME: Primary | ICD-10-CM

## 2024-06-20 DIAGNOSIS — M54.16 LUMBAR RADICULOPATHY: ICD-10-CM

## 2024-06-20 DIAGNOSIS — G58.8 INTERCOSTAL NEURALGIA: ICD-10-CM

## 2024-06-20 DIAGNOSIS — M54.42 CHRONIC BILATERAL LOW BACK PAIN WITH BILATERAL SCIATICA: ICD-10-CM

## 2024-06-20 DIAGNOSIS — G89.29 CHRONIC BILATERAL LOW BACK PAIN WITH BILATERAL SCIATICA: ICD-10-CM

## 2024-06-20 DIAGNOSIS — M54.41 CHRONIC BILATERAL LOW BACK PAIN WITH BILATERAL SCIATICA: ICD-10-CM

## 2024-06-20 PROCEDURE — 3075F SYST BP GE 130 - 139MM HG: CPT | Performed by: PAIN MEDICINE

## 2024-06-20 PROCEDURE — 3078F DIAST BP <80 MM HG: CPT | Performed by: PAIN MEDICINE

## 2024-06-20 PROCEDURE — 1036F TOBACCO NON-USER: CPT | Performed by: PAIN MEDICINE

## 2024-06-20 PROCEDURE — G8417 CALC BMI ABV UP PARAM F/U: HCPCS | Performed by: PAIN MEDICINE

## 2024-06-20 PROCEDURE — 3017F COLORECTAL CA SCREEN DOC REV: CPT | Performed by: PAIN MEDICINE

## 2024-06-20 PROCEDURE — G8427 DOCREV CUR MEDS BY ELIG CLIN: HCPCS | Performed by: PAIN MEDICINE

## 2024-06-20 PROCEDURE — 99204 OFFICE O/P NEW MOD 45 MIN: CPT | Performed by: PAIN MEDICINE

## 2024-06-20 PROCEDURE — 1111F DSCHRG MED/CURRENT MED MERGE: CPT | Performed by: PAIN MEDICINE

## 2024-06-20 PROCEDURE — 99205 OFFICE O/P NEW HI 60 MIN: CPT | Performed by: PAIN MEDICINE

## 2024-06-20 RX ORDER — PREGABALIN 100 MG/1
100 CAPSULE ORAL 3 TIMES DAILY
Qty: 90 CAPSULE | Refills: 1 | Status: SHIPPED | OUTPATIENT
Start: 2024-06-20 | End: 2024-08-19

## 2024-06-20 NOTE — PROGRESS NOTES
Yocasta Medina presents to the [facility] on 6/20/2024. Yocasta is complaining of pain low back . The pain is constant. The pain is described as aching and throbbing. Pain is rated on his best day at a 6, on his worst day at a 10, and on average at a 8 on the VAS scale. He took his last dose of Neurontin .    Any procedures since your last visit: No,   He is not on NSAIDS and  is  on anticoagulation medications to include ASA and is managed by Lizet Tineo MD  .     Pacemaker or defibrillator: No     Medication Contract and Consent for Opioid Use Documents Filed        No documents found                       /75   Pulse 73   Temp 97.2 °F (36.2 °C) (Temporal)   Resp 16   Ht 1.803 m (5' 11\")   Wt 108 kg (238 lb)   SpO2 97%   BMI 33.19 kg/m²      No LMP for male patient.  
    Frequency of Communication with Friends and Family: Never     Frequency of Social Gatherings with Friends and Family: Never     Attends Sikhism Services: Never     Active Member of Clubs or Organizations: No     Attends Club or Organization Meetings: Never     Marital Status:    Intimate Partner Violence: Not on file   Housing Stability: Low Risk  (5/30/2024)    Housing Stability Vital Sign     Unable to Pay for Housing in the Last Year: No     Number of Places Lived in the Last Year: 1     Unstable Housing in the Last Year: No       Family History   Problem Relation Age of Onset    Lung Cancer Mother     Hypertension Mother     Other Father         AAA    Hypertension Father     Coronary Art Dis Father         s/p stent    Kidney Cancer Sister     Thyroid Cancer Sister     Hypertension Sister     Hypertension Sister     Cancer Sister     Ovarian Cancer Sister        REVIEW OF SYSTEMS:     Loral denies fever/chills, chest pain, shortness of breath, new bowel or bladder complaints. All other review of systems was negative.    PHYSICAL EXAMINATION:      /75   Pulse 73   Temp 97.2 °F (36.2 °C) (Temporal)   Resp 16   Ht 1.803 m (5' 11\")   Wt 108 kg (238 lb)   SpO2 97%   BMI 33.19 kg/m²     General:       General appearance:pleasant and well-hydrated, in no distress and A & O x3  Build:Overweight  Function:Rises from a seated position with difficulty     HEENT:     Head:normocephalic, atraumatic  Pupils:regular, round, equal  Sclera: icterus absent     Lungs:     Breathing:normal breathing pattern     Abdomen:     Shape:non-distended  Tenderness:none  Guarding:none     Throacic/Lumbar spine:     Spine inspection:normal   CVA tenderness:No   Palpation:tenderness paravertebral muscles, left, right positive. + tenderness right-sided ribs mid thoracic level in mid axillary line  Range of motion:abnormal mildly in lateral bending, flexion, extension rotation bilateral and is painful.

## 2024-06-20 NOTE — TELEPHONE ENCOUNTER
The pharmacy called for clarity on if he was to stop the gabapentin 300 to start the lyrica 100mg. I advised that per your note- Continue gabapentin from outside provider (300 mg TID). She sated the patient can only be on one or the other. Please advise.

## 2024-06-27 ENCOUNTER — HOSPITAL ENCOUNTER (OUTPATIENT)
Dept: MRI IMAGING | Age: 59
Discharge: HOME | End: 2024-06-29
Payer: MEDICARE

## 2024-06-27 DIAGNOSIS — G89.29 CHRONIC BILATERAL LOW BACK PAIN WITH BILATERAL SCIATICA: ICD-10-CM

## 2024-06-27 DIAGNOSIS — M54.41 CHRONIC BILATERAL LOW BACK PAIN WITH BILATERAL SCIATICA: ICD-10-CM

## 2024-06-27 DIAGNOSIS — M54.16 LUMBAR RADICULOPATHY: ICD-10-CM

## 2024-06-27 DIAGNOSIS — R07.9 CHEST PAIN, UNSPECIFIED TYPE: ICD-10-CM

## 2024-06-27 DIAGNOSIS — M54.42 CHRONIC BILATERAL LOW BACK PAIN WITH BILATERAL SCIATICA: ICD-10-CM

## 2024-06-27 PROCEDURE — 72158 MRI LUMBAR SPINE W/O & W/DYE: CPT

## 2024-06-27 PROCEDURE — A9579 GAD-BASE MR CONTRAST NOS,1ML: HCPCS | Performed by: RADIOLOGY

## 2024-06-27 PROCEDURE — 71552 MRI CHEST W/O & W/DYE: CPT

## 2024-06-27 PROCEDURE — 6360000004 HC RX CONTRAST MEDICATION: Performed by: RADIOLOGY

## 2024-06-27 RX ADMIN — GADOTERIDOL 20 ML: 279.3 INJECTION, SOLUTION INTRAVENOUS at 13:24

## 2024-07-01 ENCOUNTER — HOSPITAL ENCOUNTER (OUTPATIENT)
Dept: PULMONOLOGY | Age: 59
Discharge: HOME | End: 2024-07-01
Attending: INTERNAL MEDICINE
Payer: MEDICARE

## 2024-07-01 DIAGNOSIS — J44.9 COPD, SEVERE (HCC): ICD-10-CM

## 2024-07-01 PROCEDURE — 94060 EVALUATION OF WHEEZING: CPT

## 2024-07-01 PROCEDURE — 94729 DIFFUSING CAPACITY: CPT

## 2024-07-01 PROCEDURE — 94726 PLETHYSMOGRAPHY LUNG VOLUMES: CPT

## 2024-07-02 ENCOUNTER — TELEPHONE (OUTPATIENT)
Dept: PAIN MANAGEMENT | Age: 59
End: 2024-07-02

## 2024-07-02 ENCOUNTER — CARE COORDINATION (OUTPATIENT)
Dept: CARE COORDINATION | Age: 59
End: 2024-07-02

## 2024-07-02 DIAGNOSIS — G89.29 CHRONIC BILATERAL LOW BACK PAIN WITH BILATERAL SCIATICA: ICD-10-CM

## 2024-07-02 DIAGNOSIS — M54.41 CHRONIC BILATERAL LOW BACK PAIN WITH BILATERAL SCIATICA: ICD-10-CM

## 2024-07-02 DIAGNOSIS — M54.16 LUMBAR RADICULOPATHY: ICD-10-CM

## 2024-07-02 DIAGNOSIS — J98.4 HERNIATION OF RIGHT LUNG: ICD-10-CM

## 2024-07-02 DIAGNOSIS — G58.8 INTERCOSTAL NEURALGIA: ICD-10-CM

## 2024-07-02 DIAGNOSIS — M54.42 CHRONIC BILATERAL LOW BACK PAIN WITH BILATERAL SCIATICA: ICD-10-CM

## 2024-07-02 DIAGNOSIS — G89.4 CHRONIC PAIN SYNDROME: Primary | ICD-10-CM

## 2024-07-02 NOTE — CARE COORDINATION
Care Transitions Note    Final Call     Attempted to reach patient for transitions of care follow up.  Unable to reach patient.      Outreach Attempts:   HIPAA compliant voicemail left for patient.     Patient graduated from the Care Transitions program on 7/2/24.  Patient/family  unable to reach patient for final outreach .      Care Summary Note: New since last outreach.    Pulmonology (Олег) F/U PFT scheduled 7/23/24    Phys Med (Stacie) New Pt appt 7/16/24    PFT completed 7/1/24     MRI Lumbar Spine completed 6/27/24    Pain Mgmt (Ellie) New Pt appt completed 6/20/24   - MRI Lumbar Spine 6/27/24   - Pregabalin 100 mg TID for 60 days   - STOP Gabapentin    Upcoming Appointments:    Future Appointments         Provider Specialty Dept Phone    7/16/2024 1:00 PM Heather Quinones, DO Physical Medicine and Rehab [phone]    7/23/2024 3:00 PM Selina Denney, DO Pulmonology [phone]    7/31/2024 3:30 PM Sissy Argueta, DO Pain Management [phone]    9/30/2024 9:00 AM Lizet Tineo MD Internal Medicine [phone]            Keisha Lancaster LPN

## 2024-07-02 NOTE — TELEPHONE ENCOUNTER
Yocasta Medina Called requesting to get off of the lyrica and back on the gabapentin. He stated the lyrica is not helping and has increased his already bad anxiety and he would prefer getting back on gabapentin. Please advise.

## 2024-07-03 RX ORDER — GABAPENTIN 600 MG/1
600 TABLET ORAL 4 TIMES DAILY
Qty: 360 TABLET | Refills: 0 | Status: SHIPPED | OUTPATIENT
Start: 2024-07-03 | End: 2024-10-01

## 2024-07-03 NOTE — TELEPHONE ENCOUNTER
Yocasta Medina called and reported to Alyssa that he only has a few pills left and he has no refills.  Please advise.

## 2024-07-03 NOTE — TELEPHONE ENCOUNTER
He can simply change from the pregabalin back to gabapentin.  The gabapentin was prescribed by another physician.  Does he have some at home or further refills that he can restart it?  If not, I can escribe just let me know.    Thanks

## 2024-07-10 DIAGNOSIS — G89.4 CHRONIC PAIN SYNDROME: ICD-10-CM

## 2024-07-11 RX ORDER — IBUPROFEN 800 MG/1
TABLET, FILM COATED ORAL
Qty: 60 TABLET | Refills: 0 | OUTPATIENT
Start: 2024-07-11

## 2024-07-15 ENCOUNTER — TELEPHONE (OUTPATIENT)
Dept: PHYSICAL MEDICINE AND REHAB | Age: 59
End: 2024-07-15

## 2024-07-15 NOTE — TELEPHONE ENCOUNTER
Left voicemail appointment cancelled per Dr. Quinones as patient is seeing pain management and he just received medication refills from them. Instructed a call back to confirm patient got this message

## 2024-07-23 ENCOUNTER — OFFICE VISIT (OUTPATIENT)
Dept: PULMONOLOGY | Age: 59
End: 2024-07-23
Payer: MEDICARE

## 2024-07-23 VITALS
RESPIRATION RATE: 14 BRPM | SYSTOLIC BLOOD PRESSURE: 116 MMHG | TEMPERATURE: 97.3 F | OXYGEN SATURATION: 93 % | DIASTOLIC BLOOD PRESSURE: 60 MMHG

## 2024-07-23 DIAGNOSIS — J44.9 COPD, SEVERE (HCC): Primary | ICD-10-CM

## 2024-07-23 DIAGNOSIS — R06.02 SHORTNESS OF BREATH: ICD-10-CM

## 2024-07-23 DIAGNOSIS — Z87.891 HISTORY OF TOBACCO ABUSE: ICD-10-CM

## 2024-07-23 DIAGNOSIS — J45.51 SEVERE PERSISTENT ASTHMA WITH ACUTE EXACERBATION: ICD-10-CM

## 2024-07-23 DIAGNOSIS — G47.33 OSA (OBSTRUCTIVE SLEEP APNEA): ICD-10-CM

## 2024-07-23 PROCEDURE — 3017F COLORECTAL CA SCREEN DOC REV: CPT | Performed by: INTERNAL MEDICINE

## 2024-07-23 PROCEDURE — 3074F SYST BP LT 130 MM HG: CPT | Performed by: INTERNAL MEDICINE

## 2024-07-23 PROCEDURE — 3078F DIAST BP <80 MM HG: CPT | Performed by: INTERNAL MEDICINE

## 2024-07-23 PROCEDURE — G8428 CUR MEDS NOT DOCUMENT: HCPCS | Performed by: INTERNAL MEDICINE

## 2024-07-23 PROCEDURE — G8417 CALC BMI ABV UP PARAM F/U: HCPCS | Performed by: INTERNAL MEDICINE

## 2024-07-23 PROCEDURE — 99213 OFFICE O/P EST LOW 20 MIN: CPT | Performed by: INTERNAL MEDICINE

## 2024-07-23 PROCEDURE — 3023F SPIROM DOC REV: CPT | Performed by: INTERNAL MEDICINE

## 2024-07-23 PROCEDURE — 1036F TOBACCO NON-USER: CPT | Performed by: INTERNAL MEDICINE

## 2024-07-23 RX ORDER — BUDESONIDE, GLYCOPYRROLATE, AND FORMOTEROL FUMARATE 160; 9; 4.8 UG/1; UG/1; UG/1
2 AEROSOL, METERED RESPIRATORY (INHALATION) 2 TIMES DAILY
Qty: 5.9 G | Refills: 5 | Status: SHIPPED | OUTPATIENT
Start: 2024-07-23

## 2024-07-23 RX ORDER — BUDESONIDE 0.5 MG/2ML
0.5 INHALANT ORAL
Qty: 60 EACH | Refills: 3 | Status: SHIPPED | OUTPATIENT
Start: 2024-07-23

## 2024-07-23 RX ORDER — ROFLUMILAST 500 UG/1
500 TABLET ORAL DAILY
Qty: 30 TABLET | Refills: 5 | Status: SHIPPED | OUTPATIENT
Start: 2024-07-23

## 2024-07-23 RX ORDER — ARFORMOTEROL TARTRATE 15 UG/2ML
15 SOLUTION RESPIRATORY (INHALATION)
Qty: 120 ML | Refills: 3 | Status: SHIPPED | OUTPATIENT
Start: 2024-07-23

## 2024-07-23 RX ORDER — IPRATROPIUM BROMIDE AND ALBUTEROL SULFATE 2.5; .5 MG/3ML; MG/3ML
3 SOLUTION RESPIRATORY (INHALATION)
Qty: 360 ML | Refills: 5 | Status: SHIPPED | OUTPATIENT
Start: 2024-07-23

## 2024-07-23 RX ORDER — ALBUTEROL SULFATE 90 UG/1
2 AEROSOL, METERED RESPIRATORY (INHALATION) EVERY 6 HOURS PRN
Qty: 18 G | Refills: 5 | Status: SHIPPED | OUTPATIENT
Start: 2024-07-23

## 2024-07-23 NOTE — PROGRESS NOTES
Patient will be referred to  for lung transplant eval.  Patient had medications refilled during office visit.    
predicted.  This is slightly improved when compared to prior.  Please see separate full dictation from PFT.    Spirometry completed previosly January 17, 2024 shows FVC of 1.98 L which is 40% of predicted.  FEV1 is 1.42 L which is 37% of predicted.  FEV1 FVC ratio 72.  The MVV was 42 which is 28% of predicted.  SVC was 1.83 L which is 37% of predicted.  Flow volume loop is consistent with obstruction.  Overall compared to the prior spirometry which was completed in July there has been a significant decrease in both his FEV1 and FVC.    Spirometry as of July 11, 2023: FVC is 2.97 L which is 60% of predicted.  FEV1 is 2.15 L which is 57% of predicted.  FEV1 FVC ratio is 73.  MVV is 70 which is 40% of predicted.  SVC is 3.25 L which is 66% of predicted.  Flow volume loop is consistent with both restriction and obstruction.  Overall spirometry is consistent with severe COPD.  It is actually improved however from last spirometry.    Prior spirometry shows FVC of 2.65 L which is 53% of predicted FEV1 is 1.93 L which is 50% of predicted FEV1 FVC ratio is 73.  MVV is 45 which is 30% of predicted.  SVC is 2.10 which is 42% of predicted.  Flow volume loops are consistent with a combined obstruction and restriction.  Compared to the prior spirometry on 2019 the patient has had a 20% decline in both his FEV1 and FVC.    Updated spirometry as of January 11, 2022.  FVC is 2.73 L which is 55% of predicted.  FEV1 is 2.09 L which is 55% of predicted.  FEV1 FVC ratio is 77.  MVV is 61 which is 41% of predicted lung volumes show SVC of 2.64 L which is 53% of predicted.  ERV is 0.15 L which is 8% of predicted.  Flow volume loop is consistent with restriction.  Overall spirometry is stable compared to prior.    CT of the lung on May 5, 2022 shows stable right lower lobe pulmonary herniation.  No nodules or masses.    IMPRESSION:       1.  Severe COPD  2.  Eosinophilic asthma  3.  Morbid obesity  4.  Prior tobacco use-quit 8 months   5.

## 2024-07-23 NOTE — PATIENT INSTRUCTIONS
Selina Denney    [facility]  [address] [place] [id number]  Office: [phone]      Your were seen in the office today for COPD/ashtma      We  did not make changes to your medications today.       Testing ordered today was none    Referral placed to [facility] for transplant evaluation    Vaccines recommended none      Follow up in 3 months        Please do not hesitate to call the office with any questions.

## 2024-07-30 NOTE — PROGRESS NOTES
[facility]        [address], [place] [address]  Dept: [phone]        Follow up Note      Yocasta Medina     Date of Visit:  24     CC:  Patient presents for follow up   Chief Complaint   Patient presents with    Follow-up     Lower back       HPI:    Pain is unchanged.  Change in quality of symptoms: no  Medication side effects: anxiety from pregabalin .   Recent diagnostic testing:no  Recent interventional procedures:none.    He has been on anticoagulation medications to include ASA and NSAIDS and has not been on herbal supplements.  He is not diabetic.     Imagin/2024 MRI lumbar w/ and w/o -  FINDINGS:  BONES/ALIGNMENT: There is normal alignment of the spine. The vertebral body  heights are maintained. The bone marrow signal appears unremarkable.     SPINAL CORD:  The conus terminates normally.     SOFT TISSUES: No abnormal enhancement is seen of the lumbar spine.  No  paraspinal mass identified.     L1-L2: Small broad-based disc protrusion.  No significant central canal,  lateral recess or neural foraminal stenoses.     L2-L3: Disc desiccation with a minimal disc bulge.  No significant central  canal, lateral recess or neural foraminal stenoses.     L3-L4: Small disc osteophyte complex.  Mild facet hypertrophy.  No  significant central canal or lateral recess stenosis.  Mild neural foraminal  stenoses.     L4-L5: Disc desiccation with a disc bulge.  Mild facet hypertrophy.  Mild-to-moderate central canal and lateral recess stenoses.  Mild neural  foraminal stenoses.     L5-S1: There is no significant disc protrusion, spinal canal stenosis or  neural foraminal narrowing.     IMPRESSION:  1. No evidence of fracture or metastatic disease in the lumbar spine.  2. Mild-to-moderate central canal and lateral recess stenoses at L4-5.  3. Mild neural foraminal stenoses at L3-4 and L4-5.    2024 CTA chest -  FINDINGS:  There is no acute pulmonary

## 2024-07-31 ENCOUNTER — OFFICE VISIT (OUTPATIENT)
Dept: PAIN MANAGEMENT | Age: 59
End: 2024-07-31
Payer: MEDICARE

## 2024-07-31 VITALS
HEART RATE: 76 BPM | OXYGEN SATURATION: 96 % | WEIGHT: 238 LBS | BODY MASS INDEX: 33.32 KG/M2 | DIASTOLIC BLOOD PRESSURE: 68 MMHG | HEIGHT: 71 IN | SYSTOLIC BLOOD PRESSURE: 110 MMHG

## 2024-07-31 DIAGNOSIS — G58.8 INTERCOSTAL NEURALGIA: ICD-10-CM

## 2024-07-31 DIAGNOSIS — G89.4 CHRONIC PAIN SYNDROME: Primary | ICD-10-CM

## 2024-07-31 DIAGNOSIS — M54.41 CHRONIC BILATERAL LOW BACK PAIN WITH BILATERAL SCIATICA: ICD-10-CM

## 2024-07-31 DIAGNOSIS — M54.16 LUMBAR RADICULOPATHY: ICD-10-CM

## 2024-07-31 DIAGNOSIS — J98.4 HERNIATION OF RIGHT LUNG: ICD-10-CM

## 2024-07-31 DIAGNOSIS — M54.42 CHRONIC BILATERAL LOW BACK PAIN WITH BILATERAL SCIATICA: ICD-10-CM

## 2024-07-31 DIAGNOSIS — M79.10 MYALGIA: ICD-10-CM

## 2024-07-31 DIAGNOSIS — G89.29 CHRONIC BILATERAL LOW BACK PAIN WITH BILATERAL SCIATICA: ICD-10-CM

## 2024-07-31 PROCEDURE — 3017F COLORECTAL CA SCREEN DOC REV: CPT | Performed by: PAIN MEDICINE

## 2024-07-31 PROCEDURE — 3074F SYST BP LT 130 MM HG: CPT | Performed by: PAIN MEDICINE

## 2024-07-31 PROCEDURE — G8417 CALC BMI ABV UP PARAM F/U: HCPCS | Performed by: PAIN MEDICINE

## 2024-07-31 PROCEDURE — 99214 OFFICE O/P EST MOD 30 MIN: CPT | Performed by: PAIN MEDICINE

## 2024-07-31 PROCEDURE — 1036F TOBACCO NON-USER: CPT | Performed by: PAIN MEDICINE

## 2024-07-31 PROCEDURE — 99214 OFFICE O/P EST MOD 30 MIN: CPT

## 2024-07-31 PROCEDURE — 3078F DIAST BP <80 MM HG: CPT | Performed by: PAIN MEDICINE

## 2024-07-31 PROCEDURE — G8427 DOCREV CUR MEDS BY ELIG CLIN: HCPCS | Performed by: PAIN MEDICINE

## 2024-07-31 RX ORDER — GABAPENTIN 600 MG/1
600 TABLET ORAL 4 TIMES DAILY
Qty: 360 TABLET | Refills: 0 | Status: SHIPPED | OUTPATIENT
Start: 2024-07-31 | End: 2024-10-29

## 2024-07-31 NOTE — PROGRESS NOTES
Yocasta Medina presents to the [facility] on 7/31/2024. Yocasta is complaining of pain in lower back. The pain is constant. The pain is described as aching and throbbing. Pain is rated on his best day at a 6, on his worst day at a 10, and on average at a 8 on the VAS scale. He took his last dose of Lyrica, Neurontin, and ibuprofen  today.     Any procedures since your last visit: No    Pacemaker or defibrillator: No    He is not on NSAIDS and is  on anticoagulation medications to include ASA and is managed by Lizet Tineo MD .     Medication Contract and Consent for Opioid Use Documents Filed        No documents found                    There were no vitals taken for this visit.     No LMP for male patient.

## 2024-08-01 DIAGNOSIS — G89.4 CHRONIC PAIN SYNDROME: ICD-10-CM

## 2024-08-02 ENCOUNTER — TELEPHONE (OUTPATIENT)
Dept: INTERNAL MEDICINE | Age: 59
End: 2024-08-02

## 2024-08-02 DIAGNOSIS — G58.8 INTERCOSTAL NEURALGIA: Primary | ICD-10-CM

## 2024-08-02 RX ORDER — IBUPROFEN 800 MG/1
TABLET, FILM COATED ORAL
Qty: 60 TABLET | Refills: 0 | OUTPATIENT
Start: 2024-08-02

## 2024-08-02 RX ORDER — ACETAMINOPHEN 500 MG
500 TABLET ORAL 4 TIMES DAILY PRN
Qty: 240 TABLET | Refills: 0 | Status: SHIPPED | OUTPATIENT
Start: 2024-08-02 | End: 2024-10-01

## 2024-08-02 NOTE — TELEPHONE ENCOUNTER
Last Appointment:  4/17/2024  Future Appointments   Date Time Provider Department Center   9/30/2024  9:00 AM Lizet Tineo MD ACC  BS E[facility] DEP   10/30/2024  2:00 PM Sissy Argueta DO BDM PAIN [facility]

## 2024-08-02 NOTE — TELEPHONE ENCOUNTER
Call placed to Mr. Medina to discuss his Ibuprofen refill request. Of note, instead of 0.5 tablets (800 mg) by mouth BID patient states that he is taking 800 mg PO TID. Note from pain management reviewed and the physician who saw the patient states that no NSAIDs for the patient given his history of Nephrectomy.  I discuss in length with the patient the need to discontinue Ibuprofen. I discuss with the patient other pain medication modality including acetaminophen and patient is agreeable to take Tylenol for now and continue to take his gabapentin. Order for Tylenol placed. I discussed the case with Dr. Nava.         Chris Noland MD   Internal Medicine   Resident, PGY-3

## 2024-08-13 NOTE — TELEPHONE ENCOUNTER
Phone contact with pt as pt left before LSW could meet in office on 9.22.23  Advised pt that transportation has been arranged with [id number] for 10. 4.23 pulmonary appt and to be ready at 10:00am and return trip home for 12 noon. Advised to discuss portable oxygen requests with Dr Mike Russell. Reviewed infusion appt and transport time restrictions with PAR and pt states he will find transportation.   Provided LSW direct number to call with other questions or need for coordination of transportation tinnitus

## 2024-09-30 ENCOUNTER — OFFICE VISIT (OUTPATIENT)
Dept: INTERNAL MEDICINE | Age: 59
End: 2024-09-30
Payer: MEDICARE

## 2024-09-30 VITALS
RESPIRATION RATE: 18 BRPM | BODY MASS INDEX: 33.25 KG/M2 | WEIGHT: 237.5 LBS | DIASTOLIC BLOOD PRESSURE: 86 MMHG | TEMPERATURE: 97.9 F | HEART RATE: 83 BPM | SYSTOLIC BLOOD PRESSURE: 130 MMHG | HEIGHT: 71 IN | OXYGEN SATURATION: 95 %

## 2024-09-30 DIAGNOSIS — G58.8 INTERCOSTAL NEURALGIA: ICD-10-CM

## 2024-09-30 DIAGNOSIS — Z12.11 ENCOUNTER FOR SCREENING COLONOSCOPY: Primary | ICD-10-CM

## 2024-09-30 DIAGNOSIS — E55.9 VITAMIN D DEFICIENCY: ICD-10-CM

## 2024-09-30 DIAGNOSIS — G89.29 CHRONIC BILATERAL LOW BACK PAIN WITH BILATERAL SCIATICA: ICD-10-CM

## 2024-09-30 DIAGNOSIS — K21.9 GASTROESOPHAGEAL REFLUX DISEASE WITHOUT ESOPHAGITIS: ICD-10-CM

## 2024-09-30 DIAGNOSIS — M54.16 LUMBAR RADICULOPATHY: ICD-10-CM

## 2024-09-30 DIAGNOSIS — E78.2 MIXED HYPERLIPIDEMIA: ICD-10-CM

## 2024-09-30 DIAGNOSIS — G89.29 CHRONIC MIDLINE LOW BACK PAIN WITHOUT SCIATICA: ICD-10-CM

## 2024-09-30 DIAGNOSIS — M54.42 CHRONIC BILATERAL LOW BACK PAIN WITH BILATERAL SCIATICA: ICD-10-CM

## 2024-09-30 DIAGNOSIS — M54.50 CHRONIC MIDLINE LOW BACK PAIN WITHOUT SCIATICA: ICD-10-CM

## 2024-09-30 DIAGNOSIS — F41.9 ANXIETY: ICD-10-CM

## 2024-09-30 DIAGNOSIS — M54.41 CHRONIC BILATERAL LOW BACK PAIN WITH BILATERAL SCIATICA: ICD-10-CM

## 2024-09-30 DIAGNOSIS — I25.119 CORONARY ARTERY DISEASE INVOLVING NATIVE HEART WITH ANGINA PECTORIS, UNSPECIFIED VESSEL OR LESION TYPE (HCC): ICD-10-CM

## 2024-09-30 DIAGNOSIS — G89.4 CHRONIC PAIN SYNDROME: ICD-10-CM

## 2024-09-30 PROBLEM — M94.0 COSTOCHONDRITIS: Status: RESOLVED | Noted: 2024-05-31 | Resolved: 2024-09-30

## 2024-09-30 PROBLEM — M79.10 MYALGIA: Status: RESOLVED | Noted: 2024-07-31 | Resolved: 2024-09-30

## 2024-09-30 PROBLEM — M48.061 SPINAL STENOSIS OF LUMBAR REGION WITHOUT NEUROGENIC CLAUDICATION: Status: RESOLVED | Noted: 2019-05-10 | Resolved: 2024-09-30

## 2024-09-30 PROCEDURE — 99212 OFFICE O/P EST SF 10 MIN: CPT

## 2024-09-30 RX ORDER — OMEPRAZOLE 40 MG/1
40 CAPSULE, DELAYED RELEASE ORAL DAILY
Qty: 90 CAPSULE | Refills: 1 | Status: SHIPPED | OUTPATIENT
Start: 2024-09-30

## 2024-09-30 RX ORDER — PROMETHAZINE HYDROCHLORIDE 25 MG/1
25 TABLET ORAL 3 TIMES DAILY PRN
Qty: 90 TABLET | Refills: 1 | Status: SHIPPED | OUTPATIENT
Start: 2024-09-30

## 2024-09-30 RX ORDER — PREGABALIN 100 MG/1
100 CAPSULE ORAL 3 TIMES DAILY
Qty: 90 CAPSULE | Refills: 1 | Status: CANCELLED | OUTPATIENT
Start: 2024-09-30 | End: 2024-11-29

## 2024-09-30 RX ORDER — MULTIVIT-MIN/IRON/FOLIC ACID/K 18-600-40
1000 CAPSULE ORAL DAILY
Qty: 90 TABLET | Refills: 1 | Status: SHIPPED | OUTPATIENT
Start: 2024-09-30

## 2024-09-30 RX ORDER — ASCORBIC ACID 500 MG
1000 TABLET ORAL DAILY
Qty: 90 TABLET | Refills: 1 | Status: SHIPPED | OUTPATIENT
Start: 2024-09-30

## 2024-09-30 RX ORDER — ASPIRIN 81 MG/1
81 TABLET ORAL DAILY
Qty: 90 TABLET | Refills: 1 | Status: SHIPPED | OUTPATIENT
Start: 2024-09-30

## 2024-09-30 RX ORDER — EZETIMIBE 10 MG/1
10 TABLET ORAL NIGHTLY
Qty: 30 TABLET | Refills: 3 | Status: SHIPPED | OUTPATIENT
Start: 2024-09-30

## 2024-09-30 RX ORDER — BUSPIRONE HYDROCHLORIDE 30 MG/1
30 TABLET ORAL 2 TIMES DAILY
Qty: 180 TABLET | Refills: 1 | Status: SHIPPED | OUTPATIENT
Start: 2024-09-30

## 2024-09-30 RX ORDER — IBUPROFEN 800 MG/1
400 TABLET, FILM COATED ORAL 2 TIMES DAILY PRN
Qty: 60 TABLET | Refills: 1 | Status: SHIPPED
Start: 2024-09-30 | End: 2024-09-30 | Stop reason: SINTOL

## 2024-09-30 ASSESSMENT — ENCOUNTER SYMPTOMS
RHINORRHEA: 0
ABDOMINAL PAIN: 0
DIARRHEA: 0
COUGH: 1
VOMITING: 0
CONSTIPATION: 0
SHORTNESS OF BREATH: 1
COLOR CHANGE: 0

## 2024-09-30 NOTE — PATIENT INSTRUCTIONS
Dear Yocasta Medina,    Thank you for coming to your appointment today. I hope we have addressed all of your needs.     Please make sure to do the following:  - Continue your medications as listed.  - Get labs drawn before our next follow up.  - Referrals have been made to General Surgery:  If you do not hear from the office in 1 week, please call the number listed.  - We will see each other again in 3 months    Call for a sooner appointment if you develop any new or worsening symptoms.    Have a great day!    Sincerely,  Lizet Tineo MD  9/30/2024  10:20 AM

## 2024-09-30 NOTE — PROGRESS NOTES
[facility]  Internal Medicine Residency Clinic    Attending Physician Statement  I have discussed the case, including pertinent history and exam findings with the resident physician.  I agree with the assessment, plan and orders as documented by the resident. I have reviewed the relevant PMHx, PSHx, FamHx, SocialHx, medications, and allergies and updated history as appropriate.    Patient presents for routine follow up of medical problems.   COPD, very severe with eosinophilic asthma  Continues on Breztri, LIZZIE prn, fasenra, roflumilast.    Recommend referral to pulmonary rehab -- he will consider this.    Remains tobacco free for 1 year    Reviewed pulmonary follow up and noted referral to  for lung transplant consideration    Solitary kidney with hx RCC s/p nephrectomy   Check CMP, UA in next 3 months    Obesity class 1 BMI 32  Counseled continued weight loss, increase cardio    Screening:   Referral for colonoscopy   Defers vaccinations    Remainder of medical problems as per resident note.    Rhys Nava DO  9/30/2024 10:04 AM    
Labs/Imaging.    I have reviewed my findings and recommendations with Yocasta Medina and Dr. Nava.    Lizet Tineo MD   9/30/2024 4:58 PM

## 2024-10-01 RX ORDER — METOPROLOL SUCCINATE 25 MG/1
TABLET, EXTENDED RELEASE ORAL
Qty: 30 TABLET | Status: CANCELLED | OUTPATIENT
Start: 2024-10-01

## 2024-10-01 RX ORDER — METOPROLOL SUCCINATE 25 MG/1
12.5 TABLET, EXTENDED RELEASE ORAL DAILY
Qty: 30 TABLET | Refills: 3 | Status: SHIPPED | OUTPATIENT
Start: 2024-10-01

## 2024-10-01 NOTE — TELEPHONE ENCOUNTER
Last Appointment:  9/30/2024  Future Appointments   Date Time Provider Department Center   10/30/2024  2:00 PM Sissy Argueta, DO BDM PAIN MAR [facility]   11/6/2024  9:20 AM Selina Denney DO ACC Pulm [facility]   1/27/2025  9:00 AM Lizet Tineo MD ACC  BS ECC DEP

## 2024-10-16 ENCOUNTER — TELEPHONE (OUTPATIENT)
Dept: SURGERY | Age: 59
End: 2024-10-16

## 2024-10-16 ENCOUNTER — OFFICE VISIT (OUTPATIENT)
Dept: SURGERY | Age: 59
End: 2024-10-16
Payer: MEDICARE

## 2024-10-16 ENCOUNTER — PREP FOR PROCEDURE (OUTPATIENT)
Dept: SURGERY | Age: 59
End: 2024-10-16

## 2024-10-16 VITALS
SYSTOLIC BLOOD PRESSURE: 129 MMHG | WEIGHT: 237 LBS | BODY MASS INDEX: 33.18 KG/M2 | DIASTOLIC BLOOD PRESSURE: 83 MMHG | HEART RATE: 76 BPM | RESPIRATION RATE: 16 BRPM | HEIGHT: 71 IN

## 2024-10-16 DIAGNOSIS — R19.7 DIARRHEA, UNSPECIFIED TYPE: ICD-10-CM

## 2024-10-16 DIAGNOSIS — K30 INDIGESTION: ICD-10-CM

## 2024-10-16 DIAGNOSIS — R10.84 GENERALIZED ABDOMINAL PAIN: ICD-10-CM

## 2024-10-16 DIAGNOSIS — Z86.0100 HX OF COLONIC POLYPS: ICD-10-CM

## 2024-10-16 DIAGNOSIS — R12 HEARTBURN: ICD-10-CM

## 2024-10-16 DIAGNOSIS — Z86.0101 HX OF ADENOMATOUS COLONIC POLYPS: ICD-10-CM

## 2024-10-16 DIAGNOSIS — R68.81 EARLY SATIETY: ICD-10-CM

## 2024-10-16 DIAGNOSIS — R12 HEARTBURN: Primary | ICD-10-CM

## 2024-10-16 PROCEDURE — 3017F COLORECTAL CA SCREEN DOC REV: CPT | Performed by: SURGERY

## 2024-10-16 PROCEDURE — G8417 CALC BMI ABV UP PARAM F/U: HCPCS | Performed by: SURGERY

## 2024-10-16 PROCEDURE — G8484 FLU IMMUNIZE NO ADMIN: HCPCS | Performed by: SURGERY

## 2024-10-16 PROCEDURE — G8427 DOCREV CUR MEDS BY ELIG CLIN: HCPCS | Performed by: SURGERY

## 2024-10-16 PROCEDURE — 1036F TOBACCO NON-USER: CPT | Performed by: SURGERY

## 2024-10-16 PROCEDURE — 3074F SYST BP LT 130 MM HG: CPT | Performed by: SURGERY

## 2024-10-16 PROCEDURE — 99213 OFFICE O/P EST LOW 20 MIN: CPT | Performed by: SURGERY

## 2024-10-16 PROCEDURE — 3079F DIAST BP 80-89 MM HG: CPT | Performed by: SURGERY

## 2024-10-16 RX ORDER — SODIUM PICOSULFATE, MAGNESIUM OXIDE, AND ANHYDROUS CITRIC ACID 12; 3.5; 1 G/175ML; G/175ML; MG/175ML
175 LIQUID ORAL ONCE
Qty: 2 EACH | Refills: 0 | Status: SHIPPED | OUTPATIENT
Start: 2024-10-16 | End: 2024-10-16

## 2024-10-16 RX ORDER — IBUPROFEN 800 MG/1
TABLET, FILM COATED ORAL
COMMUNITY
Start: 2024-09-30

## 2024-10-16 NOTE — TELEPHONE ENCOUNTER
Scheduled pt for Colonoscopy & EGD 24 at 1:15PM. Pt needs to arrive at [facility] at 12PM. Patient confirmed date and time, address and directions given in office. Prep instructions given in office, patient understood.      Prior Authorization Form:      DEMOGRAPHICS:                     Patient Name:  Yocasta Medina  Patient :  1965            Insurance:  Payor: MEDICARE / Plan: MEDICARE PART A AND B / Product Type: *No Product type* /   Insurance ID Number:    Payer/Plan Subscr  Sex Relation Sub. Ins. ID Effective Group Num   1. MEDICARE - ME* YOCASTA MEDINA ED* 1965 Male Self [id number]                                    [address]          DIAGNOSIS & PROCEDURE:                       Procedure/Operation: EGD & COLONOSCOPY           CPT Code: 73950, 48454    Diagnosis:  HEARTBURN, INDIGESTION, EARLY SATIETY, GENERALIZED ABDOMINAL PAIN, DIARRHEA, HX OF ADENOMATOUS COLONIC POLYPS    ICD10 Code: R12, K30, R68.81, R10.84, R19.7, Z86.010    Location:  [facility]    Surgeon:  ROXY MARSH    SCHEDULING INFORMATION:                          Date: 24    Time: 1:15PM              Anesthesia:  LMAC                                                       Status:  OUTPATIENT       Special Comments:  N/A       Electronically signed by Taya Reinoso MA on 10/16/2024 at 12:34 PM

## 2024-10-16 NOTE — PATIENT INSTRUCTIONS
Call [phone] for any questions/concerns.    Patient Information and Instructions for  Upper GI Endoscopy or Esophagogastroduodenoscopy [EGD])         Definition Upper GI Endoscopy or Esophagogastroduodenoscopy [EGD])  This is a test that uses a fiberoptic scope to examine the esophagus (throat), stomach, and upper part of the small intestines.     Upper GI endoscopy may be recommended if you have:   Abdominal pain   Severe heartburn   Persistent nausea and vomiting   Difficulty swallowing   Blood in stool or vomit   Abnormal x-ray or other examinations of the gastrointestinal tract     Conditions that can be diagnosed with upper GI endoscopy include:   Ulcers   Tumors   Polyps   Abnormal narrowing   Inflammation     Possible Complications   Complications are rare, but no procedure is completely free of risk. If you are planning to have upper GI endoscopy, your doctor will review a list of possible complications, which may include:   Bleeding   Damage to the esophagus, stomach, or intestine   Infection   Respiratory depression (reduced breathing rate and/or depth)   Reaction to sedatives or anesthesia causing your blood pressure to drop    Some factors that may increase the risk of complications include:   Age: 60 or older   Pregnancy   Obesity   Smoking , alcoholism , or drug use   Malnutrition   Recent illness   Diabetes   Heart or lung problems   Bleeding disorders   Use of certain medicines     Be sure to discuss these risks with your doctor before the test.     What to Expect   Prior to test   Leading up to the test:   Arrange for a ride home after the test. Also, arrange for help at home.   The night before, eat a light meal.  Do not eat or drink anything after midnight the night before the test.   Talk to your doctor about your medicines. You may be asked to stop taking some medicines up to one week before the procedure, like:   Anti-inflammatory drugs (e.g., aspirin )   Blood thinners, like clopidogrel

## 2024-10-16 NOTE — PROGRESS NOTES
[facility]  History and Physical    Patient's Name/Date of Birth: Yocasta Medina / 1965 (59 y.o.)    PCP:  Dr. Tineo    Chief Complaint:  colonoscopy eval    History of Present Illness:  59 yr old male seen 4/2023 for colonoscopy eval.  Pt had prior colonoscopy 2019 and found to have tubular adenomas at that time.  Pt reports ongoing diarrhea.  Complains of mid abd pain that radiates to sides.  Denies blood in stool.  Denies unintentional weight loss; denies family hx of colon cancer.  Reports ongoing heartburn/indigestion despite chronic PPI use.  Complains of nausea but denies vomiting.  Complains of early satiety.    Past Medical History:   Diagnosis Date    Acute gastritis without hemorrhage     CAD (coronary artery disease)     Cancer of kidney (HCC)     COPD (chronic obstructive pulmonary disease) (HCC)     COPD, severe (HCC) 6/16/2022    Difficulty sleeping     ED (erectile dysfunction)     History of BPH     Hyperlipidemia     Lung cancer (HCC)     Tobacco abuse        Past Surgical History:   Procedure Laterality Date    CARDIAC CATHETERIZATION  2015    Non-obstructive coronary disease by Dr. Torres    CHOLECYSTECTOMY      COLONOSCOPY N/A 12/20/2019    COLONOSCOPY POLYPECTOMY SNARE/COLD [place] [facility] [place]    COLONOSCOPY  12/20/2019    COLONOSCOPY SUBMUCOSAL/BOTOX INJECTION [place] [facility] [place]    LUNG SURGERY Right 09/2014    VATS with RUL wedge resection -- pathology diffuse granulomatous changes at [facility]    TOTAL NEPHRECTOMY Right     UPPER GASTROINTESTINAL ENDOSCOPY N/A 12/20/2019    EGD BIOPSY [place] [facility] [place]       Family History   Problem Relation Age of Onset    Lung Cancer Mother     Hypertension Mother     Other Father         AAA    Hypertension Father     Coronary Art Dis Father         s/p stent    Kidney Cancer Sister     Thyroid Cancer

## 2024-11-06 ENCOUNTER — OFFICE VISIT (OUTPATIENT)
Dept: PULMONOLOGY | Age: 59
End: 2024-11-06

## 2024-11-06 ENCOUNTER — HOSPITAL ENCOUNTER (OUTPATIENT)
Age: 59
Discharge: HOME | End: 2024-11-06
Payer: MEDICARE

## 2024-11-06 VITALS
RESPIRATION RATE: 14 BRPM | TEMPERATURE: 97.8 F | SYSTOLIC BLOOD PRESSURE: 112 MMHG | HEART RATE: 88 BPM | OXYGEN SATURATION: 95 % | DIASTOLIC BLOOD PRESSURE: 67 MMHG

## 2024-11-06 DIAGNOSIS — J44.9 COPD, SEVERE (HCC): ICD-10-CM

## 2024-11-06 DIAGNOSIS — Z87.891 PERSONAL HISTORY OF TOBACCO USE: Primary | ICD-10-CM

## 2024-11-06 DIAGNOSIS — M54.41 CHRONIC BILATERAL LOW BACK PAIN WITH BILATERAL SCIATICA: ICD-10-CM

## 2024-11-06 DIAGNOSIS — M54.42 CHRONIC BILATERAL LOW BACK PAIN WITH BILATERAL SCIATICA: ICD-10-CM

## 2024-11-06 DIAGNOSIS — K21.9 GASTROESOPHAGEAL REFLUX DISEASE WITHOUT ESOPHAGITIS: ICD-10-CM

## 2024-11-06 DIAGNOSIS — J45.50 SEVERE PERSISTENT ASTHMA, UNSPECIFIED WHETHER COMPLICATED: ICD-10-CM

## 2024-11-06 DIAGNOSIS — E78.2 MIXED HYPERLIPIDEMIA: ICD-10-CM

## 2024-11-06 DIAGNOSIS — G89.29 CHRONIC BILATERAL LOW BACK PAIN WITH BILATERAL SCIATICA: ICD-10-CM

## 2024-11-06 DIAGNOSIS — G47.33 OSA (OBSTRUCTIVE SLEEP APNEA): ICD-10-CM

## 2024-11-06 LAB
ALBUMIN SERPL-MCNC: 4.2 G/DL (ref 3.5–5.2)
ALP SERPL-CCNC: 64 U/L (ref 40–129)
ALT SERPL-CCNC: 12 U/L (ref 0–40)
ANION GAP SERPL CALCULATED.3IONS-SCNC: 13 MMOL/L (ref 7–16)
AST SERPL-CCNC: 14 U/L (ref 0–39)
BILIRUB SERPL-MCNC: 0.4 MG/DL (ref 0–1.2)
BILIRUB UR QL STRIP: NEGATIVE
BUN SERPL-MCNC: 15 MG/DL (ref 6–20)
CALCIUM SERPL-MCNC: 9.9 MG/DL (ref 8.6–10.2)
CHLORIDE SERPL-SCNC: 107 MMOL/L (ref 98–107)
CHOLEST SERPL-MCNC: 215 MG/DL
CLARITY UR: CLEAR
CO2 SERPL-SCNC: 23 MMOL/L (ref 22–29)
COLOR UR: YELLOW
CREAT SERPL-MCNC: 1 MG/DL (ref 0.7–1.2)
GFR, ESTIMATED: 90 ML/MIN/1.73M2
GLUCOSE SERPL-MCNC: 78 MG/DL (ref 74–99)
GLUCOSE UR STRIP-MCNC: NEGATIVE MG/DL
HDLC SERPL-MCNC: 40 MG/DL
HGB UR QL STRIP.AUTO: NEGATIVE
KETONES UR STRIP-MCNC: NEGATIVE MG/DL
LDLC SERPL CALC-MCNC: 136 MG/DL
LEUKOCYTE ESTERASE UR QL STRIP: NEGATIVE
NITRITE UR QL STRIP: NEGATIVE
PH UR STRIP: 6 [PH] (ref 5–9)
POTASSIUM SERPL-SCNC: 3.9 MMOL/L (ref 3.5–5)
PROT SERPL-MCNC: 6.5 G/DL (ref 6.4–8.3)
PROT UR STRIP-MCNC: NEGATIVE MG/DL
RBC #/AREA URNS HPF: ABNORMAL /HPF
SODIUM SERPL-SCNC: 143 MMOL/L (ref 132–146)
SP GR UR STRIP: >1.03 (ref 1–1.03)
TRIGL SERPL-MCNC: 197 MG/DL
UROBILINOGEN UR STRIP-ACNC: 0.2 EU/DL (ref 0–1)
VLDLC SERPL CALC-MCNC: 39 MG/DL
WBC #/AREA URNS HPF: ABNORMAL /HPF

## 2024-11-06 PROCEDURE — 36415 COLL VENOUS BLD VENIPUNCTURE: CPT

## 2024-11-06 PROCEDURE — 80053 COMPREHEN METABOLIC PANEL: CPT

## 2024-11-06 PROCEDURE — 81001 URINALYSIS AUTO W/SCOPE: CPT

## 2024-11-06 PROCEDURE — 80061 LIPID PANEL: CPT

## 2024-11-06 RX ORDER — ROFLUMILAST 500 UG/1
500 TABLET ORAL DAILY
Qty: 30 TABLET | Refills: 5 | Status: SHIPPED | OUTPATIENT
Start: 2024-11-06

## 2024-11-06 RX ORDER — ARFORMOTEROL TARTRATE 15 UG/2ML
15 SOLUTION RESPIRATORY (INHALATION)
Qty: 120 ML | Refills: 3 | Status: SHIPPED | OUTPATIENT
Start: 2024-11-06

## 2024-11-06 RX ORDER — IPRATROPIUM BROMIDE AND ALBUTEROL SULFATE 2.5; .5 MG/3ML; MG/3ML
3 SOLUTION RESPIRATORY (INHALATION)
Qty: 360 ML | Refills: 5 | Status: SHIPPED | OUTPATIENT
Start: 2024-11-06

## 2024-11-06 RX ORDER — BUDESONIDE 0.5 MG/2ML
0.5 INHALANT ORAL
Qty: 60 EACH | Refills: 3 | Status: SHIPPED | OUTPATIENT
Start: 2024-11-06

## 2024-11-06 NOTE — PATIENT INSTRUCTIONS
Selina Denney    [facility]  [address] [place] [id number]  Office: [phone]      Your were seen in the office today for COPD/ashtma      We  did not make changes to your medications today.       Testing ordered today was low dose screening CT due May 2025    Referral placed to [facility] for transplant evaluation    Vaccines recommended influenza      Follow up in 3 months        Please do not hesitate to call the office with any questions.

## 2024-11-06 NOTE — PROGRESS NOTES
Patient to follow up with physician in 3 months.  Patient's fasenra to be sent to office.  Patient to be referred [facility] for eval of transplant.    
monitoring through [facility].  He is continuing on the Fasenra.  He remains on the Daliresp, Brovana, Pulmicort, and Breztri.  Most recently he was giving a prednisone taper on September 23 by his PCP.  Currently he is not requiring oxygen while walking in the clinic.  He reports he has not been using it much at home.  However does put it on in the mornings when his sats are in the 80s.    Updated HPI as of January 17, 2024.  Patient seen and examined.  He continues on the Breztri, Daliresp, DuoNebs, Pulmicort, and the Fasenra.  His next dose of the Fasenra will be in February.  He states his last cigarette was 3 to 4 months ago he does report that his symptoms of his breathing do wax and wane he continues to use oxygen at night.  He has gained a significant amount of weight.  He is trying to decrease this with diet and exercise.    Updated HPI as of 4/17/2024  Patient seen and examined. He quit smoking over 6 months ago he is using nicotine pouches for replacment therapy. He has lost 34 lbs with diet and exercise. He feels like the Fasenra lasts about 3-4 weeks. He is still having symptoms when he is around triggers like cutting the grass. Remains on pulmicort, brovana, breztri, daliresp.     Updated HPI as of January 23, 2024:  Patient seen and examined.  Reports that he has remained tobacco free/nicotine free for the last 8 to 9 months.  He has lost 40 pounds with diet and exercise.  Reports that he is using oxygen \"as needed.  He however is not checking his oxygen saturation at home and just putting his oxygen on when he feels like it is also wearing 4 L nasal cannula at night.  He continues with the Fasenra, Daliresp, remains on multiple inhalers and nebulizers.  He reports that his breathing is up-and-down.  He is trying to stay active by going to the gym and lifting however also reports that he is unable to tolerate walking on the treadmill even at slow speeds due to dyspnea.  He is requesting today to be

## 2024-11-07 RX ORDER — TRAZODONE HYDROCHLORIDE 100 MG/1
100 TABLET ORAL NIGHTLY
Qty: 90 TABLET | Refills: 0 | Status: SHIPPED
Start: 2024-11-07 | End: 2024-11-08 | Stop reason: SDUPTHER

## 2024-11-08 RX ORDER — TRAZODONE HYDROCHLORIDE 100 MG/1
100 TABLET ORAL NIGHTLY
Qty: 90 TABLET | Refills: 0 | Status: SHIPPED | OUTPATIENT
Start: 2024-11-08

## 2024-11-08 NOTE — TELEPHONE ENCOUNTER
Name of Medication(s) Requested:  Requested Prescriptions     Pending Prescriptions Disp Refills    traZODone (DESYREL) 100 MG tablet 90 tablet 0     Sig: Take 1 tablet by mouth nightly       Medication is on current medication list Yes    Dosage and directions were verified? Yes    Quantity verified: 90 day supply     Pharmacy Verified?  Yes    Last Appointment:  9/30/2024 & 10/16/24    Future appts:  Future Appointments   Date Time Provider Department Center   1/27/2025  9:00 AM Lizet Tineo MD [facility]   2/10/2025  9:20 AM [facility]        (If no appt send self scheduling link. .REFILLAPPT)  Scheduling request sent?     [x] Yes  [] No    Does patient need updated?  [x] Yes  [] No

## 2024-11-14 DIAGNOSIS — J44.9 COPD, SEVERE (HCC): Primary | ICD-10-CM

## 2024-11-14 DIAGNOSIS — R06.02 SHORTNESS OF BREATH: ICD-10-CM

## 2024-11-18 NOTE — PROGRESS NOTES
[facility] PRE-ADMISSION TESTING   ENDOSCOPY AND COLONOSCOPY INSTRUCTIONS  PAT- Phone Number: [phone]    ENDOSCOPY AND COLONOSCOPY INSTRUCTIONS:     [x] Bowel Prep instructions reviewed - any questions regarding your prep, please contact surgeon's office.  [x] Colonoscopy: 1-2 days prior: No solid foods. Clear liquids only - nothing red or purple in color.  [x] Antibacterial Soap Shower Night before AND the morning of procedure.  [x] No solid food after midnight. You may have SIPS of clear liquids up until 2 hours before your arrival time to the hospital.   [x] Do not wear makeup, lotions, powders, deodorant.   [x] No tobacco products, illegal drugs, or alcohol within 24 hours of your surgery.  [x] Jewelry or valuables should not be brought to the hospital. All body and/or tongue piercing's must be removed prior to arriving to hospital. No contact lens or hair pins.   [] Urine Pregnancy test will be preformed the day of surgery. A specimen sample may be brought from home.  [x] Arrange transportation with a responsible adult  to and from the hospital. If you do not have a responsible adult  to transport you, you will need to make arrangements with a medical transportation company. Arrange for someone to be with you for the remainder of the day and for 24 hours after your procedure due to having had anesthesia.    -Who will be your  for transportation? Natalie  -Who will be staying with you for 24 hrs after your procedure? Natalie  [x] Bring insurance card and photo ID.  [] Bring copy of living will or healthcare power of  papers to be placed in your electronic record.    PARKING INSTRUCTIONS:     [x] ARRIVAL DATE & TIME: 11/22 at 1200  [x] Times are subject to change. We will contact you the business day before surgery if that were to occur.  [x] Enter into the [facility] Entrance. Two people may accompany you. Masks are not required.  [x] Parking  Lot \"I\" is where you will park. It is located on the corner of [address] and [address]. The entrance is on [address].   Only one vehicle - per patient, is permitted in parking lot.   Upon entering the parking lot, a voucher ticket will print.    MEDICATION INSTRUCTIONS:    [x] Bring a complete list of your medications, please write the last time you took the medicine, give this list to the nurse in Pre-Op.  [x] Take ONLY the following medications the morning of surgery: buspar, gabapentin, metoprolol  [x] Stop all herbal supplements and vitamins 5 days before surgery.   [x] Stop all NSAIDS 7 days before surgery.  [] DO NOT take any diabetic medicine the morning of surgery.  Follow instructions for insulin the day before surgery.  [] If you are diabetic and your blood sugar is low or you feel symptomatic, you may drink 1-2 ounces of apple juice or take a glucose tablet.            -The morning of your procedure, you may call the pre-op area if you have concerns about your blood sugar [phone].  [x] Use your inhalers the morning of surgery.  Bring your emergency inhaler with you day of surgery.  [x] Follow physician instructions regarding any blood thinners you may be taking.    WHAT TO EXPECT:    [x] The day of your procedure you will be greeted and checked in by the [facility] .  In addition, you will be registered in the [facility] by a Patient Access Representative. Please bring your photo ID and insurance card. A nurse will greet you in accordance to the time you are needed in the pre-op area to prepare you for surgery. Please do not be discouraged if you are not greeted in the order you arrive as there are many variables that are involved in patient preparation. Your patience is greatly appreciated as you wait for your nurse.  [x] Delays may occur. Staff will make a sincere effort to keep you informed of delays. If any delays occur with your procedure, we apologize ahead of

## 2024-11-22 ENCOUNTER — ANESTHESIA EVENT (OUTPATIENT)
Dept: ENDOSCOPY | Age: 59
End: 2024-11-22
Payer: MEDICARE

## 2024-11-22 ENCOUNTER — HOSPITAL ENCOUNTER (OUTPATIENT)
Age: 59
Setting detail: OUTPATIENT SURGERY
Discharge: HOME | End: 2024-11-22
Attending: SURGERY | Admitting: SURGERY
Payer: MEDICARE

## 2024-11-22 ENCOUNTER — ANESTHESIA (OUTPATIENT)
Dept: ENDOSCOPY | Age: 59
End: 2024-11-22
Payer: MEDICARE

## 2024-11-22 VITALS
HEART RATE: 74 BPM | BODY MASS INDEX: 32.34 KG/M2 | DIASTOLIC BLOOD PRESSURE: 66 MMHG | WEIGHT: 231 LBS | OXYGEN SATURATION: 94 % | HEIGHT: 71 IN | RESPIRATION RATE: 16 BRPM | TEMPERATURE: 97 F | SYSTOLIC BLOOD PRESSURE: 120 MMHG

## 2024-11-22 DIAGNOSIS — Z86.0100 HX OF COLONIC POLYPS: ICD-10-CM

## 2024-11-22 DIAGNOSIS — R12 HEARTBURN: ICD-10-CM

## 2024-11-22 PROCEDURE — 2709999900 HC NON-CHARGEABLE SUPPLY: Performed by: SURGERY

## 2024-11-22 PROCEDURE — 3609012400 HC EGD TRANSORAL BIOPSY SINGLE/MULTIPLE: Performed by: SURGERY

## 2024-11-22 PROCEDURE — 2580000003 HC RX 258: Performed by: SURGERY

## 2024-11-22 PROCEDURE — 6360000002 HC RX W HCPCS: Performed by: NURSE ANESTHETIST, CERTIFIED REGISTERED

## 2024-11-22 PROCEDURE — 3700000000 HC ANESTHESIA ATTENDED CARE: Performed by: SURGERY

## 2024-11-22 PROCEDURE — 88305 TISSUE EXAM BY PATHOLOGIST: CPT

## 2024-11-22 PROCEDURE — 7100000010 HC PHASE II RECOVERY - FIRST 15 MIN: Performed by: SURGERY

## 2024-11-22 PROCEDURE — 7100000011 HC PHASE II RECOVERY - ADDTL 15 MIN: Performed by: SURGERY

## 2024-11-22 PROCEDURE — 3700000001 HC ADD 15 MINUTES (ANESTHESIA): Performed by: SURGERY

## 2024-11-22 PROCEDURE — 3609027000 HC COLONOSCOPY: Performed by: SURGERY

## 2024-11-22 PROCEDURE — 2500000003 HC RX 250 WO HCPCS: Performed by: NURSE ANESTHETIST, CERTIFIED REGISTERED

## 2024-11-22 RX ORDER — GLYCOPYRROLATE 1 MG/5 ML
SYRINGE (ML) INTRAVENOUS
Status: DISCONTINUED | OUTPATIENT
Start: 2024-11-22 | End: 2024-11-22 | Stop reason: SDUPTHER

## 2024-11-22 RX ORDER — KETAMINE HYDROCHLORIDE 10 MG/ML
INJECTION, SOLUTION INTRAMUSCULAR; INTRAVENOUS
Status: DISCONTINUED | OUTPATIENT
Start: 2024-11-22 | End: 2024-11-22 | Stop reason: SDUPTHER

## 2024-11-22 RX ORDER — MIDAZOLAM HYDROCHLORIDE 1 MG/ML
INJECTION, SOLUTION INTRAMUSCULAR; INTRAVENOUS
Status: DISCONTINUED | OUTPATIENT
Start: 2024-11-22 | End: 2024-11-22 | Stop reason: SDUPTHER

## 2024-11-22 RX ORDER — SODIUM CHLORIDE 9 MG/ML
INJECTION, SOLUTION INTRAVENOUS CONTINUOUS
Status: DISCONTINUED | OUTPATIENT
Start: 2024-11-22 | End: 2024-11-22 | Stop reason: HOSPADM

## 2024-11-22 RX ORDER — PROPOFOL 10 MG/ML
INJECTION, EMULSION INTRAVENOUS
Status: DISCONTINUED | OUTPATIENT
Start: 2024-11-22 | End: 2024-11-22 | Stop reason: SDUPTHER

## 2024-11-22 RX ORDER — SODIUM CHLORIDE 0.9 % (FLUSH) 0.9 %
5-40 SYRINGE (ML) INJECTION PRN
Status: DISCONTINUED | OUTPATIENT
Start: 2024-11-22 | End: 2024-11-22 | Stop reason: HOSPADM

## 2024-11-22 RX ORDER — SODIUM CHLORIDE 0.9 % (FLUSH) 0.9 %
5-40 SYRINGE (ML) INJECTION EVERY 12 HOURS SCHEDULED
Status: DISCONTINUED | OUTPATIENT
Start: 2024-11-22 | End: 2024-11-22 | Stop reason: HOSPADM

## 2024-11-22 RX ORDER — SUCRALFATE 1 G/1
1 TABLET ORAL 4 TIMES DAILY
Qty: 120 TABLET | Refills: 3 | Status: SHIPPED | OUTPATIENT
Start: 2024-11-22

## 2024-11-22 RX ORDER — SODIUM CHLORIDE 9 MG/ML
INJECTION, SOLUTION INTRAVENOUS PRN
Status: DISCONTINUED | OUTPATIENT
Start: 2024-11-22 | End: 2024-11-22 | Stop reason: HOSPADM

## 2024-11-22 RX ADMIN — KETAMINE HYDROCHLORIDE 30 MG: 10 INJECTION, SOLUTION INTRAMUSCULAR; INTRAVENOUS at 13:44

## 2024-11-22 RX ADMIN — PROPOFOL 300 MG: 10 INJECTION, EMULSION INTRAVENOUS at 13:44

## 2024-11-22 RX ADMIN — MIDAZOLAM HYDROCHLORIDE 2 MG: 1 INJECTION, SOLUTION INTRAMUSCULAR; INTRAVENOUS at 13:44

## 2024-11-22 RX ADMIN — Medication 0.2 MG: at 13:56

## 2024-11-22 RX ADMIN — KETAMINE HYDROCHLORIDE 20 MG: 10 INJECTION, SOLUTION INTRAMUSCULAR; INTRAVENOUS at 13:56

## 2024-11-22 RX ADMIN — SODIUM CHLORIDE: 9 INJECTION, SOLUTION INTRAVENOUS at 11:27

## 2024-11-22 RX ADMIN — SODIUM CHLORIDE: 9 INJECTION, SOLUTION INTRAVENOUS at 13:42

## 2024-11-22 ASSESSMENT — LIFESTYLE VARIABLES: SMOKING_STATUS: 1

## 2024-11-22 ASSESSMENT — PAIN SCALES - GENERAL
PAINLEVEL_OUTOF10: 0

## 2024-11-22 ASSESSMENT — PAIN - FUNCTIONAL ASSESSMENT: PAIN_FUNCTIONAL_ASSESSMENT: NONE - DENIES PAIN

## 2024-11-22 NOTE — PROGRESS NOTES
Call placed to Dr. Almendarez and notified that patient states he is on the transplant list for a double lung transplant. Notified that patient states he wears 4L of oxygen at home, but patient states he does not wear the oxygen when he goes out of the house because he \"is not going to drag the tank anywhere.\" Notified that patient's current spo2 is 96% on room air. Also notified Dr. Almendarez that patient took several nebulizers this morning along with vitamin D and vitamin C.  Electronically signed by Marbella Christiansen RN on 11/22/2024 at 11:12 AM

## 2024-11-22 NOTE — H&P
[facility]  History and Physical     Patient's Name/Date of Birth: Yocasta Medina / 1965 (59 y.o.)     PCP:  Dr. Tineo     Chief Complaint:  colonoscopy eval     History of Present Illness:  59 yr old male seen 4/2023 for colonoscopy eval.  Pt had prior colonoscopy 2019 and found to have tubular adenomas at that time.  Pt reports ongoing diarrhea.  Complains of mid abd pain that radiates to sides.  Denies blood in stool.  Denies unintentional weight loss; denies family hx of colon cancer.  Reports ongoing heartburn/indigestion despite chronic PPI use.  Complains of nausea but denies vomiting.  Complains of early satiety.     Past Medical History        Past Medical History:   Diagnosis Date    Acute gastritis without hemorrhage      CAD (coronary artery disease)      Cancer of kidney (HCC)      COPD (chronic obstructive pulmonary disease) (HCC)      COPD, severe (HCC) 6/16/2022    Difficulty sleeping      ED (erectile dysfunction)      History of BPH      Hyperlipidemia      Lung cancer (HCC)      Tobacco abuse              Past Surgical History         Past Surgical History:   Procedure Laterality Date    CARDIAC CATHETERIZATION   2015     Non-obstructive coronary disease by Dr. Torres    CHOLECYSTECTOMY        COLONOSCOPY N/A 12/20/2019     COLONOSCOPY POLYPECTOMY SNARE/COLD BIOPSY performed by Lolita Mc [place] [facility] [place]    COLONOSCOPY   12/20/2019     COLONOSCOPY SUBMUCOSAL/BOTOX INJECTION performed by Lolita Mc MD at [facility]    LUNG SURGERY Right 09/2014     VATS with RUL wedge resection -- pathology diffuse granulomatous changes at [facility]    TOTAL NEPHRECTOMY Right      UPPER GASTROINTESTINAL ENDOSCOPY N/A 12/20/2019     EGD BIOPSY performed by Lolita Mc MD at [facility]            Family History         Family History   Problem Relation Age of Onset    Lung Cancer Mother      Hypertension Mother      Other Father              Minutes of Exercise per Session: 0 min   Stress: Stress Concern Present (8/15/2023)     [employer] - Occupational Stress Questionnaire      Feeling of Stress : Very much   Social Connections: Socially Isolated (8/15/2023)     Social Connection and Isolation Panel [NHANES]      Frequency of Communication with Friends and Family: Never      Frequency of Social Gatherings with Friends and Family: Never      Attends Confucianism Services: Never      Active Member of Clubs or Organizations: No      Attends Club or Organization Meetings: Never      Marital Status:    Intimate Partner Violence: Not on file   Housing Stability: Low Risk  (5/30/2024)     Housing Stability Vital Sign      Unable to Pay for Housing in the Last Year: No      Number of Places Lived in the Last Year: 1      Unstable Housing in the Last Year: No            Current Facility-Administered Medications          Current Outpatient Medications   Medication Sig Dispense Refill    ibuprofen (ADVIL;MOTRIN) 800 MG tablet TAKE 1/2 (ONE-HALF) TABLET BY MOUTH TWICE DAILY AS NEEDED FOR PAIN        Vitamin D, Cholecalciferol, 25 MCG (1000 UT) TABS Take 1,000 Units by mouth daily 90 tablet 1    vitamin C (ASCORBIC ACID) 500 MG tablet Take 2 tablets by mouth daily 90 tablet 1    promethazine (PHENERGAN) 25 MG tablet Take 1 tablet by mouth 3 times daily as needed for Nausea 90 tablet 1    busPIRone (BUSPAR) 30 MG tablet Take 30 mg by mouth 2 times daily 180 tablet 1    aspirin (SONIA ASPIRIN EC LOW DOSE) 81 MG EC tablet Take 1 tablet by mouth daily 90 tablet 1    omeprazole (PRILOSEC) 40 MG delayed release capsule Take 1 capsule by mouth daily 90 capsule 1    diclofenac sodium (VOLTAREN) 1 % GEL Apply 2 g topically 4 times daily 2 g 2    acetaminophen (TYLENOL) 500 MG tablet Take 1 tablet by mouth 4 times daily as needed for Pain 240 tablet 0    gabapentin (NEURONTIN) 600 MG tablet Take 1 tablet by mouth in the morning, at noon,

## 2024-11-22 NOTE — PROGRESS NOTES
Call placed to Endo and notified Dr. Cruz that patient took ibuprofen this morning at 0700.  Electronically signed by Marbella Christiansen RN on 11/22/2024 at 11:08 AM

## 2024-11-22 NOTE — DISCHARGE INSTRUCTIONS
Call [phone] for any questions/concerns.    Irritation of stomach seen--biopsies taken--letter will be sent with results.  Script for sucralfate sent to your pharmacy--take as directed.    Diverticula seen--maintain high fiber diet.  Repeat colonoscopy 5-10 years.         Upper GI Endoscopy: What to Expect at Home  Your Recovery  You had an upper GI endoscopy. Your doctor used a thin, lighted tube that bends to look at the inside of your esophagus, your stomach, and the first part of the small intestine, called the duodenum.  After you have an endoscopy, you will stay at the hospital or clinic for 1 to 2 hours. This will allow the medicine to wear off. You will be able to go home after your doctor or nurse checks to make sure that you're not having any problems.  You may have to stay overnight if you had treatment during the test. You may have a sore throat for a day or two after the test.  This care sheet gives you a general idea about what to expect after the test.  How can you care for yourself at home?  Activity   Rest as much as you need to after you go home.  You should be able to go back to your usual activities the day after the test.  Diet   Follow your doctor's directions for eating after the test.  Drink plenty of fluids (unless your doctor has told you not to).  Medications   If you have a sore throat the day after the test, use an over-the-counter spray to numb your throat.  Follow-up care is a key part of your treatment and safety. Be sure to make and go to all appointments, and call your doctor if you are having problems. It's also a good idea to know your test results and keep a list of the medicines you take.  When should you call for help?   Call 541 anytime you think you may need emergency care. For example, call if:    You passed out (lost consciousness).     You have trouble breathing.     You pass maroon or bloody stools.   Call your doctor now or seek immediate medical care if:    You  problems (insomnia);  headache; or  back pain.  This is not a complete list of side effects and others may occur. Call your doctor for medical advice about side effects. You may report side effects to FDA at 2-567-HKC-6248.  What other drugs will affect sucralfate?  Other drugs may affect sucralfate, including prescription and over-the-counter medicines, vitamins, and herbal products. Tell your doctor about all your current medicines and any medicine you start or stop using.  Where can I get more information?  Your pharmacist can provide more information about sucralfate.  Remember, keep this and all other medicines out of the reach of children, never share your medicines with others, and use this medication only for the indication prescribed.   Every effort has been made to ensure that the information provided by [employer]') is accurate, up-to-date, and complete, but no guarantee is made to that effect. Drug information contained herein may be time sensitive. [employer] information has been compiled for use by healthcare practitioners and consumers in the [place] and therefore [employer] does not warrant that uses outside of the [place] are appropriate, unless specifically indicated otherwise. [employer] drug information does not endorse drugs, diagnose patients or recommend therapy. [employer] drug information is an informational resource designed to assist licensed healthcare practitioners in caring for their patients and/or to serve consumers viewing this service as a supplement to, and not a substitute for, the expertise, skill, knowledge and judgment of healthcare practitioners. The absence of a warning for a given drug or drug combination in no way should be construed to indicate that the drug or drug combination is safe, effective or appropriate for any given patient. [employer] does not assume any responsibility for any aspect of healthcare administered with the aid of information [employer]

## 2024-11-22 NOTE — ANESTHESIA PRE PROCEDURE
Department of Anesthesiology  Preprocedure Note       Name:  Yocasta Medina   Age:  59 y.o.  :  1965                                          MRN:  [id number]         Date:  2024      Surgeon: Surgeon(s):  Serafin Cruz MD    Procedure: Procedure(s):  ESOPHAGOGASTRODUODENOSCOPY BIOPSY  COLONOSCOPY DIAGNOSTIC    Vital Signs (Current)   Vitals:    24 1112   BP: 124/72   Pulse: 69   Resp: 20   Temp: 36.1 °C (97 °F)   SpO2: 96%     Vital Signs Statistics (for past 48 hrs)     Temp  Av.1 °C (97 °F)  Min: 36.1 °C (97 °F)   Min taken time: 24 111  Max: 36.1 °C (97 °F)   Max taken time: 24 111  Pulse  Av  Min: 69   Min taken time: 24 111  Max: 69   Max taken time: 24 1112  Resp  Av  Min: 20   Min taken time: 24 1112  Max: 20   Max taken time: 24 1112  BP  Min: 124/72   Min taken time: 24 1112  Max: 124/72   Max taken time: 24 1112  SpO2  Av %  Min: 96 %   Min taken time: 24 111  Max: 96 %   Max taken time: 24 1112    BP Readings from Last 3 Encounters:   24 124/72   24 112/67   10/16/24 129/83     BMI  Body mass index is 32.22 kg/m².  Estimated body mass index is 32.22 kg/m² as calculated from the following:    Height as of this encounter: 1.803 m (5' 11\").    Weight as of this encounter: 104.8 kg (231 lb).    CBC   Lab Results   Component Value Date/Time    WBC 8.5 2024 06:12 AM    RBC 4.82 2024 06:12 AM    HGB 15.1 2024 06:12 AM    HCT 45.7 2024 06:12 AM    MCV 94.8 2024 06:12 AM    RDW 12.9 2024 06:12 AM     2024 06:12 AM     CMP    Lab Results   Component Value Date/Time     2024 10:22 AM    K 3.9 2024 10:22 AM    K 4.4 2023 05:57 AM     2024 10:22 AM    CO2 23 2024 10:22 AM    BUN 15 2024 10:22 AM    CREATININE 1.0 2024 10:22 AM    GFRAA >60 10/07/2022 01:42 PM    LABGLOM 90 2024 10:22 AM     gastritis without hemorrhage    • CAD (coronary artery disease)    • Cancer of kidney (HCC)    • COPD (chronic obstructive pulmonary disease) (HCC)    • COPD, severe (HCC) 6/16/2022   • Difficulty sleeping    • ED (erectile dysfunction)    • History of BPH    • Hyperlipidemia    • Lung cancer (HCC)    • Tobacco abuse        Past Surgical History:        Procedure Laterality Date   • CARDIAC CATHETERIZATION  2015    Non-obstructive coronary disease by Dr. Torres   • CHOLECYSTECTOMY     • COLONOSCOPY N/A 12/20/2019    COLONOSCOPY POLYPECTOMY SNARE/COLD BIOPSY performed by Lolita Mc MD at [facility]   • COLONOSCOPY  12/20/2019    COLONOSCOPY SUBMUCOSAL/BOTOX INJECTION performed by Lolita Mc MD at [facility]   • LUNG SURGERY Right 09/2014    VATS with RUL wedge resection -- pathology diffuse granulomatous changes at [facility]   • TOTAL NEPHRECTOMY Right    • UPPER GASTROINTESTINAL ENDOSCOPY N/A 12/20/2019    EGD BIOPSY performed by Lolita Mc MD at [facility]       Social History:    Social History     Tobacco Use   • Smoking status: Former     Current packs/day: 1.00     Average packs/day: 1 pack/day for 45.6 years (45.6 ttl pk-yrs)     Types: Cigarettes     Start date: 1983     Passive exposure: Past   • Smokeless tobacco: Never   • Tobacco comments:     States smokes a couple cigarettes a day sometimes   Substance Use Topics   • Alcohol use: Not Currently     Comment: rare                                Counseling given: Not Answered  Tobacco comments: States smokes a couple cigarettes a day sometimes      Vital Signs (Current):   Vitals:    11/18/24 1434 11/22/24 1112   BP:  124/72   Pulse:  69   Resp:  20   Temp:  36.1 °C (97 °F)   TempSrc:  Temporal   SpO2:  96%   Weight: 104.8 kg (231 lb) 104.8 kg (231 lb)   Height:  1.803 m (5' 11\")                                              BP Readings from Last 3 Encounters:   11/22/24 124/72   11/06/24 112/67   10/16/24 129/83

## 2024-11-29 LAB — SURGICAL PATHOLOGY REPORT: NORMAL

## 2024-12-02 NOTE — ANESTHESIA POSTPROCEDURE EVALUATION
Department of Anesthesiology  Postprocedure Note    Patient: Yocasta Medina  MRN: [id number]  YOB: 1965  Date of evaluation: 12/2/2024    Procedure Summary       Date: 11/22/24 Room / Location: [place] [address] / [facility]    Anesthesia Start: 1342 Anesthesia Stop: 1410    Procedures:       ESOPHAGOGASTRODUODENOSCOPY BIOPSY      COLONOSCOPY DIAGNOSTIC Diagnosis:       Heartburn      Hx of colonic polyps      (Heartburn [R12])      (Hx of colonic polyps [Z86.0100])    Surgeons: Serafin Cruz MD Responsible Provider: Linda Almendarez MD    Anesthesia Type: MAC ASA Status: 4            Anesthesia Type: MAC    Mendy Phase I: Mendy Score: 9    Mendy Phase II:      Anesthesia Post Evaluation    Patient location during evaluation: PACU  Patient participation: complete - patient participated  Level of consciousness: awake and alert  Airway patency: patent  Nausea & Vomiting: no nausea and no vomiting  Cardiovascular status: blood pressure returned to baseline and hemodynamically stable  Respiratory status: acceptable and spontaneous ventilation  Hydration status: euvolemic  Multimodal analgesia pain management approach  Pain management: adequate    No notable events documented.

## 2024-12-19 ENCOUNTER — TELEPHONE (OUTPATIENT)
Dept: PAIN MANAGEMENT | Age: 59
End: 2024-12-19

## 2024-12-19 ENCOUNTER — TELEPHONE (OUTPATIENT)
Dept: INTERNAL MEDICINE | Age: 59
End: 2024-12-19

## 2024-12-19 DIAGNOSIS — J98.4 HERNIATION OF RIGHT LUNG: ICD-10-CM

## 2024-12-19 DIAGNOSIS — M54.42 CHRONIC BILATERAL LOW BACK PAIN WITH BILATERAL SCIATICA: ICD-10-CM

## 2024-12-19 DIAGNOSIS — G89.4 CHRONIC PAIN SYNDROME: ICD-10-CM

## 2024-12-19 DIAGNOSIS — M54.16 LUMBAR RADICULOPATHY: ICD-10-CM

## 2024-12-19 DIAGNOSIS — G89.29 CHRONIC BILATERAL LOW BACK PAIN WITH BILATERAL SCIATICA: ICD-10-CM

## 2024-12-19 DIAGNOSIS — M79.10 MYALGIA: ICD-10-CM

## 2024-12-19 DIAGNOSIS — G58.8 INTERCOSTAL NEURALGIA: ICD-10-CM

## 2024-12-19 DIAGNOSIS — M54.41 CHRONIC BILATERAL LOW BACK PAIN WITH BILATERAL SCIATICA: ICD-10-CM

## 2024-12-19 RX ORDER — GABAPENTIN 600 MG/1
600 TABLET ORAL 4 TIMES DAILY
Qty: 120 TABLET | Refills: 0 | Status: SHIPPED | OUTPATIENT
Start: 2024-12-19 | End: 2025-01-18

## 2024-12-19 NOTE — TELEPHONE ENCOUNTER
Yocasta Medina called the office asking for a refill on gabapentin. His next office visit is scheduled on 1/21/25.

## 2025-01-21 ENCOUNTER — OFFICE VISIT (OUTPATIENT)
Dept: PAIN MANAGEMENT | Age: 60
End: 2025-01-21
Payer: MEDICARE

## 2025-01-21 VITALS
HEIGHT: 71 IN | HEART RATE: 68 BPM | DIASTOLIC BLOOD PRESSURE: 80 MMHG | RESPIRATION RATE: 16 BRPM | OXYGEN SATURATION: 94 % | TEMPERATURE: 98.1 F | BODY MASS INDEX: 32.34 KG/M2 | SYSTOLIC BLOOD PRESSURE: 118 MMHG | WEIGHT: 231 LBS

## 2025-01-21 DIAGNOSIS — G89.4 CHRONIC PAIN SYNDROME: Primary | ICD-10-CM

## 2025-01-21 DIAGNOSIS — M54.16 LUMBAR RADICULOPATHY: ICD-10-CM

## 2025-01-21 DIAGNOSIS — M79.10 MYALGIA: ICD-10-CM

## 2025-01-21 DIAGNOSIS — G89.29 CHRONIC BILATERAL LOW BACK PAIN WITH BILATERAL SCIATICA: ICD-10-CM

## 2025-01-21 DIAGNOSIS — J98.4 HERNIATION OF RIGHT LUNG: ICD-10-CM

## 2025-01-21 DIAGNOSIS — M54.42 CHRONIC BILATERAL LOW BACK PAIN WITH BILATERAL SCIATICA: ICD-10-CM

## 2025-01-21 DIAGNOSIS — G58.8 INTERCOSTAL NEURALGIA: ICD-10-CM

## 2025-01-21 DIAGNOSIS — M54.41 CHRONIC BILATERAL LOW BACK PAIN WITH BILATERAL SCIATICA: ICD-10-CM

## 2025-01-21 PROCEDURE — 1036F TOBACCO NON-USER: CPT | Performed by: PHYSICIAN ASSISTANT

## 2025-01-21 PROCEDURE — 3074F SYST BP LT 130 MM HG: CPT | Performed by: PHYSICIAN ASSISTANT

## 2025-01-21 PROCEDURE — G8417 CALC BMI ABV UP PARAM F/U: HCPCS | Performed by: PHYSICIAN ASSISTANT

## 2025-01-21 PROCEDURE — 3079F DIAST BP 80-89 MM HG: CPT | Performed by: PHYSICIAN ASSISTANT

## 2025-01-21 PROCEDURE — G8427 DOCREV CUR MEDS BY ELIG CLIN: HCPCS | Performed by: PHYSICIAN ASSISTANT

## 2025-01-21 PROCEDURE — 99213 OFFICE O/P EST LOW 20 MIN: CPT | Performed by: PHYSICIAN ASSISTANT

## 2025-01-21 PROCEDURE — 3017F COLORECTAL CA SCREEN DOC REV: CPT | Performed by: PHYSICIAN ASSISTANT

## 2025-01-21 RX ORDER — GABAPENTIN 600 MG/1
600 TABLET ORAL 4 TIMES DAILY
Qty: 360 TABLET | Refills: 0 | Status: SHIPPED | OUTPATIENT
Start: 2025-01-21 | End: 2025-04-21

## 2025-01-21 NOTE — PROGRESS NOTES
Yocasta Medina presents to the [facility] on 1/21/2025. Yocasta is complaining of pain mid back. The pain is constant. The pain is described as aching, throbbing, shooting, stabbing, and dull. Pain is rated on his best day at a 6, on his worst day at a 10, and on average at a 8 on the VAS scale. He took his last dose of Neurontin today.     Any procedures since your last visit: No,     Pacemaker or defibrillator: No     He is  on NSAIDS and is  on anticoagulation medications to include ASA and is managed by Lizet Tineo MD..     Do you want someone present when the provider examines you? No    Medication Contract and Consent for Opioid Use Documents Filed        No documents found                    /80   Pulse 68   Temp 98.1 °F (36.7 °C) (Infrared)   Resp 16   Ht 1.803 m (5' 11\")   Wt 104.8 kg (231 lb)   SpO2 94%   BMI 32.22 kg/m²      No LMP for male patient.

## 2025-01-25 SDOH — ECONOMIC STABILITY: TRANSPORTATION INSECURITY
IN THE PAST 12 MONTHS, HAS LACK OF TRANSPORTATION KEPT YOU FROM MEETINGS, WORK, OR FROM GETTING THINGS NEEDED FOR DAILY LIVING?: PATIENT DECLINED

## 2025-01-25 SDOH — ECONOMIC STABILITY: FOOD INSECURITY: WITHIN THE PAST 12 MONTHS, YOU WORRIED THAT YOUR FOOD WOULD RUN OUT BEFORE YOU GOT MONEY TO BUY MORE.: PATIENT DECLINED

## 2025-01-25 SDOH — ECONOMIC STABILITY: INCOME INSECURITY: IN THE LAST 12 MONTHS, WAS THERE A TIME WHEN YOU WERE NOT ABLE TO PAY THE MORTGAGE OR RENT ON TIME?: PATIENT DECLINED

## 2025-01-25 SDOH — ECONOMIC STABILITY: FOOD INSECURITY: WITHIN THE PAST 12 MONTHS, THE FOOD YOU BOUGHT JUST DIDN'T LAST AND YOU DIDN'T HAVE MONEY TO GET MORE.: PATIENT DECLINED

## 2025-01-25 ASSESSMENT — PATIENT HEALTH QUESTIONNAIRE - PHQ9
1. LITTLE INTEREST OR PLEASURE IN DOING THINGS: MORE THAN HALF THE DAYS
SUM OF ALL RESPONSES TO PHQ QUESTIONS 1-9: 9
SUM OF ALL RESPONSES TO PHQ QUESTIONS 1-9: 2
SUM OF ALL RESPONSES TO PHQ9 QUESTIONS 1 & 2: 2
10. IF YOU CHECKED OFF ANY PROBLEMS, HOW DIFFICULT HAVE THESE PROBLEMS MADE IT FOR YOU TO DO YOUR WORK, TAKE CARE OF THINGS AT HOME, OR GET ALONG WITH OTHER PEOPLE: SOMEWHAT DIFFICULT
SUM OF ALL RESPONSES TO PHQ QUESTIONS 1-9: 9
2. FEELING DOWN, DEPRESSED OR HOPELESS: NOT AT ALL
SUM OF ALL RESPONSES TO PHQ QUESTIONS 1-9: 2
7. TROUBLE CONCENTRATING ON THINGS, SUCH AS READING THE NEWSPAPER OR WATCHING TELEVISION: MORE THAN HALF THE DAYS
4. FEELING TIRED OR HAVING LITTLE ENERGY: NEARLY EVERY DAY
SUM OF ALL RESPONSES TO PHQ QUESTIONS 1-9: 2
9. THOUGHTS THAT YOU WOULD BE BETTER OFF DEAD, OR OF HURTING YOURSELF: NOT AT ALL
10. IF YOU CHECKED OFF ANY PROBLEMS, HOW DIFFICULT HAVE THESE PROBLEMS MADE IT FOR YOU TO DO YOUR WORK, TAKE CARE OF THINGS AT HOME, OR GET ALONG WITH OTHER PEOPLE: SOMEWHAT DIFFICULT
3. TROUBLE FALLING OR STAYING ASLEEP: MORE THAN HALF THE DAYS
3. TROUBLE FALLING OR STAYING ASLEEP: MORE THAN HALF THE DAYS
1. LITTLE INTEREST OR PLEASURE IN DOING THINGS: NOT AT ALL
4. FEELING TIRED OR HAVING LITTLE ENERGY: NEARLY EVERY DAY
SUM OF ALL RESPONSES TO PHQ QUESTIONS 1-9: 2
SUM OF ALL RESPONSES TO PHQ QUESTIONS 1-9: 9
6. FEELING BAD ABOUT YOURSELF - OR THAT YOU ARE A FAILURE OR HAVE LET YOURSELF OR YOUR FAMILY DOWN: NOT AT ALL
8. MOVING OR SPEAKING SO SLOWLY THAT OTHER PEOPLE COULD HAVE NOTICED. OR THE OPPOSITE - BEING SO FIDGETY OR RESTLESS THAT YOU HAVE BEEN MOVING AROUND A LOT MORE THAN USUAL: MORE THAN HALF THE DAYS
SUM OF ALL RESPONSES TO PHQ QUESTIONS 1-9: 9
2. FEELING DOWN, DEPRESSED OR HOPELESS: NOT AT ALL
9. THOUGHTS THAT YOU WOULD BE BETTER OFF DEAD, OR OF HURTING YOURSELF: NOT AT ALL
SUM OF ALL RESPONSES TO PHQ9 QUESTIONS 1 & 2: 2
8. MOVING OR SPEAKING SO SLOWLY THAT OTHER PEOPLE COULD HAVE NOTICED. OR THE OPPOSITE, BEING SO FIGETY OR RESTLESS THAT YOU HAVE BEEN MOVING AROUND A LOT MORE THAN USUAL: MORE THAN HALF THE DAYS
1. LITTLE INTEREST OR PLEASURE IN DOING THINGS: MORE THAN HALF THE DAYS
6. FEELING BAD ABOUT YOURSELF - OR THAT YOU ARE A FAILURE OR HAVE LET YOURSELF OR YOUR FAMILY DOWN: NOT AT ALL
1. LITTLE INTEREST OR PLEASURE IN DOING THINGS: NOT AT ALL
7. TROUBLE CONCENTRATING ON THINGS, SUCH AS READING THE NEWSPAPER OR WATCHING TELEVISION: MORE THAN HALF THE DAYS
SUM OF ALL RESPONSES TO PHQ QUESTIONS 1-9: 9
2. FEELING DOWN, DEPRESSED OR HOPELESS: NOT AT ALL
5. POOR APPETITE OR OVEREATING: NOT AT ALL
5. POOR APPETITE OR OVEREATING: NOT AT ALL
SUM OF ALL RESPONSES TO PHQ9 QUESTIONS 1 & 2: 0

## 2025-01-26 NOTE — PROGRESS NOTES
Progress Note  Date:1/26/2025       Room:Room/bed info not found  Patient Name:Yocasta Medina     YOB: 1965     Age:59 y.o.        Subjective    Subjective   Review of Systems  Objective         Vitals Last 24 Hours:  TEMPERATURE:  No data recorded  RESPIRATIONS RANGE: No data recorded  PULSE OXIMETRY RANGE: No data recorded  PULSE RANGE: No data recorded  BLOOD PRESSURE RANGE: No data recorded.  ; No data recorded.    I/O (24Hr):  No intake or output data in the 24 hours ending 01/26/25 1607  Objective  Labs/Imaging/Diagnostics    Labs:  CBC:No results for input(s): \"WBC\", \"RBC\", \"HGB\", \"HCT\", \"MCV\", \"RDW\", \"PLT\" in the last 72 hours.  CHEMISTRIES:No results for input(s): \"NA\", \"K\", \"CL\", \"CO2\", \"BUN\", \"CREATININE\", \"GLUCOSE\", \"PHOS\", \"MG\" in the last 72 hours.    Invalid input(s): \"CA\"  PT/INR:No results for input(s): \"PROTIME\", \"INR\" in the last 72 hours.  APTT:No results for input(s): \"APTT\" in the last 72 hours.  LIVER PROFILE:No results for input(s): \"AST\", \"ALT\", \"BILIDIR\", \"BILITOT\", \"ALKPHOS\" in the last 72 hours.    Imaging Last 24 Hours:  No results found.  Assessment//Plan           Assessment & Plan    Electronically signed by Lizet Tineo MD on 1/26/25 at 4:07 PM EST

## 2025-01-27 ENCOUNTER — OFFICE VISIT (OUTPATIENT)
Dept: INTERNAL MEDICINE | Age: 60
End: 2025-01-27
Payer: MEDICARE

## 2025-01-27 VITALS
SYSTOLIC BLOOD PRESSURE: 111 MMHG | DIASTOLIC BLOOD PRESSURE: 68 MMHG | TEMPERATURE: 97.6 F | RESPIRATION RATE: 18 BRPM | HEIGHT: 71 IN | HEART RATE: 83 BPM | WEIGHT: 240 LBS | OXYGEN SATURATION: 95 % | BODY MASS INDEX: 33.6 KG/M2

## 2025-01-27 DIAGNOSIS — M54.50 CHRONIC MIDLINE LOW BACK PAIN WITHOUT SCIATICA: ICD-10-CM

## 2025-01-27 DIAGNOSIS — F41.9 ANXIETY: ICD-10-CM

## 2025-01-27 DIAGNOSIS — I25.119 CORONARY ARTERY DISEASE INVOLVING NATIVE HEART WITH ANGINA PECTORIS, UNSPECIFIED VESSEL OR LESION TYPE (HCC): ICD-10-CM

## 2025-01-27 DIAGNOSIS — G89.29 CHRONIC MIDLINE LOW BACK PAIN WITHOUT SCIATICA: ICD-10-CM

## 2025-01-27 DIAGNOSIS — K21.9 GASTROESOPHAGEAL REFLUX DISEASE WITHOUT ESOPHAGITIS: ICD-10-CM

## 2025-01-27 DIAGNOSIS — E55.9 VITAMIN D DEFICIENCY: ICD-10-CM

## 2025-01-27 PROCEDURE — 99212 OFFICE O/P EST SF 10 MIN: CPT

## 2025-01-27 RX ORDER — ASPIRIN 81 MG/1
81 TABLET ORAL DAILY
Qty: 90 TABLET | Refills: 1 | Status: SHIPPED | OUTPATIENT
Start: 2025-01-27

## 2025-01-27 RX ORDER — MULTIVIT-MIN/IRON/FOLIC ACID/K 18-600-40
1000 CAPSULE ORAL DAILY
Qty: 90 TABLET | Refills: 1 | Status: SHIPPED | OUTPATIENT
Start: 2025-01-27

## 2025-01-27 RX ORDER — OMEPRAZOLE 40 MG/1
40 CAPSULE, DELAYED RELEASE ORAL DAILY
Qty: 90 CAPSULE | Refills: 1 | Status: SHIPPED | OUTPATIENT
Start: 2025-01-27

## 2025-01-27 RX ORDER — TRAZODONE HYDROCHLORIDE 100 MG/1
100 TABLET ORAL NIGHTLY
Qty: 90 TABLET | Refills: 0 | Status: SHIPPED | OUTPATIENT
Start: 2025-01-27

## 2025-01-27 RX ORDER — BUSPIRONE HYDROCHLORIDE 30 MG/1
30 TABLET ORAL 2 TIMES DAILY
Qty: 180 TABLET | Refills: 1 | Status: SHIPPED | OUTPATIENT
Start: 2025-01-27

## 2025-01-27 RX ORDER — PROMETHAZINE HYDROCHLORIDE 25 MG/1
25 TABLET ORAL 3 TIMES DAILY PRN
Qty: 90 TABLET | Refills: 1 | Status: CANCELLED | OUTPATIENT
Start: 2025-01-27

## 2025-01-27 RX ORDER — METOPROLOL SUCCINATE 25 MG/1
12.5 TABLET, EXTENDED RELEASE ORAL DAILY
Qty: 90 TABLET | Refills: 1 | Status: SHIPPED | OUTPATIENT
Start: 2025-01-27

## 2025-01-27 RX ORDER — IBUPROFEN 800 MG/1
400 TABLET, FILM COATED ORAL EVERY 8 HOURS PRN
Qty: 120 TABLET | Refills: 1 | Status: SHIPPED | OUTPATIENT
Start: 2025-01-27

## 2025-01-27 RX ORDER — ASCORBIC ACID 500 MG
1000 TABLET ORAL DAILY
Qty: 90 TABLET | Refills: 1 | Status: SHIPPED | OUTPATIENT
Start: 2025-01-27

## 2025-01-27 ASSESSMENT — ENCOUNTER SYMPTOMS
COUGH: 0
COLOR CHANGE: 0
DIARRHEA: 0
RHINORRHEA: 0
SHORTNESS OF BREATH: 0
ABDOMINAL PAIN: 0
VOMITING: 0
BACK PAIN: 0
CONSTIPATION: 0

## 2025-01-27 NOTE — PROGRESS NOTES
[facility] - [facility] Internal Medicine      SUBJECTIVE:  Yocasta Medina (:  1965) is a 59 y.o. male here for evaluation of the following chief complaint(s):  Follow-up and Medication Refill    HPI:   Mr. Medina is a 57 y/o male, comes to the clinic for regular office visit.  No issues this visit, wants medication refill  Last office visit on 2024     -H/o severe COPD,   eosinophilic asthma, prior tobacco use, chronic herniation of lung on CT imaging (stable), follows with Dr. Denney for his lung issues.      Recent PFT : - severe COPD along with a restrictive pattern. DLCO; partially reduced.  There had been discussion of referral to [facility] for possible lung transplantation, patient has not heard anything from them yet.  Patient is usually on oxygen at 4 L said he is on oxygen at least 12 to 16 hours/day.  Patient has not had any recent fevers or symptoms of exacerbation.  He is currently on nebulizer and inhaler(Proventil, Brovana, Breztri, Pulmicort).  Patient has been on benralizumab and Roflumilast.  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   Defer interventions injections here per pain management 2/2:   -Pt also with lung herniation at the surgical site  -Pt referred for consideration of interventions for this pain  -Recommend avoidance of interventions due to risk of PTX     HLD  The 10-year ASCVD risk score (Matt WALSH, et al., 2019) is: 7.9%    Values

## 2025-01-27 NOTE — PROGRESS NOTES
[facility]  Internal Medicine Residency Clinic  Attending Physician Statement:  Jude Moore M.D., F.A.C.P.  Patient is seen for fu visit today.  -- acute and chronic problems addressed  Addressed when applicable- Health maintenance issues of vaccinations, social determinants/depression/CA screening, tobacco cessation etc...  I have seen/discussed the case, including pertinent history and exam findings with the resident, review of last visit medical records/labs-   I agree with the assessment, plan and orders as documented by the resident.    -Billiing assessed by medical complexity of case  My assessment of high points of todays visit as follows:    Severe Copd fu  4LNC home o2  severe COPD along with a restrictive pattern. DLCO; partially reduced.   Inhalers etc.. thru pulmonary  ?candidate for lung transplant     Exercising, losing weight  Bmi 33.4  Discussed vaccinations     Inc lipids, didn't tolerate statin  But does tolerate zetia     Ddd- lumbart radic, refils of gabapentin  Failed lyrica  Per pain management:  \"Chronic areas scarring atelectasis in the region of the right upper lobe and in the lateral aspect of the right lung where there is a chronic focal area of herniation across the intercostal space between the lateral aspect of the  right 6th and 7th ribs to diastasis of muscle attachment sites of the  corresponding intercostal muscle.\"    Defer interventions injections here per pain management 2 to:  \"Right-sided chest pain that began in May and 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

## 2025-01-27 NOTE — PATIENT INSTRUCTIONS
Dear Yocasta Medina,    Thank you for coming to your appointment today. I hope we have addressed all of your needs.     Please make sure to do the following:  - Continue your medications as listed.  - Get labs drawn before our next follow up.  - We will see each other again in 5 months    Call for a sooner appointment if you develop any new or worsening symptoms.    Have a great day!    Sincerely,  Lizet Tineo MD  1/27/2025  9:42 AM

## 2025-02-19 ENCOUNTER — TELEPHONE (OUTPATIENT)
Dept: PULMONOLOGY | Age: 60
End: 2025-02-19

## 2025-02-19 NOTE — TELEPHONE ENCOUNTER
Mailed letter to patient to inform him of the Ct  Lung Screen that is scheduled for him at [place], [place] . This test is scheduled on  Thursday, May 1, 2025 at  1:00 pm. Please arrive 30 minutes prior to appointment time. No Test Prep is needed

## 2025-02-24 ENCOUNTER — OFFICE VISIT (OUTPATIENT)
Age: 60
End: 2025-02-24
Payer: MEDICARE

## 2025-02-24 VITALS
SYSTOLIC BLOOD PRESSURE: 122 MMHG | HEIGHT: 71 IN | WEIGHT: 239 LBS | DIASTOLIC BLOOD PRESSURE: 78 MMHG | BODY MASS INDEX: 33.46 KG/M2

## 2025-02-24 DIAGNOSIS — M54.6 ACUTE MIDLINE THORACIC BACK PAIN: Primary | ICD-10-CM

## 2025-02-24 PROCEDURE — 99204 OFFICE O/P NEW MOD 45 MIN: CPT | Performed by: PHYSICAL MEDICINE & REHABILITATION

## 2025-02-24 PROCEDURE — 1036F TOBACCO NON-USER: CPT | Performed by: PHYSICAL MEDICINE & REHABILITATION

## 2025-02-24 PROCEDURE — 3074F SYST BP LT 130 MM HG: CPT | Performed by: PHYSICAL MEDICINE & REHABILITATION

## 2025-02-24 PROCEDURE — G8417 CALC BMI ABV UP PARAM F/U: HCPCS | Performed by: PHYSICAL MEDICINE & REHABILITATION

## 2025-02-24 PROCEDURE — 3078F DIAST BP <80 MM HG: CPT | Performed by: PHYSICAL MEDICINE & REHABILITATION

## 2025-02-24 PROCEDURE — 3017F COLORECTAL CA SCREEN DOC REV: CPT | Performed by: PHYSICAL MEDICINE & REHABILITATION

## 2025-02-24 PROCEDURE — G8427 DOCREV CUR MEDS BY ELIG CLIN: HCPCS | Performed by: PHYSICAL MEDICINE & REHABILITATION

## 2025-02-24 RX ORDER — METHYLPREDNISOLONE 4 MG/1
TABLET ORAL
Qty: 1 KIT | Refills: 0 | Status: SHIPPED | OUTPATIENT
Start: 2025-02-24

## 2025-02-24 NOTE — PROGRESS NOTES
Heather Quinones, DO  [facility] Physical Medicine and Rehabilitation  [address] [place] [address]  Phone: [phone]  Fax: [phone]    PCP: Lizet Tineo MD  Date of visit: 2/24/25    Chief Complaint   Patient presents with    Back Pain     Center of his back pain working out at the gym on today bench pressing and his center back started to hurt        Ortho walk in     Patient follows with pain clinic.     Yocasta Medina is a 59 y.o. male with past medical history as below who presents with acute midline thoracic back pain. He was lifting weights on Friday. Was doing chest press of over 300 pounds. He states this is usual for him. He had some discomfort in his back (has chronic back pain) that night. Went through the weekend and did push ups and other exercises. Woke up this morning with severe tightness and pain in the back.  The pain (using VAS) is rated Pain Score:  10 - Worst pain ever/10, is described as sharp, and is located in the midline thoracic back without radiation.  The pain is better with nothing.  The pain is worse with  sitting up straight . There is no associated numbness/tingling. There is no weakness. There is no bowel/bladder changes. In wheelchair.     The prior workup has included: none for this acute problem.     He is on transplant list for double lung   Has one kidney   Apparently has osteoporosis     Allergies   Allergen Reactions    Latex Rash     Skin turns red       Current Outpatient Medications   Medication Sig Dispense Refill    methylPREDNISolone (MEDROL DOSEPACK) 4 MG tablet Take by mouth as directed 1 kit 0    aspirin (SONIA ASPIRIN EC LOW DOSE) 81 MG EC tablet Take 1 tablet by mouth daily 90 tablet 1    busPIRone (BUSPAR) 30 MG tablet Take 30 mg by mouth 2 times daily 180 tablet 1    ibuprofen (ADVIL;MOTRIN) 800 MG tablet Take 0.5 tablets by mouth every 8 hours as needed for Pain 120 tablet 1    metoprolol succinate (TOPROL XL)

## 2025-02-25 ENCOUNTER — TELEPHONE (OUTPATIENT)
Dept: PHYSICAL MEDICINE AND REHAB | Age: 60
End: 2025-02-25

## 2025-02-25 NOTE — TELEPHONE ENCOUNTER
----- Message from Dr. Heather Quinones DO sent at 2/25/2025  8:26 AM EST -----  Please call patient with x-ray- no evidence for acute fracture.   He should call pain management to follow up with them.

## 2025-03-03 ENCOUNTER — TELEPHONE (OUTPATIENT)
Dept: PULMONOLOGY | Age: 60
End: 2025-03-03

## 2025-03-03 NOTE — TELEPHONE ENCOUNTER
Attempted to contact pt to make 3/6/25 appointment VV or telephone. Pt did not answer. VM left requesting a call back.

## 2025-03-06 ENCOUNTER — SCHEDULED TELEPHONE ENCOUNTER (OUTPATIENT)
Dept: PULMONOLOGY | Age: 60
End: 2025-03-06

## 2025-03-06 DIAGNOSIS — J45.50 SEVERE PERSISTENT ASTHMA, UNSPECIFIED WHETHER COMPLICATED (HCC): ICD-10-CM

## 2025-03-06 DIAGNOSIS — J44.9 COPD, SEVERE (HCC): Primary | ICD-10-CM

## 2025-03-06 DIAGNOSIS — Z87.891 HISTORY OF TOBACCO ABUSE: ICD-10-CM

## 2025-03-06 DIAGNOSIS — Z87.891 PERSONAL HISTORY OF TOBACCO USE: ICD-10-CM

## 2025-03-06 NOTE — PROGRESS NOTES
AND OCCUPATIONAL HEALTH:  The patient is a former smoker.  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  He is currently on disability.  He is going to the gym and weightlifting 4 days a week.  He also likes to walk in the woods looking for beckford mushrooms and he has 1 dog.  He denies any travel.  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      SURGICAL HISTORY:   Past Surgical History:   Procedure Laterality Date    CARDIAC CATHETERIZATION  2015    Non-obstructive coronary disease by Dr. Melissa NIEVES      COLONOSCOPY N/A 12/20/2019    COLONOSCOPY POLYPECTOMY SNARE/COLD BIOPSY performed by Lolita Mc [place] [facility]    COLONOSCOPY  12/20/2019    COLONOSCOPY SUBMUCOSAL/BOTOX INJECTION performed by Lolita Mc MD at [facility]    COLONOSCOPY  11/22/2024    diverticula--autumn    COLONOSCOPY N/A 11/22/2024    COLONOSCOPY DIAGNOSTIC performed by Serafin Cruz MD at [facility]    ESOPHAGOGASTRODUODENOSCOPY  11/22/2024    gastritis--autumn    LUNG SURGERY Right 09/2014    VATS with RUL wedge resection -- pathology diffuse granulomatous changes at [facility]    TOTAL NEPHRECTOMY Right     UPPER GASTROINTESTINAL ENDOSCOPY N/A 12/20/2019    EGD BIOPSY performed by Lolita Mc MD at [facility]    UPPER GASTROINTESTINAL ENDOSCOPY N/A 11/22/2024    ESOPHAGOGASTRODUODENOSCOPY BIOPSY performed by Serafin Cruz MD at [facility]       FAMILY HISTORY: No family history of cancer, blood clots patient reports that his father grandmother and sister have COPD and that they all also smoked    REVIEW OF SYSTEMS:  Constitutional: No fevers, chills, unintentional weight loss  Skin: No rashes or lesions  EENT: No change in vision, change in hearing, change in taste, change in

## 2025-03-27 DIAGNOSIS — I25.119 CORONARY ARTERY DISEASE INVOLVING NATIVE HEART WITH ANGINA PECTORIS, UNSPECIFIED VESSEL OR LESION TYPE: ICD-10-CM

## 2025-03-27 NOTE — TELEPHONE ENCOUNTER
Name of Medication(s) Requested:  Requested Prescriptions     Pending Prescriptions Disp Refills    aspirin (SONIA ASPIRIN EC LOW DOSE) 81 MG EC tablet 90 tablet 1     Sig: Take 1 tablet by mouth daily    ibuprofen (ADVIL;MOTRIN) 800 MG tablet 120 tablet 1     Sig: Take 0.5 tablets by mouth every 8 hours as needed for Pain       Medication is on current medication list Yes    Dosage and directions were verified? Yes    Quantity verified: 90 day supply     Pharmacy Verified?  Yes    Last Appointment:  1/27/2025    Future appts:  Future Appointments   Date Time Provider Department Center   4/17/2025  2:45 PM Adelina Lozano PA BDM PAIN MAR [facility]   5/1/2025  1:00 PM [facility] CT SCAN 2 SEYZ CT [facility] Rad/Car   5/15/2025  8:30 AM Lizet Tineo MD ACC IM [facility] ECC DEP   6/24/2025  9:00 AM [facility]        (If no appt send self scheduling link. .REFILLAPPT)  Scheduling request sent?     [] Yes  [x] No    Does patient need updated?  [] Yes  [x] No

## 2025-03-28 RX ORDER — ASPIRIN 81 MG/1
81 TABLET ORAL DAILY
Qty: 90 TABLET | Refills: 1 | Status: SHIPPED | OUTPATIENT
Start: 2025-03-28

## 2025-03-28 RX ORDER — IBUPROFEN 800 MG/1
400 TABLET, FILM COATED ORAL EVERY 8 HOURS PRN
Qty: 90 TABLET | Refills: 1 | Status: SHIPPED | OUTPATIENT
Start: 2025-03-28

## 2025-04-14 DIAGNOSIS — F41.9 ANXIETY: Primary | ICD-10-CM

## 2025-04-14 NOTE — TELEPHONE ENCOUNTER
Name of Medication(s) Requested:  Requested Prescriptions     Pending Prescriptions Disp Refills    traZODone (DESYREL) 100 MG tablet 90 tablet 0     Sig: Take 1 tablet by mouth nightly       Medication is on current medication list Yes    Dosage and directions were verified? Yes    Quantity verified: 90 day supply     Pharmacy Verified?  Yes    Last Appointment:  1/27/2025    Future appts:  Future Appointments   Date Time Provider Department Center   4/17/2025  2:45 PM Adelina Lozano PA BDM PAIN MAR [facility]   5/1/2025  1:00 PM [facility] CT SCAN 2 SEYZ CT [facility] Rad/Car   5/15/2025  8:30 AM Lizet Tineo MD ACC IM [facility] ECC DEP   6/24/2025  9:00 AM [facility]        (If no appt send self scheduling link. .REFILLAPPT)  Scheduling request sent?     [] Yes  [x] No    Does patient need updated?  [] Yes  [x] No

## 2025-04-16 RX ORDER — TRAZODONE HYDROCHLORIDE 100 MG/1
100 TABLET ORAL NIGHTLY
Qty: 90 TABLET | Refills: 0 | Status: SHIPPED | OUTPATIENT
Start: 2025-04-16

## 2025-04-17 ENCOUNTER — OFFICE VISIT (OUTPATIENT)
Dept: PAIN MANAGEMENT | Age: 60
End: 2025-04-17
Payer: MEDICARE

## 2025-04-17 VITALS
OXYGEN SATURATION: 95 % | TEMPERATURE: 97.9 F | WEIGHT: 239 LBS | RESPIRATION RATE: 16 BRPM | SYSTOLIC BLOOD PRESSURE: 109 MMHG | HEIGHT: 71 IN | DIASTOLIC BLOOD PRESSURE: 76 MMHG | HEART RATE: 76 BPM | BODY MASS INDEX: 33.46 KG/M2

## 2025-04-17 DIAGNOSIS — R20.2 NUMBNESS AND TINGLING IN BOTH HANDS: ICD-10-CM

## 2025-04-17 DIAGNOSIS — J98.4 HERNIATION OF RIGHT LUNG: ICD-10-CM

## 2025-04-17 DIAGNOSIS — G58.8 INTERCOSTAL NEURALGIA: ICD-10-CM

## 2025-04-17 DIAGNOSIS — R20.0 NUMBNESS AND TINGLING IN BOTH HANDS: ICD-10-CM

## 2025-04-17 DIAGNOSIS — G89.4 CHRONIC PAIN SYNDROME: Primary | ICD-10-CM

## 2025-04-17 DIAGNOSIS — M54.42 CHRONIC BILATERAL LOW BACK PAIN WITH BILATERAL SCIATICA: ICD-10-CM

## 2025-04-17 DIAGNOSIS — M54.16 LUMBAR RADICULOPATHY: ICD-10-CM

## 2025-04-17 DIAGNOSIS — G89.29 CHRONIC BILATERAL LOW BACK PAIN WITH BILATERAL SCIATICA: ICD-10-CM

## 2025-04-17 DIAGNOSIS — M79.10 MYALGIA: ICD-10-CM

## 2025-04-17 DIAGNOSIS — M54.41 CHRONIC BILATERAL LOW BACK PAIN WITH BILATERAL SCIATICA: ICD-10-CM

## 2025-04-17 PROCEDURE — 3017F COLORECTAL CA SCREEN DOC REV: CPT | Performed by: PHYSICIAN ASSISTANT

## 2025-04-17 PROCEDURE — 99213 OFFICE O/P EST LOW 20 MIN: CPT | Performed by: PHYSICIAN ASSISTANT

## 2025-04-17 PROCEDURE — 3074F SYST BP LT 130 MM HG: CPT | Performed by: PHYSICIAN ASSISTANT

## 2025-04-17 PROCEDURE — 3078F DIAST BP <80 MM HG: CPT | Performed by: PHYSICIAN ASSISTANT

## 2025-04-17 PROCEDURE — 1036F TOBACCO NON-USER: CPT | Performed by: PHYSICIAN ASSISTANT

## 2025-04-17 PROCEDURE — G8427 DOCREV CUR MEDS BY ELIG CLIN: HCPCS | Performed by: PHYSICIAN ASSISTANT

## 2025-04-17 PROCEDURE — G8417 CALC BMI ABV UP PARAM F/U: HCPCS | Performed by: PHYSICIAN ASSISTANT

## 2025-04-17 RX ORDER — GABAPENTIN 600 MG/1
600 TABLET ORAL 4 TIMES DAILY
Qty: 360 TABLET | Refills: 0 | Status: SHIPPED | OUTPATIENT
Start: 2025-04-17 | End: 2025-07-16

## 2025-04-17 NOTE — PROGRESS NOTES
Yocasta Medina presents to the [facility] on 4/17/2025. Yocasta is complaining of pain in entire back. The pain is constant. The pain is described as aching, stabbing, and sharp. Pain is rated on his best day at a 6, on his worst day at a 10, and on average at a 8 on the VAS scale. He took his last dose of Neurontin, Motrin, and Tylenol 4/17.     Any procedures since your last visit: No    Pacemaker or defibrillator: No     He is  on NSAIDS and is  on anticoagulation medications to include ASA and is managed by Dr. Tineo.     Do you want someone present when the provider examines you? No    Medication Contract and Consent for Opioid Use Documents Filed        No documents found                    /76   Pulse 76   Temp 97.9 °F (36.6 °C) (Infrared)   Resp 16   Ht 1.803 m (5' 11\")   Wt 108.4 kg (239 lb)   SpO2 95%   BMI 33.33 kg/m²      No LMP for male patient.  
of potential drug abuse or diversion identified.;Assessed functional status (ability to engage in work or other purposeful activities, the pain intensity and its interference with activities of daily living, quality of family life and social activities, and the physical activity);Obtaining appropriate analgesic effect of treatment.             We discussed with the patient that combining opioids, benzodiazepines, alcohol, illicit drugs or sleep aids increases the risk of respiratory depression including death. We discussed that these medications may cause drowsiness, sedation or dizziness and have counseled the patient not to drive or operate machinery. We have discussed that these medications will be prescribed only by one provider. We have discussed with the patient about age related risk factors and have thoroughly discussed the importance of taking these medications as prescribed. The patient verbalizes understanding.    ccreferring physic

## 2025-05-14 NOTE — PROGRESS NOTES
[facility] - [facility] Internal Medicine      SUBJECTIVE:  Yocasta Medina (:  1965) is a 59 y.o. male here for evaluation of the following chief complaint(s):  Follow-up (Here for 5 month follow up visit. )    HPI:   5 months f/u  Regular office visit    Acute issue:  Has been having pain on b/l UE  Comes and goes  Relieved by exercise  Not noticed any weakness  Has been following with pain clinic, planned for EMG    Chronic issue:  H/o severe COPD   Eosinophilic asthma, prior tobacco use, chronic herniation of lung on CT imaging (stable), follows with Dr. Denney     Recent PFT : - severe COPD along with a restrictive pattern. DLCO; partially reduced.  There had been discussion of referral to [facility] for possible lung transplantation, patient has not heard anything from them yet.  Patient is usually on oxygen at 4 L said he is on oxygen at least 12 to 16 hours/day.  Patient has not had any recent fevers or symptoms of exacerbation.  He is currently on nebulizer and inhaler (Proventil, Brovana, Breztri, Pulmicort).  Patient has been on benralizumab and Roflumilast.     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   Defer interventions injections here per pain management 2/2:   -Pt also with lung herniation at the surgical site  -Pt referred for consideration of interventions for this pain  -Recommend avoidance of interventions due to risk of PTX  -Due for LD CT, he has one already ordered    H/O lung Ca:  \"piece of his upper lobe of his right lung

## 2025-05-15 ENCOUNTER — HOSPITAL ENCOUNTER (OUTPATIENT)
Age: 60
Discharge: HOME | End: 2025-05-15
Payer: MEDICARE

## 2025-05-15 ENCOUNTER — OFFICE VISIT (OUTPATIENT)
Age: 60
End: 2025-05-15
Payer: MEDICARE

## 2025-05-15 VITALS
RESPIRATION RATE: 14 BRPM | OXYGEN SATURATION: 95 % | TEMPERATURE: 97.3 F | DIASTOLIC BLOOD PRESSURE: 68 MMHG | WEIGHT: 236 LBS | SYSTOLIC BLOOD PRESSURE: 112 MMHG | HEART RATE: 76 BPM | HEIGHT: 71 IN | BODY MASS INDEX: 33.04 KG/M2

## 2025-05-15 DIAGNOSIS — F41.9 ANXIETY: ICD-10-CM

## 2025-05-15 DIAGNOSIS — I25.119 CORONARY ARTERY DISEASE INVOLVING NATIVE HEART WITH ANGINA PECTORIS, UNSPECIFIED VESSEL OR LESION TYPE: ICD-10-CM

## 2025-05-15 DIAGNOSIS — G58.8 INTERCOSTAL NEURALGIA: ICD-10-CM

## 2025-05-15 DIAGNOSIS — Z87.891 PERSONAL HISTORY OF TOBACCO USE: ICD-10-CM

## 2025-05-15 DIAGNOSIS — E78.2 MIXED HYPERLIPIDEMIA: Primary | ICD-10-CM

## 2025-05-15 DIAGNOSIS — K21.9 GASTROESOPHAGEAL REFLUX DISEASE WITHOUT ESOPHAGITIS: ICD-10-CM

## 2025-05-15 DIAGNOSIS — E78.2 MIXED HYPERLIPIDEMIA: ICD-10-CM

## 2025-05-15 DIAGNOSIS — J45.50 SEVERE PERSISTENT ASTHMA, UNSPECIFIED WHETHER COMPLICATED (HCC): ICD-10-CM

## 2025-05-15 LAB
ALBUMIN SERPL-MCNC: 4.3 G/DL (ref 3.5–5.2)
ALP SERPL-CCNC: 65 U/L (ref 40–129)
ALT SERPL-CCNC: 13 U/L (ref 0–50)
ANION GAP SERPL CALCULATED.3IONS-SCNC: 10 MMOL/L (ref 7–16)
AST SERPL-CCNC: 21 U/L (ref 0–50)
BASOPHILS # BLD: 0.09 K/UL (ref 0–0.2)
BASOPHILS NFR BLD: 1 % (ref 0–2)
BILIRUB SERPL-MCNC: 0.3 MG/DL (ref 0–1.2)
BUN SERPL-MCNC: 17 MG/DL (ref 6–20)
CALCIUM SERPL-MCNC: 9.5 MG/DL (ref 8.6–10)
CHLORIDE SERPL-SCNC: 107 MMOL/L (ref 98–107)
CHOLEST SERPL-MCNC: 198 MG/DL
CO2 SERPL-SCNC: 24 MMOL/L (ref 22–29)
CREAT SERPL-MCNC: 1.2 MG/DL (ref 0.7–1.2)
CREAT UR-MCNC: 316 MG/DL (ref 40–278)
EOSINOPHIL # BLD: 0.25 K/UL (ref 0.05–0.5)
EOSINOPHILS RELATIVE PERCENT: 3 % (ref 0–6)
ERYTHROCYTE [DISTWIDTH] IN BLOOD BY AUTOMATED COUNT: 12.3 % (ref 11.5–15)
GFR, ESTIMATED: 67 ML/MIN/1.73M2
GLUCOSE SERPL-MCNC: 85 MG/DL (ref 74–99)
HCT VFR BLD AUTO: 43.3 % (ref 37–54)
HDLC SERPL-MCNC: 39 MG/DL
HGB BLD-MCNC: 14.1 G/DL (ref 12.5–16.5)
IMM GRANULOCYTES # BLD AUTO: 0.04 K/UL (ref 0–0.58)
IMM GRANULOCYTES NFR BLD: 1 % (ref 0–5)
LDLC SERPL CALC-MCNC: 129 MG/DL
LYMPHOCYTES NFR BLD: 2.88 K/UL (ref 1.5–4)
LYMPHOCYTES RELATIVE PERCENT: 33 % (ref 20–42)
MCH RBC QN AUTO: 30.8 PG (ref 26–35)
MCHC RBC AUTO-ENTMCNC: 32.6 G/DL (ref 32–34.5)
MCV RBC AUTO: 94.5 FL (ref 80–99.9)
MONOCYTES NFR BLD: 0.79 K/UL (ref 0.1–0.95)
MONOCYTES NFR BLD: 9 % (ref 2–12)
NEUTROPHILS NFR BLD: 54 % (ref 43–80)
NEUTS SEG NFR BLD: 4.68 K/UL (ref 1.8–7.3)
PLATELET # BLD AUTO: 294 K/UL (ref 130–450)
PMV BLD AUTO: 10.8 FL (ref 7–12)
POTASSIUM SERPL-SCNC: 4.5 MMOL/L (ref 3.5–5.1)
PROT SERPL-MCNC: 6.6 G/DL (ref 6.4–8.3)
RBC # BLD AUTO: 4.58 M/UL (ref 3.8–5.8)
SODIUM SERPL-SCNC: 142 MMOL/L (ref 136–145)
TOTAL PROTEIN, URINE: 18 MG/DL (ref 0–12)
TRIGL SERPL-MCNC: 149 MG/DL
URINE TOTAL PROTEIN CREATININE RATIO: 0.06 (ref 0–0.2)
VLDLC SERPL CALC-MCNC: 30 MG/DL
WBC OTHER # BLD: 8.7 K/UL (ref 4.5–11.5)

## 2025-05-15 PROCEDURE — 82570 ASSAY OF URINE CREATININE: CPT

## 2025-05-15 PROCEDURE — G8427 DOCREV CUR MEDS BY ELIG CLIN: HCPCS

## 2025-05-15 PROCEDURE — 3017F COLORECTAL CA SCREEN DOC REV: CPT

## 2025-05-15 PROCEDURE — 99212 OFFICE O/P EST SF 10 MIN: CPT

## 2025-05-15 PROCEDURE — 85025 COMPLETE CBC W/AUTO DIFF WBC: CPT

## 2025-05-15 PROCEDURE — 36415 COLL VENOUS BLD VENIPUNCTURE: CPT

## 2025-05-15 PROCEDURE — G8417 CALC BMI ABV UP PARAM F/U: HCPCS

## 2025-05-15 PROCEDURE — 84156 ASSAY OF PROTEIN URINE: CPT

## 2025-05-15 PROCEDURE — 3074F SYST BP LT 130 MM HG: CPT

## 2025-05-15 PROCEDURE — 80061 LIPID PANEL: CPT

## 2025-05-15 PROCEDURE — 1036F TOBACCO NON-USER: CPT

## 2025-05-15 PROCEDURE — 99213 OFFICE O/P EST LOW 20 MIN: CPT

## 2025-05-15 PROCEDURE — 3078F DIAST BP <80 MM HG: CPT

## 2025-05-15 PROCEDURE — 80053 COMPREHEN METABOLIC PANEL: CPT

## 2025-05-15 RX ORDER — IBUPROFEN 800 MG/1
400 TABLET, FILM COATED ORAL EVERY 8 HOURS PRN
Qty: 90 TABLET | Refills: 1 | Status: CANCELLED | OUTPATIENT
Start: 2025-05-15

## 2025-05-15 RX ORDER — EZETIMIBE 10 MG/1
10 TABLET ORAL NIGHTLY
Qty: 30 TABLET | Refills: 3 | Status: SHIPPED | OUTPATIENT
Start: 2025-05-15

## 2025-05-15 RX ORDER — PROMETHAZINE HYDROCHLORIDE 25 MG/1
25 TABLET ORAL 3 TIMES DAILY PRN
Qty: 90 TABLET | Refills: 1 | Status: SHIPPED | OUTPATIENT
Start: 2025-05-15

## 2025-05-15 RX ORDER — OMEPRAZOLE 40 MG/1
40 CAPSULE, DELAYED RELEASE ORAL DAILY
Qty: 90 CAPSULE | Refills: 1 | Status: SHIPPED | OUTPATIENT
Start: 2025-05-15

## 2025-05-15 RX ORDER — BUSPIRONE HYDROCHLORIDE 30 MG/1
30 TABLET ORAL 2 TIMES DAILY
Qty: 180 TABLET | Refills: 1 | Status: SHIPPED | OUTPATIENT
Start: 2025-05-15

## 2025-05-15 RX ORDER — ACETAMINOPHEN 500 MG
500 TABLET ORAL 4 TIMES DAILY PRN
Qty: 240 TABLET | Refills: 0 | Status: SHIPPED | OUTPATIENT
Start: 2025-05-15 | End: 2025-07-14

## 2025-05-15 RX ORDER — ASPIRIN 81 MG/1
81 TABLET ORAL DAILY
Qty: 90 TABLET | Refills: 1 | Status: SHIPPED | OUTPATIENT
Start: 2025-05-15

## 2025-05-15 RX ORDER — CYCLOBENZAPRINE HCL 10 MG
TABLET ORAL
COMMUNITY
Start: 2025-02-24

## 2025-05-15 RX ORDER — METOPROLOL SUCCINATE 25 MG/1
12.5 TABLET, EXTENDED RELEASE ORAL DAILY
Qty: 90 TABLET | Refills: 1 | Status: SHIPPED | OUTPATIENT
Start: 2025-05-15

## 2025-05-15 RX ORDER — TRAZODONE HYDROCHLORIDE 100 MG/1
100 TABLET ORAL NIGHTLY
Qty: 90 TABLET | Refills: 0 | Status: SHIPPED | OUTPATIENT
Start: 2025-05-15

## 2025-05-15 NOTE — PATIENT INSTRUCTIONS
Dear Yocasta Medina,    Thank you for coming to your appointment at [facility].    Please make sure to do the following:    - Continue medications as listed and contact our office if medications are unavailable at the pharmacy.    - Complete any ordered lab work as instructed.    - If a referral was placed to another doctor's office, please contact our office in 5 business days if you have not heard from that office regarding the referral.    - If any imaging test was ordered at today's visit (ultrasound, CT scan, or MRI), expect a phone call to schedule in the next 5 business days. You may also call [facility] Imaging Scheduling department at [phone] to schedule.    Contact our office if you develop any new or worsening symptoms or if you have any questions regarding today's visit.    We aim to address your healthcare needs to your satisfaction!    Sincerely,  Lizet Tineo MD  5/15/2025  9:26 AM

## 2025-05-15 NOTE — PROGRESS NOTES
[facility]  Internal Medicine Residency Clinic    Attending Physician Statement  I have discussed the case, including pertinent history and exam findings with the resident physician.I have seen and examined the patient and the key elements of the encounter have been performed by me. I agree with the assessment, plan and orders as documented by the resident. I have reviewed the relevant PMHx, PSHx, FamHx, SocialHx, medications, and allergies and updated history as appropriate.    Patient presents for routine follow up of medical problems.     Pt presents with routine visit.  COPD - used home oxygen half a day and lost 50 Lbs over a year, still has SOB in exercises but is able to tolerate. Continue to follow up with Dr. Souza.  Back pain related to his lumbar disc disease has been controlled with gabapentin now, CAD has been remained stable.  Follow up in three months.      Remainder of medical problems as per resident note.        Kareem Holloway MD  5/15/2025 9:33 AM

## 2025-05-17 ENCOUNTER — HOSPITAL ENCOUNTER (OUTPATIENT)
Dept: CT IMAGING | Age: 60
Discharge: HOME | End: 2025-05-19
Attending: INTERNAL MEDICINE
Payer: MEDICARE

## 2025-05-17 PROCEDURE — 71271 CT THORAX LUNG CANCER SCR C-: CPT

## 2025-05-20 ENCOUNTER — TELEPHONE (OUTPATIENT)
Age: 60
End: 2025-05-20

## 2025-05-20 NOTE — TELEPHONE ENCOUNTER
Attempted to call patient for the third time. No answer. Left voicemail.    Electronically signed by Roberto Constantino MD on 5/20/2025 at 4:01 PM    Addendum  Patient picked up the phone. Relayed his lab results and advised that we will continue to monitor his renal function. He has slightly improved LDL levels. Advised to continue with his medications. Patient verbalized understanding.    Electronically signed by Roberto Constantino MD on 5/20/2025 at 4:13 PM

## 2025-05-25 NOTE — PROGRESS NOTES
[facility]  [address] [place] [id number]    Follow up Note      Yocasta Medina     Date of Visit:  2025    CC:  Patient presents for follow up   Chief Complaint   Patient presents with    Follow-up     Mid back       HPI:    Pain is worse due to the weather. .  Appropriate analgesia with current medications regimen: yes.    Change in quality of symptoms:no.    Medication side effects:none.   Recent diagnostic testing:none.   Recent interventional procedures:none.    He has been on anticoagulation medications to include ASA and NSAIDS and has not been on herbal supplements.  He is not diabetic.     Imagin/2024 MRI lumbar w/ and w/o -  FINDINGS:  BONES/ALIGNMENT: There is normal alignment of the spine. The vertebral body  heights are maintained. The bone marrow signal appears unremarkable.     SPINAL CORD:  The conus terminates normally.     SOFT TISSUES: No abnormal enhancement is seen of the lumbar spine.  No  paraspinal mass identified.     L1-L2: Small broad-based disc protrusion.  No significant central canal,  lateral recess or neural foraminal stenoses.     L2-L3: Disc desiccation with a minimal disc bulge.  No significant central  canal, lateral recess or neural foraminal stenoses.     L3-L4: Small disc osteophyte complex.  Mild facet hypertrophy.  No  significant central canal or lateral recess stenosis.  Mild neural foraminal  stenoses.     L4-L5: Disc desiccation with a disc bulge.  Mild facet hypertrophy.  Mild-to-moderate central canal and lateral recess stenoses.  Mild neural  foraminal stenoses.     L5-S1: There is no significant disc protrusion, spinal canal stenosis or  neural foraminal narrowing.     IMPRESSION:  1. No evidence of fracture or metastatic disease in the lumbar spine.  2. Mild-to-moderate central canal and lateral recess stenoses at L4-5.  3. Mild neural foraminal stenoses at L3-4 and L4-5.     2024 CTA chest -  FINDINGS:  There is no acute  never

## 2025-06-16 ENCOUNTER — TELEPHONE (OUTPATIENT)
Age: 60
End: 2025-06-16

## 2025-06-16 DIAGNOSIS — R25.2 SPASMS OF THE HANDS OR FEET: Primary | ICD-10-CM

## 2025-06-16 NOTE — TELEPHONE ENCOUNTER
Name of Medication(s) Requested:  Requested Prescriptions     Pending Prescriptions Disp Refills    cyclobenzaprine (FLEXERIL) 10 MG tablet         Medication is on current medication list Yes    Dosage and directions were verified? Yes    Quantity verified: 30 day supply     Pharmacy Verified?  Yes    Last Appointment:  5/15/2025    Future appts:  Future Appointments   Date Time Provider Department Center   6/20/2025  9:00 AM Virgie Navarro MD [facility]   6/24/2025  9:00 AM Selina Denney DO [facility]   7/21/2025  3:00 PM Adelina Lozano PA [facility]        (If no appt send self scheduling link. .REFILLAPPT)  Scheduling request sent?     [] Yes  [x] No    Does patient need updated?  [] Yes  [x] No

## 2025-06-16 NOTE — TELEPHONE ENCOUNTER
Pt called in stating he has been having increased lower back spasms and would like a refill on the Flexeril. Please advise.

## 2025-06-18 RX ORDER — CYCLOBENZAPRINE HCL 10 MG
10 TABLET ORAL 2 TIMES DAILY PRN
Qty: 30 TABLET | Refills: 0 | Status: SHIPPED | OUTPATIENT
Start: 2025-06-18

## 2025-06-20 ENCOUNTER — HOSPITAL ENCOUNTER (OUTPATIENT)
Dept: NEUROLOGY | Age: 60
Discharge: HOME | End: 2025-06-20

## 2025-06-20 VITALS — HEIGHT: 71 IN | BODY MASS INDEX: 32.9 KG/M2 | WEIGHT: 235 LBS

## 2025-06-20 DIAGNOSIS — R20.2 NUMBNESS AND TINGLING IN BOTH HANDS: ICD-10-CM

## 2025-06-20 DIAGNOSIS — R20.0 NUMBNESS AND TINGLING IN BOTH HANDS: ICD-10-CM

## 2025-06-20 NOTE — PROCEDURES
PROCEDURE NOTE  Date: 6/20/2025   Name: Yocasta Medina  YOB: 1965    Procedures:      [facility] [place]  Electrodiagnostic Laboratory   [place]         Full Name: Yocasta Medina Gender: Male  MRN: [id number] YOB: 1965  Location:: [facility]      Visit Date: 6/20/2025 09:00  Age: 60 Years 0 Months Old  Examining Physician: Dr. SRAVANI Navarro   Referring Physician: Adelina THOMAS  Technician: Sully Almeida   Height: 5 feet 11 inch  Weight: 235 lbs  Notes: DM: No  BMI:  Blood thinner: asprin  BUE Numbness and tingling      Sensory NCS      Nerve / Sites Onset Lat Peak Lat PP Amp Distance Velocity Temp.    ms ms µV cm m/s °C   R Median - Digit II (Antidromic)      Mid Palm 1.88 2.55 8.3 7 37 32.7      Wrist 5.89 7.29 7.3 14 24 32.6   L Median - Digit II (Antidromic)      Mid Palm 1.93 2.40 9.4 7 36 32.9      Wrist 5.52 6.41 5.8 14 25 32.9   R Ulnar - Digit V (Antidromic)      Wrist 2.50 3.18 28.5 14 56 32.7   L Ulnar - Digit V (Antidromic)      Wrist 2.60 3.18 23.0 14 54 32.9   R Radial - Anatomical snuff box (Forearm)      Forearm 1.51 2.40 17.9 10 66 32.7   L Radial - Anatomical snuff box (Forearm)      Forearm 1.46 2.24 18.0 10 69 32.9       Motor NCS      Nerve / Sites Lat. Amplitude Distance Velocity Temp.    ms mV cm m/s °C   R Median - APB      Wrist 6.67 5.7 8  32.7      Elbow 10.78 5.1 19 46 32.8   L Median - APB      Wrist 6.72 3.3 8  32.9      Elbow 11.04 2.9 20 46 32.8   R Ulnar - ADM      Wrist 2.97 7.9 8  32.8      B.Elbow 6.20 7.9 21 65 32.8      A.Elbow 8.02 7.9 10 55 32.8   L Ulnar - ADM      Wrist 2.86 8.0 8  32.8      B.Elbow 6.56 7.5 20 54 32.8      A.Elbow 8.13 7.5 10 64 32.8       F  Wave      Nerve F Lat M Lat F-M Lat    ms ms ms   R Median - APB 32.4 6.5 25.9   R Ulnar - ADM 29.0 3.1 25.9   L Median - APB 31.6 6.8 24.7   L Ulnar - ADM 28.4 3.0 25.4       EMG         EMG Summary Table     Spontaneous MUAP Recruitment   Muscle Nerve Roots IA

## 2025-06-24 ENCOUNTER — OFFICE VISIT (OUTPATIENT)
Age: 60
End: 2025-06-24
Payer: MEDICARE

## 2025-06-24 VITALS
TEMPERATURE: 97.6 F | SYSTOLIC BLOOD PRESSURE: 124 MMHG | HEART RATE: 96 BPM | OXYGEN SATURATION: 97 % | DIASTOLIC BLOOD PRESSURE: 67 MMHG | RESPIRATION RATE: 18 BRPM

## 2025-06-24 DIAGNOSIS — Z87.891 HISTORY OF TOBACCO ABUSE: Primary | ICD-10-CM

## 2025-06-24 DIAGNOSIS — G47.33 OSA (OBSTRUCTIVE SLEEP APNEA): ICD-10-CM

## 2025-06-24 DIAGNOSIS — J44.9 COPD, SEVERE (HCC): ICD-10-CM

## 2025-06-24 PROCEDURE — 3078F DIAST BP <80 MM HG: CPT | Performed by: INTERNAL MEDICINE

## 2025-06-24 PROCEDURE — 3017F COLORECTAL CA SCREEN DOC REV: CPT | Performed by: INTERNAL MEDICINE

## 2025-06-24 PROCEDURE — 1036F TOBACCO NON-USER: CPT | Performed by: INTERNAL MEDICINE

## 2025-06-24 PROCEDURE — 99214 OFFICE O/P EST MOD 30 MIN: CPT | Performed by: INTERNAL MEDICINE

## 2025-06-24 PROCEDURE — G8427 DOCREV CUR MEDS BY ELIG CLIN: HCPCS | Performed by: INTERNAL MEDICINE

## 2025-06-24 PROCEDURE — 99213 OFFICE O/P EST LOW 20 MIN: CPT | Performed by: INTERNAL MEDICINE

## 2025-06-24 PROCEDURE — G8417 CALC BMI ABV UP PARAM F/U: HCPCS | Performed by: INTERNAL MEDICINE

## 2025-06-24 PROCEDURE — 3074F SYST BP LT 130 MM HG: CPT | Performed by: INTERNAL MEDICINE

## 2025-06-24 PROCEDURE — 3023F SPIROM DOC REV: CPT | Performed by: INTERNAL MEDICINE

## 2025-06-24 RX ORDER — IPRATROPIUM BROMIDE AND ALBUTEROL SULFATE 2.5; .5 MG/3ML; MG/3ML
3 SOLUTION RESPIRATORY (INHALATION)
Qty: 360 ML | Refills: 5 | Status: SHIPPED | OUTPATIENT
Start: 2025-06-24 | End: 2025-06-26 | Stop reason: SDUPTHER

## 2025-06-24 RX ORDER — BUDESONIDE 0.5 MG/2ML
0.5 INHALANT ORAL
Qty: 60 EACH | Refills: 3 | Status: SHIPPED | OUTPATIENT
Start: 2025-06-24

## 2025-06-24 RX ORDER — ALBUTEROL SULFATE 90 UG/1
2 INHALANT RESPIRATORY (INHALATION) EVERY 6 HOURS PRN
Qty: 18 G | Refills: 5 | Status: SHIPPED | OUTPATIENT
Start: 2025-06-24

## 2025-06-24 RX ORDER — ARFORMOTEROL TARTRATE 15 UG/2ML
15 SOLUTION RESPIRATORY (INHALATION)
Qty: 120 ML | Refills: 3 | Status: SHIPPED | OUTPATIENT
Start: 2025-06-24

## 2025-06-24 RX ORDER — ROFLUMILAST 500 UG/1
500 TABLET ORAL DAILY
Qty: 30 TABLET | Refills: 5 | Status: SHIPPED | OUTPATIENT
Start: 2025-06-24

## 2025-06-26 DIAGNOSIS — R06.02 SHORTNESS OF BREATH: ICD-10-CM

## 2025-06-26 DIAGNOSIS — J45.50 SEVERE PERSISTENT ASTHMA, UNSPECIFIED WHETHER COMPLICATED (HCC): Primary | ICD-10-CM

## 2025-06-26 DIAGNOSIS — J45.909 SEVERE ASTHMA, UNSPECIFIED WHETHER COMPLICATED, UNSPECIFIED WHETHER PERSISTENT: ICD-10-CM

## 2025-06-26 DIAGNOSIS — J44.9 COPD, SEVERE (HCC): ICD-10-CM

## 2025-06-26 RX ORDER — IPRATROPIUM BROMIDE AND ALBUTEROL SULFATE 2.5; .5 MG/3ML; MG/3ML
3 SOLUTION RESPIRATORY (INHALATION)
Qty: 360 ML | Refills: 5 | Status: SHIPPED | OUTPATIENT
Start: 2025-06-26

## 2025-07-07 NOTE — PROGRESS NOTES
[facility]     HISTORY OF PRESENT ILLNESS:    Yocasta Medina is a 60 y.o. year old male here for evaluation of rest of breath.  Mr. Medina reports that he has had increasing shortness of breath and increasing coughing.  He states that sometimes he does cough up into the point of being dizzy.  He states that his shortness of breath is worse at night he is currently using both Spiriva and Advair.  He also uses a Proventil inhaler as a rescue inhaler he reports that on a good day he may not need to use his Proventil at all however on a bad day with his breathing he is using it 3-4 times a night.  He was previously seen once in our office by my colleague on August 19 of 2019.  He reports to me that he does have a history of renal cell carcinoma he states that he had a procedure done at [facility] in [facility] in 2014 in which they took a piece of his upper lobe of his right lung and lower lobe of his right lung and he was told that he had cancer however he never had any chemotherapy or any radiation therapy it appears through review of his records that Dr. Jose BELTRE was unable to obtain those records in 2019.  The patient subsequently had a CAT scan in 2020 where he was found to have a lung nodule and was supposed to follow-up however was lost to follow-up at that time.  Regarding his overall history he reports that he started smoking when he was 13 he did smoke up to 2 packs/day and is now down to 1 pack/day.  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  He reports that he has 1 dog.  His hobbies include walking in the woods and looking for beckford mushrooms.    Updated HPI as of August 10, 2022  Patient seen and examined.  Reports that he is still smoking half pack to 15 cigarettes a day.  He reports that he feels that the Breztri does help.  He is using

## 2025-07-15 DIAGNOSIS — J98.4 HERNIATION OF RIGHT LUNG: ICD-10-CM

## 2025-07-15 DIAGNOSIS — M54.16 LUMBAR RADICULOPATHY: ICD-10-CM

## 2025-07-15 DIAGNOSIS — G89.4 CHRONIC PAIN SYNDROME: ICD-10-CM

## 2025-07-15 DIAGNOSIS — M54.41 CHRONIC BILATERAL LOW BACK PAIN WITH BILATERAL SCIATICA: ICD-10-CM

## 2025-07-15 DIAGNOSIS — G58.8 INTERCOSTAL NEURALGIA: ICD-10-CM

## 2025-07-15 DIAGNOSIS — G89.29 CHRONIC BILATERAL LOW BACK PAIN WITH BILATERAL SCIATICA: ICD-10-CM

## 2025-07-15 DIAGNOSIS — M54.42 CHRONIC BILATERAL LOW BACK PAIN WITH BILATERAL SCIATICA: ICD-10-CM

## 2025-07-15 RX ORDER — GABAPENTIN 600 MG/1
600 TABLET ORAL 4 TIMES DAILY
Qty: 120 TABLET | Refills: 0 | Status: SHIPPED | OUTPATIENT
Start: 2025-07-15 | End: 2025-07-21

## 2025-07-21 ENCOUNTER — OFFICE VISIT (OUTPATIENT)
Age: 60
End: 2025-07-21
Payer: MEDICARE

## 2025-07-21 VITALS
DIASTOLIC BLOOD PRESSURE: 80 MMHG | BODY MASS INDEX: 32.9 KG/M2 | OXYGEN SATURATION: 98 % | HEART RATE: 74 BPM | RESPIRATION RATE: 16 BRPM | TEMPERATURE: 96.9 F | HEIGHT: 71 IN | WEIGHT: 235 LBS | SYSTOLIC BLOOD PRESSURE: 118 MMHG

## 2025-07-21 DIAGNOSIS — M79.10 MYALGIA: ICD-10-CM

## 2025-07-21 DIAGNOSIS — R20.2 NUMBNESS AND TINGLING IN BOTH HANDS: ICD-10-CM

## 2025-07-21 DIAGNOSIS — G89.4 CHRONIC PAIN SYNDROME: Primary | ICD-10-CM

## 2025-07-21 DIAGNOSIS — J98.4 HERNIATION OF RIGHT LUNG: ICD-10-CM

## 2025-07-21 DIAGNOSIS — R25.2 SPASMS OF THE HANDS OR FEET: ICD-10-CM

## 2025-07-21 DIAGNOSIS — M54.42 CHRONIC BILATERAL LOW BACK PAIN WITH BILATERAL SCIATICA: ICD-10-CM

## 2025-07-21 DIAGNOSIS — G58.8 INTERCOSTAL NEURALGIA: ICD-10-CM

## 2025-07-21 DIAGNOSIS — M54.41 CHRONIC BILATERAL LOW BACK PAIN WITH BILATERAL SCIATICA: ICD-10-CM

## 2025-07-21 DIAGNOSIS — M54.16 LUMBAR RADICULOPATHY: ICD-10-CM

## 2025-07-21 DIAGNOSIS — R20.0 NUMBNESS AND TINGLING IN BOTH HANDS: ICD-10-CM

## 2025-07-21 DIAGNOSIS — G89.29 CHRONIC BILATERAL LOW BACK PAIN WITH BILATERAL SCIATICA: ICD-10-CM

## 2025-07-21 PROCEDURE — 1036F TOBACCO NON-USER: CPT | Performed by: PHYSICIAN ASSISTANT

## 2025-07-21 PROCEDURE — 3074F SYST BP LT 130 MM HG: CPT | Performed by: PHYSICIAN ASSISTANT

## 2025-07-21 PROCEDURE — 3017F COLORECTAL CA SCREEN DOC REV: CPT | Performed by: PHYSICIAN ASSISTANT

## 2025-07-21 PROCEDURE — 3079F DIAST BP 80-89 MM HG: CPT | Performed by: PHYSICIAN ASSISTANT

## 2025-07-21 PROCEDURE — 99213 OFFICE O/P EST LOW 20 MIN: CPT | Performed by: PHYSICIAN ASSISTANT

## 2025-07-21 PROCEDURE — G8417 CALC BMI ABV UP PARAM F/U: HCPCS | Performed by: PHYSICIAN ASSISTANT

## 2025-07-21 PROCEDURE — 99212 OFFICE O/P EST SF 10 MIN: CPT | Performed by: PHYSICIAN ASSISTANT

## 2025-07-21 PROCEDURE — G8427 DOCREV CUR MEDS BY ELIG CLIN: HCPCS | Performed by: PHYSICIAN ASSISTANT

## 2025-07-21 RX ORDER — GABAPENTIN 600 MG/1
600 TABLET ORAL 4 TIMES DAILY
Qty: 360 TABLET | Refills: 0 | Status: SHIPPED | OUTPATIENT
Start: 2025-07-21 | End: 2025-10-19

## 2025-07-21 RX ORDER — CYCLOBENZAPRINE HCL 10 MG
10 TABLET ORAL 2 TIMES DAILY PRN
Qty: 30 TABLET | Refills: 0 | Status: SHIPPED | OUTPATIENT
Start: 2025-07-21

## 2025-07-21 NOTE — PROGRESS NOTES
Yocasta Medina presents to the [facility] on 7/21/2025. Yocasta is complaining of pain in his back. The pain is constant. The pain is described as aching, stabbing, and sharp with spasms. Pain is rated on his best day at a 5, on his worst day at a 10, and on average at a 7 on the VAS scale. He took his last dose of Gabapentin and tylenol today.  Pt was given Flexeril through PCP and states it's been effective in treating spasms. Would like to get this refilled.    Any procedures since your last visit: No    He is  on NSAIDS and  is  on anticoagulation medications to include ASA and is managed by Lizet Tineo MD       Pacemaker or defibrillator: No    Medication Contract and Consent for Opioid Use Documents Filed        No documents found                       Resp 16   Ht 1.803 m (5' 10.98\")   Wt 106.6 kg (235 lb)   BMI 32.79 kg/m²      No LMP for male patient.  
Substance Monitoring:    Acute and Chronic Pain Monitoring:   RX Monitoring Periodic Controlled Substance Monitoring   7/21/2025   3:31 PM Possible medication side effects, risk of tolerance/dependence & alternative treatments discussed.;No signs of potential drug abuse or diversion identified.;Assessed functional status (ability to engage in work or other purposeful activities, the pain intensity and its interference with activities of daily living, quality of family life and social activities, and the physical activity);Obtaining appropriate analgesic effect of treatment.          We discussed with the patient that combining opioids, benzodiazepines, alcohol, illicit drugs or sleep aids increases the risk of respiratory depression including death. We discussed that these medications may cause drowsiness, sedation or dizziness and have counseled the patient not to drive or operate machinery. We have discussed that these medications will be prescribed only by one provider. We have discussed with the patient about age related risk factors and have thoroughly discussed the importance of taking these medications as prescribed. The patient verbalizes understanding.    ccreferring physic

## 2025-08-14 DIAGNOSIS — G58.8 INTERCOSTAL NEURALGIA: ICD-10-CM

## 2025-08-15 DIAGNOSIS — G58.8 INTERCOSTAL NEURALGIA: ICD-10-CM

## 2025-08-15 RX ORDER — ACETAMINOPHEN 500 MG
500 TABLET ORAL 4 TIMES DAILY PRN
Qty: 240 TABLET | Refills: 1 | OUTPATIENT
Start: 2025-08-15 | End: 2025-10-14

## 2025-08-15 RX ORDER — ACETAMINOPHEN 500 MG
500 TABLET ORAL 4 TIMES DAILY PRN
Qty: 240 TABLET | Refills: 2 | Status: SHIPPED | OUTPATIENT
Start: 2025-08-15 | End: 2025-10-14

## 2025-08-26 ENCOUNTER — TELEPHONE (OUTPATIENT)
Age: 60
End: 2025-08-26

## 2025-08-26 DIAGNOSIS — K21.9 GASTROESOPHAGEAL REFLUX DISEASE WITHOUT ESOPHAGITIS: ICD-10-CM

## 2025-08-28 RX ORDER — OMEPRAZOLE 40 MG/1
40 CAPSULE, DELAYED RELEASE ORAL DAILY
Qty: 90 CAPSULE | Refills: 1 | Status: SHIPPED | OUTPATIENT
Start: 2025-08-28

## (undated) DEVICE — GAUZE,SPONGE,4"X4",8PLY,STRL,LF,10/TRAY: Brand: MEDLINE

## (undated) DEVICE — CONTRAST AGENT KIT SYR 23 GA 10 CC TWIN PK KT W/ INTERJECT BX/10

## (undated) DEVICE — CONTAINER SPEC 60ML PH 7NEUTRAL BUFF FRMLN RDY TO USE

## (undated) DEVICE — Device: Brand: SPOT EX ENDOSCOPIC TATTOO

## (undated) DEVICE — TRAP POLYP ETRAP

## (undated) DEVICE — BLOCK BITE 60FR RUBBER ADLT DENTAL

## (undated) DEVICE — CONNECTOR IRRIGATION AUXILIARY H2O JET W/ PRT MTL THRD HYDR

## (undated) DEVICE — FORCEPS BX L160CM JAW DIA2.4MM YEL L CAP W/ NDL DISP RAD

## (undated) DEVICE — CONNECTOR TBNG AUX H2O JET DISP FOR OLY 160/180 SER

## (undated) DEVICE — DEFENDO AIR WATER SUCTION AND BIOPSY VALVE KIT FOR  OLYMPUS: Brand: DEFENDO AIR/WATER/SUCTION AND BIOPSY VALVE

## (undated) DEVICE — CANNULA NSL ORAL AD FOR CAPNOFLEX CO2 O2 AIRLFE

## (undated) DEVICE — BITEBLOCK 54FR W/ DENT RIM BLOX

## (undated) DEVICE — FORCEPS BX L240CM JAW DIA2.4MM WRK CHN 2.8MM ORNG L CAP W/

## (undated) DEVICE — SNARE ENDOSCP L240CM LOOP W13MM SHTH DIA2.4MM SM OVL FLX

## (undated) DEVICE — ELECTRODE PT RET AD L9FT HI MOIST COND ADH HYDRGEL CORDED

## (undated) DEVICE — GAUZE,SPONGE,POST-OP,4X3,STRL,LF: Brand: MEDLINE